# Patient Record
Sex: FEMALE | Race: WHITE | NOT HISPANIC OR LATINO
[De-identification: names, ages, dates, MRNs, and addresses within clinical notes are randomized per-mention and may not be internally consistent; named-entity substitution may affect disease eponyms.]

---

## 2015-08-31 RX ORDER — ASPIRIN/CALCIUM CARB/MAGNESIUM 324 MG
1 TABLET ORAL
Qty: 0 | Refills: 0 | DISCHARGE
Start: 2015-08-31

## 2017-01-04 ENCOUNTER — RX RENEWAL (OUTPATIENT)
Age: 67
End: 2017-01-04

## 2017-01-09 ENCOUNTER — NON-APPOINTMENT (OUTPATIENT)
Age: 67
End: 2017-01-09

## 2017-01-09 ENCOUNTER — APPOINTMENT (OUTPATIENT)
Dept: CARDIOLOGY | Facility: CLINIC | Age: 67
End: 2017-01-09

## 2017-01-09 VITALS
DIASTOLIC BLOOD PRESSURE: 62 MMHG | BODY MASS INDEX: 34.31 KG/M2 | SYSTOLIC BLOOD PRESSURE: 110 MMHG | OXYGEN SATURATION: 95 % | HEIGHT: 64 IN | HEART RATE: 82 BPM | WEIGHT: 201 LBS

## 2017-01-10 LAB — INR PPP: 1.7 RATIO

## 2017-01-23 ENCOUNTER — APPOINTMENT (OUTPATIENT)
Dept: CARDIOLOGY | Facility: CLINIC | Age: 67
End: 2017-01-23

## 2017-01-27 ENCOUNTER — APPOINTMENT (OUTPATIENT)
Dept: CARDIOLOGY | Facility: CLINIC | Age: 67
End: 2017-01-27

## 2017-01-27 LAB — INR PPP: 2.4 RATIO

## 2017-02-03 ENCOUNTER — APPOINTMENT (OUTPATIENT)
Dept: CARDIOLOGY | Facility: CLINIC | Age: 67
End: 2017-02-03

## 2017-02-06 ENCOUNTER — RX RENEWAL (OUTPATIENT)
Age: 67
End: 2017-02-06

## 2017-02-08 ENCOUNTER — APPOINTMENT (OUTPATIENT)
Dept: CARDIOLOGY | Facility: CLINIC | Age: 67
End: 2017-02-08

## 2017-02-08 LAB — INR PPP: 2.5 RATIO

## 2017-02-13 ENCOUNTER — RX RENEWAL (OUTPATIENT)
Age: 67
End: 2017-02-13

## 2017-02-23 ENCOUNTER — RX RENEWAL (OUTPATIENT)
Age: 67
End: 2017-02-23

## 2017-03-03 ENCOUNTER — APPOINTMENT (OUTPATIENT)
Dept: CARDIOLOGY | Facility: CLINIC | Age: 67
End: 2017-03-03

## 2017-03-06 LAB — INR PPP: 2.1 RATIO

## 2017-03-15 ENCOUNTER — APPOINTMENT (OUTPATIENT)
Dept: CARDIOLOGY | Facility: CLINIC | Age: 67
End: 2017-03-15

## 2017-03-15 VITALS — HEART RATE: 82 BPM | HEIGHT: 64 IN | WEIGHT: 201 LBS | BODY MASS INDEX: 34.31 KG/M2

## 2017-03-15 LAB
INR PPP: 2.5 RATIO
QUALITY CONTROL: YES

## 2017-03-20 ENCOUNTER — RX RENEWAL (OUTPATIENT)
Age: 67
End: 2017-03-20

## 2017-04-05 ENCOUNTER — APPOINTMENT (OUTPATIENT)
Dept: CARDIOLOGY | Facility: CLINIC | Age: 67
End: 2017-04-05

## 2017-04-05 VITALS — HEIGHT: 64 IN | BODY MASS INDEX: 34.31 KG/M2 | HEART RATE: 82 BPM | WEIGHT: 201 LBS

## 2017-04-05 LAB
INR PPP: 1.6 RATIO
QUALITY CONTROL: YES

## 2017-04-19 ENCOUNTER — APPOINTMENT (OUTPATIENT)
Dept: CARDIOLOGY | Facility: CLINIC | Age: 67
End: 2017-04-19

## 2017-04-19 VITALS — BODY MASS INDEX: 34.31 KG/M2 | WEIGHT: 201 LBS | HEART RATE: 82 BPM | HEIGHT: 64 IN

## 2017-04-19 LAB
INR PPP: 2 RATIO
QUALITY CONTROL: YES

## 2017-05-03 ENCOUNTER — APPOINTMENT (OUTPATIENT)
Dept: CARDIOLOGY | Facility: CLINIC | Age: 67
End: 2017-05-03

## 2017-05-03 VITALS — HEART RATE: 82 BPM | WEIGHT: 201 LBS | BODY MASS INDEX: 34.31 KG/M2 | HEIGHT: 64 IN

## 2017-05-03 LAB
INR PPP: 3 RATIO
QUALITY CONTROL: YES

## 2017-05-24 ENCOUNTER — APPOINTMENT (OUTPATIENT)
Dept: CARDIOLOGY | Facility: CLINIC | Age: 67
End: 2017-05-24

## 2017-05-25 VITALS — HEIGHT: 64 IN | BODY MASS INDEX: 34.31 KG/M2 | HEART RATE: 82 BPM | WEIGHT: 201 LBS

## 2017-05-25 LAB
INR PPP: 3.3 RATIO
QUALITY CONTROL: YES

## 2017-06-07 ENCOUNTER — APPOINTMENT (OUTPATIENT)
Dept: CARDIOLOGY | Facility: CLINIC | Age: 67
End: 2017-06-07

## 2017-06-07 VITALS — HEIGHT: 64 IN | HEART RATE: 82 BPM | WEIGHT: 201 LBS | BODY MASS INDEX: 34.31 KG/M2

## 2017-06-07 LAB
INR PPP: 2.1 RATIO
QUALITY CONTROL: YES

## 2017-06-28 ENCOUNTER — APPOINTMENT (OUTPATIENT)
Dept: CARDIOLOGY | Facility: CLINIC | Age: 67
End: 2017-06-28

## 2017-06-28 VITALS — HEIGHT: 64 IN | BODY MASS INDEX: 34.31 KG/M2 | HEART RATE: 82 BPM | WEIGHT: 201 LBS

## 2017-06-28 LAB
INR PPP: 2.1 RATIO
QUALITY CONTROL: YES

## 2017-07-05 ENCOUNTER — RX RENEWAL (OUTPATIENT)
Age: 67
End: 2017-07-05

## 2017-07-24 ENCOUNTER — RX RENEWAL (OUTPATIENT)
Age: 67
End: 2017-07-24

## 2017-07-26 ENCOUNTER — APPOINTMENT (OUTPATIENT)
Dept: CARDIOLOGY | Facility: CLINIC | Age: 67
End: 2017-07-26

## 2017-07-31 ENCOUNTER — APPOINTMENT (OUTPATIENT)
Dept: CARDIOLOGY | Facility: CLINIC | Age: 67
End: 2017-07-31
Payer: COMMERCIAL

## 2017-07-31 VITALS — HEIGHT: 64 IN | BODY MASS INDEX: 34.31 KG/M2 | WEIGHT: 201 LBS | HEART RATE: 82 BPM

## 2017-07-31 LAB
INR PPP: 2.1 RATIO
QUALITY CONTROL: YES

## 2017-07-31 PROCEDURE — 85610 PROTHROMBIN TIME: CPT | Mod: QW

## 2017-07-31 PROCEDURE — 99211 OFF/OP EST MAY X REQ PHY/QHP: CPT

## 2017-08-14 ENCOUNTER — RX RENEWAL (OUTPATIENT)
Age: 67
End: 2017-08-14

## 2017-08-16 ENCOUNTER — MEDICATION RENEWAL (OUTPATIENT)
Age: 67
End: 2017-08-16

## 2017-08-22 ENCOUNTER — RX RENEWAL (OUTPATIENT)
Age: 67
End: 2017-08-22

## 2017-09-01 ENCOUNTER — APPOINTMENT (OUTPATIENT)
Dept: CARDIOLOGY | Facility: CLINIC | Age: 67
End: 2017-09-01
Payer: COMMERCIAL

## 2017-09-01 PROCEDURE — 85610 PROTHROMBIN TIME: CPT | Mod: QW

## 2017-09-01 PROCEDURE — 99211 OFF/OP EST MAY X REQ PHY/QHP: CPT

## 2017-09-06 VITALS — HEIGHT: 64 IN | BODY MASS INDEX: 34.31 KG/M2 | WEIGHT: 201 LBS | HEART RATE: 82 BPM

## 2017-09-06 LAB
INR PPP: 2.4 RATIO
QUALITY CONTROL: YES

## 2017-09-25 ENCOUNTER — APPOINTMENT (OUTPATIENT)
Dept: CARDIOLOGY | Facility: CLINIC | Age: 67
End: 2017-09-25
Payer: COMMERCIAL

## 2017-09-25 VITALS — WEIGHT: 201 LBS | HEART RATE: 82 BPM | HEIGHT: 64 IN | BODY MASS INDEX: 34.31 KG/M2

## 2017-09-25 LAB
INR PPP: 2 RATIO
QUALITY CONTROL: YES

## 2017-09-25 PROCEDURE — 99211 OFF/OP EST MAY X REQ PHY/QHP: CPT

## 2017-09-25 PROCEDURE — 85610 PROTHROMBIN TIME: CPT | Mod: QW

## 2017-10-26 ENCOUNTER — APPOINTMENT (OUTPATIENT)
Dept: CARDIOLOGY | Facility: CLINIC | Age: 67
End: 2017-10-26
Payer: COMMERCIAL

## 2017-10-26 ENCOUNTER — NON-APPOINTMENT (OUTPATIENT)
Age: 67
End: 2017-10-26

## 2017-10-26 VITALS — BODY MASS INDEX: 32.44 KG/M2 | HEART RATE: 84 BPM | WEIGHT: 190 LBS | HEIGHT: 64 IN

## 2017-10-26 VITALS
HEART RATE: 84 BPM | DIASTOLIC BLOOD PRESSURE: 80 MMHG | OXYGEN SATURATION: 96 % | SYSTOLIC BLOOD PRESSURE: 125 MMHG | WEIGHT: 190 LBS | BODY MASS INDEX: 32.44 KG/M2 | HEIGHT: 64 IN

## 2017-10-26 LAB
INR PPP: 1.7 RATIO
QUALITY CONTROL: YES

## 2017-10-26 PROCEDURE — 93000 ELECTROCARDIOGRAM COMPLETE: CPT

## 2017-10-26 PROCEDURE — 99214 OFFICE O/P EST MOD 30 MIN: CPT

## 2017-10-26 PROCEDURE — 85610 PROTHROMBIN TIME: CPT | Mod: QW

## 2017-11-08 ENCOUNTER — APPOINTMENT (OUTPATIENT)
Dept: CARDIOLOGY | Facility: CLINIC | Age: 67
End: 2017-11-08
Payer: COMMERCIAL

## 2017-11-08 PROCEDURE — 93283 PRGRMG EVAL IMPLANTABLE DFB: CPT

## 2017-11-20 ENCOUNTER — APPOINTMENT (OUTPATIENT)
Dept: CARDIOLOGY | Facility: CLINIC | Age: 67
End: 2017-11-20
Payer: COMMERCIAL

## 2017-11-20 VITALS — BODY MASS INDEX: 32.44 KG/M2 | WEIGHT: 190 LBS | HEIGHT: 64 IN | HEART RATE: 82 BPM

## 2017-11-20 LAB
INR PPP: 1.7 RATIO
QUALITY CONTROL: YES

## 2017-11-20 PROCEDURE — 85610 PROTHROMBIN TIME: CPT | Mod: QW

## 2017-11-20 PROCEDURE — 99211 OFF/OP EST MAY X REQ PHY/QHP: CPT

## 2017-12-11 ENCOUNTER — APPOINTMENT (OUTPATIENT)
Dept: CARDIOLOGY | Facility: CLINIC | Age: 67
End: 2017-12-11
Payer: COMMERCIAL

## 2017-12-11 PROCEDURE — 85610 PROTHROMBIN TIME: CPT | Mod: QW

## 2017-12-11 PROCEDURE — 99211 OFF/OP EST MAY X REQ PHY/QHP: CPT

## 2017-12-12 VITALS — BODY MASS INDEX: 32.44 KG/M2 | HEIGHT: 64 IN | WEIGHT: 190 LBS | HEART RATE: 82 BPM

## 2017-12-12 LAB
INR PPP: 3.5 RATIO
QUALITY CONTROL: YES

## 2017-12-15 ENCOUNTER — RX RENEWAL (OUTPATIENT)
Age: 67
End: 2017-12-15

## 2017-12-28 ENCOUNTER — APPOINTMENT (OUTPATIENT)
Dept: CARDIOLOGY | Facility: CLINIC | Age: 67
End: 2017-12-28
Payer: COMMERCIAL

## 2017-12-28 PROCEDURE — 85610 PROTHROMBIN TIME: CPT | Mod: QW

## 2017-12-28 PROCEDURE — 99211 OFF/OP EST MAY X REQ PHY/QHP: CPT

## 2017-12-29 VITALS — WEIGHT: 190 LBS | HEIGHT: 64 IN | BODY MASS INDEX: 32.44 KG/M2 | HEART RATE: 82 BPM

## 2018-01-02 LAB
INR PPP: 2.7 RATIO
QUALITY CONTROL: YES

## 2018-01-03 ENCOUNTER — CHART COPY (OUTPATIENT)
Age: 68
End: 2018-01-03

## 2018-01-03 ENCOUNTER — OTHER (OUTPATIENT)
Age: 68
End: 2018-01-03

## 2018-01-23 ENCOUNTER — RX RENEWAL (OUTPATIENT)
Age: 68
End: 2018-01-23

## 2018-01-24 ENCOUNTER — APPOINTMENT (OUTPATIENT)
Dept: CARDIOLOGY | Facility: CLINIC | Age: 68
End: 2018-01-24
Payer: COMMERCIAL

## 2018-01-24 VITALS — WEIGHT: 190 LBS | HEIGHT: 64 IN | BODY MASS INDEX: 32.44 KG/M2 | HEART RATE: 82 BPM

## 2018-01-24 LAB
INR PPP: 2.3 RATIO
QUALITY CONTROL: YES

## 2018-01-24 PROCEDURE — 99211 OFF/OP EST MAY X REQ PHY/QHP: CPT

## 2018-01-24 PROCEDURE — 85610 PROTHROMBIN TIME: CPT | Mod: QW

## 2018-02-01 ENCOUNTER — RX RENEWAL (OUTPATIENT)
Age: 68
End: 2018-02-01

## 2018-02-19 ENCOUNTER — RX RENEWAL (OUTPATIENT)
Age: 68
End: 2018-02-19

## 2018-02-23 ENCOUNTER — APPOINTMENT (OUTPATIENT)
Dept: CARDIOLOGY | Facility: CLINIC | Age: 68
End: 2018-02-23
Payer: COMMERCIAL

## 2018-02-23 VITALS — HEART RATE: 82 BPM | WEIGHT: 190 LBS | BODY MASS INDEX: 32.44 KG/M2 | HEIGHT: 64 IN

## 2018-02-23 LAB
INR PPP: 3.9 RATIO
QUALITY CONTROL: YES

## 2018-02-23 PROCEDURE — 85610 PROTHROMBIN TIME: CPT | Mod: QW

## 2018-02-23 PROCEDURE — 99211 OFF/OP EST MAY X REQ PHY/QHP: CPT

## 2018-03-09 ENCOUNTER — APPOINTMENT (OUTPATIENT)
Dept: CARDIOLOGY | Facility: CLINIC | Age: 68
End: 2018-03-09
Payer: COMMERCIAL

## 2018-03-09 VITALS — HEART RATE: 82 BPM | WEIGHT: 190 LBS | HEIGHT: 64 IN | BODY MASS INDEX: 32.44 KG/M2

## 2018-03-09 PROCEDURE — 99211 OFF/OP EST MAY X REQ PHY/QHP: CPT

## 2018-03-09 PROCEDURE — 85610 PROTHROMBIN TIME: CPT | Mod: QW

## 2018-03-10 LAB
INR PPP: 2.1 RATIO
QUALITY CONTROL: YES

## 2018-03-28 ENCOUNTER — APPOINTMENT (OUTPATIENT)
Dept: CARDIOLOGY | Facility: CLINIC | Age: 68
End: 2018-03-28
Payer: COMMERCIAL

## 2018-03-28 VITALS — HEART RATE: 82 BPM | WEIGHT: 190 LBS | BODY MASS INDEX: 32.44 KG/M2 | HEIGHT: 64 IN

## 2018-03-28 LAB
INR PPP: 1.4 RATIO
QUALITY CONTROL: YES

## 2018-03-28 PROCEDURE — 99211 OFF/OP EST MAY X REQ PHY/QHP: CPT

## 2018-03-28 PROCEDURE — 85610 PROTHROMBIN TIME: CPT | Mod: QW

## 2018-04-13 ENCOUNTER — APPOINTMENT (OUTPATIENT)
Dept: CARDIOLOGY | Facility: CLINIC | Age: 68
End: 2018-04-13
Payer: COMMERCIAL

## 2018-04-13 PROCEDURE — 85610 PROTHROMBIN TIME: CPT | Mod: QW

## 2018-04-13 PROCEDURE — 99211 OFF/OP EST MAY X REQ PHY/QHP: CPT

## 2018-04-15 LAB
INR PPP: 2.9 RATIO
QUALITY CONTROL: YES

## 2018-05-03 ENCOUNTER — APPOINTMENT (OUTPATIENT)
Dept: CARDIOLOGY | Facility: CLINIC | Age: 68
End: 2018-05-03

## 2018-05-04 ENCOUNTER — APPOINTMENT (OUTPATIENT)
Dept: CARDIOLOGY | Facility: CLINIC | Age: 68
End: 2018-05-04
Payer: COMMERCIAL

## 2018-05-04 VITALS — WEIGHT: 190 LBS | HEART RATE: 82 BPM | BODY MASS INDEX: 32.44 KG/M2 | HEIGHT: 64 IN

## 2018-05-04 LAB
INR PPP: 2.4 RATIO
QUALITY CONTROL: YES

## 2018-05-04 PROCEDURE — 99211 OFF/OP EST MAY X REQ PHY/QHP: CPT

## 2018-05-04 PROCEDURE — 85610 PROTHROMBIN TIME: CPT | Mod: QW

## 2018-05-16 ENCOUNTER — EMERGENCY (EMERGENCY)
Facility: HOSPITAL | Age: 68
LOS: 1 days | Discharge: HOME | End: 2018-05-16
Attending: STUDENT IN AN ORGANIZED HEALTH CARE EDUCATION/TRAINING PROGRAM | Admitting: STUDENT IN AN ORGANIZED HEALTH CARE EDUCATION/TRAINING PROGRAM
Payer: COMMERCIAL

## 2018-05-16 VITALS
DIASTOLIC BLOOD PRESSURE: 85 MMHG | SYSTOLIC BLOOD PRESSURE: 124 MMHG | RESPIRATION RATE: 20 BRPM | HEART RATE: 78 BPM | TEMPERATURE: 98 F | OXYGEN SATURATION: 100 %

## 2018-05-16 VITALS
DIASTOLIC BLOOD PRESSURE: 55 MMHG | SYSTOLIC BLOOD PRESSURE: 96 MMHG | RESPIRATION RATE: 20 BRPM | OXYGEN SATURATION: 98 % | HEART RATE: 70 BPM | TEMPERATURE: 98 F

## 2018-05-16 LAB
ALBUMIN SERPL ELPH-MCNC: 3.4 G/DL — SIGNIFICANT CHANGE UP (ref 3.3–5)
ALP SERPL-CCNC: 69 U/L — SIGNIFICANT CHANGE UP (ref 40–120)
ALT FLD-CCNC: 25 U/L — SIGNIFICANT CHANGE UP (ref 12–78)
ANION GAP SERPL CALC-SCNC: 8 MMOL/L — SIGNIFICANT CHANGE UP (ref 5–17)
APTT BLD: 43.8 SEC — HIGH (ref 27.5–37.4)
AST SERPL-CCNC: 28 U/L — SIGNIFICANT CHANGE UP (ref 15–37)
BASOPHILS # BLD AUTO: 0.1 K/UL — SIGNIFICANT CHANGE UP (ref 0–0.2)
BASOPHILS NFR BLD AUTO: 0.7 % — SIGNIFICANT CHANGE UP (ref 0–2)
BILIRUB SERPL-MCNC: 0.9 MG/DL — SIGNIFICANT CHANGE UP (ref 0.2–1.2)
BUN SERPL-MCNC: 22 MG/DL — SIGNIFICANT CHANGE UP (ref 7–23)
CALCIUM SERPL-MCNC: 8.5 MG/DL — SIGNIFICANT CHANGE UP (ref 8.5–10.1)
CHLORIDE SERPL-SCNC: 106 MMOL/L — SIGNIFICANT CHANGE UP (ref 96–108)
CK MB BLD-MCNC: 2.7 % — SIGNIFICANT CHANGE UP (ref 0–3.5)
CK MB CFR SERPL CALC: 2.6 NG/ML — SIGNIFICANT CHANGE UP (ref 0–3.6)
CK SERPL-CCNC: 96 U/L — SIGNIFICANT CHANGE UP (ref 26–192)
CO2 SERPL-SCNC: 29 MMOL/L — SIGNIFICANT CHANGE UP (ref 22–31)
CREAT SERPL-MCNC: 1.2 MG/DL — SIGNIFICANT CHANGE UP (ref 0.5–1.3)
EOSINOPHIL # BLD AUTO: 0.1 K/UL — SIGNIFICANT CHANGE UP (ref 0–0.5)
EOSINOPHIL NFR BLD AUTO: 1.9 % — SIGNIFICANT CHANGE UP (ref 0–6)
GLUCOSE SERPL-MCNC: 144 MG/DL — HIGH (ref 70–99)
HCT VFR BLD CALC: 40.6 % — SIGNIFICANT CHANGE UP (ref 34.5–45)
HGB BLD-MCNC: 13.1 G/DL — SIGNIFICANT CHANGE UP (ref 11.5–15.5)
INR BLD: 2.86 RATIO — HIGH (ref 0.88–1.16)
LYMPHOCYTES # BLD AUTO: 1.2 K/UL — SIGNIFICANT CHANGE UP (ref 1–3.3)
LYMPHOCYTES # BLD AUTO: 15 % — SIGNIFICANT CHANGE UP (ref 13–44)
MCHC RBC-ENTMCNC: 28.9 PG — SIGNIFICANT CHANGE UP (ref 27–34)
MCHC RBC-ENTMCNC: 32.2 GM/DL — SIGNIFICANT CHANGE UP (ref 32–36)
MCV RBC AUTO: 89.6 FL — SIGNIFICANT CHANGE UP (ref 80–100)
MONOCYTES # BLD AUTO: 0.5 K/UL — SIGNIFICANT CHANGE UP (ref 0–0.9)
MONOCYTES NFR BLD AUTO: 6.9 % — SIGNIFICANT CHANGE UP (ref 1–9)
NEUTROPHILS # BLD AUTO: 5.9 K/UL — SIGNIFICANT CHANGE UP (ref 1.8–7.4)
NEUTROPHILS NFR BLD AUTO: 75.6 % — SIGNIFICANT CHANGE UP (ref 43–77)
NT-PROBNP SERPL-SCNC: 2676 PG/ML — HIGH (ref 0–125)
PLATELET # BLD AUTO: 107 K/UL — LOW (ref 150–400)
POTASSIUM SERPL-MCNC: 3.7 MMOL/L — SIGNIFICANT CHANGE UP (ref 3.5–5.3)
POTASSIUM SERPL-SCNC: 3.7 MMOL/L — SIGNIFICANT CHANGE UP (ref 3.5–5.3)
PROT SERPL-MCNC: 6.8 G/DL — SIGNIFICANT CHANGE UP (ref 6–8.3)
PROTHROM AB SERPL-ACNC: 31.8 SEC — HIGH (ref 9.8–12.7)
RBC # BLD: 4.53 M/UL — SIGNIFICANT CHANGE UP (ref 3.8–5.2)
RBC # FLD: 14.2 % — SIGNIFICANT CHANGE UP (ref 10.3–14.5)
SODIUM SERPL-SCNC: 143 MMOL/L — SIGNIFICANT CHANGE UP (ref 135–145)
TROPONIN I SERPL-MCNC: 0.03 NG/ML — SIGNIFICANT CHANGE UP (ref 0.01–0.04)
WBC # BLD: 7.8 K/UL — SIGNIFICANT CHANGE UP (ref 3.8–10.5)
WBC # FLD AUTO: 7.8 K/UL — SIGNIFICANT CHANGE UP (ref 3.8–10.5)

## 2018-05-16 PROCEDURE — 99284 EMERGENCY DEPT VISIT MOD MDM: CPT | Mod: 25

## 2018-05-16 PROCEDURE — 71045 X-RAY EXAM CHEST 1 VIEW: CPT

## 2018-05-16 PROCEDURE — 85610 PROTHROMBIN TIME: CPT

## 2018-05-16 PROCEDURE — 83880 ASSAY OF NATRIURETIC PEPTIDE: CPT

## 2018-05-16 PROCEDURE — 71045 X-RAY EXAM CHEST 1 VIEW: CPT | Mod: 26

## 2018-05-16 PROCEDURE — 93005 ELECTROCARDIOGRAM TRACING: CPT

## 2018-05-16 PROCEDURE — 84484 ASSAY OF TROPONIN QUANT: CPT

## 2018-05-16 PROCEDURE — 99285 EMERGENCY DEPT VISIT HI MDM: CPT

## 2018-05-16 PROCEDURE — 80053 COMPREHEN METABOLIC PANEL: CPT

## 2018-05-16 PROCEDURE — 82550 ASSAY OF CK (CPK): CPT

## 2018-05-16 PROCEDURE — 96374 THER/PROPH/DIAG INJ IV PUSH: CPT

## 2018-05-16 PROCEDURE — 93010 ELECTROCARDIOGRAM REPORT: CPT

## 2018-05-16 PROCEDURE — 85730 THROMBOPLASTIN TIME PARTIAL: CPT

## 2018-05-16 PROCEDURE — 85027 COMPLETE CBC AUTOMATED: CPT

## 2018-05-16 PROCEDURE — 82553 CREATINE MB FRACTION: CPT

## 2018-05-16 RX ORDER — ASPIRIN/CALCIUM CARB/MAGNESIUM 324 MG
325 TABLET ORAL ONCE
Qty: 0 | Refills: 0 | Status: COMPLETED | OUTPATIENT
Start: 2018-05-16 | End: 2018-05-16

## 2018-05-16 RX ORDER — SODIUM CHLORIDE 9 MG/ML
3 INJECTION INTRAMUSCULAR; INTRAVENOUS; SUBCUTANEOUS ONCE
Qty: 0 | Refills: 0 | Status: COMPLETED | OUTPATIENT
Start: 2018-05-16 | End: 2018-05-16

## 2018-05-16 RX ORDER — FUROSEMIDE 40 MG
40 TABLET ORAL ONCE
Qty: 0 | Refills: 0 | Status: COMPLETED | OUTPATIENT
Start: 2018-05-16 | End: 2018-05-16

## 2018-05-16 RX ADMIN — SODIUM CHLORIDE 3 MILLILITER(S): 9 INJECTION INTRAMUSCULAR; INTRAVENOUS; SUBCUTANEOUS at 01:45

## 2018-05-16 RX ADMIN — Medication 40 MILLIGRAM(S): at 01:45

## 2018-05-16 RX ADMIN — Medication 325 MILLIGRAM(S): at 02:34

## 2018-05-16 NOTE — ED PROVIDER NOTE - PROGRESS NOTE DETAILS
Patient feels better. Offered admission for CHF exacerbation.  Patient refused.  States she does not want to be admitted until seen by Dr. Curtis this morning Patient seen by monica Avalos to send home. No need for 2nd set CE. She will increase her dose of diuretic. Patient aware and agreeable with plan

## 2018-05-16 NOTE — ED PROVIDER NOTE - PMH
Arrhythmia    Benign Hypertension    CAD (Coronary Artery Disease)    Cerebellar Infarction    CHF (congestive heart failure)    CVA (Cerebral Vascular Accident)    Diabetes Mellitus, Type 2    Former smoker    HLD (hyperlipidemia)    Hyperthyroidism    MI (myocardial infarction)    Obesity    TIA (Transient Ischemic Attack)    Unsteady gait

## 2018-05-16 NOTE — ED ADULT NURSE NOTE - OBJECTIVE STATEMENT
received pt stable with c/o chest pressure and sob pt placed oncardiac monitor and ekg done and reviwed by MD xray done and blood work drawned  and sent to lab pt medicated with  lasix 40mg ivp as ordered

## 2018-05-16 NOTE — CONSULT NOTE ADULT - ASSESSMENT
Lisa presents with subacute onset of edema and dyspnea likely due in large part to dietary indiscretion from her multiple stressors. Her pacemaker/ICD is functioning well and she has known underlying atrial fibrillation. She is no evidence for an acute coronary syndrome or significant acute decompensated heart failure. She can be discharged home and her torsemide dose will be increased to 80 mg daily for several days. She will followup in the office later this week. No cardiac evaluation is necessary at this time and counseling were present a greater than 50% of her visit which was 50 minutes in duration

## 2018-05-16 NOTE — ED PROVIDER NOTE - MUSCULOSKELETAL, MLM
Spine appears normal, range of motion is not limited, no muscle or joint tenderness, b/l LE pitting edema 1+, L>R

## 2018-05-16 NOTE — CONSULT NOTE ADULT - SUBJECTIVE AND OBJECTIVE BOX
[facility] Consultation    CHIEF COMPLAINT: Patient is a 67y old  Female who presents with a chief complaint of edema and SOB    HPI:  Patient came to the emergency room after not feeling well for several days. She has had some mild increasing dyspnea but has noted edema of her legs which has been getting worse over the last 2 weeks. She reports being noncompliant with a sodium restricted diet will stressors at home including her 's hospitalization and her children living in her house. She works regularly and has been able to do her normal activity but is more fatigued at the end of the day. She reports no anginal chest pain, palpitations, lightheadedness, or syncope.    Past medical history is remarkable for coronary artery disease status post myocardial infarction, ischemic cardiomyopathy, ICD, PAF, stroke syndrome with intracranial hemorrhage several years ago, recently doing well       PAST MEDICAL & SURGICAL HISTORY:  Unsteady gait  CHF (congestive heart failure)  Arrhythmia  HLD (hyperlipidemia)  MI (myocardial infarction)  Obesity  Former smoker  Hyperthyroidism  TIA (Transient Ischemic Attack)  Cerebellar Infarction  CVA (Cerebral Vascular Accident)  Diabetes Mellitus, Type 2  CAD (Coronary Artery Disease)  Benign Hypertension  ICD  Cardiac Pacemaker  S/P Hysterectomy  S/P Ventriculoperitoneal Shunt  S/P Craniotomy  CABG (Coronary Artery Bypass Graft)  CABG (Coronary Artery Bypass Graft)      SOCIAL HISTORY: no tob/etoh    FAMILY HISTORY: no CAD, CHF  Family history of heart disease      MEDICATIONS  (STANDING): Aspirin, carvedilol, Demadex, Aldactone, Zocor, insulin, warfarin, Tapazole, digoxin    Allergies    No Known Allergies    REVIEW OF SYSTEMS:    CONSTITUTIONAL: No fevers or chills; pos fatigue  EYES: No visual changes, No diplopia  ENMT: No throat pain , No exudate  NECK: No pain or stiffness  RESPIRATORY: No cough, wheezing, hemoptysis; pos shortness of breath  CARDIOVASCULAR: No chest pain or palpitations  GASTROINTESTINAL: No abdominal pain. No nausea, vomiting, or hematemesis; No diarrhea or constipation. No melena or hematochezia.  GENITOURINARY: No dysuria, frequency or hematuria  NEUROLOGICAL: No numbness or weakness  SKIN: No itching or rash  All other review of systems is negative unless indicated above    VITAL SIGNS:   Vital Signs Last 24 Hrs  T(C): 36.4 (16 May 2018 07:20), Max: 36.8 (16 May 2018 06:09)  T(F): 97.5 (16 May 2018 07:20), Max: 98.2 (16 May 2018 06:09)  HR: 70 (16 May 2018 07:20) (70 - 78)  BP: 96/55 (16 May 2018 07:20) (96/55 - 124/85)  BP(mean): --  RR: 20 (16 May 2018 07:20) (20 - 22)  SpO2: 98% (16 May 2018 07:20) (97% - 100%)    I&O's Summary      PHYSICAL EXAM:    Constitutional: NAD, awake and alert, well-developed  Eyes:  EOMI,  Pupils round, no lesions  ENMT: no exudate or erythema  Pulmonary: Non-labored, breath sounds are clear bilaterally, No wheezing, rales or rhonchi  Cardiovascular: PMI not palpable Regular S1 and S2, no murmurs, rubs, gallops or clicks  Gastrointestinal: Bowel Sounds present, soft, nontender.   Lymph:  No cervical lymphadenopathy.  Neurological: Alert, no focal deficits  Skin: No rashes. Changes of chronic venous stasis. No cyanosis.  Psych:  Mood & affect appropriate  Ext: 1+ bilat leg edema    LABS: All Labs Reviewed:                        13.1   7.8   )-----------( 107      ( 16 May 2018 01:56 )             40.6     16 May 2018 01:56    143    |  106    |  22     ----------------------------<  144    3.7     |  29     |  1.20     Ca    8.5        16 May 2018 01:56    TPro  6.8    /  Alb  3.4    /  TBili  0.9    /  DBili  x      /  AST  28     /  ALT  25     /  AlkPhos  69     16 May 2018 01:56    PT/INR - ( 16 May 2018 01:56 )   PT: 31.8 sec;   INR: 2.86 ratio         PTT - ( 16 May 2018 01:56 )  PTT:43.8 sec  CARDIAC MARKERS ( 16 May 2018 01:56 )  .025 ng/mL / x     / 96 U/L / x     / 2.6 ng/mL        05-16 @ 01:56  Pro Bnp [id number]    ECG: AF, v paced      < from: Xray Chest 1 View AP/PA (05.16.18 @ 02:04) >    EXAM:  XR CHEST AP OR PA 1V                            PROCEDURE DATE:  05/16/2018          INTERPRETATION:  Chest pain.    AP chest. Prior 5/21/2016.    Status post median sternotomy. Left cardiac pacer in situ. Heart probably   enlarged even accounting for AP film. No consolidation or effusion.    Impression: As above                BEN ESTRELLA M.D., ATTENDING RADIOLOGIST  This document has been electronically signed. May 16 2018  9:15AM                < end of copied text >

## 2018-05-16 NOTE — ED PROVIDER NOTE - CHPI ED SYMPTOMS NEG
no chills/no diaphoresis/no vomiting/no fever/no dizziness/no cough/no back pain/no nausea/no syncope

## 2018-05-16 NOTE — ED ADULT NURSE REASSESSMENT NOTE - NS ED NURSE REASSESS COMMENT FT1
pt stable no c/o voiced at present and voided 200mls of urine vital signs stable
pt reavaluated and vital signs stable voided 600MLS of clear urine awaiting CE to be drawned at 8 am pt verbalizes no disconfort at present

## 2018-05-16 NOTE — ED PROVIDER NOTE - OBJECTIVE STATEMENT
67 year old female with extensive PMH including CHF, IDDM, CAD with 2 stents, TIA, CVA presents with chest pain and SOB. Patient states she has been increasingly fatigued for the last few days and she has noticed worsening b/l LE edema L>R despite compliance with aldactone.  Last night while at rest, she experienced SOB and mild left-sided chest pressure.  She walked up a flight of stairs which did not worsen the symptoms but they persisted.  Took ASA 81mg at 10:30pm last night.  Symptoms improved when EMS administered O2 via nasal canula.  She is not on O2 at home. PMD Leonel Mosqueda, Cardiology Dr. Curtis

## 2018-05-23 ENCOUNTER — APPOINTMENT (OUTPATIENT)
Dept: CARDIOLOGY | Facility: CLINIC | Age: 68
End: 2018-05-23
Payer: COMMERCIAL

## 2018-05-23 PROCEDURE — 93283 PRGRMG EVAL IMPLANTABLE DFB: CPT

## 2018-06-04 ENCOUNTER — APPOINTMENT (OUTPATIENT)
Dept: CARDIOLOGY | Facility: CLINIC | Age: 68
End: 2018-06-04
Payer: COMMERCIAL

## 2018-06-04 ENCOUNTER — NON-APPOINTMENT (OUTPATIENT)
Age: 68
End: 2018-06-04

## 2018-06-04 VITALS
DIASTOLIC BLOOD PRESSURE: 76 MMHG | HEIGHT: 64 IN | BODY MASS INDEX: 34.31 KG/M2 | OXYGEN SATURATION: 93 % | SYSTOLIC BLOOD PRESSURE: 120 MMHG | WEIGHT: 201 LBS | HEART RATE: 84 BPM

## 2018-06-04 VITALS — RESPIRATION RATE: 12 BRPM | HEART RATE: 65 BPM | SYSTOLIC BLOOD PRESSURE: 110 MMHG | DIASTOLIC BLOOD PRESSURE: 70 MMHG

## 2018-06-04 LAB
INR PPP: 3 RATIO
QUALITY CONTROL: YES

## 2018-06-04 PROCEDURE — 99214 OFFICE O/P EST MOD 30 MIN: CPT

## 2018-06-04 PROCEDURE — 93000 ELECTROCARDIOGRAM COMPLETE: CPT

## 2018-06-15 ENCOUNTER — OTHER (OUTPATIENT)
Age: 68
End: 2018-06-15

## 2018-06-22 ENCOUNTER — APPOINTMENT (OUTPATIENT)
Dept: CARDIOLOGY | Facility: CLINIC | Age: 68
End: 2018-06-22
Payer: COMMERCIAL

## 2018-06-22 PROCEDURE — 93306 TTE W/DOPPLER COMPLETE: CPT

## 2018-06-25 ENCOUNTER — APPOINTMENT (OUTPATIENT)
Dept: CARDIOLOGY | Facility: CLINIC | Age: 68
End: 2018-06-25

## 2018-06-26 ENCOUNTER — APPOINTMENT (OUTPATIENT)
Dept: CARDIOLOGY | Facility: CLINIC | Age: 68
End: 2018-06-26

## 2018-06-26 ENCOUNTER — EMERGENCY (EMERGENCY)
Facility: HOSPITAL | Age: 68
LOS: 1 days | Discharge: HOME | End: 2018-06-26
Attending: EMERGENCY MEDICINE
Payer: COMMERCIAL

## 2018-06-26 VITALS
TEMPERATURE: 98 F | RESPIRATION RATE: 16 BRPM | HEART RATE: 71 BPM | WEIGHT: 190.04 LBS | HEIGHT: 64 IN | SYSTOLIC BLOOD PRESSURE: 114 MMHG | OXYGEN SATURATION: 97 % | DIASTOLIC BLOOD PRESSURE: 81 MMHG

## 2018-06-26 VITALS
TEMPERATURE: 98 F | HEART RATE: 70 BPM | SYSTOLIC BLOOD PRESSURE: 107 MMHG | DIASTOLIC BLOOD PRESSURE: 71 MMHG | OXYGEN SATURATION: 97 % | RESPIRATION RATE: 16 BRPM

## 2018-06-26 LAB
ALBUMIN SERPL ELPH-MCNC: 3.5 G/DL — SIGNIFICANT CHANGE UP (ref 3.3–5)
ALP SERPL-CCNC: 77 U/L — SIGNIFICANT CHANGE UP (ref 40–120)
ALT FLD-CCNC: 23 U/L — SIGNIFICANT CHANGE UP (ref 12–78)
ANION GAP SERPL CALC-SCNC: 8 MMOL/L — SIGNIFICANT CHANGE UP (ref 5–17)
AST SERPL-CCNC: 26 U/L — SIGNIFICANT CHANGE UP (ref 15–37)
BASOPHILS # BLD AUTO: 0.06 K/UL — SIGNIFICANT CHANGE UP (ref 0–0.2)
BASOPHILS NFR BLD AUTO: 0.9 % — SIGNIFICANT CHANGE UP (ref 0–2)
BILIRUB SERPL-MCNC: 0.8 MG/DL — SIGNIFICANT CHANGE UP (ref 0.2–1.2)
BUN SERPL-MCNC: 28 MG/DL — HIGH (ref 7–23)
CALCIUM SERPL-MCNC: 9 MG/DL — SIGNIFICANT CHANGE UP (ref 8.5–10.1)
CHLORIDE SERPL-SCNC: 106 MMOL/L — SIGNIFICANT CHANGE UP (ref 96–108)
CK MB BLD-MCNC: 3.5 % — SIGNIFICANT CHANGE UP (ref 0–3.5)
CK MB CFR SERPL CALC: 3.2 NG/ML — SIGNIFICANT CHANGE UP (ref 0–3.6)
CK SERPL-CCNC: 92 U/L — SIGNIFICANT CHANGE UP (ref 26–192)
CO2 SERPL-SCNC: 29 MMOL/L — SIGNIFICANT CHANGE UP (ref 22–31)
CREAT SERPL-MCNC: 1.4 MG/DL — HIGH (ref 0.5–1.3)
EOSINOPHIL # BLD AUTO: 0.14 K/UL — SIGNIFICANT CHANGE UP (ref 0–0.5)
EOSINOPHIL NFR BLD AUTO: 2.1 % — SIGNIFICANT CHANGE UP (ref 0–6)
GLUCOSE SERPL-MCNC: 150 MG/DL — HIGH (ref 70–99)
HCT VFR BLD CALC: 42.7 % — SIGNIFICANT CHANGE UP (ref 34.5–45)
HGB BLD-MCNC: 13.5 G/DL — SIGNIFICANT CHANGE UP (ref 11.5–15.5)
IMM GRANULOCYTES NFR BLD AUTO: 0.2 % — SIGNIFICANT CHANGE UP (ref 0–1.5)
INR PPP: 2.86
LYMPHOCYTES # BLD AUTO: 0.99 K/UL — LOW (ref 1–3.3)
LYMPHOCYTES # BLD AUTO: 14.9 % — SIGNIFICANT CHANGE UP (ref 13–44)
MCHC RBC-ENTMCNC: 28.7 PG — SIGNIFICANT CHANGE UP (ref 27–34)
MCHC RBC-ENTMCNC: 31.6 GM/DL — LOW (ref 32–36)
MCV RBC AUTO: 90.7 FL — SIGNIFICANT CHANGE UP (ref 80–100)
MONOCYTES # BLD AUTO: 0.54 K/UL — SIGNIFICANT CHANGE UP (ref 0–0.9)
MONOCYTES NFR BLD AUTO: 8.1 % — SIGNIFICANT CHANGE UP (ref 2–14)
NEUTROPHILS # BLD AUTO: 4.91 K/UL — SIGNIFICANT CHANGE UP (ref 1.8–7.4)
NEUTROPHILS NFR BLD AUTO: 73.8 % — SIGNIFICANT CHANGE UP (ref 43–77)
NRBC # BLD: 0 /100 WBCS — SIGNIFICANT CHANGE UP (ref 0–0)
NT-PROBNP SERPL-SCNC: 3017 PG/ML — HIGH (ref 0–125)
PLATELET # BLD AUTO: 92 K/UL — LOW (ref 150–400)
POTASSIUM SERPL-MCNC: 3.9 MMOL/L — SIGNIFICANT CHANGE UP (ref 3.5–5.3)
POTASSIUM SERPL-SCNC: 3.9 MMOL/L — SIGNIFICANT CHANGE UP (ref 3.5–5.3)
PROT SERPL-MCNC: 7.1 G/DL — SIGNIFICANT CHANGE UP (ref 6–8.3)
RBC # BLD: 4.71 M/UL — SIGNIFICANT CHANGE UP (ref 3.8–5.2)
RBC # FLD: 15.2 % — HIGH (ref 10.3–14.5)
SODIUM SERPL-SCNC: 143 MMOL/L — SIGNIFICANT CHANGE UP (ref 135–145)
TROPONIN I SERPL-MCNC: 0.02 NG/ML — SIGNIFICANT CHANGE UP (ref 0.01–0.04)
WBC # BLD: 6.65 K/UL — SIGNIFICANT CHANGE UP (ref 3.8–10.5)
WBC # FLD AUTO: 6.65 K/UL — SIGNIFICANT CHANGE UP (ref 3.8–10.5)

## 2018-06-26 PROCEDURE — 82553 CREATINE MB FRACTION: CPT

## 2018-06-26 PROCEDURE — 85027 COMPLETE CBC AUTOMATED: CPT

## 2018-06-26 PROCEDURE — 99285 EMERGENCY DEPT VISIT HI MDM: CPT

## 2018-06-26 PROCEDURE — 83880 ASSAY OF NATRIURETIC PEPTIDE: CPT

## 2018-06-26 PROCEDURE — 93005 ELECTROCARDIOGRAM TRACING: CPT

## 2018-06-26 PROCEDURE — 99284 EMERGENCY DEPT VISIT MOD MDM: CPT | Mod: 25

## 2018-06-26 PROCEDURE — 71045 X-RAY EXAM CHEST 1 VIEW: CPT | Mod: 26

## 2018-06-26 PROCEDURE — 71045 X-RAY EXAM CHEST 1 VIEW: CPT

## 2018-06-26 PROCEDURE — 80053 COMPREHEN METABOLIC PANEL: CPT

## 2018-06-26 PROCEDURE — 96374 THER/PROPH/DIAG INJ IV PUSH: CPT

## 2018-06-26 PROCEDURE — 82550 ASSAY OF CK (CPK): CPT

## 2018-06-26 PROCEDURE — 84484 ASSAY OF TROPONIN QUANT: CPT

## 2018-06-26 PROCEDURE — 99285 EMERGENCY DEPT VISIT HI MDM: CPT | Mod: 25

## 2018-06-26 PROCEDURE — 93010 ELECTROCARDIOGRAM REPORT: CPT

## 2018-06-26 RX ORDER — FUROSEMIDE 40 MG
40 TABLET ORAL ONCE
Qty: 0 | Refills: 0 | Status: COMPLETED | OUTPATIENT
Start: 2018-06-26 | End: 2018-06-26

## 2018-06-26 RX ORDER — ASPIRIN/CALCIUM CARB/MAGNESIUM 324 MG
325 TABLET ORAL ONCE
Qty: 0 | Refills: 0 | Status: COMPLETED | OUTPATIENT
Start: 2018-06-26 | End: 2018-06-26

## 2018-06-26 RX ORDER — SODIUM CHLORIDE 9 MG/ML
3 INJECTION INTRAMUSCULAR; INTRAVENOUS; SUBCUTANEOUS ONCE
Qty: 0 | Refills: 0 | Status: COMPLETED | OUTPATIENT
Start: 2018-06-26 | End: 2018-06-26

## 2018-06-26 RX ADMIN — Medication 325 MILLIGRAM(S): at 05:25

## 2018-06-26 RX ADMIN — Medication 40 MILLIGRAM(S): at 08:12

## 2018-06-26 RX ADMIN — SODIUM CHLORIDE 3 MILLILITER(S): 9 INJECTION INTRAMUSCULAR; INTRAVENOUS; SUBCUTANEOUS at 05:26

## 2018-06-26 NOTE — ED PROVIDER NOTE - OBJECTIVE STATEMENT
67yo female bib ems with sob for several weeks. pt has been followed by cardiologist dr hernadez and sundeep, was told to increase her diuretic twice a week and had echo friday and she is still feeling sob worse with exertion, no chest pain, no cough, fever, chills, no other complaints 67yo female bib ems with sob for several weeks. pt has been followed by cardiologist dr hernadez and sundeep, was told to increase her diuretic twice a week and had echo Friday and she is still feeling sob worse with exertion, no chest pain, no cough, fever, chills, no other complaints

## 2018-06-26 NOTE — CONSULT NOTE ADULT - SUBJECTIVE AND OBJECTIVE BOX
[facility] - Ramy Solis, Julia Hernadez, José Miguel Rivera Savella  Office Number: [phone]    Initial Consult Note    CHIEF COMPLAINT: Patient is a 68y old  Female who presents with a chief complaint of shortness of breath    HPI:   69yo female bib ems with sob for several weeks. pt has been followed by cardiologist dr hernadez and julia, was told to increase her diuretic twice a week and had echo friday and she is still feeling sob worse with exertion, no chest pain, no cough, fever, chills, no other complaints.    Lisa came to the emergency room after not feeling well for several days. She has had some mild increasing dyspnea but has noted edema of her legs which has been getting worse over the last few weeks. She reports being compliant with a sodium restricted diet though has stress at home given her husbands stint in rehab. she works regularly and has been able to do her normal activity but is more fatigued at the end of the day. She reports no anginal chest pain, palpitations, lightheadedness, or syncope.  She has been told to double her torsemide dose 2x per week.   She had an echo in our office on Friday; the official result is not available but she was told that there were no significant changes, except for some pulmonary hypertension.    Past medical history is remarkable for coronary artery disease status post myocardial infarction, s/p CABG, ischemic cardiomyopathy, ICD, PAF, stroke syndrome with intracranial hemorrhage several years ago, recently doing well     PAST MEDICAL & SURGICAL HISTORY:  Unsteady gait  CHF (congestive heart failure)  Arrhythmia  HLD (hyperlipidemia)  MI (myocardial infarction)  Obesity  Former smoker  Hyperthyroidism  TIA (Transient Ischemic Attack)  Cerebellar Infarction  CVA (Cerebral Vascular Accident)  Diabetes Mellitus, Type 2  CAD (Coronary Artery Disease)  Benign Hypertension  ICD  Cardiac Pacemaker  S/P Hysterectomy  S/P Ventriculoperitoneal Shunt  S/P Craniotomy  CABG (Coronary Artery Bypass Graft)  CABG (Coronary Artery Bypass Graft)      SOCIAL HISTORY:  No tobacco, ethanol, or drug abuse.    FAMILY HISTORY:  Family history of heart disease    No family history of acute MI or sudden cardiac death.    MEDICATIONS  (STANDING):  furosemide   Injectable 40 milliGRAM(s) IV Push Once    MEDICATIONS  (PRN):      Allergies    No Known Allergies    Intolerances        REVIEW OF SYSTEMS:    CONSTITUTIONAL: + weakness, no fevers or chills  EYES/ENT: No visual changes;  No vertigo or throat pain   NECK: No pain or stiffness  RESPIRATORY: No cough, wheezing, hemoptysis; + shortness of breath  CARDIOVASCULAR: No chest pain or palpitations  GASTROINTESTINAL: No abdominal pain. No nausea, vomiting, or hematemesis; No diarrhea or constipation. No melena or hematochezia.  GENITOURINARY: No dysuria, frequency or hematuria  NEUROLOGICAL: No numbness or weakness  SKIN: No itching or rash  All other review of systems is negative unless indicated above    VITAL SIGNS:   Vital Signs Last 24 Hrs  T(C): 36.9 (26 Jun 2018 04:30), Max: 36.9 (26 Jun 2018 04:30)  T(F): 98.4 (26 Jun 2018 04:30), Max: 98.4 (26 Jun 2018 04:30)  HR: 71 (26 Jun 2018 04:30) (71 - 71)  BP: 114/81 (26 Jun 2018 04:30) (114/81 - 114/81)  BP(mean): --  RR: 16 (26 Jun 2018 04:30) (16 - 16)  SpO2: 97% (26 Jun 2018 04:30) (97% - 97%)    I&O's Summary      On Exam:    Constitutional: NAD, alert and oriented x 3, on oxygen  Lungs:  Non-labored, decrease BS at bases, No wheezing, rales or rhonchi  Cardiovascular: RRR.  S1 and S2 positive.  No murmurs, rubs, gallops or clicks  Gastrointestinal: Bowel Sounds present, soft, nontender.   Lymph: Trace peripheral edema. No cervical lymphadenopathy.  Neurological: Alert, no focal deficits  Skin: No rashes or ulcers   Psych:  Mood & affect appropriate.    LABS: All Labs Reviewed:                        13.5   6.65  )-----------( 92       ( 26 Jun 2018 05:51 )             42.7     26 Jun 2018 05:51    143    |  106    |  28     ----------------------------<  150    3.9     |  29     |  1.40     Ca    9.0        26 Jun 2018 05:51    TPro  7.1    /  Alb  3.5    /  TBili  0.8    /  DBili  x      /  AST  26     /  ALT  23     /  AlkPhos  77     26 Jun 2018 05:51      CARDIAC MARKERS ( 26 Jun 2018 05:51 )  .023 ng/mL / x     / 92 U/L / x     / 3.2 ng/mL      Blood Culture:   06-26 @ 05:51  Pro Bnp [id number]        RADIOLOGY:    EKG:

## 2018-06-26 NOTE — ED PROVIDER NOTE - PSH
CABG (Coronary Artery Bypass Graft)    CABG (Coronary Artery Bypass Graft)    Cardiac Pacemaker    ICD    S/P Craniotomy    S/P Hysterectomy    S/P Ventriculoperitoneal Shunt

## 2018-06-26 NOTE — CONSULT NOTE ADULT - ASSESSMENT
Mrs. Barrios is a 68 year old female with CAD, S/p CABG in 2007, and PCI to LCx and OM last year, ICM, and VT s/p ICD, here with shortness of breath. There is evidence of mild overload on exam.  Echo in our office performed on Friday, without significant change per patient.  Give Lasix 40 IV x 1 now.  Increase her home torsemide to 40 mg po bid through the end of the week. She will follow up with us on Friday.  Watch daily weights  Continue coumadin for goal INR 2-3. It is 2.86 now  EKG today is Ventricular paced. Cardiac enzymes are unremarkable x 2.  No cardiac contraindication to d/c home with close follow up.

## 2018-06-27 ENCOUNTER — RX RENEWAL (OUTPATIENT)
Age: 68
End: 2018-06-27

## 2018-06-29 ENCOUNTER — APPOINTMENT (OUTPATIENT)
Dept: CARDIOLOGY | Facility: CLINIC | Age: 68
End: 2018-06-29
Payer: COMMERCIAL

## 2018-06-29 ENCOUNTER — NON-APPOINTMENT (OUTPATIENT)
Age: 68
End: 2018-06-29

## 2018-06-29 VITALS
SYSTOLIC BLOOD PRESSURE: 97 MMHG | DIASTOLIC BLOOD PRESSURE: 65 MMHG | BODY MASS INDEX: 35.17 KG/M2 | HEIGHT: 64 IN | OXYGEN SATURATION: 94 % | HEART RATE: 67 BPM | WEIGHT: 206 LBS

## 2018-06-29 DIAGNOSIS — R07.9 CHEST PAIN, UNSPECIFIED: ICD-10-CM

## 2018-06-29 PROCEDURE — 99215 OFFICE O/P EST HI 40 MIN: CPT

## 2018-06-29 PROCEDURE — 93000 ELECTROCARDIOGRAM COMPLETE: CPT

## 2018-07-05 ENCOUNTER — RESULT CHARGE (OUTPATIENT)
Age: 68
End: 2018-07-05

## 2018-07-06 PROBLEM — R07.9 CHEST PAIN: Status: ACTIVE | Noted: 2018-07-06

## 2018-07-09 ENCOUNTER — APPOINTMENT (OUTPATIENT)
Dept: CARDIOLOGY | Facility: CLINIC | Age: 68
End: 2018-07-09
Payer: COMMERCIAL

## 2018-07-09 VITALS — WEIGHT: 206 LBS | BODY MASS INDEX: 35.17 KG/M2 | HEART RATE: 67 BPM | HEIGHT: 64 IN

## 2018-07-09 LAB
INR PPP: 2.7 RATIO
QUALITY CONTROL: YES

## 2018-07-09 PROCEDURE — 85610 PROTHROMBIN TIME: CPT | Mod: QW

## 2018-07-09 PROCEDURE — 99214 OFFICE O/P EST MOD 30 MIN: CPT

## 2018-07-09 RX ORDER — METOLAZONE 2.5 MG/1
2.5 TABLET ORAL DAILY
Qty: 5 | Refills: 3 | Status: DISCONTINUED | COMMUNITY
Start: 2018-06-29 | End: 2018-07-09

## 2018-07-17 VITALS — HEART RATE: 64 BPM | DIASTOLIC BLOOD PRESSURE: 60 MMHG | RESPIRATION RATE: 12 BRPM | SYSTOLIC BLOOD PRESSURE: 100 MMHG

## 2018-07-30 ENCOUNTER — RX RENEWAL (OUTPATIENT)
Age: 68
End: 2018-07-30

## 2018-08-10 ENCOUNTER — APPOINTMENT (OUTPATIENT)
Dept: CARDIOLOGY | Facility: CLINIC | Age: 68
End: 2018-08-10
Payer: COMMERCIAL

## 2018-08-10 VITALS — HEART RATE: 64 BPM | WEIGHT: 206 LBS | BODY MASS INDEX: 35.17 KG/M2 | HEIGHT: 64 IN

## 2018-08-10 LAB
INR PPP: 3.1 RATIO
QUALITY CONTROL: YES

## 2018-08-10 PROCEDURE — 93793 ANTICOAG MGMT PT WARFARIN: CPT

## 2018-08-10 PROCEDURE — 85610 PROTHROMBIN TIME: CPT | Mod: QW

## 2018-08-13 ENCOUNTER — RX RENEWAL (OUTPATIENT)
Age: 68
End: 2018-08-13

## 2018-08-17 ENCOUNTER — APPOINTMENT (OUTPATIENT)
Dept: CARDIOLOGY | Facility: CLINIC | Age: 68
End: 2018-08-17
Payer: COMMERCIAL

## 2018-08-17 VITALS — HEART RATE: 64 BPM | WEIGHT: 206 LBS | HEIGHT: 64 IN | BODY MASS INDEX: 35.17 KG/M2

## 2018-08-17 LAB
INR PPP: 2.7 RATIO
QUALITY CONTROL: YES

## 2018-08-17 PROCEDURE — 85610 PROTHROMBIN TIME: CPT | Mod: QW

## 2018-08-17 PROCEDURE — 93793 ANTICOAG MGMT PT WARFARIN: CPT

## 2018-08-22 ENCOUNTER — MEDICATION RENEWAL (OUTPATIENT)
Age: 68
End: 2018-08-22

## 2018-08-27 ENCOUNTER — APPOINTMENT (OUTPATIENT)
Dept: CARDIOLOGY | Facility: CLINIC | Age: 68
End: 2018-08-27
Payer: COMMERCIAL

## 2018-08-27 PROCEDURE — 93793 ANTICOAG MGMT PT WARFARIN: CPT

## 2018-08-27 PROCEDURE — 85610 PROTHROMBIN TIME: CPT | Mod: QW

## 2018-08-29 VITALS — BODY MASS INDEX: 35.17 KG/M2 | WEIGHT: 206 LBS | HEART RATE: 64 BPM | HEIGHT: 64 IN

## 2018-08-30 LAB
INR PPP: 1.5 RATIO
QUALITY CONTROL: YES

## 2018-08-31 ENCOUNTER — APPOINTMENT (OUTPATIENT)
Dept: CARDIOLOGY | Facility: CLINIC | Age: 68
End: 2018-08-31
Payer: COMMERCIAL

## 2018-08-31 VITALS — HEIGHT: 64 IN | BODY MASS INDEX: 35.17 KG/M2 | HEART RATE: 64 BPM | WEIGHT: 206 LBS

## 2018-08-31 LAB
INR PPP: 1.9 RATIO
QUALITY CONTROL: YES

## 2018-08-31 PROCEDURE — 93793 ANTICOAG MGMT PT WARFARIN: CPT

## 2018-08-31 PROCEDURE — 85610 PROTHROMBIN TIME: CPT | Mod: QW

## 2018-09-07 ENCOUNTER — APPOINTMENT (OUTPATIENT)
Dept: CARDIOLOGY | Facility: CLINIC | Age: 68
End: 2018-09-07
Payer: COMMERCIAL

## 2018-09-07 VITALS — BODY MASS INDEX: 35.17 KG/M2 | HEART RATE: 64 BPM | WEIGHT: 206 LBS | HEIGHT: 64 IN

## 2018-09-07 LAB
INR PPP: 2.7 RATIO
QUALITY CONTROL: YES

## 2018-09-07 PROCEDURE — 93793 ANTICOAG MGMT PT WARFARIN: CPT

## 2018-09-07 PROCEDURE — 85610 PROTHROMBIN TIME: CPT | Mod: QW

## 2018-09-14 ENCOUNTER — APPOINTMENT (OUTPATIENT)
Dept: CARDIOLOGY | Facility: CLINIC | Age: 68
End: 2018-09-14
Payer: COMMERCIAL

## 2018-09-14 VITALS — WEIGHT: 206 LBS | HEIGHT: 64 IN | BODY MASS INDEX: 35.17 KG/M2 | HEART RATE: 64 BPM

## 2018-09-14 LAB
INR PPP: 4.9 RATIO
QUALITY CONTROL: YES

## 2018-09-14 PROCEDURE — 93793 ANTICOAG MGMT PT WARFARIN: CPT

## 2018-09-14 PROCEDURE — 85610 PROTHROMBIN TIME: CPT | Mod: QW

## 2018-09-17 ENCOUNTER — APPOINTMENT (OUTPATIENT)
Dept: CARDIOLOGY | Facility: CLINIC | Age: 68
End: 2018-09-17
Payer: COMMERCIAL

## 2018-09-17 VITALS — WEIGHT: 206 LBS | HEIGHT: 64 IN | BODY MASS INDEX: 35.17 KG/M2 | HEART RATE: 64 BPM

## 2018-09-17 LAB
INR PPP: 1.9 RATIO
QUALITY CONTROL: YES

## 2018-09-17 PROCEDURE — 85610 PROTHROMBIN TIME: CPT | Mod: QW

## 2018-09-17 PROCEDURE — 93793 ANTICOAG MGMT PT WARFARIN: CPT

## 2018-09-24 ENCOUNTER — APPOINTMENT (OUTPATIENT)
Dept: CARDIOLOGY | Facility: CLINIC | Age: 68
End: 2018-09-24
Payer: COMMERCIAL

## 2018-09-24 ENCOUNTER — NON-APPOINTMENT (OUTPATIENT)
Age: 68
End: 2018-09-24

## 2018-09-24 VITALS
BODY MASS INDEX: 32.95 KG/M2 | HEIGHT: 64 IN | DIASTOLIC BLOOD PRESSURE: 60 MMHG | SYSTOLIC BLOOD PRESSURE: 98 MMHG | WEIGHT: 193 LBS | OXYGEN SATURATION: 94 % | HEART RATE: 70 BPM

## 2018-09-24 LAB
INR PPP: 2.9 RATIO
QUALITY CONTROL: YES

## 2018-09-24 PROCEDURE — 99214 OFFICE O/P EST MOD 30 MIN: CPT

## 2018-09-24 PROCEDURE — ZZZZZ: CPT

## 2018-09-24 PROCEDURE — 93000 ELECTROCARDIOGRAM COMPLETE: CPT

## 2018-10-01 ENCOUNTER — APPOINTMENT (OUTPATIENT)
Dept: CARDIOLOGY | Facility: CLINIC | Age: 68
End: 2018-10-01

## 2018-10-05 ENCOUNTER — APPOINTMENT (OUTPATIENT)
Dept: CARDIOLOGY | Facility: CLINIC | Age: 68
End: 2018-10-05
Payer: COMMERCIAL

## 2018-10-05 VITALS — HEIGHT: 64 IN | HEART RATE: 70 BPM | WEIGHT: 193 LBS | BODY MASS INDEX: 32.95 KG/M2

## 2018-10-05 PROCEDURE — 93793 ANTICOAG MGMT PT WARFARIN: CPT

## 2018-10-05 PROCEDURE — 85610 PROTHROMBIN TIME: CPT | Mod: QW

## 2018-10-09 LAB
INR PPP: 3.4 RATIO
QUALITY CONTROL: YES

## 2018-10-22 ENCOUNTER — APPOINTMENT (OUTPATIENT)
Dept: CARDIOLOGY | Facility: CLINIC | Age: 68
End: 2018-10-22
Payer: COMMERCIAL

## 2018-10-22 PROCEDURE — 85610 PROTHROMBIN TIME: CPT | Mod: QW

## 2018-10-22 PROCEDURE — 93793 ANTICOAG MGMT PT WARFARIN: CPT

## 2018-10-23 VITALS — HEART RATE: 70 BPM | HEIGHT: 64 IN | WEIGHT: 193 LBS | BODY MASS INDEX: 32.95 KG/M2

## 2018-10-24 LAB
INR PPP: 3.9 RATIO
QUALITY CONTROL: YES

## 2018-11-05 ENCOUNTER — APPOINTMENT (OUTPATIENT)
Dept: CARDIOLOGY | Facility: CLINIC | Age: 68
End: 2018-11-05

## 2018-11-09 ENCOUNTER — APPOINTMENT (OUTPATIENT)
Dept: CARDIOLOGY | Facility: CLINIC | Age: 68
End: 2018-11-09
Payer: COMMERCIAL

## 2018-11-09 VITALS — HEART RATE: 70 BPM | BODY MASS INDEX: 32.95 KG/M2 | WEIGHT: 193 LBS | HEIGHT: 64 IN

## 2018-11-09 LAB
INR PPP: 2 RATIO
QUALITY CONTROL: YES

## 2018-11-09 PROCEDURE — 85610 PROTHROMBIN TIME: CPT | Mod: QW

## 2018-11-09 PROCEDURE — 93793 ANTICOAG MGMT PT WARFARIN: CPT

## 2018-11-16 ENCOUNTER — APPOINTMENT (OUTPATIENT)
Dept: CARDIOLOGY | Facility: CLINIC | Age: 68
End: 2018-11-16

## 2018-11-16 ENCOUNTER — APPOINTMENT (OUTPATIENT)
Dept: CARDIOLOGY | Facility: CLINIC | Age: 68
End: 2018-11-16
Payer: COMMERCIAL

## 2018-11-16 LAB
INR PPP: 2.2 RATIO
QUALITY CONTROL: YES

## 2018-11-16 PROCEDURE — 93793 ANTICOAG MGMT PT WARFARIN: CPT

## 2018-11-16 PROCEDURE — 85610 PROTHROMBIN TIME: CPT | Mod: QW

## 2018-11-30 ENCOUNTER — APPOINTMENT (OUTPATIENT)
Dept: CARDIOLOGY | Facility: CLINIC | Age: 68
End: 2018-11-30
Payer: COMMERCIAL

## 2018-11-30 VITALS — HEIGHT: 64 IN | HEART RATE: 70 BPM | BODY MASS INDEX: 32.95 KG/M2 | WEIGHT: 193 LBS

## 2018-11-30 LAB
INR PPP: 3.2 RATIO
QUALITY CONTROL: YES

## 2018-11-30 PROCEDURE — 93793 ANTICOAG MGMT PT WARFARIN: CPT

## 2018-11-30 PROCEDURE — 85610 PROTHROMBIN TIME: CPT | Mod: QW

## 2018-12-07 ENCOUNTER — APPOINTMENT (OUTPATIENT)
Dept: CARDIOLOGY | Facility: CLINIC | Age: 68
End: 2018-12-07
Payer: COMMERCIAL

## 2018-12-07 PROCEDURE — 85610 PROTHROMBIN TIME: CPT | Mod: QW

## 2018-12-07 PROCEDURE — 93793 ANTICOAG MGMT PT WARFARIN: CPT

## 2018-12-12 VITALS — HEIGHT: 64 IN | HEART RATE: 70 BPM | WEIGHT: 193 LBS | BODY MASS INDEX: 32.95 KG/M2

## 2018-12-12 LAB
INR PPP: 3.1 RATIO
QUALITY CONTROL: YES

## 2018-12-21 ENCOUNTER — APPOINTMENT (OUTPATIENT)
Dept: CARDIOLOGY | Facility: CLINIC | Age: 68
End: 2018-12-21
Payer: COMMERCIAL

## 2018-12-21 VITALS — BODY MASS INDEX: 32.95 KG/M2 | HEART RATE: 70 BPM | WEIGHT: 193 LBS | HEIGHT: 64 IN

## 2018-12-21 LAB
INR PPP: 2.6 RATIO
QUALITY CONTROL: YES

## 2018-12-21 PROCEDURE — 93793 ANTICOAG MGMT PT WARFARIN: CPT

## 2018-12-21 PROCEDURE — 85610 PROTHROMBIN TIME: CPT | Mod: QW

## 2019-01-14 ENCOUNTER — NON-APPOINTMENT (OUTPATIENT)
Age: 69
End: 2019-01-14

## 2019-01-14 ENCOUNTER — APPOINTMENT (OUTPATIENT)
Dept: CARDIOLOGY | Facility: CLINIC | Age: 69
End: 2019-01-14
Payer: COMMERCIAL

## 2019-01-14 VITALS
HEART RATE: 72 BPM | DIASTOLIC BLOOD PRESSURE: 82 MMHG | SYSTOLIC BLOOD PRESSURE: 135 MMHG | HEIGHT: 64 IN | OXYGEN SATURATION: 100 % | WEIGHT: 195 LBS | BODY MASS INDEX: 33.29 KG/M2

## 2019-01-14 PROCEDURE — 85610 PROTHROMBIN TIME: CPT | Mod: QW

## 2019-01-14 PROCEDURE — 99214 OFFICE O/P EST MOD 30 MIN: CPT

## 2019-01-14 PROCEDURE — 93000 ELECTROCARDIOGRAM COMPLETE: CPT

## 2019-01-16 VITALS — HEART RATE: 72 BPM | HEIGHT: 64 IN | WEIGHT: 195 LBS | BODY MASS INDEX: 33.29 KG/M2

## 2019-01-16 LAB
INR PPP: 2.7 RATIO
QUALITY CONTROL: YES

## 2019-01-28 ENCOUNTER — RX RENEWAL (OUTPATIENT)
Age: 69
End: 2019-01-28

## 2019-02-11 ENCOUNTER — APPOINTMENT (OUTPATIENT)
Dept: CARDIOLOGY | Facility: CLINIC | Age: 69
End: 2019-02-11

## 2019-02-13 ENCOUNTER — APPOINTMENT (OUTPATIENT)
Dept: CARDIOLOGY | Facility: CLINIC | Age: 69
End: 2019-02-13

## 2019-02-15 ENCOUNTER — APPOINTMENT (OUTPATIENT)
Dept: CARDIOLOGY | Facility: CLINIC | Age: 69
End: 2019-02-15
Payer: COMMERCIAL

## 2019-02-15 VITALS — HEART RATE: 72 BPM | DIASTOLIC BLOOD PRESSURE: 82 MMHG | OXYGEN SATURATION: 100 % | SYSTOLIC BLOOD PRESSURE: 135 MMHG

## 2019-02-15 LAB
INR PPP: 2.4 RATIO
QUALITY CONTROL: YES

## 2019-02-15 PROCEDURE — 85610 PROTHROMBIN TIME: CPT | Mod: QW

## 2019-02-15 PROCEDURE — 93793 ANTICOAG MGMT PT WARFARIN: CPT

## 2019-02-18 ENCOUNTER — RX RENEWAL (OUTPATIENT)
Age: 69
End: 2019-02-18

## 2019-03-01 ENCOUNTER — APPOINTMENT (OUTPATIENT)
Dept: CARDIOLOGY | Facility: CLINIC | Age: 69
End: 2019-03-01

## 2019-03-08 ENCOUNTER — APPOINTMENT (OUTPATIENT)
Dept: CARDIOLOGY | Facility: CLINIC | Age: 69
End: 2019-03-08
Payer: COMMERCIAL

## 2019-03-08 VITALS — BODY MASS INDEX: 33.29 KG/M2 | HEIGHT: 64 IN | WEIGHT: 195 LBS | HEART RATE: 72 BPM

## 2019-03-08 LAB
INR PPP: 3.1 RATIO
QUALITY CONTROL: YES

## 2019-03-08 PROCEDURE — 85610 PROTHROMBIN TIME: CPT | Mod: QW

## 2019-03-08 PROCEDURE — 93793 ANTICOAG MGMT PT WARFARIN: CPT

## 2019-03-18 ENCOUNTER — APPOINTMENT (OUTPATIENT)
Dept: CARDIOLOGY | Facility: CLINIC | Age: 69
End: 2019-03-18

## 2019-03-22 ENCOUNTER — APPOINTMENT (OUTPATIENT)
Dept: CARDIOLOGY | Facility: CLINIC | Age: 69
End: 2019-03-22
Payer: COMMERCIAL

## 2019-03-22 VITALS — BODY MASS INDEX: 33.29 KG/M2 | HEART RATE: 72 BPM | WEIGHT: 195 LBS | HEIGHT: 64 IN

## 2019-03-22 LAB
INR PPP: 2.5 RATIO
QUALITY CONTROL: YES

## 2019-03-22 PROCEDURE — 85610 PROTHROMBIN TIME: CPT | Mod: QW

## 2019-03-22 PROCEDURE — 93793 ANTICOAG MGMT PT WARFARIN: CPT

## 2019-04-15 ENCOUNTER — NON-APPOINTMENT (OUTPATIENT)
Age: 69
End: 2019-04-15

## 2019-04-15 ENCOUNTER — APPOINTMENT (OUTPATIENT)
Dept: CARDIOLOGY | Facility: CLINIC | Age: 69
End: 2019-04-15
Payer: COMMERCIAL

## 2019-04-15 VITALS
DIASTOLIC BLOOD PRESSURE: 68 MMHG | SYSTOLIC BLOOD PRESSURE: 111 MMHG | WEIGHT: 204 LBS | HEART RATE: 81 BPM | BODY MASS INDEX: 35.02 KG/M2 | OXYGEN SATURATION: 96 %

## 2019-04-15 LAB
INR PPP: 2.1 RATIO
QUALITY CONTROL: YES

## 2019-04-15 PROCEDURE — 93000 ELECTROCARDIOGRAM COMPLETE: CPT

## 2019-04-15 PROCEDURE — 99214 OFFICE O/P EST MOD 30 MIN: CPT

## 2019-05-08 ENCOUNTER — NON-APPOINTMENT (OUTPATIENT)
Age: 69
End: 2019-05-08

## 2019-05-08 ENCOUNTER — EMERGENCY (EMERGENCY)
Facility: HOSPITAL | Age: 69
LOS: 1 days | Discharge: HOME | End: 2019-05-08
Attending: EMERGENCY MEDICINE | Admitting: EMERGENCY MEDICINE
Payer: COMMERCIAL

## 2019-05-08 ENCOUNTER — APPOINTMENT (OUTPATIENT)
Dept: CARDIOLOGY | Facility: CLINIC | Age: 69
End: 2019-05-08
Payer: COMMERCIAL

## 2019-05-08 VITALS
HEART RATE: 105 BPM | RESPIRATION RATE: 15 BRPM | TEMPERATURE: 98 F | HEIGHT: 64 IN | WEIGHT: 195.11 LBS | OXYGEN SATURATION: 96 % | SYSTOLIC BLOOD PRESSURE: 102 MMHG | DIASTOLIC BLOOD PRESSURE: 70 MMHG

## 2019-05-08 VITALS
HEART RATE: 92 BPM | TEMPERATURE: 101 F | OXYGEN SATURATION: 96 % | RESPIRATION RATE: 20 BRPM | SYSTOLIC BLOOD PRESSURE: 101 MMHG | DIASTOLIC BLOOD PRESSURE: 59 MMHG

## 2019-05-08 VITALS
SYSTOLIC BLOOD PRESSURE: 122 MMHG | WEIGHT: 202 LBS | HEART RATE: 108 BPM | DIASTOLIC BLOOD PRESSURE: 78 MMHG | HEIGHT: 64 IN | OXYGEN SATURATION: 93 % | BODY MASS INDEX: 34.49 KG/M2

## 2019-05-08 LAB
ALBUMIN SERPL ELPH-MCNC: 3.3 G/DL — SIGNIFICANT CHANGE UP (ref 3.3–5)
ALP SERPL-CCNC: 71 U/L — SIGNIFICANT CHANGE UP (ref 40–120)
ALT FLD-CCNC: 24 U/L — SIGNIFICANT CHANGE UP (ref 12–78)
ANION GAP SERPL CALC-SCNC: 9 MMOL/L — SIGNIFICANT CHANGE UP (ref 5–17)
APTT BLD: 36.2 SEC — SIGNIFICANT CHANGE UP (ref 28.5–37)
AST SERPL-CCNC: 24 U/L — SIGNIFICANT CHANGE UP (ref 15–37)
BASOPHILS # BLD AUTO: 0.02 K/UL — SIGNIFICANT CHANGE UP (ref 0–0.2)
BASOPHILS NFR BLD AUTO: 0.2 % — SIGNIFICANT CHANGE UP (ref 0–2)
BILIRUB SERPL-MCNC: 1.6 MG/DL — HIGH (ref 0.2–1.2)
BUN SERPL-MCNC: 47 MG/DL — HIGH (ref 7–23)
CALCIUM SERPL-MCNC: 8.6 MG/DL — SIGNIFICANT CHANGE UP (ref 8.5–10.1)
CHLORIDE SERPL-SCNC: 98 MMOL/L — SIGNIFICANT CHANGE UP (ref 96–108)
CO2 SERPL-SCNC: 29 MMOL/L — SIGNIFICANT CHANGE UP (ref 22–31)
CREAT SERPL-MCNC: 1.7 MG/DL — HIGH (ref 0.5–1.3)
DIGOXIN SERPL-MCNC: 0.2 NG/ML — LOW (ref 0.8–2)
EOSINOPHIL # BLD AUTO: 0 K/UL — SIGNIFICANT CHANGE UP (ref 0–0.5)
EOSINOPHIL NFR BLD AUTO: 0 % — SIGNIFICANT CHANGE UP (ref 0–6)
FLU A RESULT: SIGNIFICANT CHANGE UP
FLU A RESULT: SIGNIFICANT CHANGE UP
FLUAV AG NPH QL: SIGNIFICANT CHANGE UP
FLUBV AG NPH QL: SIGNIFICANT CHANGE UP
GLUCOSE SERPL-MCNC: 251 MG/DL — HIGH (ref 70–99)
HCT VFR BLD CALC: 36.3 % — SIGNIFICANT CHANGE UP (ref 34.5–45)
HGB BLD-MCNC: 11.6 G/DL — SIGNIFICANT CHANGE UP (ref 11.5–15.5)
IMM GRANULOCYTES NFR BLD AUTO: 0.5 % — SIGNIFICANT CHANGE UP (ref 0–1.5)
INR BLD: 2.36 RATIO — HIGH (ref 0.88–1.16)
LIDOCAIN IGE QN: 105 U/L — SIGNIFICANT CHANGE UP (ref 73–393)
LYMPHOCYTES # BLD AUTO: 0.74 K/UL — LOW (ref 1–3.3)
LYMPHOCYTES # BLD AUTO: 5.8 % — LOW (ref 13–44)
MCHC RBC-ENTMCNC: 28.6 PG — SIGNIFICANT CHANGE UP (ref 27–34)
MCHC RBC-ENTMCNC: 32 GM/DL — SIGNIFICANT CHANGE UP (ref 32–36)
MCV RBC AUTO: 89.4 FL — SIGNIFICANT CHANGE UP (ref 80–100)
MONOCYTES # BLD AUTO: 1.31 K/UL — HIGH (ref 0–0.9)
MONOCYTES NFR BLD AUTO: 10.3 % — SIGNIFICANT CHANGE UP (ref 2–14)
NEUTROPHILS # BLD AUTO: 10.59 K/UL — HIGH (ref 1.8–7.4)
NEUTROPHILS NFR BLD AUTO: 83.2 % — HIGH (ref 43–77)
NRBC # BLD: 0 /100 WBCS — SIGNIFICANT CHANGE UP (ref 0–0)
NT-PROBNP SERPL-SCNC: 3991 PG/ML — HIGH (ref 0–125)
PLATELET # BLD AUTO: 120 K/UL — LOW (ref 150–400)
POTASSIUM SERPL-MCNC: 3.6 MMOL/L — SIGNIFICANT CHANGE UP (ref 3.5–5.3)
POTASSIUM SERPL-SCNC: 3.6 MMOL/L — SIGNIFICANT CHANGE UP (ref 3.5–5.3)
PROCALCITONIN SERPL-MCNC: 0.87 NG/ML — HIGH (ref 0–0.04)
PROT SERPL-MCNC: 7.3 G/DL — SIGNIFICANT CHANGE UP (ref 6–8.3)
PROTHROM AB SERPL-ACNC: 27.6 SEC — HIGH (ref 10–12.9)
RBC # BLD: 4.06 M/UL — SIGNIFICANT CHANGE UP (ref 3.8–5.2)
RBC # FLD: 14.2 % — SIGNIFICANT CHANGE UP (ref 10.3–14.5)
RSV RESULT: SIGNIFICANT CHANGE UP
RSV RNA RESP QL NAA+PROBE: SIGNIFICANT CHANGE UP
SODIUM SERPL-SCNC: 136 MMOL/L — SIGNIFICANT CHANGE UP (ref 135–145)
TROPONIN I SERPL-MCNC: 0.03 NG/ML — SIGNIFICANT CHANGE UP (ref 0.01–0.04)
WBC # BLD: 12.72 K/UL — HIGH (ref 3.8–10.5)
WBC # FLD AUTO: 12.72 K/UL — HIGH (ref 3.8–10.5)

## 2019-05-08 PROCEDURE — 85610 PROTHROMBIN TIME: CPT

## 2019-05-08 PROCEDURE — 80162 ASSAY OF DIGOXIN TOTAL: CPT

## 2019-05-08 PROCEDURE — 93283 PRGRMG EVAL IMPLANTABLE DFB: CPT

## 2019-05-08 PROCEDURE — 85730 THROMBOPLASTIN TIME PARTIAL: CPT

## 2019-05-08 PROCEDURE — 71045 X-RAY EXAM CHEST 1 VIEW: CPT

## 2019-05-08 PROCEDURE — 71045 X-RAY EXAM CHEST 1 VIEW: CPT | Mod: 26

## 2019-05-08 PROCEDURE — 93000 ELECTROCARDIOGRAM COMPLETE: CPT | Mod: 59

## 2019-05-08 PROCEDURE — 99215 OFFICE O/P EST HI 40 MIN: CPT

## 2019-05-08 PROCEDURE — 99284 EMERGENCY DEPT VISIT MOD MDM: CPT

## 2019-05-08 PROCEDURE — 36415 COLL VENOUS BLD VENIPUNCTURE: CPT

## 2019-05-08 PROCEDURE — 80053 COMPREHEN METABOLIC PANEL: CPT

## 2019-05-08 PROCEDURE — 84145 PROCALCITONIN (PCT): CPT

## 2019-05-08 PROCEDURE — 83880 ASSAY OF NATRIURETIC PEPTIDE: CPT

## 2019-05-08 PROCEDURE — 84484 ASSAY OF TROPONIN QUANT: CPT

## 2019-05-08 PROCEDURE — 87631 RESP VIRUS 3-5 TARGETS: CPT

## 2019-05-08 PROCEDURE — 99283 EMERGENCY DEPT VISIT LOW MDM: CPT | Mod: 25

## 2019-05-08 PROCEDURE — 85027 COMPLETE CBC AUTOMATED: CPT

## 2019-05-08 PROCEDURE — 83690 ASSAY OF LIPASE: CPT

## 2019-05-08 PROCEDURE — 94640 AIRWAY INHALATION TREATMENT: CPT

## 2019-05-08 RX ORDER — IPRATROPIUM/ALBUTEROL SULFATE 18-103MCG
3 AEROSOL WITH ADAPTER (GRAM) INHALATION ONCE
Qty: 0 | Refills: 0 | Status: COMPLETED | OUTPATIENT
Start: 2019-05-08 | End: 2019-05-08

## 2019-05-08 RX ORDER — SODIUM CHLORIDE 9 MG/ML
3 INJECTION INTRAMUSCULAR; INTRAVENOUS; SUBCUTANEOUS ONCE
Qty: 0 | Refills: 0 | Status: COMPLETED | OUTPATIENT
Start: 2019-05-08 | End: 2019-05-08

## 2019-05-08 RX ORDER — ALBUTEROL 90 UG/1
2 AEROSOL, METERED ORAL
Qty: 1 | Refills: 0
Start: 2019-05-08 | End: 2019-06-06

## 2019-05-08 RX ADMIN — SODIUM CHLORIDE 3 MILLILITER(S): 9 INJECTION INTRAMUSCULAR; INTRAVENOUS; SUBCUTANEOUS at 19:19

## 2019-05-08 RX ADMIN — Medication 3 MILLILITER(S): at 19:20

## 2019-05-08 RX ADMIN — Medication 1 TABLET(S): at 21:35

## 2019-05-08 NOTE — ED PROVIDER NOTE - CLINICAL SUMMARY MEDICAL DECISION MAKING FREE TEXT BOX
Pt is a 67 yo female who presents to the ED with a cc of cough.  PMHx of Arrhythmia, BPH, h/o CAD, h/o CVA, h/o CHF, DM, HLD, hyperthyroidism, h/o obesity.  Concern for underlying pneumonia vs fluid overload.  Will obtain screening labs, EKG, chest x-ray.  Will medicate for symptoms

## 2019-05-08 NOTE — ED ADULT TRIAGE NOTE - ESI TRIAGE ACUITY LEVEL, MLM
Chief Complaint   Patient presents with     Port Draw     Right power port, labs drawn and sent, flushed with saline and heparin, vitlas completed, checked into next appointment.   Flower Boyce,RN  
3

## 2019-05-08 NOTE — ED PROVIDER NOTE - CARE PLAN
Principal Discharge DX:	Pneumonia  Secondary Diagnosis:	Cough  Secondary Diagnosis:	Shortness of breath

## 2019-05-08 NOTE — ED PROVIDER NOTE - NSFOLLOWUPINSTRUCTIONS_ED_ALL_ED_FT
Return to the ED for any new or worsening symptoms  Take your medication as prescribed  Augmentin 1 tab 2 times a day   Mucinex per label instructions take with a full glass of water  Pro Air inhaler 2 puffs every 4-6 hours as needed for shortness of breath   Advance activity as tolerated   Follow up with your PMD in 2-3 days for a recheck

## 2019-05-08 NOTE — ED ADULT NURSE REASSESSMENT NOTE - NS ED NURSE REASSESS COMMENT FT1
patient d/c to home at this time to f/u with PMD, patient well appearing and in no distress, feels much better after treatment here and feels she is ready to go home. Breathing easily in no distress, no complaints at this time. Patient states she will take tylenol at home for fever.

## 2019-05-08 NOTE — ED PROVIDER NOTE - PROGRESS NOTE DETAILS
Pt with significant improvement after nebulizer treatment.  Ambulatory to the restroom with no SOB.  Results of labs and images reviewed, copy provided, all questions answered.  Pt would like to trail outpatient management at this time and is stable.  Will discharge home

## 2019-05-08 NOTE — ED ADULT NURSE NOTE - NSIMPLEMENTINTERV_GEN_ALL_ED
Implemented All Universal Safety Interventions:  [place] to call system. Call bell, personal items and telephone within reach. Instruct patient to call for assistance. Room bathroom lighting operational. Non-slip footwear when patient is off stretcher. Physically safe environment: no spills, clutter or unnecessary equipment. Stretcher in lowest position, wheels locked, appropriate side rails in place.

## 2019-05-08 NOTE — ED PROVIDER NOTE - OBJECTIVE STATEMENT
Pt is a 69 yo female who presents to the ED with a cc of cough.  PMHx of Arrhythmia, BPH, h/o CAD, h/o CVA, h/o CHF, DM, HLD, hyperthyroidism, h/o obesity.  Pt reports that for the last 1-2 days she has been experiencing SOB worse with exertion and associated non productive cough.  Pt states that symptoms have been worsening.  She further reports that she has been experiencing increased fatigue and chest tightness.  Denies fever, chills, N/V/D/C, abd pain, ext numbness or weakness.  She was seen by the cardiologist and was sent to the ED for further work up.  Pt does report that her  has been in and out of the hospital and rehab over the last several weeks and so she has been exposed to multiple sick contacts

## 2019-05-16 ENCOUNTER — INPATIENT (INPATIENT)
Facility: HOSPITAL | Age: 69
LOS: 1 days | Discharge: HOME | DRG: 195 | End: 2019-05-18
Attending: FAMILY MEDICINE | Admitting: FAMILY MEDICINE
Payer: COMMERCIAL

## 2019-05-16 VITALS
HEART RATE: 92 BPM | DIASTOLIC BLOOD PRESSURE: 68 MMHG | RESPIRATION RATE: 21 BRPM | SYSTOLIC BLOOD PRESSURE: 130 MMHG | OXYGEN SATURATION: 98 % | WEIGHT: 195.11 LBS | TEMPERATURE: 98 F | HEIGHT: 64 IN

## 2019-05-16 DIAGNOSIS — R06.02 SHORTNESS OF BREATH: ICD-10-CM

## 2019-05-16 DIAGNOSIS — I50.9 HEART FAILURE, UNSPECIFIED: ICD-10-CM

## 2019-05-16 DIAGNOSIS — J18.1 LOBAR PNEUMONIA, UNSPECIFIED ORGANISM: ICD-10-CM

## 2019-05-16 LAB
ALBUMIN SERPL ELPH-MCNC: 3.2 G/DL — LOW (ref 3.3–5)
ALP SERPL-CCNC: 84 U/L — SIGNIFICANT CHANGE UP (ref 40–120)
ALT FLD-CCNC: 28 U/L — SIGNIFICANT CHANGE UP (ref 12–78)
ANION GAP SERPL CALC-SCNC: 9 MMOL/L — SIGNIFICANT CHANGE UP (ref 5–17)
APTT BLD: 42.2 SEC — HIGH (ref 28.5–37)
AST SERPL-CCNC: 24 U/L — SIGNIFICANT CHANGE UP (ref 15–37)
BASOPHILS # BLD AUTO: 0.07 K/UL — SIGNIFICANT CHANGE UP (ref 0–0.2)
BASOPHILS NFR BLD AUTO: 0.7 % — SIGNIFICANT CHANGE UP (ref 0–2)
BILIRUB SERPL-MCNC: 0.7 MG/DL — SIGNIFICANT CHANGE UP (ref 0.2–1.2)
BUN SERPL-MCNC: 28 MG/DL — HIGH (ref 7–23)
CALCIUM SERPL-MCNC: 8.8 MG/DL — SIGNIFICANT CHANGE UP (ref 8.5–10.1)
CHLORIDE SERPL-SCNC: 103 MMOL/L — SIGNIFICANT CHANGE UP (ref 96–108)
CK SERPL-CCNC: 116 U/L — SIGNIFICANT CHANGE UP (ref 26–192)
CO2 SERPL-SCNC: 31 MMOL/L — SIGNIFICANT CHANGE UP (ref 22–31)
CREAT SERPL-MCNC: 1.3 MG/DL — SIGNIFICANT CHANGE UP (ref 0.5–1.3)
EOSINOPHIL # BLD AUTO: 0.12 K/UL — SIGNIFICANT CHANGE UP (ref 0–0.5)
EOSINOPHIL NFR BLD AUTO: 1.2 % — SIGNIFICANT CHANGE UP (ref 0–6)
FLU A RESULT: SIGNIFICANT CHANGE UP
FLU A RESULT: SIGNIFICANT CHANGE UP
FLUAV AG NPH QL: SIGNIFICANT CHANGE UP
FLUBV AG NPH QL: SIGNIFICANT CHANGE UP
GLUCOSE SERPL-MCNC: 208 MG/DL — HIGH (ref 70–99)
HCT VFR BLD CALC: 38.7 % — SIGNIFICANT CHANGE UP (ref 34.5–45)
HGB BLD-MCNC: 12.2 G/DL — SIGNIFICANT CHANGE UP (ref 11.5–15.5)
IMM GRANULOCYTES NFR BLD AUTO: 0.8 % — SIGNIFICANT CHANGE UP (ref 0–1.5)
INR BLD: 2.37 RATIO — HIGH (ref 0.88–1.16)
LACTATE SERPL-SCNC: 1.5 MMOL/L — SIGNIFICANT CHANGE UP (ref 0.7–2)
LYMPHOCYTES # BLD AUTO: 1.26 K/UL — SIGNIFICANT CHANGE UP (ref 1–3.3)
LYMPHOCYTES # BLD AUTO: 13 % — SIGNIFICANT CHANGE UP (ref 13–44)
MCHC RBC-ENTMCNC: 28.6 PG — SIGNIFICANT CHANGE UP (ref 27–34)
MCHC RBC-ENTMCNC: 31.5 GM/DL — LOW (ref 32–36)
MCV RBC AUTO: 90.6 FL — SIGNIFICANT CHANGE UP (ref 80–100)
MONOCYTES # BLD AUTO: 0.45 K/UL — SIGNIFICANT CHANGE UP (ref 0–0.9)
MONOCYTES NFR BLD AUTO: 4.6 % — SIGNIFICANT CHANGE UP (ref 2–14)
NEUTROPHILS # BLD AUTO: 7.71 K/UL — HIGH (ref 1.8–7.4)
NEUTROPHILS NFR BLD AUTO: 79.7 % — HIGH (ref 43–77)
NRBC # BLD: 0 /100 WBCS — SIGNIFICANT CHANGE UP (ref 0–0)
NT-PROBNP SERPL-SCNC: 2740 PG/ML — HIGH (ref 0–125)
PLATELET # BLD AUTO: 191 K/UL — SIGNIFICANT CHANGE UP (ref 150–400)
POTASSIUM SERPL-MCNC: 4 MMOL/L — SIGNIFICANT CHANGE UP (ref 3.5–5.3)
POTASSIUM SERPL-SCNC: 4 MMOL/L — SIGNIFICANT CHANGE UP (ref 3.5–5.3)
PROT SERPL-MCNC: 7.4 G/DL — SIGNIFICANT CHANGE UP (ref 6–8.3)
PROTHROM AB SERPL-ACNC: 27.8 SEC — HIGH (ref 10–12.9)
RAPID RVP RESULT: DETECTED
RBC # BLD: 4.27 M/UL — SIGNIFICANT CHANGE UP (ref 3.8–5.2)
RBC # FLD: 14.6 % — HIGH (ref 10.3–14.5)
RSV RESULT: SIGNIFICANT CHANGE UP
RSV RNA RESP QL NAA+PROBE: SIGNIFICANT CHANGE UP
RV+EV RNA SPEC QL NAA+PROBE: DETECTED
SODIUM SERPL-SCNC: 143 MMOL/L — SIGNIFICANT CHANGE UP (ref 135–145)
TROPONIN I SERPL-MCNC: 0.03 NG/ML — SIGNIFICANT CHANGE UP (ref 0.01–0.04)
WBC # BLD: 9.69 K/UL — SIGNIFICANT CHANGE UP (ref 3.8–10.5)
WBC # FLD AUTO: 9.69 K/UL — SIGNIFICANT CHANGE UP (ref 3.8–10.5)

## 2019-05-16 PROCEDURE — 99255 IP/OBS CONSLTJ NEW/EST HI 80: CPT

## 2019-05-16 PROCEDURE — 93010 ELECTROCARDIOGRAM REPORT: CPT

## 2019-05-16 PROCEDURE — 99285 EMERGENCY DEPT VISIT HI MDM: CPT

## 2019-05-16 PROCEDURE — 71046 X-RAY EXAM CHEST 2 VIEWS: CPT | Mod: 26

## 2019-05-16 RX ORDER — SODIUM CHLORIDE 9 MG/ML
1000 INJECTION, SOLUTION INTRAVENOUS
Refills: 0 | Status: DISCONTINUED | OUTPATIENT
Start: 2019-05-16 | End: 2019-05-18

## 2019-05-16 RX ORDER — AZITHROMYCIN 500 MG/1
500 TABLET, FILM COATED ORAL ONCE
Refills: 0 | Status: COMPLETED | OUTPATIENT
Start: 2019-05-16 | End: 2019-05-16

## 2019-05-16 RX ORDER — CEFTRIAXONE 500 MG/1
INJECTION, POWDER, FOR SOLUTION INTRAMUSCULAR; INTRAVENOUS
Refills: 0 | Status: COMPLETED | OUTPATIENT
Start: 2019-05-16 | End: 2019-05-18

## 2019-05-16 RX ORDER — DEXTROSE 50 % IN WATER 50 %
15 SYRINGE (ML) INTRAVENOUS ONCE
Refills: 0 | Status: DISCONTINUED | OUTPATIENT
Start: 2019-05-16 | End: 2019-05-18

## 2019-05-16 RX ORDER — ASPIRIN/CALCIUM CARB/MAGNESIUM 324 MG
81 TABLET ORAL DAILY
Refills: 0 | Status: DISCONTINUED | OUTPATIENT
Start: 2019-05-16 | End: 2019-05-18

## 2019-05-16 RX ORDER — ALBUTEROL 90 UG/1
2 AEROSOL, METERED ORAL EVERY 6 HOURS
Refills: 0 | Status: DISCONTINUED | OUTPATIENT
Start: 2019-05-16 | End: 2019-05-18

## 2019-05-16 RX ORDER — DIGOXIN 250 MCG
0.12 TABLET ORAL DAILY
Refills: 0 | Status: DISCONTINUED | OUTPATIENT
Start: 2019-05-16 | End: 2019-05-18

## 2019-05-16 RX ORDER — CEFTRIAXONE 500 MG/1
1 INJECTION, POWDER, FOR SOLUTION INTRAMUSCULAR; INTRAVENOUS EVERY 24 HOURS
Refills: 0 | Status: COMPLETED | OUTPATIENT
Start: 2019-05-17 | End: 2019-05-18

## 2019-05-16 RX ORDER — SIMVASTATIN 20 MG/1
40 TABLET, FILM COATED ORAL AT BEDTIME
Refills: 0 | Status: DISCONTINUED | OUTPATIENT
Start: 2019-05-16 | End: 2019-05-18

## 2019-05-16 RX ORDER — INSULIN LISPRO 100/ML
VIAL (ML) SUBCUTANEOUS
Refills: 0 | Status: DISCONTINUED | OUTPATIENT
Start: 2019-05-16 | End: 2019-05-17

## 2019-05-16 RX ORDER — DEXTROSE 50 % IN WATER 50 %
25 SYRINGE (ML) INTRAVENOUS ONCE
Refills: 0 | Status: DISCONTINUED | OUTPATIENT
Start: 2019-05-16 | End: 2019-05-18

## 2019-05-16 RX ORDER — DEXTROSE 50 % IN WATER 50 %
12.5 SYRINGE (ML) INTRAVENOUS ONCE
Refills: 0 | Status: DISCONTINUED | OUTPATIENT
Start: 2019-05-16 | End: 2019-05-18

## 2019-05-16 RX ORDER — SODIUM CHLORIDE 9 MG/ML
3 INJECTION INTRAMUSCULAR; INTRAVENOUS; SUBCUTANEOUS ONCE
Refills: 0 | Status: COMPLETED | OUTPATIENT
Start: 2019-05-16 | End: 2019-05-16

## 2019-05-16 RX ORDER — LACTOBACILLUS ACIDOPHILUS 100MM CELL
1 CAPSULE ORAL
Refills: 0 | Status: DISCONTINUED | OUTPATIENT
Start: 2019-05-16 | End: 2019-05-18

## 2019-05-16 RX ORDER — INSULIN LISPRO 100/ML
VIAL (ML) SUBCUTANEOUS AT BEDTIME
Refills: 0 | Status: DISCONTINUED | OUTPATIENT
Start: 2019-05-16 | End: 2019-05-17

## 2019-05-16 RX ORDER — CEFTRIAXONE 500 MG/1
1 INJECTION, POWDER, FOR SOLUTION INTRAMUSCULAR; INTRAVENOUS ONCE
Refills: 0 | Status: COMPLETED | OUTPATIENT
Start: 2019-05-16 | End: 2019-05-16

## 2019-05-16 RX ORDER — GLUCAGON INJECTION, SOLUTION 0.5 MG/.1ML
1 INJECTION, SOLUTION SUBCUTANEOUS ONCE
Refills: 0 | Status: DISCONTINUED | OUTPATIENT
Start: 2019-05-16 | End: 2019-05-18

## 2019-05-16 RX ORDER — SPIRONOLACTONE 25 MG/1
25 TABLET, FILM COATED ORAL DAILY
Refills: 0 | Status: DISCONTINUED | OUTPATIENT
Start: 2019-05-16 | End: 2019-05-18

## 2019-05-16 RX ORDER — CARVEDILOL PHOSPHATE 80 MG/1
12.5 CAPSULE, EXTENDED RELEASE ORAL EVERY 12 HOURS
Refills: 0 | Status: DISCONTINUED | OUTPATIENT
Start: 2019-05-16 | End: 2019-05-18

## 2019-05-16 RX ORDER — INSULIN GLARGINE 100 [IU]/ML
70 INJECTION, SOLUTION SUBCUTANEOUS AT BEDTIME
Refills: 0 | Status: DISCONTINUED | OUTPATIENT
Start: 2019-05-16 | End: 2019-05-18

## 2019-05-16 RX ORDER — PIPERACILLIN AND TAZOBACTAM 4; .5 G/20ML; G/20ML
3.38 INJECTION, POWDER, LYOPHILIZED, FOR SOLUTION INTRAVENOUS EVERY 8 HOURS
Refills: 0 | Status: DISCONTINUED | OUTPATIENT
Start: 2019-05-16 | End: 2019-05-16

## 2019-05-16 RX ORDER — WARFARIN SODIUM 2.5 MG/1
5 TABLET ORAL DAILY
Refills: 0 | Status: DISCONTINUED | OUTPATIENT
Start: 2019-05-16 | End: 2019-05-18

## 2019-05-16 RX ADMIN — Medication 100 MILLIGRAM(S): at 18:45

## 2019-05-16 RX ADMIN — WARFARIN SODIUM 5 MILLIGRAM(S): 2.5 TABLET ORAL at 21:45

## 2019-05-16 RX ADMIN — AZITHROMYCIN 500 MILLIGRAM(S): 500 TABLET, FILM COATED ORAL at 13:05

## 2019-05-16 RX ADMIN — SODIUM CHLORIDE 3 MILLILITER(S): 9 INJECTION INTRAMUSCULAR; INTRAVENOUS; SUBCUTANEOUS at 09:07

## 2019-05-16 RX ADMIN — Medication 100 MILLIGRAM(S): at 13:06

## 2019-05-16 RX ADMIN — Medication 1 TABLET(S): at 18:44

## 2019-05-16 RX ADMIN — Medication 1 TABLET(S): at 13:05

## 2019-05-16 RX ADMIN — Medication 100 MILLIGRAM(S): at 21:45

## 2019-05-16 RX ADMIN — Medication 20 MILLIGRAM(S): at 18:45

## 2019-05-16 RX ADMIN — CARVEDILOL PHOSPHATE 12.5 MILLIGRAM(S): 80 CAPSULE, EXTENDED RELEASE ORAL at 18:44

## 2019-05-16 RX ADMIN — CEFTRIAXONE 100 GRAM(S): 500 INJECTION, POWDER, FOR SOLUTION INTRAMUSCULAR; INTRAVENOUS at 13:06

## 2019-05-16 RX ADMIN — Medication 81 MILLIGRAM(S): at 13:05

## 2019-05-16 RX ADMIN — Medication 2: at 21:47

## 2019-05-16 RX ADMIN — Medication 100 MILLIGRAM(S): at 10:22

## 2019-05-16 RX ADMIN — Medication 2: at 17:16

## 2019-05-16 RX ADMIN — INSULIN GLARGINE 70 UNIT(S): 100 INJECTION, SOLUTION SUBCUTANEOUS at 21:52

## 2019-05-16 RX ADMIN — SIMVASTATIN 40 MILLIGRAM(S): 20 TABLET, FILM COATED ORAL at 21:51

## 2019-05-16 NOTE — ED PROVIDER NOTE - OBJECTIVE STATEMENT
67 yo F p/w cough, mild dyspnea over past few days. Pt seen in ed 1 week ago for cough x few days, found to have PNA. Pt seen by PMD 3 days ago, now co inc dyspnea, weakness, persistent cough. Pos chest heaviness. Pt with LE edema, no worse than usual. no abd pain. no n/v/d. no neck / back pain. no recent trauma. no other recent illness. no other agg/allev factors. No other inj or co.

## 2019-05-16 NOTE — ED PROVIDER NOTE - PROGRESS NOTE DETAILS
Pt doing well, still with some persistent dyspnea - dw Dr Langston ([facility]) , will see pt to admit

## 2019-05-16 NOTE — H&P ADULT - NSHPREVIEWOFSYSTEMS_GEN_ALL_CORE
· Respiratory [+]: COUGH, SHORTNESS OF BREATH  · ROS STATEMENT: all other ROS negative except as per HPI

## 2019-05-16 NOTE — ED PROVIDER NOTE - CARE PLAN
Principal Discharge DX:	SOB (shortness of breath)  Secondary Diagnosis:	Pneumonia of left lower lobe due to infectious organism

## 2019-05-16 NOTE — ED PROVIDER NOTE - PHYSICAL EXAMINATION
· CONSTITUTIONAL: Well appearing, well nourished, awake, alert, oriented to person, place, time/situation and in no apparent distress. non-toxic, well appearing.  · ENMT: Airway patent, Nasal mucosa clear. Mouth with normal mucosa. Throat has no vesicles, no oropharyngeal exudates and uvula is midline. MM moist. neck supple. no meningeal signs.  · EYES: Clear bilaterally, pupils equal, round and reactive to light.  · CARDIAC: Normal rate, regular rhythm.  Heart sounds S1, S2.  No murmurs, rubs or gallops.  · RESPIRATORY: Breath sounds equal bilaterally. nl resp effort.  ? dec bs bl bases. No wheeze.  · GASTROINTESTINAL: Abdomen soft, non-tender, no guarding. non-distended. no hsm. no CVAT  · GENITOURINARY:  Bladder: non-tender  · MUSCULOSKELETAL: Spine appears normal, range of motion is not limited, no muscle or joint tenderness, 1+ bl LE edema. No erythema.  · NEUROLOGICAL: Alert and oriented, no focal deficits, no motor or sensory deficits.  · SKIN: Skin normal color for race, warm, dry and intact. No evidence of rash.  · HEME LYMPH: No adenopathy or splenomegaly.

## 2019-05-16 NOTE — ED ADULT NURSE NOTE - OBJECTIVE STATEMENT
67 y/o female presents to the ed today, biba, c/o cough, weakness, and sob. pt was here 7 days ago and was found to have pneumonia and dc'd on abx and inhalers. went to her pcp and was told getting better. over the last two days pt became increased sob, more coughing, and very weak. she denies nausea vomiting fever chills chest pain headache abdominal pain and urinary problems.

## 2019-05-16 NOTE — CONSULT NOTE ADULT - ASSESSMENT
69 yo F w/ pmhx CAD s/p CABG and PCI of the LCA and the first obtuse marginal artery with ILANA, MI and ischemic cardiomyopathy, s/p cardiomems placed but not able to get ongoing readings due to inability to lie flat from prior stroke and brain surgery, paroxysmal VT s/p biventricular/ICD ([employer]), A fib (on warfarin), DM2, HLD, hyperthyroidism, proliferative diabetic retinopathy, stroke and hemorrhage (2010) s/p craniotomy for intracranial decompression and removal of thrombus, present to ED due to weakness and cough. Patient reports she came to hospital last week for SOB and cough and was found to have PNA. Patient was discharged on antibiotics and an inhaler. Patient reports persistent symptoms and worsening weakness. She denies sternal chest pain, palpitations, lightheadedness or LOC. 67 yo F w/ pmhx CAD s/p CABG and PCI of the LCA and the first obtuse marginal artery with ILANA, MI and ischemic cardiomyopathy, s/p cardiomems placed but not able to get ongoing readings due to inability to lie flat from prior stroke and brain surgery, paroxysmal VT s/p biventricular/ICD ([employer]), A fib (on warfarin), DM2, HLD, hyperthyroidism, proliferative diabetic retinopathy, stroke and hemorrhage (2010) s/p craniotomy for intracranial decompression and removal of thrombus, present to ED due to weakness and cough. Patient reports she came to hospital last week for SOB and cough and was found to have PNA. Patient was discharged on antibiotics and an inhaler. Patient reports persistent symptoms and worsening weakness. She denies sternal chest pain, palpitations, lightheadedness or LOC.     - No clear evidence of acute ischemia, trops negative x 1.  - No acute changes on EKG compared to previous.  - Chronic BL LE edema, not changed from baseline.   - Chest X ray shows a questionable evolving airspace infiltrate left base with a small hazy left pleural effusion. This may be the culprit of her symptoms considering patient recent PNA. Continue abx.   - Previous TTE shows aortic sclerosis, MAC, LAE, severe LV dysfunction, moderate pulmonary hypertension, enlarged LA size, mild mitral regurgitation LVEF 30%.  .  - BP well controlled, monitor routine hemodynamics.  - Continue spirinolactone, ASA, carvedilol and simvastatin for CAD.    - Continue torsemide for HTN  - Continue warfarin for a fib.  - Monitor and replete lytes, keep K>4, Mg>2.  - Strict I/Os, daily weights.  - Other cardiovascular workup will depend on clinical course.  - All other workup per primary team.  - Will continue to follow. 69 yo F w/ pmhx CAD s/p CABG and PCI of the LCA and the first obtuse marginal artery with ILANA, MI and ischemic cardiomyopathy, s/p cardiomems placed but not able to get ongoing readings due to inability to lie flat from prior stroke and brain surgery, paroxysmal VT s/p biventricular/ICD ([employer]), A fib (on warfarin), DM2, HLD, hyperthyroidism, proliferative diabetic retinopathy, stroke and hemorrhage (2010) s/p craniotomy for intracranial decompression and removal of thrombus, present to ED due to weakness and cough. Patient reports she came to hospital last week for SOB and cough and was found to have PNA. Patient was discharged on antibiotics and an inhaler. Patient reports persistent symptoms and worsening weakness. She denies sternal chest pain, palpitations, lightheadedness or LOC.     - No clear evidence of acute ischemia, trops negative x 1.  - No acute changes on EKG compared to previous.  - EKG shows ventricular paced rhythm with occasional premature ventricular complexes   - Chronic BL LE edema, not changed from baseline.   - Chest X ray shows a questionable evolving airspace infiltrate left base with a small hazy left pleural effusion. This may be the culprit of her symptoms considering patient recent PNA. Continue abx.   - Previous TTE shows aortic sclerosis, MAC, LAE, severe LV dysfunction, moderate pulmonary hypertension, enlarged LA size, mild mitral regurgitation LVEF 30%.  .  - BP well controlled, monitor routine hemodynamics.  - Continue spirinolactone, ASA, carvedilol and simvastatin for CAD.    - Continue torsemide for HTN  - Continue warfarin for a fib.  - Monitor and replete lytes, keep K>4, Mg>2.  - Strict I/Os, daily weights.  - Other cardiovascular workup will depend on clinical course.  - All other workup per primary team.  - Will continue to follow.

## 2019-05-16 NOTE — SWALLOW BEDSIDE ASSESSMENT ADULT - COMMENTS
Consult received and chart reviewed. This clinician arrived at patient's room this afternoon to perform a clinical bedside swallow evaluation, at which time the patient refused to participate in a formal evaluation of swallow function, stating, "I never gave consent to have this done and I don't want it." Discussed patient's refusal with RN on unit and awaiting a call back from Dr. Langston to further discuss. Re-consult this department if patient is willing to participate in an evaluation.

## 2019-05-16 NOTE — H&P ADULT - NSICDXPASTSURGICALHX_GEN_ALL_CORE_FT
PAST SURGICAL HISTORY:  CABG (Coronary Artery Bypass Graft)     CABG (Coronary Artery Bypass Graft)     Cardiac Pacemaker     ICD     S/P Craniotomy     S/P Hysterectomy     S/P Ventriculoperitoneal Shunt

## 2019-05-16 NOTE — H&P ADULT - HISTORY OF PRESENT ILLNESS
67 yo F p/w cough, mild dyspnea over past few days. Pt seen in ed 1 week ago for cough x few days, found to have PNA. Pt seen by PMD 3 days ago, now co inc dyspnea, weakness, persistent cough. Pos chest heaviness. Pt with LE edema, no worse than usual. no abd pain. no n/v/d. no neck / back pain. no recent trauma. no other recent illness. no other agg/allev factors.   In ER patient was found to have PNA.  patient is being admitted for further work up and treatment.

## 2019-05-16 NOTE — CONSULT NOTE ADULT - SUBJECTIVE AND OBJECTIVE BOX
[facility]         Ramy Solis, Ever, Julia, Nicole, José Miguel, Caron        [phone] (office)    CHIEF COMPLAINT: Patient is a 68y old  Female who presents with a chief complaint of     HPI:  This is a 68 year old woman with a history of CAD s/p CABG and percutaneous intervention of the left circumflex artery and the first obtuse marginal artery with drug eluting stents, a prior MI, an ischemic cardiomyopathy, s/p Cardiomems placed but has not been able to get ongoing readings given her inability to lie flat from her prior stroke and brain surgery, paroxysmal VT s/p placement of a biventricular/ICD (Guidant), history of ischemic cardiomyopathy, history of diabetes, history of hyperlipidemia,history of hyperthyroidism, history of proliferative diabetic retinopathy, and history of stroke with hemorrhage in 2010 at which time she had a long complicated course requiring craniotomy for intracranial decompression and removal of thrombus, ICD discharges felt to be due to rapid atrial fibrillation     PAST MEDICAL & SURGICAL HISTORY:  Unsteady gait  CHF (congestive heart failure)  Arrhythmia  HLD (hyperlipidemia)  MI (myocardial infarction)  Obesity  Former smoker  Hyperthyroidism  TIA (Transient Ischemic Attack)  Cerebellar Infarction  CVA (Cerebral Vascular Accident)  Diabetes Mellitus, Type 2  CAD (Coronary Artery Disease)  Benign Hypertension  ICD  Cardiac Pacemaker  S/P Hysterectomy  S/P Ventriculoperitoneal Shunt  S/P Craniotomy  CABG (Coronary Artery Bypass Graft)  CABG (Coronary Artery Bypass Graft)      SOCIAL HISTORY: No active tobacco, alcohol or illicit drug use.    FAMILY HISTORY:      Home Medications:  Aldactone 25 mg oral tablet: 1 tab(s) orally once a day (16 May 2019 08:41)  aspirin 81 mg oral delayed release tablet: 1 tab(s) orally once a day (16 May 2019 08:41)  carvedilol 12.5 mg oral tablet: 1 tab(s) orally every 12 hours (16 May 2019 08:41)  Demadex 20 mg oral tablet:  orally 2 times a day (16 May 2019 08:41)  digoxin 125 mcg (0.125 mg) oral tablet: 1 tab(s) orally every 48 hours (16 May 2019 08:41)  HumaLOG KwikPen 100 units/mL subcutaneous solution: 1  subcutaneous 3 times a day with meals  BASED ON SLIDING SCALE - APPROXIMATELY  30 units BFAST  30 units LUNCH  60 units DINNER    (16 May 2019 08:41)  Lantus 100 units/mL subcutaneous solution: 70 unit(s) subcutaneous once a day (at bedtime) (16 May 2018 01:02)  Tapazole 5 mg oral tablet: 0.5 tab(s) orally every 48 hours (16 May 2018 01:02)  warfarin 6 mg oral tablet: 1 tab(s) orally once a day (16 May 2018 01:02)  Zocor 40 mg oral tablet: 1 tab(s) orally once a day (at bedtime) (16 May 2018 01:02)      MEDICATIONS  (STANDING):  levoFLOXacin IVPB 500 milliGRAM(s) IV Intermittent once    MEDICATIONS  (PRN):      Allergies    No Known Allergies    Intolerances        REVIEW OF SYSTEMS: Is negative for eye, ENT, GI, , allergic, dermatologic, musculoskeletal and neurologic, except as described above.    VITAL SIGNS:   Vital Signs Last 24 Hrs  T(C): 36.8 (16 May 2019 08:32), Max: 36.8 (16 May 2019 08:32)  T(F): 98.3 (16 May 2019 08:32), Max: 98.3 (16 May 2019 08:32)  HR: 92 (16 May 2019 08:32) (92 - 92)  BP: 130/68 (16 May 2019 08:32) (130/68 - 130/68)  BP(mean): --  RR: 21 (16 May 2019 08:32) (21 - 21)  SpO2: 98% (16 May 2019 08:32) (98% - 98%)    I&O's Summary      PHYSICAL EXAM:  Constitutional: NAD, awake and alert, well-developed  Eyes: EOMI, no oral cyanosis, conjunctivae clear, anicteric  Pulmonary: Non-labored, breath sounds are clear bilaterally, no wheezing, rales or rhonchi  Cardiovascular: Irregular S1 and S2, in af, tachycardic normal rate. No murmur  Gastrointestinal: Bowel sounds present, soft, nontender  Lymph: No peripheral edema   Neurological: Alert, strength and sensitivity are grossly intact  Skin: Warm, well-perfused  Psych: Mood and affect appropriate    LABS: All Labs Reviewed:                        12.2   9.69  )-----------( 191      ( 16 May 2019 09:09 )             38.7     16 May 2019 09:09    143    |  103    |  28     ----------------------------<  208    4.0     |  31     |  1.30     Ca    8.8        16 May 2019 09:09    TPro  7.4    /  Alb  3.2    /  TBili  0.7    /  DBili  x      /  AST  24     /  ALT  28     /  AlkPhos  84     16 May 2019 09:09    PT/INR - ( 16 May 2019 09:09 )   PT: 27.8 sec;   INR: 2.37 ratio         PTT - ( 16 May 2019 09:09 )  PTT:42.2 sec  CARDIAC MARKERS ( 16 May 2019 09:09 )  .030 ng/mL / x     / 116 U/L / x     / x          Blood Culture:   05-16 @ 09:09  Pro Bnp [id number]        RADIOLOGY:    EKG: [facility]         Ramy Solis, Ever, Julia, Nicole, José Miguel, Caron        [phone] (office)    CHIEF COMPLAINT: Patient is a 68y old  Female who presents with a chief complaint of     HPI:  69 yo F w/ pmhx CAD s/p CABG and PCI of the LCA and the first obtuse marginal artery with ILANA, MI and ischemic cardiomyopathy, s/p cardiomems placed but not able to get ongoing readings due to inability to lie flat from prior stroke and brain surgery, paroxysmal VT s/p biventricular/ICD (Guidant), A fib (on warfarin), DM2, HLD, hyperthyroidism, proliferative diabetic retinopathy, stroke and hemorrhage (2010) s/p craniotomy for intracranial decompression and removal of thrombus, present to ED due to weakness and cough. Patient reports she came to hospital last week for SOB and cough and was found to have PNA. Patient was discharged on antibiotics and an inhaler. Patient reports persistent symptoms and worsening weakness. She denies sternal chest pain, palpitations, lightheadedness or LOC.     PAST MEDICAL & SURGICAL HISTORY:  Unsteady gait  CHF (congestive heart failure)  Arrhythmia  HLD (hyperlipidemia)  MI (myocardial infarction)  Obesity  Former smoker  Hyperthyroidism  TIA (Transient Ischemic Attack)  Cerebellar Infarction  CVA (Cerebral Vascular Accident)  Diabetes Mellitus, Type 2  CAD (Coronary Artery Disease)  Benign Hypertension  ICD  Cardiac Pacemaker  S/P Hysterectomy  S/P Ventriculoperitoneal Shunt  S/P Craniotomy  CABG (Coronary Artery Bypass Graft)  CABG (Coronary Artery Bypass Graft)      SOCIAL HISTORY: No active tobacco, alcohol or illicit drug use.    FAMILY HISTORY:      Home Medications:  Aldactone 25 mg oral tablet: 1 tab(s) orally once a day (16 May 2019 08:41)  aspirin 81 mg oral delayed release tablet: 1 tab(s) orally once a day (16 May 2019 08:41)  carvedilol 12.5 mg oral tablet: 1 tab(s) orally every 12 hours (16 May 2019 08:41)  Demadex 20 mg oral tablet:  orally 2 times a day (16 May 2019 08:41)  digoxin 125 mcg (0.125 mg) oral tablet: 1 tab(s) orally every 48 hours (16 May 2019 08:41)  HumaLOG KwikPen 100 units/mL subcutaneous solution: 1  subcutaneous 3 times a day with meals  BASED ON SLIDING SCALE - APPROXIMATELY  30 units BFAST  30 units LUNCH  60 units DINNER    (16 May 2019 08:41)  Lantus 100 units/mL subcutaneous solution: 70 unit(s) subcutaneous once a day (at bedtime) (16 May 2018 01:02)  Tapazole 5 mg oral tablet: 0.5 tab(s) orally every 48 hours (16 May 2018 01:02)  warfarin 6 mg oral tablet: 1 tab(s) orally once a day (16 May 2018 01:02)  Zocor 40 mg oral tablet: 1 tab(s) orally once a day (at bedtime) (16 May 2018 01:02)      MEDICATIONS  (STANDING):  levoFLOXacin IVPB 500 milliGRAM(s) IV Intermittent once    MEDICATIONS  (PRN):      Allergies    No Known Allergies    Intolerances        REVIEW OF SYSTEMS: Is negative for eye, ENT, GI, , allergic, dermatologic, musculoskeletal and neurologic, except as described above.    VITAL SIGNS:   Vital Signs Last 24 Hrs  T(C): 36.8 (16 May 2019 08:32), Max: 36.8 (16 May 2019 08:32)  T(F): 98.3 (16 May 2019 08:32), Max: 98.3 (16 May 2019 08:32)  HR: 92 (16 May 2019 08:32) (92 - 92)  BP: 130/68 (16 May 2019 08:32) (130/68 - 130/68)  BP(mean): --  RR: 21 (16 May 2019 08:32) (21 - 21)  SpO2: 98% (16 May 2019 08:32) (98% - 98%)    I&O's Summary      PHYSICAL EXAM:  Constitutional: in acute distress, awake and alert  Eyes: EOMI, no oral cyanosis, conjunctivae clear, anicteric  Pulmonary: Labored,left sided rales in middle lobe   Cardiovascular: Irregular S1 and S2, in af, normal rate. No murmur  Gastrointestinal: Bowel sounds present, soft, nontender  Lymph: +2 BL peripheral edema   Neurological: Alert, strength and sensitivity are grossly intact  Skin: Warm, well-perfused  Psych: Mood and affect appropriate    LABS: All Labs Reviewed:                        12.2   9.69  )-----------( 191      ( 16 May 2019 09:09 )             38.7     16 May 2019 09:09    143    |  103    |  28     ----------------------------<  208    4.0     |  31     |  1.30     Ca    8.8        16 May 2019 09:09    TPro  7.4    /  Alb  3.2    /  TBili  0.7    /  DBili  x      /  AST  24     /  ALT  28     /  AlkPhos  84     16 May 2019 09:09    PT/INR - ( 16 May 2019 09:09 )   PT: 27.8 sec;   INR: 2.37 ratio         PTT - ( 16 May 2019 09:09 )  PTT:42.2 sec  CARDIAC MARKERS ( 16 May 2019 09:09 )  .030 ng/mL / x     / 116 U/L / x     / x          Blood Culture:   05-16 @ 09:09  Pro Bnp [id number]        RADIOLOGY:    EKG: [facility]         Ramy Solis, Ever, Julia, Nicole, José Miguel, Caron        [phone] (office)    CHIEF COMPLAINT: Patient is a 68y old  Female who presents with a chief complaint of     HPI:  69 yo F w/ pmhx CAD s/p CABG and PCI of the LCA and the first obtuse marginal artery with ILANA, MI and ischemic cardiomyopathy, s/p cardiomems placed but not able to get ongoing readings due to inability to lie flat from prior stroke and brain surgery, paroxysmal VT s/p biventricular/ICD (Guidant), A fib (on warfarin), DM2, HLD, hyperthyroidism, proliferative diabetic retinopathy, stroke and hemorrhage () s/p craniotomy for intracranial decompression and removal of thrombus, present to ED due to weakness and cough. Patient reports she came to hospital last week for SOB and cough and was found to have PNA. Patient was discharged on antibiotics and an inhaler. Patient reports persistent symptoms and worsening weakness. She denies sternal chest pain, palpitations, lightheadedness or LOC.     Cardiology Summary    EK2019, V paced, AF   Stress Test: 14, Left ventricular ejection fraction 42%, apical, inferior, mid septal infarction with minimal suzette-infarct ischemia.   Echo: , Aortic sclerosis, MAC, LAE, severe LV dysfunction, moderate pulmonary hypertension, enlarged LA size, mild mitral regurgitation LVEF 30%.   Cardiac Cath: 8/15, Left ventricular ejection fraction of 50%, normal left main, total occlusion of the proximal LAD, 80% stenosis the proximal circumflex artery, 99% stenosis of the first acute marginal artery, 99% diffuse stenosis of the right coronary artery, minor luminal irregularities of the left internal mammary artery to the LAD, total occlusion of the saphenous vein graft to the first obtuse marginal artery, mild atherosclerosis of the saphenous vein graft to the right posterior descending artery.   Stent: 8/15, Percutaneous intervention with drug eluding stent of the first obtuse marginal artery and percutaneous intervention with drug eluding stent to the proximal circumflex artery   CAB   ICD/Defibrillator: , Guidant BiV-ICD     PAST MEDICAL & SURGICAL HISTORY:  Unsteady gait  CHF (congestive heart failure)  Arrhythmia  HLD (hyperlipidemia)  MI (myocardial infarction)  Obesity  Former smoker  Hyperthyroidism  TIA (Transient Ischemic Attack)  Cerebellar Infarction  CVA (Cerebral Vascular Accident)  Diabetes Mellitus, Type 2  CAD (Coronary Artery Disease)  Benign Hypertension  ICD  Cardiac Pacemaker  S/P Hysterectomy  S/P Ventriculoperitoneal Shunt  S/P Craniotomy  CABG (Coronary Artery Bypass Graft)  CABG (Coronary Artery Bypass Graft)      SOCIAL HISTORY: No active tobacco, alcohol or illicit drug use.    FAMILY HISTORY:      Home Medications:  Aldactone 25 mg oral tablet: 1 tab(s) orally once a day (16 May 2019 08:41)  aspirin 81 mg oral delayed release tablet: 1 tab(s) orally once a day (16 May 2019 08:41)  carvedilol 12.5 mg oral tablet: 1 tab(s) orally every 12 hours (16 May 2019 08:41)  Demadex 20 mg oral tablet:  orally 2 times a day (16 May 2019 08:41)  digoxin 125 mcg (0.125 mg) oral tablet: 1 tab(s) orally every 48 hours (16 May 2019 08:41)  HumaLOG KwikPen 100 units/mL subcutaneous solution: 1  subcutaneous 3 times a day with meals  BASED ON SLIDING SCALE - APPROXIMATELY  30 units BFAST  30 units LUNCH  60 units DINNER    (16 May 2019 08:41)  Lantus 100 units/mL subcutaneous solution: 70 unit(s) subcutaneous once a day (at bedtime) (16 May 2018 01:02)  Tapazole 5 mg oral tablet: 0.5 tab(s) orally every 48 hours (16 May 2018 01:02)  warfarin 6 mg oral tablet: 1 tab(s) orally once a day (16 May 2018 01:02)  Zocor 40 mg oral tablet: 1 tab(s) orally once a day (at bedtime) (16 May 2018 01:02)      MEDICATIONS  (STANDING):  levoFLOXacin IVPB 500 milliGRAM(s) IV Intermittent once    MEDICATIONS  (PRN):      Allergies    No Known Allergies    Intolerances        REVIEW OF SYSTEMS: Is negative for eye, ENT, GI, , allergic, dermatologic, musculoskeletal and neurologic, except as described above.    VITAL SIGNS:   Vital Signs Last 24 Hrs  T(C): 36.8 (16 May 2019 08:32), Max: 36.8 (16 May 2019 08:32)  T(F): 98.3 (16 May 2019 08:32), Max: 98.3 (16 May 2019 08:32)  HR: 92 (16 May 2019 08:32) (92 - 92)  BP: 130/68 (16 May 2019 08:32) (130/68 - 130/68)  BP(mean): --  RR: 21 (16 May 2019 08:32) (21 - 21)  SpO2: 98% (16 May 2019 08:32) (98% - 98%)    I&O's Summary      PHYSICAL EXAM:  Constitutional: in acute distress, awake and alert  Eyes: EOMI, no oral cyanosis, conjunctivae clear, anicteric  Pulmonary: Labored,left sided rales in middle lobe   Cardiovascular: Irregular S1 and S2, in af, normal rate. No murmur  Gastrointestinal: Bowel sounds present, soft, nontender  Lymph: +2 BL peripheral edema   Neurological: Alert, strength and sensitivity are grossly intact  Skin: Warm, well-perfused  Psych: Mood and affect appropriate    LABS: All Labs Reviewed:                        12.2   9.69  )-----------( 191      ( 16 May 2019 09:09 )             38.7     16 May 2019 09:09    143    |  103    |  28     ----------------------------<  208    4.0     |  31     |  1.30     Ca    8.8        16 May 2019 09:09    TPro  7.4    /  Alb  3.2    /  TBili  0.7    /  DBili  x      /  AST  24     /  ALT  28     /  AlkPhos  84     16 May 2019 09:09    PT/INR - ( 16 May 2019 09:09 )   PT: 27.8 sec;   INR: 2.37 ratio         PTT - ( 16 May 2019 09:09 )  PTT:42.2 sec  CARDIAC MARKERS ( 16 May 2019 09:09 )  .030 ng/mL / x     / 116 U/L / x     / x          Blood Culture:    @ 09:09  Pro Bnp [id number]        RADIOLOGY:    EKG: [facility]         Ramy Solis, Ever, Julia, Nicole, José Miguel, Caron        [phone] (office)    CHIEF COMPLAINT: Patient is a 68y old  Female who presents with a chief complaint of     HPI:  67 yo F w/ pmhx CAD s/p CABG and PCI of the LCA and the first obtuse marginal artery with ILANA, MI and ischemic cardiomyopathy, s/p cardiomems placed but not able to get ongoing readings due to inability to lie flat from prior stroke and brain surgery, paroxysmal VT s/p biventricular/ICD (Guidant), A fib (on warfarin), DM2, HLD, hyperthyroidism, proliferative diabetic retinopathy, stroke and hemorrhage () s/p craniotomy for intracranial decompression and removal of thrombus, present to ED due to weakness and cough. Patient reports she came to hospital last week for SOB and cough and was found to have PNA. Patient was discharged on antibiotics and an inhaler. Patient reports persistent symptoms and worsening weakness. She denies sternal chest pain, palpitations, lightheadedness or LOC.     Cardiology Summary    EK2019, V paced, AF   Stress Test: 14, Left ventricular ejection fraction 42%, apical, inferior, mid septal infarction with minimal suzette-infarct ischemia.   Echo: , Aortic sclerosis, MAC, LAE, severe LV dysfunction, moderate pulmonary hypertension, enlarged LA size, mild mitral regurgitation LVEF 30%.   Cardiac Cath: 8/15, Left ventricular ejection fraction of 50%, normal left main, total occlusion of the proximal LAD, 80% stenosis the proximal circumflex artery, 99% stenosis of the first acute marginal artery, 99% diffuse stenosis of the right coronary artery, minor luminal irregularities of the left internal mammary artery to the LAD, total occlusion of the saphenous vein graft to the first obtuse marginal artery, mild atherosclerosis of the saphenous vein graft to the right posterior descending artery.   Stent: 8/15, Percutaneous intervention with drug eluding stent of the first obtuse marginal artery and percutaneous intervention with drug eluding stent to the proximal circumflex artery   CAB   ICD/Defibrillator: , Guidant BiV-ICD     PAST MEDICAL & SURGICAL HISTORY:  Unsteady gait  CHF (congestive heart failure)  Arrhythmia  HLD (hyperlipidemia)  MI (myocardial infarction)  Obesity  Former smoker  Hyperthyroidism  TIA (Transient Ischemic Attack)  Cerebellar Infarction  CVA (Cerebral Vascular Accident)  Diabetes Mellitus, Type 2  CAD (Coronary Artery Disease)  Benign Hypertension  ICD  Cardiac Pacemaker  S/P Hysterectomy  S/P Ventriculoperitoneal Shunt  S/P Craniotomy  CABG (Coronary Artery Bypass Graft)  CABG (Coronary Artery Bypass Graft)      SOCIAL HISTORY: No active tobacco, alcohol or illicit drug use.    FAMILY HISTORY:      Home Medications:  Aldactone 25 mg oral tablet: 1 tab(s) orally once a day (16 May 2019 08:41)  aspirin 81 mg oral delayed release tablet: 1 tab(s) orally once a day (16 May 2019 08:41)  carvedilol 12.5 mg oral tablet: 1 tab(s) orally every 12 hours (16 May 2019 08:41)  Demadex 20 mg oral tablet:  orally 2 times a day (16 May 2019 08:41)  digoxin 125 mcg (0.125 mg) oral tablet: 1 tab(s) orally every 48 hours (16 May 2019 08:41)  HumaLOG KwikPen 100 units/mL subcutaneous solution: 1  subcutaneous 3 times a day with meals  BASED ON SLIDING SCALE - APPROXIMATELY  30 units BFAST  30 units LUNCH  60 units DINNER    (16 May 2019 08:41)  Lantus 100 units/mL subcutaneous solution: 70 unit(s) subcutaneous once a day (at bedtime) (16 May 2018 01:02)  Tapazole 5 mg oral tablet: 0.5 tab(s) orally every 48 hours (16 May 2018 01:02)  warfarin 6 mg oral tablet: 1 tab(s) orally once a day (16 May 2018 01:02)  Zocor 40 mg oral tablet: 1 tab(s) orally once a day (at bedtime) (16 May 2018 01:02)      MEDICATIONS  (STANDING):  levoFLOXacin IVPB 500 milliGRAM(s) IV Intermittent once    MEDICATIONS  (PRN):      Allergies    No Known Allergies    Intolerances        REVIEW OF SYSTEMS: Is negative for eye, ENT, GI, , allergic, dermatologic, musculoskeletal and neurologic, except as described above.    VITAL SIGNS:   Vital Signs Last 24 Hrs  T(C): 36.8 (16 May 2019 08:32), Max: 36.8 (16 May 2019 08:32)  T(F): 98.3 (16 May 2019 08:32), Max: 98.3 (16 May 2019 08:32)  HR: 92 (16 May 2019 08:32) (92 - 92)  BP: 130/68 (16 May 2019 08:32) (130/68 - 130/68)  BP(mean): --  RR: 21 (16 May 2019 08:32) (21 - 21)  SpO2: 98% (16 May 2019 08:32) (98% - 98%)    I&O's Summary      PHYSICAL EXAM:  Constitutional: in acute distress, awake and alert  Eyes: EOMI, no oral cyanosis, conjunctivae clear, anicteric  Pulmonary: Labored,left sided rales in middle lobe   Cardiovascular: Irregular S1 and S2, in af, normal rate. No murmur  Gastrointestinal: Bowel sounds present, soft, nontender  Lymph: +2 BL peripheral edema   Neurological: Alert, strength and sensitivity are grossly intact  Skin: Warm, well-perfused  Psych: Mood and affect appropriate    LABS: All Labs Reviewed:                        12.2   9.69  )-----------( 191      ( 16 May 2019 09:09 )             38.7     16 May 2019 09:09    143    |  103    |  28     ----------------------------<  208    4.0     |  31     |  1.30     Ca    8.8        16 May 2019 09:09    TPro  7.4    /  Alb  3.2    /  TBili  0.7    /  DBili  x      /  AST  24     /  ALT  28     /  AlkPhos  84     16 May 2019 09:09    PT/INR - ( 16 May 2019 09:09 )   PT: 27.8 sec;   INR: 2.37 ratio         PTT - ( 16 May 2019 09:09 )  PTT:42.2 sec  CARDIAC MARKERS ( 16 May 2019 09:09 )  .030 ng/mL / x     / 116 U/L / x     / x          Blood Culture:   - @ 09:09  Pro Bnp [id number]        RADIOLOGY:    EKG:   Ventricular-paced rhythm with occasional premature ventricular complexes   Vent rate 95 pr interval *  QRS duration 144  QT/QTc 430/540   prt * 216 21 [facility]         Ramy Solis, Ever, Julia, Nicole, Caron Valdez        [phone] (office)    CHIEF COMPLAINT: Patient is a 68y old  Female who presents with a chief complaint of shortness of breath    HPI:  69 yo F w/ pmhx CAD s/p CABG and PCI of the LCA and the first obtuse marginal artery with ILANA, MI and ischemic cardiomyopathy, s/p cardiomems placed but not able to get ongoing readings due to inability to lie flat from prior stroke and brain surgery, paroxysmal VT s/p biventricular/ICD (Guidant), A fib (on warfarin), DM2, HLD, hyperthyroidism, proliferative diabetic retinopathy, stroke and hemorrhage () s/p craniotomy for intracranial decompression and removal of thrombus, present to ED due to weakness and cough. Patient reports she came to hospital last week for SOB and cough and was found to have PNA. Patient was discharged on antibiotics and an inhaler. Patient reports persistent symptoms and worsening weakness. She denies sternal chest pain, palpitations, lightheadedness or LOC.     Cardiology Summary    EK2019, V paced, AF   Stress Test: 14, Left ventricular ejection fraction 42%, apical, inferior, mid septal infarction with minimal suzette-infarct ischemia.   Echo: , Aortic sclerosis, MAC, LAE, severe LV dysfunction, moderate pulmonary hypertension, enlarged LA size, mild mitral regurgitation LVEF 30%.   Cardiac Cath: 8/15, Left ventricular ejection fraction of 50%, normal left main, total occlusion of the proximal LAD, 80% stenosis the proximal circumflex artery, 99% stenosis of the first acute marginal artery, 99% diffuse stenosis of the right coronary artery, minor luminal irregularities of the left internal mammary artery to the LAD, total occlusion of the saphenous vein graft to the first obtuse marginal artery, mild atherosclerosis of the saphenous vein graft to the right posterior descending artery.   Stent: 8/15, Percutaneous intervention with drug eluding stent of the first obtuse marginal artery and percutaneous intervention with drug eluding stent to the proximal circumflex artery   CAB   ICD/Defibrillator: , Guidant BiV-ICD     PAST MEDICAL & SURGICAL HISTORY:  Unsteady gait  CHF (congestive heart failure)  Arrhythmia  HLD (hyperlipidemia)  MI (myocardial infarction)  Obesity  Former smoker  Hyperthyroidism  TIA (Transient Ischemic Attack)  Cerebellar Infarction  CVA (Cerebral Vascular Accident)  Diabetes Mellitus, Type 2  CAD (Coronary Artery Disease)  Benign Hypertension  ICD  Cardiac Pacemaker  S/P Hysterectomy  S/P Ventriculoperitoneal Shunt  S/P Craniotomy  CABG (Coronary Artery Bypass Graft)  CABG (Coronary Artery Bypass Graft)      SOCIAL HISTORY: No active tobacco, alcohol or illicit drug use.    FAMILY HISTORY:      Home Medications:  Aldactone 25 mg oral tablet: 1 tab(s) orally once a day (16 May 2019 08:41)  aspirin 81 mg oral delayed release tablet: 1 tab(s) orally once a day (16 May 2019 08:41)  carvedilol 12.5 mg oral tablet: 1 tab(s) orally every 12 hours (16 May 2019 08:41)  Demadex 20 mg oral tablet:  orally 2 times a day (16 May 2019 08:41)  digoxin 125 mcg (0.125 mg) oral tablet: 1 tab(s) orally every 48 hours (16 May 2019 08:41)  HumaLOG KwikPen 100 units/mL subcutaneous solution: 1  subcutaneous 3 times a day with meals  BASED ON SLIDING SCALE - APPROXIMATELY  30 units BFAST  30 units LUNCH  60 units DINNER    (16 May 2019 08:41)  Lantus 100 units/mL subcutaneous solution: 70 unit(s) subcutaneous once a day (at bedtime) (16 May 2018 01:02)  Tapazole 5 mg oral tablet: 0.5 tab(s) orally every 48 hours (16 May 2018 01:02)  warfarin 6 mg oral tablet: 1 tab(s) orally once a day (16 May 2018 01:02)  Zocor 40 mg oral tablet: 1 tab(s) orally once a day (at bedtime) (16 May 2018 01:02)      MEDICATIONS  (STANDING):  levoFLOXacin IVPB 500 milliGRAM(s) IV Intermittent once    MEDICATIONS  (PRN):      Allergies    No Known Allergies    Intolerances        REVIEW OF SYSTEMS: Is negative for eye, ENT, GI, , allergic, dermatologic, musculoskeletal and neurologic, except as described above.    VITAL SIGNS:   Vital Signs Last 24 Hrs  T(C): 36.8 (16 May 2019 08:32), Max: 36.8 (16 May 2019 08:32)  T(F): 98.3 (16 May 2019 08:32), Max: 98.3 (16 May 2019 08:32)  HR: 92 (16 May 2019 08:32) (92 - 92)  BP: 130/68 (16 May 2019 08:32) (130/68 - 130/68)  BP(mean): --  RR: 21 (16 May 2019 08:32) (21 - 21)  SpO2: 98% (16 May 2019 08:32) (98% - 98%)    I&O's Summary      PHYSICAL EXAM:  Constitutional: in acute distress, awake and alert  Eyes: EOMI, no oral cyanosis, conjunctivae clear, anicteric  Pulmonary: Labored,left sided rales in middle lobe   Cardiovascular: Irregular S1 and S2, in af, normal rate. No murmur  Gastrointestinal: Bowel sounds present, soft, nontender  Lymph: +2 BL peripheral edema   Neurological: Alert, strength and sensitivity are grossly intact  Skin: Warm, well-perfused  Psych: Mood and affect appropriate    LABS: All Labs Reviewed:                        12.2   9.69  )-----------( 191      ( 16 May 2019 09:09 )             38.7     16 May 2019 09:09    143    |  103    |  28     ----------------------------<  208    4.0     |  31     |  1.30     Ca    8.8        16 May 2019 09:09    TPro  7.4    /  Alb  3.2    /  TBili  0.7    /  DBili  x      /  AST  24     /  ALT  28     /  AlkPhos  84     16 May 2019 09:09    PT/INR - ( 16 May 2019 09:09 )   PT: 27.8 sec;   INR: 2.37 ratio         PTT - ( 16 May 2019 09:09 )  PTT:42.2 sec  CARDIAC MARKERS ( 16 May 2019 09:09 )  .030 ng/mL / x     / 116 U/L / x     / x          Blood Culture:    @ 09:09  Pro Bnp [id number]        RADIOLOGY:    EKG:   Ventricular-paced rhythm with occasional premature ventricular complexes   Vent rate 95 pr interval *  QRS duration 144  QT/QTc 430/540   prt * 216 21

## 2019-05-16 NOTE — H&P ADULT - NSHPLABSRESULTS_GEN_ALL_CORE
05-16    143  |  103  |  28<H>  ----------------------------<  208<H>  4.0   |  31  |  1.30    Ca    8.8      16 May 2019 09:09    TPro  7.4  /  Alb  3.2<L>  /  TBili  0.7  /  DBili  x   /  AST  24  /  ALT  28  /  AlkPhos  84  05-16                            12.2   9.69  )-----------( 191      ( 16 May 2019 09:09 )             38.7       CARDIAC MARKERS ( 16 May 2019 09:09 )  .030 ng/mL / x     / 116 U/L / x     / x            LIVER FUNCTIONS - ( 16 May 2019 09:09 )  Alb: 3.2 g/dL / Pro: 7.4 g/dL / ALK PHOS: 84 U/L / ALT: 28 U/L / AST: 24 U/L / GGT: x             PT/INR - ( 16 May 2019 09:09 )   PT: 27.8 sec;   INR: 2.37 ratio         PTT - ( 16 May 2019 09:09 )  PTT:42.2 sec

## 2019-05-16 NOTE — ED ADULT NURSE NOTE - NSIMPLEMENTINTERV_GEN_ALL_ED
Implemented All Universal Safety Interventions:  [place] to call system. Call bell, personal items and telephone within reach. Instruct patient to call for assistance. Room bathroom lighting operational. Non-slip footwear when patient is off stretcher. Physically safe environment: no spills, clutter or unnecessary equipment. Stretcher in lowest position, wheels locked, appropriate side rails in place.

## 2019-05-16 NOTE — ED ADULT NURSE REASSESSMENT NOTE - NS ED NURSE REASSESS COMMENT FT1
report given to rn virginia at bedside, a/o x 4, 20 g rac, resp even and unlabored, vitals are as charted

## 2019-05-16 NOTE — H&P ADULT - NSHPPHYSICALEXAM_GEN_ALL_CORE
· Physical Examination: · CONSTITUTIONAL: Well appearing, well nourished, awake, alert, oriented to person, place, time/situation and in no apparent distress. non-toxic, well appearing.  	· ENMT: Airway patent, Nasal mucosa clear. Mouth with normal mucosa. Throat has no vesicles, no oropharyngeal exudates and uvula is midline. MM moist. neck supple. no meningeal signs.  	· EYES: Clear bilaterally, pupils equal, round and reactive to light.  	· CARDIAC: Normal rate, regular rhythm.  Heart sounds S1, S2.  No murmurs, rubs or gallops.  	· RESPIRATORY: Breath sounds equal bilaterally. nl resp effort.  ? dec bs bl bases. No wheeze.  	· GASTROINTESTINAL: Abdomen soft, non-tender, no guarding. non-distended. no hsm. no CVAT  	· GENITOURINARY:  Bladder: non-tender  	· MUSCULOSKELETAL: Spine appears normal, range of motion is not limited, no muscle or joint tenderness, 1+ bl LE edema. No erythema.  	· NEUROLOGICAL: Alert and oriented, no focal deficits, no motor or sensory deficits.  	· SKIN: Skin normal color for race, warm, dry and intact. No evidence of rash.  · HEME LYMPH: No adenopathy or splenomegaly.

## 2019-05-16 NOTE — CONSULT NOTE ADULT - SUBJECTIVE AND OBJECTIVE BOX
HPI: 67 yo F p/w cough, mild dyspnea not resolving for over a week. Pt seen in ed 1 week ago for cough x few days, found to have PNA. Pt seen by PMD 3 days ago, now co inc dyspnea, weakness, persistent cough. Pos chest heaviness. Pt with LE edema, no worse than usual. no abd pain. no n/v/d. no neck / back pain. no recent trauma. no other recent illness. no other agg/allev factors. Prior treatment with nebs and augmentin      PAST MEDICAL & SURGICAL HISTORY:  Unsteady gait  CHF (congestive heart failure)  Arrhythmia  HLD (hyperlipidemia)  MI (myocardial infarction)  Obesity  Former smoker  Hyperthyroidism  TIA (Transient Ischemic Attack)  Cerebellar Infarction  CVA (Cerebral Vascular Accident)  Diabetes Mellitus, Type 2  CAD (Coronary Artery Disease)  Benign Hypertension  ICD  Cardiac Pacemaker  S/P Hysterectomy  S/P Ventriculoperitoneal Shunt  S/P Craniotomy  CABG (Coronary Artery Bypass Graft)  CABG (Coronary Artery Bypass Graft)      Antimicrobials  piperacillin/tazobactam IVPB. 3.375 Gram(s) IV Intermittent every 8 hours      Immunological      Other  ALBUTerol    90 MICROgram(s) HFA Inhaler 2 Puff(s) Inhalation every 6 hours PRN  aspirin enteric coated 81 milliGRAM(s) Oral daily  carvedilol 12.5 milliGRAM(s) Oral every 12 hours  digoxin     Tablet 0.125 milliGRAM(s) Oral daily  guaiFENesin    Syrup 100 milliGRAM(s) Oral every 4 hours  lactobacillus acidophilus 1 Tablet(s) Oral three times a day with meals  methimazole 2.5 milliGRAM(s) Oral daily  simvastatin 40 milliGRAM(s) Oral at bedtime  spironolactone 25 milliGRAM(s) Oral daily  torsemide 20 milliGRAM(s) Oral two times a day  warfarin 5 milliGRAM(s) Oral daily      Allergies    No Known Allergies    Intolerances      SOCIAL HISTORY: no toxic habits reported    FAMILY HISTORY: noncontrib      ROS:    EYES:  Negative  blurry vision or double vision  GASTROINTESTINAL:  Negative for nausea, vomiting, diarrhea  -otherwise negative except for subjective    Vital Signs Last 24 Hrs  T(C): 36.8 (16 May 2019 08:32), Max: 36.8 (16 May 2019 08:32)  T(F): 98.3 (16 May 2019 08:32), Max: 98.3 (16 May 2019 08:32)  HR: 92 (16 May 2019 08:32) (92 - 92)  BP: 130/68 (16 May 2019 08:32) (130/68 - 130/68)  BP(mean): --  RR: 21 (16 May 2019 08:32) (21 - 21)  SpO2: 98% (16 May 2019 08:32) (98% - 98%)    PE:  WDWN in no distress  HEENT:  NC, PERRL, sclerae anicteric, conjunctivae clear, EOMI.  Sinuses nontender, no nasal exudate.  No buccal or pharyngeal lesions, erythema or exudate  Neck:  Supple, no adenopathy  Lungs:  No accessory muscle use, bronchial BS and crackles in LLL  Cor:  RRR, S1, S2, no murmur appreciated  Abd:  Symmetric, normoactive BS.  Soft, nontender, no masses, guarding or rebound.  Liver and spleen not enlarged  Extrem:  No cyanosis, noted LE edema  Skin:  No rashes.  Neuro: grossly intact  Musc: moving all limbs freely, no focal deficits    LABS:                        12.2   9.69  )-----------( 191      ( 16 May 2019 09:09 )             38.7       WBC Count: 9.69 K/uL (05-16-19 @ 09:09)      05-16    143  |  103  |  28<H>  ----------------------------<  208<H>  4.0   |  31  |  1.30    Ca    8.8      16 May 2019 09:09    TPro  7.4  /  Alb  3.2<L>  /  TBili  0.7  /  DBili  x   /  AST  24  /  ALT  28  /  AlkPhos  84  05-16      Creatinine, Serum: 1.30 mg/dL (05-16-19 @ 09:09)        MICROBIOLOGY:        RADIOLOGY & ADDITIONAL STUDIES:    --< from: Xray Chest 2 Views PA/Lat (05.16.19 @ 08:53) >  EXAM:  XR CHEST PA LAT 2V                            PROCEDURE DATE:  05/16/2019          INTERPRETATION:  Short of breath, cough.    PA lateral. Prior 5/8/2019.    Status post median sternotomy. Left cardiac pacer obscures a portion of   the left base. Questionable evolving airspace infiltrate left base with a   small hazy left pleural effusion. Recommend close follow-up.

## 2019-05-17 ENCOUNTER — APPOINTMENT (OUTPATIENT)
Dept: CARDIOLOGY | Facility: CLINIC | Age: 69
End: 2019-05-17

## 2019-05-17 ENCOUNTER — TRANSCRIPTION ENCOUNTER (OUTPATIENT)
Age: 69
End: 2019-05-17

## 2019-05-17 DIAGNOSIS — I49.9 CARDIAC ARRHYTHMIA, UNSPECIFIED: ICD-10-CM

## 2019-05-17 DIAGNOSIS — E05.90 THYROTOXICOSIS, UNSPECIFIED WITHOUT THYROTOXIC CRISIS OR STORM: ICD-10-CM

## 2019-05-17 DIAGNOSIS — I25.10 ATHEROSCLEROTIC HEART DISEASE OF NATIVE CORONARY ARTERY WITHOUT ANGINA PECTORIS: ICD-10-CM

## 2019-05-17 DIAGNOSIS — R06.02 SHORTNESS OF BREATH: ICD-10-CM

## 2019-05-17 DIAGNOSIS — J20.6 ACUTE BRONCHITIS DUE TO RHINOVIRUS: ICD-10-CM

## 2019-05-17 DIAGNOSIS — E11.9 TYPE 2 DIABETES MELLITUS WITHOUT COMPLICATIONS: ICD-10-CM

## 2019-05-17 DIAGNOSIS — I63.543 CEREBRAL INFARCTION DUE TO UNSPECIFIED OCCLUSION OR STENOSIS OF BILATERAL CEREBELLAR ARTERIES: ICD-10-CM

## 2019-05-17 LAB
APTT BLD: 40.4 SEC — HIGH (ref 28.5–37)
HCV AB S/CO SERPL IA: 0.13 S/CO — SIGNIFICANT CHANGE UP (ref 0–0.99)
HCV AB SERPL-IMP: SIGNIFICANT CHANGE UP
INR BLD: 2.33 RATIO — HIGH (ref 0.88–1.16)
PROCALCITONIN SERPL-MCNC: <0.05 — SIGNIFICANT CHANGE UP (ref 0–0.04)
PROTHROM AB SERPL-ACNC: 27.3 SEC — HIGH (ref 10–12.9)

## 2019-05-17 PROCEDURE — 99232 SBSQ HOSP IP/OBS MODERATE 35: CPT

## 2019-05-17 RX ORDER — LACTOBACILLUS ACIDOPHILUS 100MM CELL
2 CAPSULE ORAL
Qty: 0 | Refills: 0 | DISCHARGE
Start: 2019-05-17

## 2019-05-17 RX ORDER — INSULIN LISPRO 100/ML
VIAL (ML) SUBCUTANEOUS
Refills: 0 | Status: DISCONTINUED | OUTPATIENT
Start: 2019-05-17 | End: 2019-05-18

## 2019-05-17 RX ORDER — INSULIN LISPRO 100/ML
15 VIAL (ML) SUBCUTANEOUS ONCE
Refills: 0 | Status: COMPLETED | OUTPATIENT
Start: 2019-05-17 | End: 2019-05-17

## 2019-05-17 RX ORDER — AZITHROMYCIN 500 MG/1
500 TABLET, FILM COATED ORAL ONCE
Refills: 0 | Status: COMPLETED | OUTPATIENT
Start: 2019-05-18 | End: 2019-05-18

## 2019-05-17 RX ORDER — INSULIN LISPRO 100/ML
VIAL (ML) SUBCUTANEOUS AT BEDTIME
Refills: 0 | Status: DISCONTINUED | OUTPATIENT
Start: 2019-05-17 | End: 2019-05-18

## 2019-05-17 RX ORDER — INSULIN LISPRO 100/ML
25 VIAL (ML) SUBCUTANEOUS
Refills: 0 | Status: DISCONTINUED | OUTPATIENT
Start: 2019-05-17 | End: 2019-05-18

## 2019-05-17 RX ORDER — AZITHROMYCIN 500 MG/1
500 TABLET, FILM COATED ORAL DAILY
Refills: 0 | Status: DISCONTINUED | OUTPATIENT
Start: 2019-05-17 | End: 2019-05-17

## 2019-05-17 RX ORDER — CEFUROXIME AXETIL 250 MG
1 TABLET ORAL
Qty: 10 | Refills: 0
Start: 2019-05-17 | End: 2019-05-21

## 2019-05-17 RX ORDER — AZITHROMYCIN 500 MG/1
500 TABLET, FILM COATED ORAL ONCE
Refills: 0 | Status: COMPLETED | OUTPATIENT
Start: 2019-05-17 | End: 2019-05-17

## 2019-05-17 RX ORDER — AZITHROMYCIN 500 MG/1
1 TABLET, FILM COATED ORAL
Qty: 3 | Refills: 0
Start: 2019-05-17 | End: 2019-05-19

## 2019-05-17 RX ORDER — INSULIN GLARGINE 100 [IU]/ML
40 INJECTION, SOLUTION SUBCUTANEOUS AT BEDTIME
Refills: 0 | Status: COMPLETED | OUTPATIENT
Start: 2019-05-17 | End: 2019-05-17

## 2019-05-17 RX ORDER — CEFUROXIME AXETIL 250 MG
500 TABLET ORAL EVERY 12 HOURS
Refills: 0 | Status: DISCONTINUED | OUTPATIENT
Start: 2019-05-19 | End: 2019-05-18

## 2019-05-17 RX ADMIN — CEFTRIAXONE 100 GRAM(S): 500 INJECTION, POWDER, FOR SOLUTION INTRAMUSCULAR; INTRAVENOUS at 11:38

## 2019-05-17 RX ADMIN — CARVEDILOL PHOSPHATE 12.5 MILLIGRAM(S): 80 CAPSULE, EXTENDED RELEASE ORAL at 05:40

## 2019-05-17 RX ADMIN — Medication 1 TABLET(S): at 11:37

## 2019-05-17 RX ADMIN — Medication 6: at 11:38

## 2019-05-17 RX ADMIN — SIMVASTATIN 40 MILLIGRAM(S): 20 TABLET, FILM COATED ORAL at 21:25

## 2019-05-17 RX ADMIN — Medication 0.12 MILLIGRAM(S): at 05:40

## 2019-05-17 RX ADMIN — Medication 60 MILLIGRAM(S): at 05:40

## 2019-05-17 RX ADMIN — Medication 100 MILLIGRAM(S): at 05:40

## 2019-05-17 RX ADMIN — Medication 25 UNIT(S): at 11:38

## 2019-05-17 RX ADMIN — AZITHROMYCIN 500 MILLIGRAM(S): 500 TABLET, FILM COATED ORAL at 13:20

## 2019-05-17 RX ADMIN — INSULIN GLARGINE 40 UNIT(S): 100 INJECTION, SOLUTION SUBCUTANEOUS at 21:53

## 2019-05-17 RX ADMIN — Medication 1 TABLET(S): at 17:44

## 2019-05-17 RX ADMIN — Medication 15 UNIT(S): at 17:19

## 2019-05-17 RX ADMIN — Medication 81 MILLIGRAM(S): at 11:37

## 2019-05-17 RX ADMIN — Medication 100 MILLIGRAM(S): at 13:20

## 2019-05-17 RX ADMIN — CARVEDILOL PHOSPHATE 12.5 MILLIGRAM(S): 80 CAPSULE, EXTENDED RELEASE ORAL at 17:44

## 2019-05-17 RX ADMIN — Medication 100 MILLIGRAM(S): at 21:25

## 2019-05-17 RX ADMIN — SPIRONOLACTONE 25 MILLIGRAM(S): 25 TABLET, FILM COATED ORAL at 08:36

## 2019-05-17 RX ADMIN — WARFARIN SODIUM 5 MILLIGRAM(S): 2.5 TABLET ORAL at 21:25

## 2019-05-17 RX ADMIN — Medication 100 MILLIGRAM(S): at 17:44

## 2019-05-17 RX ADMIN — Medication 1 TABLET(S): at 08:36

## 2019-05-17 NOTE — CONSULT NOTE ADULT - PROBLEM SELECTOR RECOMMENDATION 9
add humalog 25 units 3x/day before meals  cont lantus 70 units qhs  cont mod dose humalog scale coverage  cont cons cho diet  goal bg 100-180 in hosp setting
Based on this clinical presentation story is consistent with PNA but no obv risks for pseudomonas or MRSA so will change therapy to standard CAP coverage pending further data.
procalcitonin  ID note apprec  FU cxr

## 2019-05-17 NOTE — CONSULT NOTE ADULT - SUBJECTIVE AND OBJECTIVE BOX
PULMONARY/CRITICAL CARE        Patient is a 68y old  Female who presents with a chief complaint of sob (17 May 2019 07:17)    BRIEF HOSPITAL COURSE: *** 69 yo F p/w cough, mild dyspnea not resolving for over a week. Pt seen in ed 1 week ago for cough x few days, found to have PNA. Pt seen by PMD 3 days ago, now co inc dyspnea, weakness, persistent cough. Pos chest heaviness. Pt with LE edema, no worse than usual. no abd pain. no n/v/d. no neck / back pain. no recent trauma. no other recent illness. no other agg/allev factors. Prior treatment with nebs and augmentin    Pt feels better today. Nonprod cough.  Rhinovirus positive.  No prior lung disease  Events last 24 hours: ***    PAST MEDICAL & SURGICAL HISTORY:  Unsteady gait  CHF (congestive heart failure)  Arrhythmia  HLD (hyperlipidemia)  MI (myocardial infarction)  Obesity  Former smoker  Hyperthyroidism  TIA (Transient Ischemic Attack)  Cerebellar Infarction  CVA (Cerebral Vascular Accident)  Diabetes Mellitus, Type 2  CAD (Coronary Artery Disease)  Benign Hypertension  ICD  Cardiac Pacemaker  S/P Hysterectomy  S/P Ventriculoperitoneal Shunt  S/P Craniotomy  CABG (Coronary Artery Bypass Graft)  CABG (Coronary Artery Bypass Graft)    Allergies    No Known Allergies    Intolerances      FAMILY HISTORY and SOCIAL: distant smoker. No etoh. Works with disabled      Review of Systems:  CONSTITUTIONAL: No fever, chills, has  fatigue  EYES: No eye pain, visual disturbances, or discharge  ENMT:  No difficulty hearing, tinnitus, vertigo; No sinus or throat pain  NECK: No pain or stiffness  RESPIRATORY: has  cough, wheezing, no chills or hemoptysis; mild shortness of breath  CARDIOVASCULAR: No chest pain, palpitations, dizziness, has chronic  leg swelling  GASTROINTESTINAL: No abdominal or epigastric pain. No nausea, vomiting, or hematemesis; No diarrhea or constipation. No melena or hematochezia.  GENITOURINARY: No dysuria, frequency, hematuria, or incontinence  NEUROLOGICAL: No headaches, memory loss, loss of strength, numbness, or tremors  SKIN: No itching, burning, rashes, or lesions   MUSCULOSKELETAL: No joint pain or swelling; No muscle, back, or extremity pain  PSYCHIATRIC: No depression, anxiety, mood swings, or difficulty sleeping      Medications:  cefTRIAXone   IVPB      cefTRIAXone   IVPB 1 Gram(s) IV Intermittent every 24 hours    carvedilol 12.5 milliGRAM(s) Oral every 12 hours  digoxin     Tablet 0.125 milliGRAM(s) Oral daily  spironolactone 25 milliGRAM(s) Oral daily  torsemide 60 milliGRAM(s) Oral daily    ALBUTerol    90 MICROgram(s) HFA Inhaler 2 Puff(s) Inhalation every 6 hours PRN  guaiFENesin    Syrup 100 milliGRAM(s) Oral every 4 hours        aspirin enteric coated 81 milliGRAM(s) Oral daily  warfarin 5 milliGRAM(s) Oral daily        dextrose 40% Gel 15 Gram(s) Oral once PRN  dextrose 50% Injectable 12.5 Gram(s) IV Push once  dextrose 50% Injectable 25 Gram(s) IV Push once  dextrose 50% Injectable 25 Gram(s) IV Push once  glucagon  Injectable 1 milliGRAM(s) IntraMuscular once PRN  insulin glargine Injectable (LANTUS) 70 Unit(s) SubCutaneous at bedtime  insulin lispro (HumaLOG) corrective regimen sliding scale   SubCutaneous three times a day before meals  insulin lispro (HumaLOG) corrective regimen sliding scale   SubCutaneous at bedtime  insulin lispro Injectable (HumaLOG) 25 Unit(s) SubCutaneous three times a day before meals  methimazole 2.5 milliGRAM(s) Oral daily  simvastatin 40 milliGRAM(s) Oral at bedtime    dextrose 5%. 1000 milliLiter(s) IV Continuous <Continuous>        lactobacillus acidophilus 1 Tablet(s) Oral three times a day with meals          ICU Vital Signs Last 24 Hrs  T(C): 36.4 (17 May 2019 05:15), Max: 36.8 (16 May 2019 08:32)  T(F): 97.6 (17 May 2019 05:15), Max: 98.3 (16 May 2019 08:32)  HR: 84 (17 May 2019 05:15) (79 - 92)  BP: 109/75 (17 May 2019 05:15) (100/64 - 130/68)  BP(mean): --  ABP: --  ABP(mean): --  RR: 18 (17 May 2019 05:15) (18 - 21)  SpO2: 94% (17 May 2019 05:15) (93% - 98%)    Vital Signs Last 24 Hrs  T(C): 36.4 (17 May 2019 05:15), Max: 36.8 (16 May 2019 08:32)  T(F): 97.6 (17 May 2019 05:15), Max: 98.3 (16 May 2019 08:32)  HR: 84 (17 May 2019 05:15) (79 - 92)  BP: 109/75 (17 May 2019 05:15) (100/64 - 130/68)  BP(mean): --  RR: 18 (17 May 2019 05:15) (18 - 21)  SpO2: 94% (17 May 2019 05:15) (93% - 98%)        I&O's Detail    16 May 2019 07:01  -  17 May 2019 07:00  --------------------------------------------------------  IN:    Oral Fluid: 240 mL  Total IN: 240 mL    OUT:  Total OUT: 0 mL    Total NET: 240 mL            LABS:                        12.2   9.69  )-----------( 191      ( 16 May 2019 09:09 )             38.7     05-16    143  |  103  |  28<H>  ----------------------------<  208<H>  4.0   |  31  |  1.30    Ca    8.8      16 May 2019 09:09    TPro  7.4  /  Alb  3.2<L>  /  TBili  0.7  /  DBili  x   /  AST  24  /  ALT  28  /  AlkPhos  84  05-16      CARDIAC MARKERS ( 16 May 2019 09:09 )  .030 ng/mL / x     / 116 U/L / x     / x          CAPILLARY BLOOD GLUCOSE      POCT Blood Glucose.: 322 mg/dL (16 May 2019 21:40)    PT/INR - ( 16 May 2019 09:09 )   PT: 27.8 sec;   INR: 2.37 ratio         PTT - ( 16 May 2019 09:09 )  PTT:42.2 sec    CULTURES:      Physical Examination:    General: No acute distress.  obese female    HEENT: Pupils equal, reactive to light.  Symmetric.    PULM: few bas crackles, few wheezes good excursion    CVS: Regular rate and rhythm, no murmurs, rubs, or gallops    ABD: Soft, nondistended, nontender, normoactive bowel sounds, no masses    EXT: 2 plus pedal edema, nontender    SKIN: Warm and well perfused, no rashes noted.    NEURO: Alert, oriented, interactive, nonfocal    RADIOLOGY: ***< from: Xray Chest 2 Views PA/Lat (05.16.19 @ 08:53) >  EXAM:  XR CHEST PA LAT 2V                            PROCEDURE DATE:  05/16/2019          INTERPRETATION:  Short of breath, cough.    PA lateral. Prior 5/8/2019.    Status post median sternotomy. Left cardiac pacer obscures a portion of   the left base. Questionable evolving airspace infiltrate left base with a   small hazy left pleural effusion. Recommend close follow-up.    Impression: As above                BEN ESTRELLA M.D., ATTENDING RADIOLOGIST  This document has been electronically signed. May 16 2019  9:45AM        < end of copied text >      CRITICAL CARE TIME SPENT: *** Yes

## 2019-05-17 NOTE — CHART NOTE - NSCHARTNOTEFT_GEN_A_CORE
Do you have Advance Directives (HCP / LV / Organ donation / Documentation of oral advance Directive):   (    )  yes    (  x    )    NO                                                                            Do you have LV - Living will :                                                                                                                                             (    )  yes    (   x   )   No    Do you have HCP - Health Care Proxy:                                                                                                                            (    )  yes   (    x   ) N0    Do you have DNR- Do Not Resuscitate :                                                                                                                           (      )  yes  (    x    )  No    Do you have DNI- Do Not intubate  :                                                                                                                               (      )  yes   (   x    ) No    Do you have MOLST - Medical orders for Life sustaining treatment  :                                                                    (      ) yes    (  x     ) No    Decision Maker :  (  x   ) Patient     (      )  HCA   (     ) Public Health Law Surrogate     (      ) Surrogate  (       ) Guardian    Goals of Care :  (   x   )   Complete Care     (       ) No Limitations                              (       )   Comfort Care       (       )  Hospice                               (      )   Limited medical Intervention / s    Medical Interventions :   (   x     )   CPR       (        )  DNR                                               (      x  )  Intubation with MV - Mechanical Ventilation  (  x    ) BIPAP/CPAP    (         )   DNI                                               (     x    )  Artificial Nutrition -  IVF, TPN / PPN, Tube Feeds             (         )   No Feeding Tube                                                (    x    ) Use Antibiotics                         (          ) No Antibiotics                                                (     x    ) Blood and Blood Products     (         )   No Blood or Blood products                                                (     x     )  Dialysis                                    (         )  No Dialysis                                                (          )  Medical Management only  (         )  No Invasive Interventions or Surgery  Time spent :                        (    x   ) up to 30 minutes                       (           )   more than 30 minutes  Goals of care reviewed and discussed

## 2019-05-17 NOTE — CONSULT NOTE ADULT - PROBLEM SELECTOR PROBLEM 1
Diabetes Mellitus, Type 2
Pneumonia of left lower lobe due to infectious organism
Pneumonia of left lower lobe due to infectious organism

## 2019-05-17 NOTE — DISCHARGE NOTE PROVIDER - NSDCCPCAREPLAN_GEN_ALL_CORE_FT
PRINCIPAL DISCHARGE DIAGNOSIS  Diagnosis: SOB (shortness of breath)  Assessment and Plan of Treatment: follow up with Lung Dr. VARGAS      SECONDARY DISCHARGE DIAGNOSES  Diagnosis: Pneumonia of left lower lobe due to infectious organism  Assessment and Plan of Treatment:

## 2019-05-17 NOTE — DISCHARGE NOTE PROVIDER - PROVIDER TOKENS
PROVIDER:[TOKEN:[[id number]]],PROVIDER:[TOKEN:[[id number]]],PROVIDER:[TOKEN:[[id number]]],PROVIDER:[TOKEN:[[id number]]]

## 2019-05-17 NOTE — CONSULT NOTE ADULT - PROBLEM SELECTOR RECOMMENDATION 2
chaddn
cont methimazole 2.5mg daily
per medicine/cardiology.    Thank you for consulting us and involving us in the management of this most interesting and challenging case.     We will follow along in the care of this patient.

## 2019-05-17 NOTE — DIETITIAN INITIAL EVALUATION ADULT. - OTHER INFO
Pt states wants mostly Kosher foods, she will call kitchen to choose Kosher or Kosher style. States knows DM meal guidelines, aware of coumadin food/drug interactions,  declines further education States no hypoglycemia/symptoms PTA..

## 2019-05-17 NOTE — CONSULT NOTE ADULT - SUBJECTIVE AND OBJECTIVE BOX
Patient is a 68y old  Female who presents with a chief complaint of sob (16 May 2019 12:11)      Reason For Consult: dm2 uncontrolled    HPI:      PAST MEDICAL & SURGICAL HISTORY:  Unsteady gait  CHF (congestive heart failure)  Arrhythmia  HLD (hyperlipidemia)  MI (myocardial infarction)  Obesity  Former smoker  Hyperthyroidism  TIA (Transient Ischemic Attack)  Cerebellar Infarction  CVA (Cerebral Vascular Accident)  Diabetes Mellitus, Type 2  CAD (Coronary Artery Disease)  Benign Hypertension  ICD  Cardiac Pacemaker  S/P Hysterectomy  S/P Ventriculoperitoneal Shunt  S/P Craniotomy  CABG (Coronary Artery Bypass Graft)  CABG (Coronary Artery Bypass Graft)      FAMILY HISTORY:        Social History:    MEDICATIONS  (STANDING):  aspirin enteric coated 81 milliGRAM(s) Oral daily  carvedilol 12.5 milliGRAM(s) Oral every 12 hours  cefTRIAXone   IVPB      cefTRIAXone   IVPB 1 Gram(s) IV Intermittent every 24 hours  dextrose 5%. 1000 milliLiter(s) (50 mL/Hr) IV Continuous <Continuous>  dextrose 50% Injectable 12.5 Gram(s) IV Push once  dextrose 50% Injectable 25 Gram(s) IV Push once  dextrose 50% Injectable 25 Gram(s) IV Push once  digoxin     Tablet 0.125 milliGRAM(s) Oral daily  guaiFENesin    Syrup 100 milliGRAM(s) Oral every 4 hours  insulin glargine Injectable (LANTUS) 70 Unit(s) SubCutaneous at bedtime  insulin lispro (HumaLOG) corrective regimen sliding scale   SubCutaneous three times a day before meals  insulin lispro (HumaLOG) corrective regimen sliding scale   SubCutaneous at bedtime  lactobacillus acidophilus 1 Tablet(s) Oral three times a day with meals  methimazole 2.5 milliGRAM(s) Oral daily  simvastatin 40 milliGRAM(s) Oral at bedtime  spironolactone 25 milliGRAM(s) Oral daily  torsemide 60 milliGRAM(s) Oral daily  warfarin 5 milliGRAM(s) Oral daily    MEDICATIONS  (PRN):  ALBUTerol    90 MICROgram(s) HFA Inhaler 2 Puff(s) Inhalation every 6 hours PRN Shortness of Breath and/or Wheezing  dextrose 40% Gel 15 Gram(s) Oral once PRN Blood Glucose LESS THAN 70 milliGRAM(s)/deciLiter  glucagon  Injectable 1 milliGRAM(s) IntraMuscular once PRN Glucose <70 milliGRAM(s)/deciLiter        T(C): 36.4 (05-17-19 @ 05:15), Max: 36.8 (05-16-19 @ 08:32)  HR: 84 (05-17-19 @ 05:15) (79 - 92)  BP: 109/75 (05-17-19 @ 05:15) (100/64 - 130/68)  RR: 18 (05-17-19 @ 05:15) (18 - 21)  SpO2: 94% (05-17-19 @ 05:15) (93% - 98%)  Wt(kg): --    PHYSICAL EXAM:  GENERAL: NAD, well-groomed, well-developed  HEAD:  Atraumatic, Normocephalic  NECK: Supple, No JVD, Normal thyroid  CHEST/LUNG: Clear to percussion bilaterally; No rales, rhonchi, wheezing, or rubs  HEART: Regular rate and rhythm; No murmurs, rubs, or gallops  ABDOMEN: Soft, Nontender, Nondistended; Bowel sounds present  EXTREMITIES:  2+ Peripheral Pulses, No clubbing, cyanosis, or edema  SKIN: No rashes or lesions    CAPILLARY BLOOD GLUCOSE      POCT Blood Glucose.: 322 mg/dL (16 May 2019 21:40)  POCT Blood Glucose.: 315 mg/dL (16 May 2019 21:37)  POCT Blood Glucose.: 234 mg/dL (16 May 2019 16:52)                            12.2   9.69  )-----------( 191      ( 16 May 2019 09:09 )             38.7       CMP:  05-16 @ 09:09  SGPT 28  Albumin 3.2   Alk Phos 84   Anion Gap 9   SGOT 24   Total Bili 0.7   BUN 28   Calcium Total 8.8   CO2 31   Chloride 103   Creatinine 1.30   eGFR if AA 49   eGFR if non AA 42   Glucose 208   Potassium 4.0   Protein 7.4   Sodium 143      Thyroid Function Tests:      Diabetes Tests: 05-16 @ 15:49 HbA1c 8.8 C-Peptide --         Radiology:

## 2019-05-17 NOTE — DISCHARGE NOTE PROVIDER - CARE PROVIDERS DIRECT ADDRESSES
[email or web address]

## 2019-05-17 NOTE — SWALLOW VFSS/MBS ASSESSMENT ADULT - COMMENTS
MD order for MBS received. Chart reviewed. Pt is familiar to this dept from an attempt at a clinical assessment of swallow function completed on 5/16/2019 at which time pt refused ST intervention (please see note for full details). SLP arrived at pt bedside this AM to discuss MBS completion given recent refusal. Pt was educated on the rationale of MBS and how pneumonia can be related to dysphagia. Pt continues to adamantly refuse ST intervention at this time.     MBS not completed 2/2 to pt refusal. Discussed today's encounter with pt's RN, Yulia with call out to Dr. Langston.

## 2019-05-17 NOTE — DISCHARGE NOTE PROVIDER - CARE PROVIDER_API CALL
Catrachito Rojas (DO)  Family Medicine  [address], [address]  [place] [address]  Phone: [phone]  Fax: [phone]  Follow Up Time:     Serafin Easley)  Critical Care Medicine; Internal Medicine; Pulmonary Disease  [address], [address]  [place] [address]  Phone: [phone]  Fax: [phone]  Follow Up Time:     Elliott Loco; PhD)  Infectious Disease; Internal Medicine  [address]  [place] [address]  Phone: [phone]  Fax: [phone]  Follow Up Time:     Pito Curtis)  Cardiovascular Disease  [address] [place] [address]  Phone: [phone]  Fax: [phone]  Follow Up Time:

## 2019-05-17 NOTE — DISCHARGE NOTE PROVIDER - HOSPITAL COURSE
admitted for SOB / RACHEL    found to have PNA on CXR    ABX per ID    DC after ID and PULM clearance

## 2019-05-18 ENCOUNTER — TRANSCRIPTION ENCOUNTER (OUTPATIENT)
Age: 69
End: 2019-05-18

## 2019-05-18 VITALS
WEIGHT: 214.95 LBS | DIASTOLIC BLOOD PRESSURE: 66 MMHG | RESPIRATION RATE: 18 BRPM | TEMPERATURE: 98 F | HEART RATE: 76 BPM | OXYGEN SATURATION: 92 % | SYSTOLIC BLOOD PRESSURE: 101 MMHG

## 2019-05-18 LAB
APTT BLD: 36.4 SEC — HIGH (ref 27.5–36.3)
HCT VFR BLD CALC: 36 % — SIGNIFICANT CHANGE UP (ref 34.5–45)
HGB BLD-MCNC: 11.3 G/DL — LOW (ref 11.5–15.5)
INR BLD: 2.31 RATIO — HIGH (ref 0.88–1.16)
MCHC RBC-ENTMCNC: 28.5 PG — SIGNIFICANT CHANGE UP (ref 27–34)
MCHC RBC-ENTMCNC: 31.4 GM/DL — LOW (ref 32–36)
MCV RBC AUTO: 90.9 FL — SIGNIFICANT CHANGE UP (ref 80–100)
MRSA PCR RESULT.: DETECTED
NRBC # BLD: 0 /100 WBCS — SIGNIFICANT CHANGE UP (ref 0–0)
PLATELET # BLD AUTO: 155 K/UL — SIGNIFICANT CHANGE UP (ref 150–400)
PROTHROM AB SERPL-ACNC: 26.8 SEC — HIGH (ref 10–12.9)
RBC # BLD: 3.96 M/UL — SIGNIFICANT CHANGE UP (ref 3.8–5.2)
RBC # FLD: 14.6 % — HIGH (ref 10.3–14.5)
S AUREUS DNA NOSE QL NAA+PROBE: DETECTED
WBC # BLD: 6.96 K/UL — SIGNIFICANT CHANGE UP (ref 3.8–10.5)
WBC # FLD AUTO: 6.96 K/UL — SIGNIFICANT CHANGE UP (ref 3.8–10.5)

## 2019-05-18 PROCEDURE — 87633 RESP VIRUS 12-25 TARGETS: CPT

## 2019-05-18 PROCEDURE — 99285 EMERGENCY DEPT VISIT HI MDM: CPT | Mod: 25

## 2019-05-18 PROCEDURE — 71045 X-RAY EXAM CHEST 1 VIEW: CPT | Mod: 26

## 2019-05-18 PROCEDURE — 84145 PROCALCITONIN (PCT): CPT

## 2019-05-18 PROCEDURE — 84484 ASSAY OF TROPONIN QUANT: CPT

## 2019-05-18 PROCEDURE — 83880 ASSAY OF NATRIURETIC PEPTIDE: CPT

## 2019-05-18 PROCEDURE — 36415 COLL VENOUS BLD VENIPUNCTURE: CPT

## 2019-05-18 PROCEDURE — 83036 HEMOGLOBIN GLYCOSYLATED A1C: CPT

## 2019-05-18 PROCEDURE — 93005 ELECTROCARDIOGRAM TRACING: CPT

## 2019-05-18 PROCEDURE — 87486 CHLMYD PNEUM DNA AMP PROBE: CPT

## 2019-05-18 PROCEDURE — 83605 ASSAY OF LACTIC ACID: CPT

## 2019-05-18 PROCEDURE — 87641 MR-STAPH DNA AMP PROBE: CPT

## 2019-05-18 PROCEDURE — 99232 SBSQ HOSP IP/OBS MODERATE 35: CPT

## 2019-05-18 PROCEDURE — 85730 THROMBOPLASTIN TIME PARTIAL: CPT

## 2019-05-18 PROCEDURE — 86803 HEPATITIS C AB TEST: CPT

## 2019-05-18 PROCEDURE — 87798 DETECT AGENT NOS DNA AMP: CPT

## 2019-05-18 PROCEDURE — 82962 GLUCOSE BLOOD TEST: CPT

## 2019-05-18 PROCEDURE — 87631 RESP VIRUS 3-5 TARGETS: CPT

## 2019-05-18 PROCEDURE — 71045 X-RAY EXAM CHEST 1 VIEW: CPT

## 2019-05-18 PROCEDURE — 87040 BLOOD CULTURE FOR BACTERIA: CPT

## 2019-05-18 PROCEDURE — 87581 M.PNEUMON DNA AMP PROBE: CPT

## 2019-05-18 PROCEDURE — 82550 ASSAY OF CK (CPK): CPT

## 2019-05-18 PROCEDURE — 87640 STAPH A DNA AMP PROBE: CPT

## 2019-05-18 PROCEDURE — 80053 COMPREHEN METABOLIC PANEL: CPT

## 2019-05-18 PROCEDURE — 85027 COMPLETE CBC AUTOMATED: CPT

## 2019-05-18 PROCEDURE — 96374 THER/PROPH/DIAG INJ IV PUSH: CPT

## 2019-05-18 PROCEDURE — 71046 X-RAY EXAM CHEST 2 VIEWS: CPT

## 2019-05-18 PROCEDURE — 85610 PROTHROMBIN TIME: CPT

## 2019-05-18 RX ADMIN — CEFTRIAXONE 100 GRAM(S): 500 INJECTION, POWDER, FOR SOLUTION INTRAMUSCULAR; INTRAVENOUS at 09:24

## 2019-05-18 RX ADMIN — Medication 25 UNIT(S): at 08:22

## 2019-05-18 RX ADMIN — Medication 100 MILLIGRAM(S): at 02:39

## 2019-05-18 RX ADMIN — Medication 25 UNIT(S): at 11:55

## 2019-05-18 RX ADMIN — Medication 60 MILLIGRAM(S): at 06:59

## 2019-05-18 RX ADMIN — Medication 81 MILLIGRAM(S): at 11:54

## 2019-05-18 RX ADMIN — Medication 1 TABLET(S): at 11:55

## 2019-05-18 RX ADMIN — Medication 100 MILLIGRAM(S): at 09:25

## 2019-05-18 RX ADMIN — Medication 0.12 MILLIGRAM(S): at 06:39

## 2019-05-18 RX ADMIN — SPIRONOLACTONE 25 MILLIGRAM(S): 25 TABLET, FILM COATED ORAL at 11:53

## 2019-05-18 RX ADMIN — Medication 100 MILLIGRAM(S): at 06:39

## 2019-05-18 RX ADMIN — CARVEDILOL PHOSPHATE 12.5 MILLIGRAM(S): 80 CAPSULE, EXTENDED RELEASE ORAL at 07:00

## 2019-05-18 RX ADMIN — AZITHROMYCIN 500 MILLIGRAM(S): 500 TABLET, FILM COATED ORAL at 09:24

## 2019-05-18 RX ADMIN — Medication 1 TABLET(S): at 08:25

## 2019-05-18 NOTE — PROGRESS NOTE ADULT - ATTENDING COMMENTS
The patient was personally seen and examined, in addition to being examined and evaluated by NP.  All elements of the note were edited where appropriate.
d/c home

## 2019-05-18 NOTE — PROGRESS NOTE ADULT - SUBJECTIVE AND OBJECTIVE BOX
[facility] -- Ramy Solis, Ever, Nicole Dumont Patel, Savella  Office # [phone]      Follow Up:    weakness  Subjective/Observations:   No events overnight resting comfortably in bed.  No complaints of chest pain, dyspnea, or palpitations reported. No signs of orthopnea or PND.     REVIEW OF SYSTEMS: All other review of systems is negative unless indicated above    PAST MEDICAL & SURGICAL HISTORY:  Unsteady gait  CHF (congestive heart failure)  Arrhythmia  HLD (hyperlipidemia)  MI (myocardial infarction)  Obesity  Former smoker  Hyperthyroidism  TIA (Transient Ischemic Attack)  Cerebellar Infarction  CVA (Cerebral Vascular Accident)  Diabetes Mellitus, Type 2  CAD (Coronary Artery Disease)  Benign Hypertension  ICD  Cardiac Pacemaker  S/P Hysterectomy  S/P Ventriculoperitoneal Shunt  S/P Craniotomy  CABG (Coronary Artery Bypass Graft)  CABG (Coronary Artery Bypass Graft)      MEDICATIONS  (STANDING):  aspirin enteric coated 81 milliGRAM(s) Oral daily  carvedilol 12.5 milliGRAM(s) Oral every 12 hours  cefTRIAXone   IVPB      cefTRIAXone   IVPB 1 Gram(s) IV Intermittent every 24 hours  dextrose 5%. 1000 milliLiter(s) (50 mL/Hr) IV Continuous <Continuous>  dextrose 50% Injectable 12.5 Gram(s) IV Push once  dextrose 50% Injectable 25 Gram(s) IV Push once  dextrose 50% Injectable 25 Gram(s) IV Push once  digoxin     Tablet 0.125 milliGRAM(s) Oral daily  guaiFENesin    Syrup 100 milliGRAM(s) Oral every 4 hours  insulin glargine Injectable (LANTUS) 70 Unit(s) SubCutaneous at bedtime  insulin lispro (HumaLOG) corrective regimen sliding scale   SubCutaneous three times a day before meals  insulin lispro (HumaLOG) corrective regimen sliding scale   SubCutaneous at bedtime  insulin lispro Injectable (HumaLOG) 25 Unit(s) SubCutaneous three times a day before meals  lactobacillus acidophilus 1 Tablet(s) Oral three times a day with meals  methimazole 2.5 milliGRAM(s) Oral daily  simvastatin 40 milliGRAM(s) Oral at bedtime  spironolactone 25 milliGRAM(s) Oral daily  torsemide 60 milliGRAM(s) Oral daily  warfarin 5 milliGRAM(s) Oral daily    MEDICATIONS  (PRN):  ALBUTerol    90 MICROgram(s) HFA Inhaler 2 Puff(s) Inhalation every 6 hours PRN Shortness of Breath and/or Wheezing  dextrose 40% Gel 15 Gram(s) Oral once PRN Blood Glucose LESS THAN 70 milliGRAM(s)/deciLiter  glucagon  Injectable 1 milliGRAM(s) IntraMuscular once PRN Glucose <70 milliGRAM(s)/deciLiter      Allergies    No Known Allergies    Intolerances        Vital Signs Last 24 Hrs  T(C): 36.9 (17 May 2019 13:06), Max: 36.9 (17 May 2019 13:06)  T(F): 98.4 (17 May 2019 13:06), Max: 98.4 (17 May 2019 13:06)  HR: 78 (17 May 2019 13:06) (78 - 90)  BP: 103/69 (17 May 2019 13:06) (100/64 - 124/81)  BP(mean): --  RR: 19 (17 May 2019 13:06) (18 - 19)  SpO2: 92% (17 May 2019 13:06) (92% - 97%)    I&O's Summary    16 May 2019 07:01  -  17 May 2019 07:00  --------------------------------------------------------  IN: 240 mL / OUT: 0 mL / NET: 240 mL          PHYSICAL EXAM:  TELE: vpaced  Constitutional: NAD, awake and alert, well-developed  HEENT: Moist Mucous Membranes, Anicteric  Pulmonary: Non-labored, breath sounds are clear bilaterally, No wheezing, crackles or rhonchi  Cardiovascular: Regular, S1 and S2 nl, No murmurs, rubs, gallops or clicks  Gastrointestinal: Bowel Sounds present, soft, nontender.   Lymph: No lymphadenopathy. No peripheral edema.  Skin: No visible rashes or ulcers.  Psych:  Mood & affect appropriate    LABS: All Labs Reviewed:                        12.2   9.69  )-----------( 191      ( 16 May 2019 09:09 )             38.7     16 May 2019 09:09    143    |  103    |  28     ----------------------------<  208    4.0     |  31     |  1.30     Ca    8.8        16 May 2019 09:09    TPro  7.4    /  Alb  3.2    /  TBili  0.7    /  DBili  x      /  AST  24     /  ALT  28     /  AlkPhos  84     16 May 2019 09:09    PT/INR - ( 17 May 2019 08:32 )   PT: 27.3 sec;   INR: 2.33 ratio         PTT - ( 17 May 2019 08:32 )  PTT:40.4 sec  CARDIAC MARKERS ( 16 May 2019 09:09 )  .030 ng/mL / x     / 116 U/L / x     / x             ECG:  < from: 12 Lead ECG (05.16.19 @ 08:53) >  Ventricular Rate 95 BPM    Atrial Rate 98 BPM    QRS Duration 144 ms    Q-T Interval 430 ms    QTC Calculation(Bezet) 540 ms    R Axis 216 degrees    T Axis 21 degrees    Diagnosis Line Ventricular-paced rhythm with occasional premature ventricularcomplexes  Underlying Atrial Fibrillation with appropriate VVI Pacing  Abnormal ECG  Confirmed by Pito Curtis MD ([id number]) on 5/16/2019 12:11:05 PM    < end of copied text >    Echo:    Radiology:  < from: Xray Chest 2 Views PA/Lat (05.16.19 @ 08:53) >  EXAM:  XR CHEST PA LAT 2V                            PROCEDURE DATE:  05/16/2019          INTERPRETATION:  Short of breath, cough.    PA lateral. Prior 5/8/2019.    Status post median sternotomy. Left cardiac pacer obscures a portion of   the left base. Questionable evolving airspace infiltrate left base with a   small hazy left pleural effusion. Recommend close follow-up.    Impression: As above                BEN ESTRELLA M.D., ATTENDING RADIOLOGIST  This document has been electronically signed. May 16 2019  9:45AM        < end of copied text >           [facility]   Cardiology
PULMONARY/CRITICAL CARE      INTERVAL HPI/OVERNIGHT EVENTS:    68y FemaleHPI:  Pt denies sob, less cough. No fever.  67 yo F p/w cough, mild dyspnea over past few days. Pt seen in ed 1 week ago for cough x few days, found to have PNA. Pt seen by PMD 3 days ago, now co inc dyspnea, weakness, persistent cough. Pos chest heaviness. Pt with LE edema, no worse than usual. no abd pain. no n/v/d. no neck / back pain. no recent trauma. no other recent illness. no other agg/allev factors.   In ER patient was found to have PNA.  patient is being admitted for further work up and treatment. (16 May 2019 11:09)        PAST MEDICAL & SURGICAL HISTORY:  Unsteady gait  CHF (congestive heart failure)  Arrhythmia  HLD (hyperlipidemia)  MI (myocardial infarction)  Obesity  Former smoker  Hyperthyroidism  TIA (Transient Ischemic Attack)  Cerebellar Infarction  CVA (Cerebral Vascular Accident)  Diabetes Mellitus, Type 2  CAD (Coronary Artery Disease)  Benign Hypertension  ICD  Cardiac Pacemaker  S/P Hysterectomy  S/P Ventriculoperitoneal Shunt  S/P Craniotomy  CABG (Coronary Artery Bypass Graft)  CABG (Coronary Artery Bypass Graft)        ICU Vital Signs Last 24 Hrs  T(C): 36.6 (18 May 2019 05:22), Max: 36.9 (17 May 2019 13:06)  T(F): 97.9 (18 May 2019 05:22), Max: 98.4 (17 May 2019 13:06)  HR: 76 (18 May 2019 05:22) (76 - 80)  BP: 101/66 (18 May 2019 05:22) (94/55 - 113/73)  BP(mean): --  ABP: --  ABP(mean): --  RR: 18 (18 May 2019 05:22) (18 - 19)  SpO2: 92% (18 May 2019 05:22) (91% - 92%)    Qtts:     I&O's Summary            PHYSICAL EXAM:    GENERAL: NAD, well-groomed, well-developed, NAD  HEAD:  Atraumatic, Normocephalic  EYES: EOMI, PERRLA, conjunctiva and sclera clear  ENMT: No tonsillar erythema, exudates, or enlargement; Moist mucous membranes, Good dentition, No lesions  NECK: Supple, No JVD, Normal thyroid  NERVOUS SYSTEM:  Alert & Oriented X3, Good concentration; Motor Strength 5/5 B/L upper and lower extremities  CHEST/LUNG: decreased bs, few rhonchi, no wheezing, or rubs  HEART: Regular rate and rhythm; No murmurs, rubs, or gallops  ABDOMEN: Soft, Nontender, Nondistended; Bowel sounds present  EXTREMITIES:  2+ Peripheral Pulses, No clubbing, cyanosis, or edema  LYMPH: No lymphadenopathy noted  SKIN: No rashes or lesions        LABS:                        11.3   6.96  )-----------( 155      ( 18 May 2019 06:40 )             36.0     05-16    143  |  103  |  28<H>  ----------------------------<  208<H>  4.0   |  31  |  1.30    Ca    8.8      16 May 2019 09:09    TPro  7.4  /  Alb  3.2<L>  /  TBili  0.7  /  DBili  x   /  AST  24  /  ALT  28  /  AlkPhos  84  05-16    PT/INR - ( 18 May 2019 06:40 )   PT: 26.8 sec;   INR: 2.31 ratio         PTT - ( 18 May 2019 06:40 )  PTT:36.4 sec      vanco through     RADIOLOGY & ADDITIONAL STUDIES:  < from: Xray Chest 2 Views PA/Lat (05.16.19 @ 08:53) >    EXAM:  XR CHEST PA LAT 2V                            PROCEDURE DATE:  05/16/2019          INTERPRETATION:  Short of breath, cough.    PA lateral. Prior 5/8/2019.    Status post median sternotomy. Left cardiac pacer obscures a portion of   the left base. Questionable evolving airspace infiltrate left base with a   small hazy left pleural effusion. Recommend close follow-up.    Impression: As above                BEN ESTRELLA M.D., ATTENDING RADIOLOGIST  This document has been electronically signed. May 16 2019  9:45AM        < end of copied text >      CRITICAL CARE TIME SPENT:
Patient is a 68y old  Female who presents with a chief complaint of sob (17 May 2019 11:40)      INTERVAL /OVERNIGHT EVENTS: feels better    MEDICATIONS  (STANDING):  aspirin enteric coated 81 milliGRAM(s) Oral daily  carvedilol 12.5 milliGRAM(s) Oral every 12 hours  cefTRIAXone   IVPB      cefTRIAXone   IVPB 1 Gram(s) IV Intermittent every 24 hours  dextrose 5%. 1000 milliLiter(s) (50 mL/Hr) IV Continuous <Continuous>  dextrose 50% Injectable 12.5 Gram(s) IV Push once  dextrose 50% Injectable 25 Gram(s) IV Push once  dextrose 50% Injectable 25 Gram(s) IV Push once  digoxin     Tablet 0.125 milliGRAM(s) Oral daily  guaiFENesin    Syrup 100 milliGRAM(s) Oral every 4 hours  insulin glargine Injectable (LANTUS) 70 Unit(s) SubCutaneous at bedtime  insulin lispro (HumaLOG) corrective regimen sliding scale   SubCutaneous three times a day before meals  insulin lispro (HumaLOG) corrective regimen sliding scale   SubCutaneous at bedtime  insulin lispro Injectable (HumaLOG) 25 Unit(s) SubCutaneous three times a day before meals  lactobacillus acidophilus 1 Tablet(s) Oral three times a day with meals  methimazole 2.5 milliGRAM(s) Oral daily  simvastatin 40 milliGRAM(s) Oral at bedtime  spironolactone 25 milliGRAM(s) Oral daily  torsemide 60 milliGRAM(s) Oral daily  warfarin 5 milliGRAM(s) Oral daily    MEDICATIONS  (PRN):  ALBUTerol    90 MICROgram(s) HFA Inhaler 2 Puff(s) Inhalation every 6 hours PRN Shortness of Breath and/or Wheezing  dextrose 40% Gel 15 Gram(s) Oral once PRN Blood Glucose LESS THAN 70 milliGRAM(s)/deciLiter  glucagon  Injectable 1 milliGRAM(s) IntraMuscular once PRN Glucose <70 milliGRAM(s)/deciLiter      Allergies    No Known Allergies    Intolerances        REVIEW OF SYSTEMS:  CONSTITUTIONAL: No fever, weight loss, or fatigue  EYES: No eye pain, visual disturbances, or discharge  ENMT:  No difficulty hearing, tinnitus, vertigo; No sinus or throat pain  NECK: No pain or stiffness  RESPIRATORY: No cough, wheezing, chills or hemoptysis; No shortness of breath  CARDIOVASCULAR: No chest pain, palpitations, dizziness, or leg swelling  GASTROINTESTINAL: No abdominal or epigastric pain. No nausea, vomiting, or hematemesis; No diarrhea or constipation. No melena or hematochezia.  GENITOURINARY: No dysuria, frequency, hematuria, or incontinence  NEUROLOGICAL: No headaches, memory loss, loss of strength, numbness, or tremors  SKIN: No itching, burning, rashes, or lesions   LYMPH NODES: No enlarged glands  ENDOCRINE: No heat or cold intolerance; No hair loss; No polydipsia or polyuria  MUSCULOSKELETAL: No joint pain or swelling; No muscle, back, or extremity pain  PSYCHIATRIC: No depression, anxiety, mood swings, or difficulty sleeping  HEME/LYMPH: No easy bruising, or bleeding gums  ALLERGY AND IMMUNOLOGIC: No hives or eczema    Vital Signs Last 24 Hrs  T(C): 36.9 (17 May 2019 13:06), Max: 36.9 (17 May 2019 13:06)  T(F): 98.4 (17 May 2019 13:06), Max: 98.4 (17 May 2019 13:06)  HR: 78 (17 May 2019 13:06) (78 - 90)  BP: 103/69 (17 May 2019 13:06) (100/64 - 124/81)  BP(mean): --  RR: 19 (17 May 2019 13:06) (18 - 20)  SpO2: 92% (17 May 2019 13:06) (92% - 98%)    PHYSICAL EXAM:  GENERAL: NAD, well-groomed, well-developed  HEAD:  Atraumatic, Normocephalic  EYES: EOMI, PERRLA, conjunctiva and sclera clear  ENMT: No tonsillar erythema, exudates, or enlargement; Moist mucous membranes, Good dentition, No lesions  NECK: Supple, No JVD, Normal thyroid  NERVOUS SYSTEM:  Alert & Oriented X3, Good concentration; Motor Strength 5/5 B/L upper and lower extremities; DTRs 2+ intact and symmetric  CHEST/LUNG: Clear to auscultation bilaterally; No rales, rhonchi, wheezing, or rubs  HEART: Regular rate and rhythm; No murmurs, rubs, or gallops  ABDOMEN: Soft, Nontender, Nondistended; Bowel sounds present  EXTREMITIES:  2+ Peripheral Pulses, No clubbing, cyanosis, + edema  LYMPH: No lymphadenopathy noted  SKIN: No rashes or lesions    LABS:      Ca    8.8        16 May 2019 09:09      PT/INR - ( 17 May 2019 08:32 )   PT: 27.3 sec;   INR: 2.33 ratio         PTT - ( 17 May 2019 08:32 )  PTT:40.4 sec    CAPILLARY BLOOD GLUCOSE      POCT Blood Glucose.: 252 mg/dL (17 May 2019 11:28)  POCT Blood Glucose.: 206 mg/dL (17 May 2019 08:04)  POCT Blood Glucose.: 322 mg/dL (16 May 2019 21:40)  POCT Blood Glucose.: 315 mg/dL (16 May 2019 21:37)  POCT Blood Glucose.: 234 mg/dL (16 May 2019 16:52)      RADIOLOGY & ADDITIONAL TESTS:    Notes Reviewed:  [x ] YES  [ ] NO    Care Discussed with Consultants/Other Providers [x ] YES  [ ] NO
Patient is a 68y old  Female who presents with a chief complaint of sob (18 May 2019 10:13)      INTERVAL HPI/OVERNIGHT EVENTS: Patient seen and examined. NAD. No complaints.    Vital Signs Last 24 Hrs  T(C): 36.6 (18 May 2019 05:22), Max: 36.9 (17 May 2019 13:06)  T(F): 97.9 (18 May 2019 05:22), Max: 98.4 (17 May 2019 13:06)  HR: 76 (18 May 2019 05:22) (76 - 80)  BP: 101/66 (18 May 2019 05:22) (94/55 - 113/73)  BP(mean): --  RR: 18 (18 May 2019 05:22) (18 - 19)  SpO2: 92% (18 May 2019 05:22) (91% - 92%)                                11.3   6.96  )-----------( 155      ( 18 May 2019 06:40 )             36.0     PT/INR - ( 18 May 2019 06:40 )   PT: 26.8 sec;   INR: 2.31 ratio         PTT - ( 18 May 2019 06:40 )  PTT:36.4 sec  CAPILLARY BLOOD GLUCOSE      POCT Blood Glucose.: 136 mg/dL (18 May 2019 07:37)  POCT Blood Glucose.: 147 mg/dL (18 May 2019 02:26)  POCT Blood Glucose.: 132 mg/dL (17 May 2019 20:56)  POCT Blood Glucose.: 86 mg/dL (17 May 2019 16:43)  POCT Blood Glucose.: 77 mg/dL (17 May 2019 16:38)  POCT Blood Glucose.: 252 mg/dL (17 May 2019 11:28)              ALBUTerol    90 MICROgram(s) HFA Inhaler 2 Puff(s) Inhalation every 6 hours PRN  aspirin enteric coated 81 milliGRAM(s) Oral daily  carvedilol 12.5 milliGRAM(s) Oral every 12 hours  dextrose 40% Gel 15 Gram(s) Oral once PRN  dextrose 5%. 1000 milliLiter(s) IV Continuous <Continuous>  dextrose 50% Injectable 12.5 Gram(s) IV Push once  dextrose 50% Injectable 25 Gram(s) IV Push once  dextrose 50% Injectable 25 Gram(s) IV Push once  digoxin     Tablet 0.125 milliGRAM(s) Oral daily  glucagon  Injectable 1 milliGRAM(s) IntraMuscular once PRN  guaiFENesin    Syrup 100 milliGRAM(s) Oral every 4 hours  insulin glargine Injectable (LANTUS) 70 Unit(s) SubCutaneous at bedtime  insulin lispro (HumaLOG) corrective regimen sliding scale   SubCutaneous three times a day before meals  insulin lispro (HumaLOG) corrective regimen sliding scale   SubCutaneous at bedtime  insulin lispro Injectable (HumaLOG) 25 Unit(s) SubCutaneous three times a day before meals  lactobacillus acidophilus 1 Tablet(s) Oral three times a day with meals  methimazole 2.5 milliGRAM(s) Oral daily  simvastatin 40 milliGRAM(s) Oral at bedtime  spironolactone 25 milliGRAM(s) Oral daily  torsemide 60 milliGRAM(s) Oral daily  warfarin 5 milliGRAM(s) Oral daily              REVIEW OF SYSTEMS:  CONSTITUTIONAL: No fever, no weight loss, or no fatigue  NECK: No pain, no stiffness  RESPIRATORY: No cough, no wheezing, no chills, no hemoptysis, No shortness of breath  CARDIOVASCULAR: No chest pain, no palpitations, no dizziness, no leg swelling  GASTROINTESTINAL: No abdominal pain. No nausea, no vomiting, no hematemesis; No diarrhea, no constipation. No melena, no hematochezia.  GENITOURINARY: No dysuria, no frequency, no hematuria, no incontinence  NEUROLOGICAL: No headaches, no loss of strength, no numbness, no tremors  SKIN: No itching, no burning  MUSCULOSKELETAL: No joint pain, no swelling; No muscle, no back, no extremity pain  PSYCHIATRIC: No depression, no mood swings,   HEME/LYMPH: No easy bruising, no bleeding gums  ALLERY AND IMMUNOLOGIC: No hives       Consultant(s) Notes Reviewed:  [X] YES  [ ] NO    PHYSICAL EXAM:  GENERAL: NAD  HEAD:  Atraumatic, Normocephalic  EYES: EOMI, PERRLA, conjunctiva and sclera clear  ENMT: No tonsillar erythema, exudates, or enlargement; Moist mucous membranes  NECK: Supple, No JVD  NERVOUS SYSTEM:  Awake & alert  CHEST/LUNG: Clear to auscultation bilaterally; No rales, rhonchi, wheezing,  HEART: Regular rate and rhythm  ABDOMEN: Soft, Nontender, Nondistended; Bowel sounds present  EXTREMITIES:  No clubbing, cyanosis, or edema  LYMPH: No lymphadenopathy noted  SKIN: No rashes      Advanced care planning discussed with patient/family [X] YES   [ ] NO    Advanced care planning discussed with patient/family. Advanced care planning forms reviewed/discussed/completed. 20 minutes spent.
[facility]    Ramy Solis, Ever, Julia, Nicole, José Miguel, Caron      [phone]    CHIEF COMPLAINT: Patient is a 68y old  Female who presents with a chief complaint of sob (18 May 2019 08:01)      Follow Up: icm, af, pna    Interim history: The patient reports no new symptoms.  Denies chest discomfort and shortness of breath.  No abdominal pain.  No new neurologic symptoms. Persistent productive cough      MEDICATIONS  (STANDING):  aspirin enteric coated 81 milliGRAM(s) Oral daily  carvedilol 12.5 milliGRAM(s) Oral every 12 hours  dextrose 5%. 1000 milliLiter(s) (50 mL/Hr) IV Continuous <Continuous>  dextrose 50% Injectable 12.5 Gram(s) IV Push once  dextrose 50% Injectable 25 Gram(s) IV Push once  dextrose 50% Injectable 25 Gram(s) IV Push once  digoxin     Tablet 0.125 milliGRAM(s) Oral daily  guaiFENesin    Syrup 100 milliGRAM(s) Oral every 4 hours  insulin glargine Injectable (LANTUS) 70 Unit(s) SubCutaneous at bedtime  insulin lispro (HumaLOG) corrective regimen sliding scale   SubCutaneous three times a day before meals  insulin lispro (HumaLOG) corrective regimen sliding scale   SubCutaneous at bedtime  insulin lispro Injectable (HumaLOG) 25 Unit(s) SubCutaneous three times a day before meals  lactobacillus acidophilus 1 Tablet(s) Oral three times a day with meals  methimazole 2.5 milliGRAM(s) Oral daily  simvastatin 40 milliGRAM(s) Oral at bedtime  spironolactone 25 milliGRAM(s) Oral daily  torsemide 60 milliGRAM(s) Oral daily  warfarin 5 milliGRAM(s) Oral daily    MEDICATIONS  (PRN):  ALBUTerol    90 MICROgram(s) HFA Inhaler 2 Puff(s) Inhalation every 6 hours PRN Shortness of Breath and/or Wheezing  dextrose 40% Gel 15 Gram(s) Oral once PRN Blood Glucose LESS THAN 70 milliGRAM(s)/deciLiter  glucagon  Injectable 1 milliGRAM(s) IntraMuscular once PRN Glucose <70 milliGRAM(s)/deciLiter      REVIEW OF SYSTEMS:  eye, ent, GI, , allergic, dermatologic, musculoskeletal and neurologic are negative except as described above    Vital Signs Last 24 Hrs  T(C): 36.6 (18 May 2019 05:22), Max: 36.9 (17 May 2019 13:06)  T(F): 97.9 (18 May 2019 05:22), Max: 98.4 (17 May 2019 13:06)  HR: 76 (18 May 2019 05:22) (76 - 80)  BP: 101/66 (18 May 2019 05:22) (94/55 - 113/73)  BP(mean): --  RR: 18 (18 May 2019 05:22) (18 - 19)  SpO2: 92% (18 May 2019 05:22) (91% - 92%)    I&O's Summary      Telemetry past 24h:    PHYSICAL EXAM:    Constitutional: well-nourished, well-developed, NAD   HEENT:  MMM, sclerae anicteric, conjunctivae clear, no oral cyanosis.  Pulmonary: Non-labored, breath sounds are dec at left base, No wheezing, rales or rhonchi  Cardiovascular: Regular, S1 and S2.  No murmur.  No rubs, gallops or clicks  Gastrointestinal: Bowel Sounds present, soft, nontender.   Lymph: mild peripheral edema.   Neurological: Alert, no focal deficits  Skin: No rashes.  Psych:  Mood & affect appropriate    LABS: All Labs Reviewed:                        11.3   6.96  )-----------( 155      ( 18 May 2019 06:40 )             36.0                         12.2   9.69  )-----------( 191      ( 16 May 2019 09:09 )             38.7     16 May 2019 09:09    143    |  103    |  28     ----------------------------<  208    4.0     |  31     |  1.30     Ca    8.8        16 May 2019 09:09    TPro  7.4    /  Alb  3.2    /  TBili  0.7    /  DBili  x      /  AST  24     /  ALT  28     /  AlkPhos  84     16 May 2019 09:09    PT/INR - ( 18 May 2019 06:40 )   PT: 26.8 sec;   INR: 2.31 ratio         PTT - ( 18 May 2019 06:40 )  PTT:36.4 sec      Blood Culture: Organism --  Gram Stain Blood -- Gram Stain --  Specimen Source .Blood Blood-Peripheral  Culture-Blood --      05-16 @ 09:09  Pro Bnp [id number]        RADIOLOGY:    EKG:    Echo:
infectious diseases progress note:    JULY CLINE is a 68y y. o. Female patient    Patient reports: feeling much better and anxious to go home    ROS:    EYES:  Negative  blurry vision or double vision  GASTROINTESTINAL:  Negative for nausea, vomiting, diarrhea  -otherwise negative except for subjective    Allergies    No Known Allergies    Intolerances        ANTIBIOTICS/RELEVANT:  antimicrobials  azithromycin   Tablet 500 milliGRAM(s) Oral daily  cefTRIAXone   IVPB      cefTRIAXone   IVPB 1 Gram(s) IV Intermittent every 24 hours    immunologic:    OTHER:  ALBUTerol    90 MICROgram(s) HFA Inhaler 2 Puff(s) Inhalation every 6 hours PRN  aspirin enteric coated 81 milliGRAM(s) Oral daily  carvedilol 12.5 milliGRAM(s) Oral every 12 hours  dextrose 40% Gel 15 Gram(s) Oral once PRN  dextrose 5%. 1000 milliLiter(s) IV Continuous <Continuous>  dextrose 50% Injectable 12.5 Gram(s) IV Push once  dextrose 50% Injectable 25 Gram(s) IV Push once  dextrose 50% Injectable 25 Gram(s) IV Push once  digoxin     Tablet 0.125 milliGRAM(s) Oral daily  glucagon  Injectable 1 milliGRAM(s) IntraMuscular once PRN  guaiFENesin    Syrup 100 milliGRAM(s) Oral every 4 hours  insulin glargine Injectable (LANTUS) 70 Unit(s) SubCutaneous at bedtime  insulin lispro (HumaLOG) corrective regimen sliding scale   SubCutaneous three times a day before meals  insulin lispro (HumaLOG) corrective regimen sliding scale   SubCutaneous at bedtime  insulin lispro Injectable (HumaLOG) 25 Unit(s) SubCutaneous three times a day before meals  lactobacillus acidophilus 1 Tablet(s) Oral three times a day with meals  methimazole 2.5 milliGRAM(s) Oral daily  simvastatin 40 milliGRAM(s) Oral at bedtime  spironolactone 25 milliGRAM(s) Oral daily  torsemide 60 milliGRAM(s) Oral daily  warfarin 5 milliGRAM(s) Oral daily      Objective:  Last 24-Vital Signs Last 24 Hrs  T(C): 36.4 (17 May 2019 05:15), Max: 36.7 (16 May 2019 14:55)  T(F): 97.6 (17 May 2019 05:15), Max: 98 (16 May 2019 14:55)  HR: 84 (17 May 2019 05:15) (79 - 90)  BP: 109/75 (17 May 2019 05:15) (100/64 - 124/81)  BP(mean): --  RR: 18 (17 May 2019 05:15) (18 - 20)  SpO2: 94% (17 May 2019 05:15) (93% - 98%)    T(C): 36.4 (05-17-19 @ 05:15), Max: 36.8 (05-16-19 @ 08:32)  T(F): 97.6 (05-17-19 @ 05:15), Max: 98.3 (05-16-19 @ 08:32)  T(C): 36.4 (05-17-19 @ 05:15), Max: 36.8 (05-16-19 @ 08:32)  T(F): 97.6 (05-17-19 @ 05:15), Max: 98.3 (05-16-19 @ 08:32)  T(C): 36.4 (05-17-19 @ 05:15), Max: 36.8 (05-16-19 @ 08:32)  T(F): 97.6 (05-17-19 @ 05:15), Max: 98.3 (05-16-19 @ 08:32)    PHYSICAL EXAM:  Constitutional: Well-developed, well nourished  Eyes: PERRLA, EOMI  Ear/Nose/Throat: oropharynx normal	  Neck: no JVD, no lymphadenopathy, supple  Respiratory: no accessory muscle use, decreased BS in LLL, few crackles and ronchi noted  Cardiovascular: RRR, normal S1, S2 no m/r/g  Gastrointestinal: soft, NT, no HSM, BS-normal  Extremities: no clubbing, no cyanosis, edema absent  Neuro: patient alert, oriented and appropriate  Skin: no sig lesions      LABS:                        12.2   9.69  )-----------( 191      ( 16 May 2019 09:09 )             38.7       WBC 9.69  05-16 @ 09:09      05-16    143  |  103  |  28<H>  ----------------------------<  208<H>  4.0   |  31  |  1.30    Ca    8.8      16 May 2019 09:09    TPro  7.4  /  Alb  3.2<L>  /  TBili  0.7  /  DBili  x   /  AST  24  /  ALT  28  /  AlkPhos  84  05-16      Creatinine, Serum: 1.30 mg/dL (05-16-19 @ 09:09)      PT/INR - ( 17 May 2019 08:32 )   PT: 27.3 sec;   INR: 2.33 ratio         PTT - ( 17 May 2019 08:32 )  PTT:40.4 sec          MICROBIOLOGY:    Rapid Respiratory Viral Panel (05.16.19 @ 15:46)    Rapid RVP Result: Detected: This Respiratory Panel uses polymerase chain reaction (PCR) to detect for  adenovirus; coronavirus (HKU1, NL63, 229E, OC43); human metapneumovirus  (hMPV); human enterovirus/rhinovirus (Entero/RV); influenza A; influenza  A/H1; influenza A/H3; influenza A/H1-2009; influenza B; parainfluenza  viruses 1, 2, 3, 4; respiratory syncytial virus; Mycoplasma pneumoniae;  and Chlamydophila pneumoniae.    Entero/Rhinovirus (RapRVP): Detected (05.16.19 @ 15:46)        RADIOLOGY & ADDITIONAL STUDIES:

## 2019-05-18 NOTE — DISCHARGE NOTE NURSING/CASE MANAGEMENT/SOCIAL WORK - NSDCDPATPORTLINK_GEN_ALL_CORE
You can access the [facility] Patient Portal, offered by [facility], by registering with the following website: [email or web address]

## 2019-05-18 NOTE — PROGRESS NOTE ADULT - PROBLEM SELECTOR PROBLEM 2
Pneumonia of left lower lobe due to infectious organism
Pneumonia of left lower lobe due to infectious organism
Acute bronchitis due to Rhinovirus
CHF (congestive heart failure)

## 2019-05-18 NOTE — PROGRESS NOTE ADULT - PROBLEM SELECTOR PLAN 1
multifactorial  pulm eval with Dr. DIAZ
multifactorial  pulm eval with Dr. DIAZ
FU cxr my office next week
Patient rapidly improving after change in therapy. Recommend:  Ceftriaxone and Azithromycin last dose am 5/18 then fine for dc on  Cefuroxime 500mg PO BID starting 5/19 with last day 5/22

## 2019-05-18 NOTE — PROGRESS NOTE ADULT - ASSESSMENT
67 yo F w/ pmhx CAD s/p CABG and PCI of the LCA and the first obtuse marginal artery with ILANA, MI and ischemic cardiomyopathy, s/p cardiomems placed but not able to get ongoing readings due to inability to lie flat from prior stroke and brain surgery, paroxysmal VT s/p biventricular/ICD (Guidant), A fib (on warfarin), DM2, HLD, hyperthyroidism, proliferative diabetic retinopathy, stroke and hemorrhage (2010) s/p craniotomy for intracranial decompression and removal of thrombus, present to ED due to weakness and cough. Patient reports she came to hospital last week for SOB and cough and was found to have PNA. Patient was discharged on antibiotics and an inhaler. Patient reports persistent symptoms and worsening weakness. She denies sternal chest pain, palpitations, lightheadedness or LOC.         CAD  -Stable  - No clear evidence of acute ischemia, trops negative x 2.  - No acute changes on EKG compared to previous.  - EKG shows ventricular paced rhythm with occasional premature ventricular complexes   -CW ASA, carvedilol and simvastatin    ICM  - Chronic BL LE edema, not changed from baseline.   - Chest X ray shows a questionable evolving airspace infiltrate left base with a small hazy left pleural effusion. This may be the culprit of her symptoms considering patient recent PNA. Continue abx.   - Previous TTE shows aortic sclerosis, MAC, LAE, severe LV dysfunction, moderate pulmonary hypertension, enlarged LA size, mild mitral regurgitation LVEF 30%.  .  - BP well controlled, monitor routine hemodynamics.  - Continue torsemide/spirolactone/dig    AFib  -Rate controlled  - Dose coumadin to maintain INR of 2-3   - Monitor and replete lytes, keep K>4, Mg>2.  - Strict I/Os, daily weights.  - Other cardiovascular workup will depend on clinical course.  - All other workup per primary team.  - Will continue to follow.  Pito Phoenix [facility]  Cardiology
67 yo F admitted with concern for PNA that did not resolve with nebs and PO Augmentin
69 yo F w/ pmhx CAD s/p CABG and PCI of the LCA and the first obtuse marginal artery with ILANA, MI and ischemic cardiomyopathy, s/p cardiomems placed but not able to get ongoing readings due to inability to lie flat from prior stroke and brain surgery, paroxysmal VT s/p biventricular/ICD (Guidant), A fib (on warfarin), DM2, HLD, hyperthyroidism, proliferative diabetic retinopathy, stroke and hemorrhage (2010) s/p craniotomy for intracranial decompression and removal of thrombus, present to ED due to weakness and cough. Patient reports she came to hospital last week for SOB and cough and was found to have PNA. Patient was discharged on antibiotics and an inhaler. Patient reports persistent symptoms and worsening weakness. She denies sternal chest pain, palpitations, lightheadedness or LOC.         CAD  -Stable  - No clear evidence of acute ischemia, trops negative x 2.  - No acute changes on EKG compared to previous.  - EKG shows ventricular paced rhythm with occasional premature ventricular complexes   -CW ASA, carvedilol and simvastatin    ICM  - Chronic BL LE edema, not changed from baseline.    - Previous TTE shows aortic sclerosis, MAC, LAE, severe LV dysfunction, moderate pulmonary hypertension, enlarged LA size, mild mitral regurgitation LVEF 30%.  .  - BP well controlled, monitor routine hemodynamics.  - Continue torsemide/spirolactone/dig    AFib  -Rate controlled  - Dose coumadin to maintain INR of 2-3   - Monitor and replete lytes, keep K>4, Mg>2.  - Strict I/Os, daily weights.  - Other cardiovascular workup will depend on clinical course.  - All other workup and dc planning per primary team.
Pt with persistent Pneumonia/ Bronchitis. Has Rhinovirus, questionable bacterial infection.  Should be able to go home today.  Advised see me in office next week.

## 2019-05-18 NOTE — PROGRESS NOTE ADULT - PROBLEM SELECTOR PLAN 3
diuretics for diuresis  cardio eval with Dr. RAYA
diuretics for diuresis  cardio eval with Dr. RAYA
continue meds

## 2019-05-18 NOTE — PROGRESS NOTE ADULT - PROBLEM SELECTOR PLAN 2
IV ABX   ID eval with Dr. SILVERMAN
change ot po abx  ID eval with Dr. SILVERMAN
continue meds
per medicine.    Thank you for consulting us and involving us in the management of this most interesting and challenging case.     Please Call with any further questions

## 2019-05-18 NOTE — DISCHARGE NOTE NURSING/CASE MANAGEMENT/SOCIAL WORK - NSDCPEPTSTRK_GEN_ALL_CORE
Prescribed medications/Risk factors for stroke/Stroke warning signs and symptoms/Signs and symptoms of stroke/Call 911 for stroke/Stroke support groups for patients, families, and friends/Need for follow up after discharge/Stroke education booklet

## 2019-05-18 NOTE — PROGRESS NOTE ADULT - PROBLEM SELECTOR PROBLEM 1
SOB (shortness of breath)
SOB (shortness of breath)
Pneumonia of left lower lobe due to infectious organism
Pneumonia of left lower lobe due to infectious organism

## 2019-05-21 LAB
CULTURE RESULTS: SIGNIFICANT CHANGE UP
CULTURE RESULTS: SIGNIFICANT CHANGE UP
SPECIMEN SOURCE: SIGNIFICANT CHANGE UP
SPECIMEN SOURCE: SIGNIFICANT CHANGE UP

## 2019-06-14 ENCOUNTER — APPOINTMENT (OUTPATIENT)
Dept: CARDIOLOGY | Facility: CLINIC | Age: 69
End: 2019-06-14
Payer: COMMERCIAL

## 2019-06-14 VITALS — DIASTOLIC BLOOD PRESSURE: 78 MMHG | HEART RATE: 108 BPM | SYSTOLIC BLOOD PRESSURE: 122 MMHG | OXYGEN SATURATION: 93 %

## 2019-06-14 LAB
INR PPP: 2 RATIO
QUALITY CONTROL: YES

## 2019-06-14 PROCEDURE — 93793 ANTICOAG MGMT PT WARFARIN: CPT

## 2019-06-14 PROCEDURE — 85610 PROTHROMBIN TIME: CPT | Mod: QW

## 2019-06-24 ENCOUNTER — RX RENEWAL (OUTPATIENT)
Age: 69
End: 2019-06-24

## 2019-06-24 ENCOUNTER — EMERGENCY (EMERGENCY)
Facility: HOSPITAL | Age: 69
LOS: 1 days | End: 2019-06-24
Attending: EMERGENCY MEDICINE
Payer: COMMERCIAL

## 2019-06-24 VITALS
TEMPERATURE: 98 F | OXYGEN SATURATION: 98 % | DIASTOLIC BLOOD PRESSURE: 68 MMHG | SYSTOLIC BLOOD PRESSURE: 132 MMHG | RESPIRATION RATE: 17 BRPM | HEART RATE: 76 BPM

## 2019-06-24 VITALS
TEMPERATURE: 98 F | RESPIRATION RATE: 16 BRPM | WEIGHT: 195.11 LBS | DIASTOLIC BLOOD PRESSURE: 87 MMHG | HEART RATE: 79 BPM | OXYGEN SATURATION: 99 % | SYSTOLIC BLOOD PRESSURE: 125 MMHG

## 2019-06-24 LAB
ALBUMIN SERPL ELPH-MCNC: 3.5 G/DL — SIGNIFICANT CHANGE UP (ref 3.3–5)
ALP SERPL-CCNC: 73 U/L — SIGNIFICANT CHANGE UP (ref 40–120)
ALT FLD-CCNC: 21 U/L — SIGNIFICANT CHANGE UP (ref 12–78)
ANION GAP SERPL CALC-SCNC: 5 MMOL/L — SIGNIFICANT CHANGE UP (ref 5–17)
APPEARANCE UR: ABNORMAL
APTT BLD: 42.9 SEC — HIGH (ref 28.5–37)
AST SERPL-CCNC: 25 U/L — SIGNIFICANT CHANGE UP (ref 15–37)
BACTERIA # UR AUTO: ABNORMAL
BASOPHILS # BLD AUTO: 0.07 K/UL — SIGNIFICANT CHANGE UP (ref 0–0.2)
BASOPHILS NFR BLD AUTO: 0.9 % — SIGNIFICANT CHANGE UP (ref 0–2)
BILIRUB SERPL-MCNC: 1 MG/DL — SIGNIFICANT CHANGE UP (ref 0.2–1.2)
BILIRUB UR-MCNC: NEGATIVE — SIGNIFICANT CHANGE UP
BUN SERPL-MCNC: 25 MG/DL — HIGH (ref 7–23)
CALCIUM SERPL-MCNC: 8.9 MG/DL — SIGNIFICANT CHANGE UP (ref 8.5–10.1)
CHLORIDE SERPL-SCNC: 104 MMOL/L — SIGNIFICANT CHANGE UP (ref 96–108)
CK MB CFR SERPL CALC: 1.9 NG/ML — SIGNIFICANT CHANGE UP (ref 0–3.6)
CO2 SERPL-SCNC: 32 MMOL/L — HIGH (ref 22–31)
COLOR SPEC: YELLOW — SIGNIFICANT CHANGE UP
COMMENT - URINE: SIGNIFICANT CHANGE UP
CREAT SERPL-MCNC: 1.3 MG/DL — SIGNIFICANT CHANGE UP (ref 0.5–1.3)
DIFF PNL FLD: NEGATIVE — SIGNIFICANT CHANGE UP
EOSINOPHIL # BLD AUTO: 0.11 K/UL — SIGNIFICANT CHANGE UP (ref 0–0.5)
EOSINOPHIL NFR BLD AUTO: 1.4 % — SIGNIFICANT CHANGE UP (ref 0–6)
EPI CELLS # UR: SIGNIFICANT CHANGE UP
GLUCOSE SERPL-MCNC: 222 MG/DL — HIGH (ref 70–99)
GLUCOSE UR QL: NEGATIVE — SIGNIFICANT CHANGE UP
HCT VFR BLD CALC: 40.6 % — SIGNIFICANT CHANGE UP (ref 34.5–45)
HGB BLD-MCNC: 12.8 G/DL — SIGNIFICANT CHANGE UP (ref 11.5–15.5)
IMM GRANULOCYTES NFR BLD AUTO: 0.3 % — SIGNIFICANT CHANGE UP (ref 0–1.5)
INR BLD: 2.6 RATIO — HIGH (ref 0.88–1.16)
KETONES UR-MCNC: NEGATIVE — SIGNIFICANT CHANGE UP
LACTATE SERPL-SCNC: 0.9 MMOL/L — SIGNIFICANT CHANGE UP (ref 0.7–2)
LEUKOCYTE ESTERASE UR-ACNC: ABNORMAL
LYMPHOCYTES # BLD AUTO: 0.87 K/UL — LOW (ref 1–3.3)
LYMPHOCYTES # BLD AUTO: 11.3 % — LOW (ref 13–44)
MAGNESIUM SERPL-MCNC: 2.2 MG/DL — SIGNIFICANT CHANGE UP (ref 1.6–2.6)
MCHC RBC-ENTMCNC: 28.4 PG — SIGNIFICANT CHANGE UP (ref 27–34)
MCHC RBC-ENTMCNC: 31.5 GM/DL — LOW (ref 32–36)
MCV RBC AUTO: 90.2 FL — SIGNIFICANT CHANGE UP (ref 80–100)
MONOCYTES # BLD AUTO: 0.39 K/UL — SIGNIFICANT CHANGE UP (ref 0–0.9)
MONOCYTES NFR BLD AUTO: 5.1 % — SIGNIFICANT CHANGE UP (ref 2–14)
NEUTROPHILS # BLD AUTO: 6.22 K/UL — SIGNIFICANT CHANGE UP (ref 1.8–7.4)
NEUTROPHILS NFR BLD AUTO: 81 % — HIGH (ref 43–77)
NITRITE UR-MCNC: NEGATIVE — SIGNIFICANT CHANGE UP
NRBC # BLD: 0 /100 WBCS — SIGNIFICANT CHANGE UP (ref 0–0)
NT-PROBNP SERPL-SCNC: 2686 PG/ML — HIGH (ref 0–125)
PH UR: 5 — SIGNIFICANT CHANGE UP (ref 5–8)
PLATELET # BLD AUTO: 124 K/UL — LOW (ref 150–400)
POTASSIUM SERPL-MCNC: 3.8 MMOL/L — SIGNIFICANT CHANGE UP (ref 3.5–5.3)
POTASSIUM SERPL-SCNC: 3.8 MMOL/L — SIGNIFICANT CHANGE UP (ref 3.5–5.3)
PROCALCITONIN SERPL-MCNC: <0.05 — SIGNIFICANT CHANGE UP (ref 0–0.04)
PROT SERPL-MCNC: 6.7 G/DL — SIGNIFICANT CHANGE UP (ref 6–8.3)
PROT UR-MCNC: 150 MG/DL
PROTHROM AB SERPL-ACNC: 30.5 SEC — HIGH (ref 10–12.9)
RBC # BLD: 4.5 M/UL — SIGNIFICANT CHANGE UP (ref 3.8–5.2)
RBC # FLD: 15.3 % — HIGH (ref 10.3–14.5)
RBC CASTS # UR COMP ASSIST: SIGNIFICANT CHANGE UP /HPF (ref 0–4)
SODIUM SERPL-SCNC: 141 MMOL/L — SIGNIFICANT CHANGE UP (ref 135–145)
SP GR SPEC: 1.02 — SIGNIFICANT CHANGE UP (ref 1.01–1.02)
TROPONIN I SERPL-MCNC: 0.03 NG/ML — SIGNIFICANT CHANGE UP (ref 0.01–0.04)
UROBILINOGEN FLD QL: NEGATIVE — SIGNIFICANT CHANGE UP
WBC # BLD: 7.68 K/UL — SIGNIFICANT CHANGE UP (ref 3.8–10.5)
WBC # FLD AUTO: 7.68 K/UL — SIGNIFICANT CHANGE UP (ref 3.8–10.5)
WBC UR QL: ABNORMAL

## 2019-06-24 PROCEDURE — 93010 ELECTROCARDIOGRAM REPORT: CPT

## 2019-06-24 PROCEDURE — 85610 PROTHROMBIN TIME: CPT

## 2019-06-24 PROCEDURE — 81001 URINALYSIS AUTO W/SCOPE: CPT

## 2019-06-24 PROCEDURE — 94640 AIRWAY INHALATION TREATMENT: CPT

## 2019-06-24 PROCEDURE — 83735 ASSAY OF MAGNESIUM: CPT

## 2019-06-24 PROCEDURE — 93005 ELECTROCARDIOGRAM TRACING: CPT

## 2019-06-24 PROCEDURE — 83880 ASSAY OF NATRIURETIC PEPTIDE: CPT

## 2019-06-24 PROCEDURE — 85730 THROMBOPLASTIN TIME PARTIAL: CPT

## 2019-06-24 PROCEDURE — 84145 PROCALCITONIN (PCT): CPT

## 2019-06-24 PROCEDURE — 80053 COMPREHEN METABOLIC PANEL: CPT

## 2019-06-24 PROCEDURE — 71045 X-RAY EXAM CHEST 1 VIEW: CPT | Mod: 26

## 2019-06-24 PROCEDURE — 99285 EMERGENCY DEPT VISIT HI MDM: CPT

## 2019-06-24 PROCEDURE — 87040 BLOOD CULTURE FOR BACTERIA: CPT

## 2019-06-24 PROCEDURE — 36415 COLL VENOUS BLD VENIPUNCTURE: CPT

## 2019-06-24 PROCEDURE — 96374 THER/PROPH/DIAG INJ IV PUSH: CPT

## 2019-06-24 PROCEDURE — 71045 X-RAY EXAM CHEST 1 VIEW: CPT

## 2019-06-24 PROCEDURE — 82553 CREATINE MB FRACTION: CPT

## 2019-06-24 PROCEDURE — 85027 COMPLETE CBC AUTOMATED: CPT

## 2019-06-24 PROCEDURE — 84484 ASSAY OF TROPONIN QUANT: CPT

## 2019-06-24 PROCEDURE — 99284 EMERGENCY DEPT VISIT MOD MDM: CPT | Mod: 25

## 2019-06-24 PROCEDURE — 83605 ASSAY OF LACTIC ACID: CPT

## 2019-06-24 RX ORDER — FUROSEMIDE 40 MG
40 TABLET ORAL ONCE
Refills: 0 | Status: COMPLETED | OUTPATIENT
Start: 2019-06-24 | End: 2019-06-24

## 2019-06-24 RX ORDER — IPRATROPIUM/ALBUTEROL SULFATE 18-103MCG
3 AEROSOL WITH ADAPTER (GRAM) INHALATION ONCE
Refills: 0 | Status: COMPLETED | OUTPATIENT
Start: 2019-06-24 | End: 2019-06-24

## 2019-06-24 RX ADMIN — Medication 40 MILLIGRAM(S): at 09:17

## 2019-06-24 RX ADMIN — Medication 3 MILLILITER(S): at 09:17

## 2019-06-24 NOTE — CONSULT NOTE ADULT - ASSESSMENT
70 yo F w/ pmhx CAD s/p CABG and PCI of the LCA and the first obtuse marginal artery with ILANA, MI and ischemic cardiomyopathy, s/p cardiomems placed but not able to get ongoing readings due to inability to lie flat from prior stroke and brain surgery, paroxysmal VT s/p biventricular/ICD (Guidant), A fib (on warfarin), DM2, HLD, hyperthyroidism, proliferative diabetic retinopathy, stroke and hemorrhage (2010) s/p craniotomy for intracranial decompression and removal of thrombus, recently discharged 05/19 after being treated for PNA presents c/o SOB    - Chronic BL LE edema, small Left sided pleural effusion  - mildly volume overloaded at this time  - Previous TTE shows aortic sclerosis, MAC, LAE, severe LV dysfunction, moderate pulmonary hypertension, enlarged LA size, mild mitral regurgitation LVEF 30%.      - A fib rate controlled, continue coumadin  - Trops pending will f/u  - EKG shows ventricular paced rhythm  - No acute changes on EKG compared to previous    - BP well controlled, continue home BP meds, monitor routine hemodynamics  - monitor and replete lytes, keep K>4, Mg>2      - Will follow up pending labs 70 yo F w/ pmhx CAD s/p CABG and PCI of the LCA and the first obtuse marginal artery with ILANA, MI and ischemic cardiomyopathy, s/p cardiomems placed but not able to get ongoing readings due to inability to lie flat from prior stroke and brain surgery, paroxysmal VT s/p biventricular/ICD (Guidant), A fib (on warfarin), DM2, HLD, hyperthyroidism, proliferative diabetic retinopathy, stroke and hemorrhage (2010) s/p craniotomy for intracranial decompression and removal of thrombus, recently discharged 05/19 after being treated for PNA presents c/o SOB    - Chronic BL LE edema, small Left sided pleural effusion  - mildly volume overloaded at this time  - would continue torsemide 60mg daily, add metolazone 5mg daily for 3 days with outpatient follow up at the end of the week with our office  - Previous TTE shows aortic sclerosis, MAC, LAE, severe LV dysfunction, moderate pulmonary hypertension, enlarged LA size, mild mitral regurgitation LVEF 30%.      - A fib rate controlled, continue coumadin  - Trops negative, no concern for ischemia at this time  - EKG shows ventricular paced rhythm  - No acute changes on EKG compared to previous    - BP well controlled, continue home BP meds, monitor routine hemodynamics  - monitor and replete lytes, keep K>4, Mg>2      - Will follow up pending labs 70 yo F w/ pmhx CAD s/p CABG and PCI of the LCA and the first obtuse marginal artery with ILANA, MI and ischemic cardiomyopathy, s/p cardiomems placed but not able to get ongoing readings due to inability to lie flat from prior stroke and brain surgery, paroxysmal VT s/p biventricular/ICD (Guidant), A fib (on warfarin), DM2, HLD, hyperthyroidism, proliferative diabetic retinopathy, stroke and hemorrhage (2010) s/p craniotomy for intracranial decompression and removal of thrombus, recently discharged 05/19 after being treated for PNA presents c/o SOB    - She appears to be more vol ol  - Her cxr shows more congestion when compared to her discharge cxr.   - Her lower ext edema has worsened and she has worsening orthopnea  - She has improved with lasix 40mg IV x1   - EKG is paced.   - Trops negative, no concern for ischemia at this time  - BP well controlled, continue home BP meds, monitor routine hemodynamics    - F/U labs.   - She is more comfortable at this time. If above workup neg I would continue torsemide 60mg daily, add metolazone 5mg daily for 3 days with outpatient follow up at the end of the week with our office

## 2019-06-24 NOTE — CONSULT NOTE ADULT - ATTENDING COMMENTS
I saw and examined the patient personally. Spoke with above provider regarding this case. I reviewed the above findings completely.  I agree with the above history, physical, and plan which I have edited where appropriate.  likely now mildly vol ol. reduce salt intake. start metolazone 5mg Qday x 3 days. F/U with Dr. Curtis in 1 week.

## 2019-06-24 NOTE — ED ADULT NURSE NOTE - NSIMPLEMENTINTERV_GEN_ALL_ED
Implemented All Universal Safety Interventions:  [place] to call system. Call bell, personal items and telephone within reach. Instruct patient to call for assistance. Room bathroom lighting operational. Non-slip footwear when patient is off stretcher. Physically safe environment: no spills, clutter or unnecessary equipment. Stretcher in lowest position, wheels locked, appropriate side rails in place.

## 2019-06-24 NOTE — ED PROVIDER NOTE - PROGRESS NOTE DETAILS
patient seen and examined by cardiology Dr. Valdez, to start metolazone for 3 days, dc home f/u in office this week

## 2019-06-24 NOTE — ED PROVIDER NOTE - CARE PROVIDER_API CALL
Estuardo Valdez)  Internal Medicine  [address] [place] [address]  Phone: [phone]  Fax: [phone]  Follow Up Time:

## 2019-06-24 NOTE — CONSULT NOTE ADULT - SUBJECTIVE AND OBJECTIVE BOX
CHIEF COMPLAINT: Patient is a 69y old  Female who presents with a chief complaint of SOB    HPI:   70 yo F w/ pmhx CAD s/p CABG and PCI of the LCA and the first obtuse marginal artery with ILANA, MI and ischemic cardiomyopathy, s/p cardiomems placed but not able to get ongoing readings due to inability to lie flat from prior stroke and brain surgery, paroxysmal VT s/p biventricular/ICD (Guidant), A fib (on warfarin), DM2, HLD, hyperthyroidism, proliferative diabetic retinopathy, stroke and hemorrhage () s/p craniotomy for intracranial decompression and removal of thrombus, recently discharged  after being treated for PNA presents c/o SOB. Pt states she was recently treated for PNA and since then has not been feeling better. Reports persistent cough and SOB. SOB both at rest and on exertion. Also reports leg swelling has worsened. Admits to feeling tired. Denies CP, palpitations, dizziness, lightheadedness.     EKG: V paced HR 78    Cardiology Summary    EK2019, V paced, AF   Stress Test: 14, Left ventricular ejection fraction 42%, apical, inferior, mid septal infarction with minimal suzette-infarct ischemia.   Echo: , Aortic sclerosis, MAC, LAE, severe LV dysfunction, moderate pulmonary hypertension, enlarged LA size, mild mitral regurgitation LVEF 30%.   Cardiac Cath: 8/15, Left ventricular ejection fraction of 50%, normal left main, total occlusion of the proximal LAD, 80% stenosis the proximal circumflex artery, 99% stenosis of the first acute marginal artery, 99% diffuse stenosis of the right coronary artery, minor luminal irregularities of the left internal mammary artery to the LAD, total occlusion of the saphenous vein graft to the first obtuse marginal artery, mild atherosclerosis of the saphenous vein graft to the right posterior descending artery.   Stent: 8/15, Percutaneous intervention with drug eluding stent of the first obtuse marginal artery and percutaneous intervention with drug eluding stent to the proximal circumflex artery   CAB   ICD/Defibrillator: Jos BiV-ICD   REVIEW OF SYSTEMS:   All other review of systems are negative unless indicated above    PAST MEDICAL & SURGICAL HISTORY:  Unsteady gait  CHF (congestive heart failure)  Arrhythmia  HLD (hyperlipidemia)  MI (myocardial infarction)  Obesity  Former smoker  Hyperthyroidism  TIA (Transient Ischemic Attack)  Cerebellar Infarction  CVA (Cerebral Vascular Accident)  Diabetes Mellitus, Type 2  CAD (Coronary Artery Disease)  Benign Hypertension  ICD  Cardiac Pacemaker  S/P Hysterectomy  S/P Ventriculoperitoneal Shunt  S/P Craniotomy  CABG (Coronary Artery Bypass Graft)  CABG (Coronary Artery Bypass Graft)      SOCIAL HISTORY:  No tobacco, ethanol, or drug abuse.    FAMILY HISTORY:    No family history of acute MI or sudden cardiac death.    MEDICATIONS  (STANDING):  ALBUTerol/ipratropium for Nebulization. 3 milliLiter(s) Nebulizer once  furosemide   Injectable 40 milliGRAM(s) IV Push Once    Aspirin 81 MG TABS; TAKE 1 TABLET DAILY  Carvedilol 12.5 MG Oral Tablet; Take 1 tablet twice a day  Digox 125 MCG Oral Tablet; take 1 tablet every other day  Digoxin 125 MCG Oral Tablet; take 1 tablet every other day  Enoxaparin Sodium 100 MG/ML Subcutaneous Solution; INJECT 1 UNIT Twice daily  HumaLOG SOLN; 60 units am, 65 units pm  Klor-Con M20 20 MEQ Oral Tablet Extended Release; TAKE 2 TABLETS DAILY X FIRST 3  DAYS ONLY THEN 1 TAB DAILY  Losartan Potassium 50 MG Oral Tablet; TAKE 1 TABLET DAILY  Simvastatin 40 MG Oral Tablet; Take 1 tablet daily  Spironolactone 25 MG Oral Tablet; Take 1 tablet daily  Tapazole TABS; 1.25 mg every other day  Torsemide 20 MG Oral Tablet; 60 mg/day  Warfarin Sodium 4 MG Oral Tablet; Take 2 tablets daily      MEDICATIONS  (PRN):      Allergies    No Known Allergies    Intolerances            VITAL SIGNS:   Vital Signs Last 24 Hrs  T(C): 36.6 (2019 08:02), Max: 36.6 (2019 08:02)  T(F): 97.9 (2019 08:02), Max: 97.9 (2019 08:02)  HR: 79 (2019 08:02) (79 - 79)  BP: 125/87 (2019 08:02) (125/87 - 125/87)  BP(mean): --  RR: 16 (2019 08:02) (16 - 16)  SpO2: 99% (2019 08:02) (99% - 99%)    I&O's Summary      On Exam:  TELE:   Constitutional: NAD, awake and alert, well-developed  HEENT: Moist Mucous Membranes, Anicteric  Pulmonary: Non-labored, breath sounds decreased b/l bases with mild rales L>R, No wheezing or rhonchi  Cardiovascular: Regular, S1 and S2, No murmurs, rubs, gallops or clicks  Gastrointestinal: Bowel Sounds present, soft, nontender.   Ext: LLE 2+ pitting edema, RLE 1+ pitting edema  Skin: No visible rashes or ulcers.  Psych:  Mood & affect appropriate, anxious    LABS: All Labs Reviewed:                Blood Culture:         RADIOLOGY:  < from: Xray Chest 1 View- PORTABLE-Urgent (19 @ 08:49) >    EXAM:  XR CHEST PORTABLE URGENT 1V                            PROCEDURE DATE:  2019          INTERPRETATION:  Clinical information: Shortness of breath. Congestive   heart failure. Pneumonia.    Technique: Frontal view of the chest.    Comparison: Prior chest x-ray examination from 2019.    Findings: There is an AICD device overlying the left chest wall. The   patient is status post median sternotomy.    There is a small left sided pleural effusion with adjacent atelectasis   and/or pneumonia. There is mild pulmonary vascular congestion. The heart   size is enlarged. Multilevel degenerative changes are noted within the   imaged potions of the spine.     IMPRESSION: Small left-sided pleural effusion with adjacent atelectasis   and/or pneumonia.                SHANNAN ALCALA M.D., ATTENDING RADIOLOGIST  This document has been electronically signed. 2019  9:03AM                < end of copied text > CHIEF COMPLAINT: Patient is a 69y old  Female who presents with a chief complaint of SOB    HPI:   68 yo F w/ pmhx CAD s/p CABG and PCI of the LCx and the first obtuse marginal artery with ILANA,  severe ischemic cardiomyopathy, s/p cardiomems placed but not able to get ongoing readings due to inability to lie flat from prior stroke and brain surgery, paroxysmal VT s/p biventricular/ICD (Guidant), A fib (on warfarin), DM2, HLD, hyperthyroidism, proliferative diabetic retinopathy, stroke and hemorrhage () s/p craniotomy for intracranial decompression and removal of thrombus, recently discharged  after being treated for PNA presents c/o SOB. Pt states she was recently treated for PNA and since then has not been feeling better. Reports persistent cough and SOB. SOB both at rest and on exertion. Also reports leg swelling has worsened over the last few days. Recently saw pulmonary and it was felt no new active pulmonary issues.  Admits to feeling tired. Denies CP, palpitations, dizziness, lightheadedness.     EKG: V paced HR 78    Cardiology Summary    EK2019, V paced, AF   Stress Test: 14, Left ventricular ejection fraction 42%, apical, inferior, mid septal infarction with minimal suzette-infarct ischemia.   Echo: , Aortic sclerosis, MAC, LAE, severe LV dysfunction, moderate pulmonary hypertension, enlarged LA size, mild mitral regurgitation LVEF 30%.   Cardiac Cath: 8/15, Left ventricular ejection fraction of 50%, normal left main, total occlusion of the proximal LAD, 80% stenosis the proximal circumflex artery, 99% stenosis of the first acute marginal artery, 99% diffuse stenosis of the right coronary artery, minor luminal irregularities of the left internal mammary artery to the LAD, total occlusion of the saphenous vein graft to the first obtuse marginal artery, mild atherosclerosis of the saphenous vein graft to the right posterior descending artery.   Stent: 8/15, Percutaneous intervention with drug eluding stent of the first obtuse marginal artery and percutaneous intervention with drug eluding stent to the proximal circumflex artery   CAB   ICD/Defibrillator: , Guidant BiV-ICD   REVIEW OF SYSTEMS:   All other review of systems are negative unless indicated above    PAST MEDICAL & SURGICAL HISTORY:  Unsteady gait  CHF (congestive heart failure)  Arrhythmia  HLD (hyperlipidemia)  MI (myocardial infarction)  Obesity  Former smoker  Hyperthyroidism  TIA (Transient Ischemic Attack)  Cerebellar Infarction  CVA (Cerebral Vascular Accident)  Diabetes Mellitus, Type 2  CAD (Coronary Artery Disease)  Benign Hypertension  ICD  Cardiac Pacemaker  S/P Hysterectomy  S/P Ventriculoperitoneal Shunt  S/P Craniotomy  CABG (Coronary Artery Bypass Graft)  CABG (Coronary Artery Bypass Graft)      SOCIAL HISTORY:  No tobacco, ethanol, or drug abuse.    FAMILY HISTORY:    No family history of acute MI or sudden cardiac death.    MEDICATIONS  (STANDING):  ALBUTerol/ipratropium for Nebulization. 3 milliLiter(s) Nebulizer once  furosemide   Injectable 40 milliGRAM(s) IV Push Once    Aspirin 81 MG TABS; TAKE 1 TABLET DAILY  Carvedilol 12.5 MG Oral Tablet; Take 1 tablet twice a day  Digox 125 MCG Oral Tablet; take 1 tablet every other day  Digoxin 125 MCG Oral Tablet; take 1 tablet every other day  Enoxaparin Sodium 100 MG/ML Subcutaneous Solution; INJECT 1 UNIT Twice daily  HumaLOG SOLN; 60 units am, 65 units pm  Klor-Con M20 20 MEQ Oral Tablet Extended Release; TAKE 2 TABLETS DAILY X FIRST 3  DAYS ONLY THEN 1 TAB DAILY  Losartan Potassium 50 MG Oral Tablet; TAKE 1 TABLET DAILY  Simvastatin 40 MG Oral Tablet; Take 1 tablet daily  Spironolactone 25 MG Oral Tablet; Take 1 tablet daily  Tapazole TABS; 1.25 mg every other day  Torsemide 20 MG Oral Tablet; 60 mg/day  Warfarin Sodium 4 MG Oral Tablet; Take 2 tablets daily      MEDICATIONS  (PRN):      Allergies    No Known Allergies    Intolerances            VITAL SIGNS:   Vital Signs Last 24 Hrs  T(C): 36.6 (2019 08:02), Max: 36.6 (2019 08:02)  T(F): 97.9 (2019 08:02), Max: 97.9 (2019 08:02)  HR: 79 (2019 08:02) (79 - 79)  BP: 125/87 (2019 08:02) (125/87 - 125/87)  BP(mean): --  RR: 16 (2019 08:02) (16 - 16)  SpO2: 99% (2019 08:02) (99% - 99%)    I&O's Summary      On Exam:     Constitutional: NAD, awake and alert, well-developed  HEENT: Moist Mucous Membranes, Anicteric  Pulmonary: Non-labored, breath sounds decreased b/l bases with mild rales L>R, No wheezing or rhonchi  Cardiovascular: Regular, S1 and S2, No murmurs, rubs, gallops or clicks  Gastrointestinal: Bowel Sounds present, soft, nontender.   Lymph:  2+ b/l pitting edema  Skin: No visible rashes or ulcers.  Psych:  Mood & affect appropriate, anxious    LABS: All Labs Reviewed:                Blood Culture:         RADIOLOGY:  < from: Xray Chest 1 View- PORTABLE-Urgent (19 @ 08:49) >    EXAM:  XR CHEST PORTABLE URGENT 1V                            PROCEDURE DATE:  2019          INTERPRETATION:  Clinical information: Shortness of breath. Congestive   heart failure. Pneumonia.    Technique: Frontal view of the chest.    Comparison: Prior chest x-ray examination from 2019.    Findings: There is an AICD device overlying the left chest wall. The   patient is status post median sternotomy.    There is a small left sided pleural effusion with adjacent atelectasis   and/or pneumonia. There is mild pulmonary vascular congestion. The heart   size is enlarged. Multilevel degenerative changes are noted within the   imaged potions of the spine.     IMPRESSION: Small left-sided pleural effusion with adjacent atelectasis   and/or pneumonia.                SHANNAN ALCALA M.D., ATTENDING RADIOLOGIST  This document has been electronically signed. 2019  9:03AM                < end of copied text >         EKG

## 2019-06-24 NOTE — ED PROVIDER NOTE - OBJECTIVE STATEMENT
69 female presents to ER by ambulance with report of shortness of breath, worse with lying down and exertion, for the past month, getting worse today, having productive cough, no fever, was recently treated for pneumonia on last admission to [facility], not currently on antibiotics.

## 2019-07-01 ENCOUNTER — NON-APPOINTMENT (OUTPATIENT)
Age: 69
End: 2019-07-01

## 2019-07-01 ENCOUNTER — APPOINTMENT (OUTPATIENT)
Dept: CARDIOLOGY | Facility: CLINIC | Age: 69
End: 2019-07-01
Payer: COMMERCIAL

## 2019-07-01 VITALS
BODY MASS INDEX: 31.41 KG/M2 | HEART RATE: 70 BPM | DIASTOLIC BLOOD PRESSURE: 73 MMHG | SYSTOLIC BLOOD PRESSURE: 108 MMHG | WEIGHT: 184 LBS | OXYGEN SATURATION: 96 % | HEIGHT: 64 IN

## 2019-07-01 PROCEDURE — 93000 ELECTROCARDIOGRAM COMPLETE: CPT

## 2019-07-01 PROCEDURE — 99214 OFFICE O/P EST MOD 30 MIN: CPT

## 2019-07-01 RX ORDER — LANCETS 28 GAUGE
EACH MISCELLANEOUS
Qty: 400 | Refills: 0 | Status: ACTIVE | COMMUNITY
Start: 2019-01-28

## 2019-07-01 RX ORDER — BLOOD SUGAR DIAGNOSTIC
STRIP MISCELLANEOUS
Qty: 450 | Refills: 0 | Status: ACTIVE | COMMUNITY
Start: 2018-07-30

## 2019-07-01 RX ORDER — PEN NEEDLE, DIABETIC 29 G X1/2"
32G X 4 MM NEEDLE, DISPOSABLE MISCELLANEOUS
Qty: 360 | Refills: 0 | Status: ACTIVE | COMMUNITY
Start: 2019-01-28

## 2019-07-01 RX ORDER — INSULIN DETEMIR 100 [IU]/ML
100 INJECTION, SOLUTION SUBCUTANEOUS
Qty: 75 | Refills: 0 | Status: ACTIVE | COMMUNITY
Start: 2019-01-28

## 2019-07-01 RX ORDER — INSULIN ASPART 100 [IU]/ML
100 INJECTION, SOLUTION INTRAVENOUS; SUBCUTANEOUS
Qty: 120 | Refills: 0 | Status: ACTIVE | COMMUNITY
Start: 2019-01-28

## 2019-07-01 NOTE — REVIEW OF SYSTEMS
[Negative] : Psychiatric [Feeling Fatigued] : feeling fatigued [Shortness Of Breath] : no shortness of breath [Dyspnea on exertion] : not dyspnea during exertion [Chest  Pressure] : no chest pressure [Chest Pain] : no chest pain [Lower Ext Edema] : lower extremity edema [Palpitations] : no palpitations [Cough] : cough [Wheezing] : no wheezing [Abdominal Pain] : no abdominal pain [Heartburn] : no heartburn [Dysphagia] : no dysphagia [Dysuria] : no dysuria [Incontinence] : no incontinence

## 2019-07-01 NOTE — CARDIOLOGY SUMMARY
[LVEF ___%] : LVEF [unfilled]% [Severe] : severe LV dysfunction [Moderate] : moderate pulmonary hypertension [Enlarged] : enlarged LA size [Mild] : mild mitral regurgitation [___] : [unfilled]

## 2019-07-01 NOTE — DISCUSSION/SUMMARY
[FreeTextEntry1] : 69 year old woman with a history of CAD s/p CABG and percutaneous intervention of the left circumflex artery and the first obtuse marginal artery with drug eluting stents, a prior MI, an ischemic cardiomyopathy, paroxysmal VT s/p placement of a biventricular/ICD ([employer]), who presents for follow up.\par \par She is feeling a little better today and her volume status has greatly improved. She will continue her current dose of torsemide and will monitor her weights at home. I have given her a script for metolazone 2.5 mg, which seems to have worked well. If she gains 2-3 lbs, she will call the office, and we will proceed with a metolazone. \par I have stressed the importance of staying active. She will follow up with her new pulmonologist.\par She is to follow up with Dr. Curtis as recommended.

## 2019-07-01 NOTE — PHYSICAL EXAM
[Normal Conjunctiva] : the conjunctiva exhibited no abnormalities [Eyelids - No Xanthelasma] : the eyelids demonstrated no xanthelasmas [Normal Oral Mucosa] : normal oral mucosa [No Oral Pallor] : no oral pallor [No Oral Cyanosis] : no oral cyanosis [Normal Jugular Venous A Waves Present] : normal jugular venous A waves present [Normal Jugular Venous V Waves Present] : normal jugular venous V waves present [No Jugular Venous Merino A Waves] : no jugular venous merino A waves [Abdomen Soft] : soft [Abdomen Tenderness] : non-tender [Abdomen Mass (___ Cm)] : no abdominal mass palpated [Nail Clubbing] : no clubbing of the fingernails [Cyanosis, Localized] : no localized cyanosis [Petechial Hemorrhages (___cm)] : no petechial hemorrhages [Skin Color & Pigmentation] : normal skin color and pigmentation [No Venous Stasis] : no venous stasis [Skin Lesions] : no skin lesions [No Skin Ulcers] : no skin ulcer [No Xanthoma] : no  xanthoma was observed [Oriented To Time, Place, And Person] : oriented to person, place, and time [Affect] : the affect was normal [Mood] : the mood was normal [No Anxiety] : not feeling anxious [Not Palpable] : not palpable [No Precordial Heave] : no precordial heave was noted [Apical Thrill] : a thrill was present at the apex [Tachycardia] : tachycardic [Heart Rate ___] : [unfilled] bpm [Irregularly Irregular] : irregularly irregular [Normal S1] : normal S1 [Normal S2] : normal S2 [No Gallop] : no gallop heard [No Murmur] : no murmurs heard [2+] : left 2+ [Rt] : varicose veins of the right leg noted [Lt] : varicose veins of the left leg noted [FreeTextEntry1] : Mild respiratory distress [] : no respiratory distress [Respiration, Rhythm And Depth] : normal respiratory rhythm and effort [Exaggerated Use Of Accessory Muscles For Inspiration] : no accessory muscle use [Auscultation Breath Sounds / Voice Sounds] : lungs were clear to auscultation bilaterally [Click] : no click [Pericardial Rub] : no pericardial rub [Right Carotid Bruit] : no bruit heard over the right carotid [Left Carotid Bruit] : no bruit heard over the left carotid [Right Femoral Bruit] : no bruit heard over the right femoral artery [Left Femoral Bruit] : no bruit heard over the left femoral artery [Bruit] : no bruit heard [___+] : [unfilled]U+ pretibial pitting edema on the left

## 2019-07-01 NOTE — REASON FOR VISIT
[Follow-Up - Clinic] : a clinic follow-up of [Cardiomyopathy] : cardiomyopathy [Coronary Artery Disease] : coronary artery disease [Hyperlipidemia] : hyperlipidemia

## 2019-07-01 NOTE — HISTORY OF PRESENT ILLNESS
[FreeTextEntry1] : 69 year old woman with a history of CAD s/p CABG and percutaneous intervention of the left circumflex artery and the first obtuse marginal artery with drug eluting stents, a prior MI, an ischemic cardiomyopathy, s/p Cardiomems placed but has not been able to get ongoing readings given her inability to lie flat from her prior stroke and brain surgery, paroxysmal VT s/p placement of a biventricular/ICD (Guidant), history of ischemic cardiomyopathy, history of diabetes, history of hyperlipidemia,history of hyperthyroidism, history of proliferative diabetic retinopathy, and history of stroke with hemorrhage in 2010 at which time she had a long complicated course requiring craniotomy for intracranial decompression and removal of thrombus, ICD discharges felt to be due to rapid atrial fibrillation who presents to the office for a follow up.\par \par On 6/24/2019, she went to the ER with shortness of breath and LE edema. She was given a script for metolazone, which she took 5 mg po daily for 3 days, and is now back on torsemide alone.\par She is feeling ok today and reports congestion. Her breathing and edema has significantly improved. She is still coughing, but not wheezing.

## 2019-07-10 NOTE — DISCHARGE NOTE PROVIDER - NSDCHOSPICE_GEN_A_CORE
Speech Therapy Video Fluoroscopy  Video Swallow Study                                        Video Fluoroscopy Date: 7/10/2019  Referred by: ZORA Wood  Next Referring Physician Visit: 7/17/19  Medical Diagnosis (from order):  Dysphagia, unspecified type [R13.10]   Treatment Diagnosis: Dysphagia, Unspecified  Insurance: 1. [employer] 2. N/A  Insurance Information: [place] Medicaid HMO    Date of Onset/Injury: Referred on 6/4/19  Precautions: None  Chart reviewed: Relevant co-morbidities, allergies, tests and medications: HTN, COPD, CKD, GERD    SUBJECTIVE   Patient arrived on time and was pleasant and cooperative. He reported increased difficulty swallowing solids, endorsing a feeling of foods \"getting stuck\" at pharyngeal level.   Pain: patient reports pain is not an issue/concern    Function:   Limitations (patient reported): swallowing  Prior Level(patient reported): diet: Easy Chew/Dysphagia 3, Thin Liquids.    Prior Treatment: no therapies in the past year for current condition. Hospitalization, home health services or skilled nursing facility in the last 30 days: No, per patient.    Social Support/Home Environment: Patient lives alone.  Patient has assistance as needed from family/friends.       Safety:  Do you feel safe at home, work and/or school? yes, per patient  Fall History: (fall/near fall in past 12 months): No    Patient Goals/Concerns:  Swallowing    OBJECTIVE   Current Status:  Diet: Easy Chew/Dysphagia 3, Thin Liquids  Dentition:  Edentulous (patient has upper and lower dentures but does not wear them)  Cognition: Intact  Auditory Comprehension: Intact   Verbal Expression: Intact  Feeds Self: Yes  Oral Motor Screen: Assessment of facial, labial, lingual, velum and jaw function tested within functional limits    Videofluoroscopy Results:   Tested In: Lateral View  Consistency Trialed: Thin Liquid; Delivery Method: Teaspoon, Cup, Straw  Oral Phase Premature Spillage   Pharyngeal Phase  Spillage to the level of the pyriforms, Delayed swallow response, Residuals noted   Amount of Residuals trace   Location of Residuals base of tongue, pyriform sinuses   Amount of Penetration/Aspiration none   Timing of Penetration/Aspiration not applicable   Penetration Aspiration Scale 1 - Material does not enter the airway       Consistency Trialed: Pontoosuc Thick Liquid; Delivery Method: Teaspoon, Cup  Oral Phase Premature Spillage   Pharyngeal Phase Spillage to the level of the pyriforms, Delayed swallow response, Residuals noted, Penetration   Amount of Residuals trace   Location of Residuals base of tongue, vallecula, pyriform sinuses   Amount of Penetration/Aspiration trace, shallow, transient   Timing of Penetration/Aspiration during the swallow   Penetration Aspiration Scale 2 - Material enters the airway, remains above the vocal folds and is ejected from the airway     Consistency Trialed: Honey Thick Liquid; Delivery Method: Teaspoon, Cup  Oral Phase Slow Oral Transit, Premature Spillage   Pharyngeal Phase Spillage to the level of the pyriforms, Delayed swallow response, Residuals noted   Amount of Residuals mild   Location of Residuals base of tongue   Amount of Penetration/Aspiration none   Timing of Penetration/Aspiration not applicable   Penetration Aspiration Scale 1 - Material does not enter the airway     Consistency Trialed: Puree; Delivery Method: Teaspoon  Oral Phase Slow Oral Transit   Pharyngeal Phase Spillage to the level of the vallecula, Delayed swallow response, Residuals noted   Amount of Residuals mild   Location of Residuals base of tongue   Amount of Penetration/Aspiration none   Timing of Penetration/Aspiration not applicable   Penetration Aspiration Scale 1 - Material does not enter the airway       Consistency Trialed: Solid; Bite Size: average  Oral Phase Slow Mastication, Slow Oral Transit, Premature Spillage   Pharyngeal Phase Spillage to the level of the pyriforms, Delayed swallow  response, Residuals noted   Amount of Residuals mild   Location of Residuals base of tongue   Amount of Penetration/Aspiration none   Timing of Penetration/Aspiration not applicable   Penetration Aspiration Scale 1 - Material does not enter the airway       Consistency Trialed: Pill; Delivery Method: Teaspoon  Oral Phase Intact   Pharyngeal Phase Spillage to the level of the vallecula   Amount of Residuals none   Location of Residuals not applicable   Amount of Penetration/Aspiration none   Timing of Penetration/Aspiration not applicable   Penetration Aspiration Scale 1 - Material does not enter the airway         Esophageal Phase:  Not Observed    ASSESSMENT   60 year old male presents to speech therapy with decline in swallowing function. Assessment completed in lateral view with thin, nectar thick, and honey thick liquids, pureed and general solids, and a 13 mm tablet, all contrasted with barium. Oral phase was largely intact, with the exception of premature spillage with all liquid consistencies and general solids, slow but functional mastication for solids, and slow anterior-posterior transit particularly with solids. Delayed swallow initiation to the level of the pyriforms for all consistencies except pureed solids, which triggered at the level of the vallecula. Patient with increased swallow delay as viscosity increased, up to 3 seconds for general solids. Pharyngeal phase with adequate hyolaryngeal elevation/excursion and epiglottic inversion. Trace to mild pharyngeal residuals, particularly on base of tongue. Trace, transient penetration with nectar thick liquids via cup, and no other penetration/aspiration observed.     Recommend continue current diet (likely mechanical soft to easy chew solids) due to lack of dentition, and thin liquids. Patient would likely benefit from slow rate during meals in order to reduce impact of fatigue, small bites/sips, and alternating solids/liquids. No further speech therapy  indicated at this time.          Recommendations/Plan:  P.O. Diet Consistency: Easy Chew/Dysphagia 3, Thin Liquids  Strategies/Guidelines: Alternate liquids/solids, Small bites/sips, Eat Slowly, Sit Upright, Remain sitting for 30 minutes following PO intake  Level of Supervision: None Required  Swallow Therapy: No  Repeat Videofluoroscopy: No  Consults: None    Goals:  to be obtained in one visit:   1. Patient to complete video fluoroscopy evaluation. Status: MET  2. To communicate results to patient.  Status: MET    PLAN   Frequency/Duration: no additional therapy indicated    The plan of care and goals were established with the patient who concurs.  Patient has been given attendance policy at time of initial evaluation.    Patient Education:  Who will be receiving education: patient  Are they ready to learn: yes  Preferred learning style: written, verbal, demonstration  Barriers to learning: no barriers apparent at this time   Result of initial outlined education: Verbalizes understanding    Therapy Daily Billing   Primary Insurance: [facility] [employer] [facility]  Secondary Insurance: N/A    Evaluation Procedures:  Motion Fluoroscopy / Swallow Eval    Treatment Procedures:  No treatment codes were used on this date of service    Total Treatment Time: 60 minutes      The referring provider's electronic or written signature on the evaluation authorizes the therapy plan of care and certifies the need for these services, furnished under this plan of care while under their care.  Mailed/faxed for referring provider signature: ______________________________ Date: _________ Time: _______   No

## 2019-07-12 ENCOUNTER — APPOINTMENT (OUTPATIENT)
Dept: CARDIOLOGY | Facility: CLINIC | Age: 69
End: 2019-07-12
Payer: COMMERCIAL

## 2019-07-12 VITALS — WEIGHT: 193 LBS | HEART RATE: 95 BPM | BODY MASS INDEX: 33.13 KG/M2 | OXYGEN SATURATION: 97 %

## 2019-07-12 LAB
INR PPP: 1.9 RATIO
QUALITY CONTROL: YES

## 2019-07-12 PROCEDURE — 85610 PROTHROMBIN TIME: CPT | Mod: QW

## 2019-07-12 PROCEDURE — 93793 ANTICOAG MGMT PT WARFARIN: CPT

## 2019-07-29 ENCOUNTER — RX RENEWAL (OUTPATIENT)
Age: 69
End: 2019-07-29

## 2019-07-29 ENCOUNTER — OTHER (OUTPATIENT)
Age: 69
End: 2019-07-29

## 2019-08-09 ENCOUNTER — APPOINTMENT (OUTPATIENT)
Dept: CARDIOLOGY | Facility: CLINIC | Age: 69
End: 2019-08-09
Payer: COMMERCIAL

## 2019-08-09 VITALS — HEIGHT: 64 IN | BODY MASS INDEX: 32.95 KG/M2 | WEIGHT: 193 LBS | HEART RATE: 95 BPM

## 2019-08-09 LAB
INR PPP: 1.8 RATIO
QUALITY CONTROL: YES

## 2019-08-09 PROCEDURE — 85610 PROTHROMBIN TIME: CPT | Mod: QW

## 2019-08-09 PROCEDURE — 93793 ANTICOAG MGMT PT WARFARIN: CPT

## 2019-08-12 ENCOUNTER — OTHER (OUTPATIENT)
Age: 69
End: 2019-08-12

## 2019-08-12 ENCOUNTER — RX RENEWAL (OUTPATIENT)
Age: 69
End: 2019-08-12

## 2019-08-23 ENCOUNTER — APPOINTMENT (OUTPATIENT)
Dept: CARDIOLOGY | Facility: CLINIC | Age: 69
End: 2019-08-23
Payer: COMMERCIAL

## 2019-08-23 VITALS — BODY MASS INDEX: 32.95 KG/M2 | HEART RATE: 95 BPM | WEIGHT: 193 LBS | HEIGHT: 64 IN

## 2019-08-23 LAB
INR PPP: 2 RATIO
QUALITY CONTROL: YES

## 2019-08-23 PROCEDURE — 85610 PROTHROMBIN TIME: CPT | Mod: QW

## 2019-08-23 PROCEDURE — 93793 ANTICOAG MGMT PT WARFARIN: CPT

## 2019-09-06 ENCOUNTER — APPOINTMENT (OUTPATIENT)
Dept: CARDIOLOGY | Facility: CLINIC | Age: 69
End: 2019-09-06
Payer: MEDICARE

## 2019-09-06 ENCOUNTER — NON-APPOINTMENT (OUTPATIENT)
Age: 69
End: 2019-09-06

## 2019-09-06 VITALS
WEIGHT: 187 LBS | SYSTOLIC BLOOD PRESSURE: 107 MMHG | HEIGHT: 64 IN | BODY MASS INDEX: 31.92 KG/M2 | DIASTOLIC BLOOD PRESSURE: 74 MMHG | HEART RATE: 88 BPM | OXYGEN SATURATION: 93 %

## 2019-09-06 LAB
INR PPP: 2 RATIO
QUALITY CONTROL: YES

## 2019-09-06 PROCEDURE — 93000 ELECTROCARDIOGRAM COMPLETE: CPT

## 2019-09-06 PROCEDURE — 99214 OFFICE O/P EST MOD 30 MIN: CPT

## 2019-09-06 RX ORDER — AMOXICILLIN AND CLAVULANATE POTASSIUM 875; 125 MG/1; MG/1
875-125 TABLET, COATED ORAL
Qty: 20 | Refills: 0 | Status: DISCONTINUED | COMMUNITY
Start: 2019-05-08 | End: 2019-09-06

## 2019-09-09 NOTE — CONSULT NOTE ADULT - PROVIDER SPECIALTY LIST ADULT
Cardiology Returned from [facility] 9/1/19.  Pt c/o LLQ abd pain, abd distention, chills, nausea, SOB, and CP  PMH ovarian cysts  US Thursday was normal

## 2019-09-18 ENCOUNTER — APPOINTMENT (OUTPATIENT)
Dept: CARDIOLOGY | Facility: CLINIC | Age: 69
End: 2019-09-18
Payer: MEDICARE

## 2019-09-18 PROCEDURE — 93283 PRGRMG EVAL IMPLANTABLE DFB: CPT

## 2019-09-27 ENCOUNTER — APPOINTMENT (OUTPATIENT)
Dept: CARDIOLOGY | Facility: CLINIC | Age: 69
End: 2019-09-27
Payer: MEDICARE

## 2019-09-27 VITALS — OXYGEN SATURATION: 93 % | DIASTOLIC BLOOD PRESSURE: 74 MMHG | SYSTOLIC BLOOD PRESSURE: 107 MMHG | HEART RATE: 88 BPM

## 2019-09-27 LAB
INR PPP: 2.4 RATIO
QUALITY CONTROL: YES

## 2019-09-27 PROCEDURE — 85610 PROTHROMBIN TIME: CPT | Mod: QW

## 2019-09-27 PROCEDURE — 93793 ANTICOAG MGMT PT WARFARIN: CPT

## 2019-10-03 ENCOUNTER — OTHER (OUTPATIENT)
Age: 69
End: 2019-10-03

## 2019-10-18 ENCOUNTER — APPOINTMENT (OUTPATIENT)
Dept: CARDIOLOGY | Facility: CLINIC | Age: 69
End: 2019-10-18
Payer: MEDICARE

## 2019-10-18 VITALS — OXYGEN SATURATION: 93 % | DIASTOLIC BLOOD PRESSURE: 74 MMHG | SYSTOLIC BLOOD PRESSURE: 107 MMHG | HEART RATE: 88 BPM

## 2019-10-18 LAB
INR PPP: 3.3 RATIO
QUALITY CONTROL: YES

## 2019-10-18 PROCEDURE — 85610 PROTHROMBIN TIME: CPT | Mod: QW

## 2019-10-18 PROCEDURE — 93793 ANTICOAG MGMT PT WARFARIN: CPT

## 2019-11-01 ENCOUNTER — APPOINTMENT (OUTPATIENT)
Dept: CARDIOLOGY | Facility: CLINIC | Age: 69
End: 2019-11-01
Payer: MEDICARE

## 2019-11-01 VITALS — WEIGHT: 187 LBS | HEIGHT: 64 IN | BODY MASS INDEX: 31.92 KG/M2 | HEART RATE: 88 BPM

## 2019-11-01 LAB
INR PPP: 2.9 RATIO
QUALITY CONTROL: YES

## 2019-11-01 PROCEDURE — 93793 ANTICOAG MGMT PT WARFARIN: CPT

## 2019-11-01 PROCEDURE — 85610 PROTHROMBIN TIME: CPT | Mod: QW

## 2019-11-15 ENCOUNTER — APPOINTMENT (OUTPATIENT)
Dept: CARDIOLOGY | Facility: CLINIC | Age: 69
End: 2019-11-15
Payer: MEDICARE

## 2019-11-15 VITALS — WEIGHT: 187 LBS | BODY MASS INDEX: 31.92 KG/M2 | HEART RATE: 88 BPM | HEIGHT: 64 IN

## 2019-11-15 LAB
INR PPP: 2.6 RATIO
QUALITY CONTROL: YES

## 2019-11-15 PROCEDURE — 85610 PROTHROMBIN TIME: CPT | Mod: QW

## 2019-11-15 PROCEDURE — 93793 ANTICOAG MGMT PT WARFARIN: CPT

## 2019-12-06 ENCOUNTER — APPOINTMENT (OUTPATIENT)
Dept: CARDIOLOGY | Facility: CLINIC | Age: 69
End: 2019-12-06
Payer: MEDICARE

## 2019-12-06 VITALS — HEART RATE: 88 BPM | HEIGHT: 64 IN | WEIGHT: 187 LBS | BODY MASS INDEX: 31.92 KG/M2

## 2019-12-06 LAB
INR PPP: 2.5 RATIO
QUALITY CONTROL: YES

## 2019-12-06 PROCEDURE — 93793 ANTICOAG MGMT PT WARFARIN: CPT

## 2019-12-06 PROCEDURE — 85610 PROTHROMBIN TIME: CPT | Mod: QW

## 2019-12-20 ENCOUNTER — NON-APPOINTMENT (OUTPATIENT)
Age: 69
End: 2019-12-20

## 2019-12-20 ENCOUNTER — APPOINTMENT (OUTPATIENT)
Dept: CARDIOLOGY | Facility: CLINIC | Age: 69
End: 2019-12-20
Payer: MEDICARE

## 2019-12-20 VITALS
DIASTOLIC BLOOD PRESSURE: 65 MMHG | HEART RATE: 72 BPM | OXYGEN SATURATION: 97 % | BODY MASS INDEX: 33.46 KG/M2 | WEIGHT: 196 LBS | SYSTOLIC BLOOD PRESSURE: 98 MMHG | HEIGHT: 64 IN

## 2019-12-20 LAB
INR PPP: 3.8 RATIO
QUALITY CONTROL: YES

## 2019-12-20 PROCEDURE — 99214 OFFICE O/P EST MOD 30 MIN: CPT

## 2019-12-20 PROCEDURE — 85610 PROTHROMBIN TIME: CPT | Mod: QW

## 2019-12-20 PROCEDURE — 93000 ELECTROCARDIOGRAM COMPLETE: CPT

## 2020-01-03 ENCOUNTER — EMERGENCY (EMERGENCY)
Facility: HOSPITAL | Age: 70
LOS: 1 days | Discharge: HOME | End: 2020-01-03
Attending: EMERGENCY MEDICINE | Admitting: EMERGENCY MEDICINE
Payer: MEDICARE

## 2020-01-03 ENCOUNTER — APPOINTMENT (OUTPATIENT)
Dept: CARDIOLOGY | Facility: CLINIC | Age: 70
End: 2020-01-03
Payer: MEDICARE

## 2020-01-03 VITALS
OXYGEN SATURATION: 100 % | SYSTOLIC BLOOD PRESSURE: 118 MMHG | DIASTOLIC BLOOD PRESSURE: 82 MMHG | RESPIRATION RATE: 15 BRPM | TEMPERATURE: 98 F | HEART RATE: 87 BPM

## 2020-01-03 VITALS — BODY MASS INDEX: 33.46 KG/M2 | HEIGHT: 64 IN | WEIGHT: 196 LBS | HEART RATE: 72 BPM

## 2020-01-03 VITALS
SYSTOLIC BLOOD PRESSURE: 126 MMHG | HEART RATE: 80 BPM | TEMPERATURE: 97 F | DIASTOLIC BLOOD PRESSURE: 84 MMHG | RESPIRATION RATE: 14 BRPM | OXYGEN SATURATION: 100 % | WEIGHT: 195.11 LBS | HEIGHT: 65 IN

## 2020-01-03 LAB
ALBUMIN SERPL ELPH-MCNC: 3.3 G/DL — SIGNIFICANT CHANGE UP (ref 3.3–5)
ALP SERPL-CCNC: 73 U/L — SIGNIFICANT CHANGE UP (ref 40–120)
ALT FLD-CCNC: 18 U/L — SIGNIFICANT CHANGE UP (ref 12–78)
ANION GAP SERPL CALC-SCNC: 7 MMOL/L — SIGNIFICANT CHANGE UP (ref 5–17)
APTT BLD: 59.4 SEC — HIGH (ref 28.5–37)
AST SERPL-CCNC: 29 U/L — SIGNIFICANT CHANGE UP (ref 15–37)
BASOPHILS # BLD AUTO: 0.07 K/UL — SIGNIFICANT CHANGE UP (ref 0–0.2)
BASOPHILS NFR BLD AUTO: 0.9 % — SIGNIFICANT CHANGE UP (ref 0–2)
BILIRUB SERPL-MCNC: 1.3 MG/DL — HIGH (ref 0.2–1.2)
BUN SERPL-MCNC: 35 MG/DL — HIGH (ref 7–23)
CALCIUM SERPL-MCNC: 9 MG/DL — SIGNIFICANT CHANGE UP (ref 8.5–10.1)
CHLORIDE SERPL-SCNC: 104 MMOL/L — SIGNIFICANT CHANGE UP (ref 96–108)
CO2 SERPL-SCNC: 29 MMOL/L — SIGNIFICANT CHANGE UP (ref 22–31)
CREAT SERPL-MCNC: 1.6 MG/DL — HIGH (ref 0.5–1.3)
DIGOXIN SERPL-MCNC: 0.7 NG/ML — LOW (ref 0.8–2)
EOSINOPHIL # BLD AUTO: 0.08 K/UL — SIGNIFICANT CHANGE UP (ref 0–0.5)
EOSINOPHIL NFR BLD AUTO: 1.1 % — SIGNIFICANT CHANGE UP (ref 0–6)
GLUCOSE SERPL-MCNC: 130 MG/DL — HIGH (ref 70–99)
HCT VFR BLD CALC: 39.3 % — SIGNIFICANT CHANGE UP (ref 34.5–45)
HGB BLD-MCNC: 12.2 G/DL — SIGNIFICANT CHANGE UP (ref 11.5–15.5)
IMM GRANULOCYTES NFR BLD AUTO: 0.4 % — SIGNIFICANT CHANGE UP (ref 0–1.5)
INR BLD: 6.64 RATIO — CRITICAL HIGH (ref 0.88–1.16)
INR PPP: 6.9 RATIO
LYMPHOCYTES # BLD AUTO: 0.6 K/UL — LOW (ref 1–3.3)
LYMPHOCYTES # BLD AUTO: 7.9 % — LOW (ref 13–44)
MCHC RBC-ENTMCNC: 27.7 PG — SIGNIFICANT CHANGE UP (ref 27–34)
MCHC RBC-ENTMCNC: 31 GM/DL — LOW (ref 32–36)
MCV RBC AUTO: 89.1 FL — SIGNIFICANT CHANGE UP (ref 80–100)
MONOCYTES # BLD AUTO: 0.59 K/UL — SIGNIFICANT CHANGE UP (ref 0–0.9)
MONOCYTES NFR BLD AUTO: 7.8 % — SIGNIFICANT CHANGE UP (ref 2–14)
NEUTROPHILS # BLD AUTO: 6.22 K/UL — SIGNIFICANT CHANGE UP (ref 1.8–7.4)
NEUTROPHILS NFR BLD AUTO: 81.9 % — HIGH (ref 43–77)
NRBC # BLD: 0 /100 WBCS — SIGNIFICANT CHANGE UP (ref 0–0)
NT-PROBNP SERPL-SCNC: 2654 PG/ML — HIGH (ref 0–125)
PLATELET # BLD AUTO: 94 K/UL — LOW (ref 150–400)
POTASSIUM SERPL-MCNC: 4 MMOL/L — SIGNIFICANT CHANGE UP (ref 3.5–5.3)
POTASSIUM SERPL-SCNC: 4 MMOL/L — SIGNIFICANT CHANGE UP (ref 3.5–5.3)
PROT SERPL-MCNC: 7.3 G/DL — SIGNIFICANT CHANGE UP (ref 6–8.3)
PROTHROM AB SERPL-ACNC: 79.6 SEC — HIGH (ref 10–12.9)
QUALITY CONTROL: YES
RBC # BLD: 4.41 M/UL — SIGNIFICANT CHANGE UP (ref 3.8–5.2)
RBC # FLD: 16.3 % — HIGH (ref 10.3–14.5)
SODIUM SERPL-SCNC: 140 MMOL/L — SIGNIFICANT CHANGE UP (ref 135–145)
WBC # BLD: 7.59 K/UL — SIGNIFICANT CHANGE UP (ref 3.8–10.5)
WBC # FLD AUTO: 7.59 K/UL — SIGNIFICANT CHANGE UP (ref 3.8–10.5)

## 2020-01-03 PROCEDURE — 80053 COMPREHEN METABOLIC PANEL: CPT

## 2020-01-03 PROCEDURE — 84100 ASSAY OF PHOSPHORUS: CPT

## 2020-01-03 PROCEDURE — 93005 ELECTROCARDIOGRAM TRACING: CPT

## 2020-01-03 PROCEDURE — 80162 ASSAY OF DIGOXIN TOTAL: CPT

## 2020-01-03 PROCEDURE — 83735 ASSAY OF MAGNESIUM: CPT

## 2020-01-03 PROCEDURE — 85027 COMPLETE CBC AUTOMATED: CPT

## 2020-01-03 PROCEDURE — 96374 THER/PROPH/DIAG INJ IV PUSH: CPT

## 2020-01-03 PROCEDURE — 85730 THROMBOPLASTIN TIME PARTIAL: CPT

## 2020-01-03 PROCEDURE — 71045 X-RAY EXAM CHEST 1 VIEW: CPT

## 2020-01-03 PROCEDURE — 99283 EMERGENCY DEPT VISIT LOW MDM: CPT

## 2020-01-03 PROCEDURE — 99284 EMERGENCY DEPT VISIT MOD MDM: CPT | Mod: 25

## 2020-01-03 PROCEDURE — 83880 ASSAY OF NATRIURETIC PEPTIDE: CPT

## 2020-01-03 PROCEDURE — 85610 PROTHROMBIN TIME: CPT

## 2020-01-03 PROCEDURE — 99285 EMERGENCY DEPT VISIT HI MDM: CPT

## 2020-01-03 PROCEDURE — 85610 PROTHROMBIN TIME: CPT | Mod: QW

## 2020-01-03 PROCEDURE — 36415 COLL VENOUS BLD VENIPUNCTURE: CPT

## 2020-01-03 PROCEDURE — 93010 ELECTROCARDIOGRAM REPORT: CPT

## 2020-01-03 PROCEDURE — 71045 X-RAY EXAM CHEST 1 VIEW: CPT | Mod: 26

## 2020-01-03 RX ORDER — FUROSEMIDE 40 MG
80 TABLET ORAL ONCE
Refills: 0 | Status: COMPLETED | OUTPATIENT
Start: 2020-01-03 | End: 2020-01-03

## 2020-01-03 RX ADMIN — Medication 80 MILLIGRAM(S): at 17:00

## 2020-01-03 NOTE — ED ADULT NURSE NOTE - NSIMPLEMENTINTERV_GEN_ALL_ED
Implemented All Universal Safety Interventions:  [place] to call system. Call bell, personal items and telephone within reach. Instruct patient to call for assistance. Room bathroom lighting operational. Non-slip footwear when patient is off stretcher. Physically safe environment: no spills, clutter or unnecessary equipment. Stretcher in lowest position, wheels locked, appropriate side rails in place.

## 2020-01-03 NOTE — CONSULT NOTE ADULT - ATTENDING COMMENTS
I saw and examined the patient personally. Spoke with above provider regarding this case. I reviewed the above findings completely.  I agree with the above history, physical, and plan which I have edited where appropriate.     Pt has ADHF>  lasix 80mg IV x1.   check cxr labs.   may need to be admitted for further diuresis given crackles on exam.

## 2020-01-03 NOTE — ED PROVIDER NOTE - CLINICAL SUMMARY MEDICAL DECISION MAKING FREE TEXT BOX
pt with acute on chronic chf, took metolazone at home, feeling improved, check bnp, cxr, cards consult,   lasix 80mg and improved, ok for d/c per dr wadsworth on daily metolazone prior to diuretic.  fu monday

## 2020-01-03 NOTE — ED PROVIDER NOTE - PROGRESS NOTE DETAILS
pt urinating well in ed.  pt feeling better. d/c dr wadsworth. d/c on metolazone daily followed by diuretic this weekend instead of as needed and fu as scheduled on monday. pt instructed to limited salts and follow diet.

## 2020-01-03 NOTE — ED PROVIDER NOTE - NSFOLLOWUPINSTRUCTIONS_ED_ALL_ED_FT
take metolazone daily 1/2 hour prior to torsemide.  low sodium, fluid restrict diet. return for worsening symptoms.  follow up with dr hernadez in office on monday as prior scheduled.  hold coumadin as instructed by cardiology

## 2020-01-03 NOTE — CONSULT NOTE ADULT - SUBJECTIVE AND OBJECTIVE BOX
[facility] - Ramy Solis, Ever, Julia, Nicole, José Miguel, Caron Conner  Office Number: [phone]    Initial Consult Note    CHIEF COMPLAINT: Patient is a 69y old  Female who presents with a chief complaint of difficulty breathing    HPI:  Patient is a 69F with PMH of CAD s/p CABG and PCI of the LCx and the first obtuse marginal artery with ILANA,  severe ischemic cardiomyopathy, s/p cardiomems placed but not able to get ongoing readings due to inability to lie flat from prior stroke and brain surgery, paroxysmal VT s/p biventricular/ICD (Guidant), A fib (on warfarin), DM2, HLD, hyperthyroidism, proliferative diabetic retinopathy, stroke and hemorrhage (2010) s/p craniotomy for intracranial decompression and removal of thrombus, presents to hospital today for PMD office with difficulty breathing. Patient reports her doctor sent her to the hospital to be evaluated for fluid around her heart. In the ER, patient hemodynamically stable with BP of 126/84, HR of 80. Cardiology called to evaluate for SOB. Patient endorses she is unable to lie flat.  PAST MEDICAL & SURGICAL HISTORY:  Unsteady gait  CHF (congestive heart failure)  Arrhythmia  HLD (hyperlipidemia)  MI (myocardial infarction)  Obesity  Former smoker  Hyperthyroidism  TIA (Transient Ischemic Attack)  Cerebellar Infarction  CVA (Cerebral Vascular Accident)  Diabetes Mellitus, Type 2  CAD (Coronary Artery Disease)  Benign Hypertension  ICD  Cardiac Pacemaker  S/P Hysterectomy  S/P Ventriculoperitoneal Shunt  S/P Craniotomy  CABG (Coronary Artery Bypass Graft)  CABG (Coronary Artery Bypass Graft)    SOCIAL HISTORY:  No tobacco, ethanol, or drug abuse.  FAMILY HISTORY:    No family history of acute MI or sudden cardiac death.  MEDICATIONS  (STANDING):    MEDICATIONS  (PRN):    Allergies    No Known Allergies    Intolerances      REVIEW OF SYSTEMS:  CONSTITUTIONAL: No weakness, fevers or chills  EYES/ENT: No visual changes;  No vertigo or throat pain   NECK: No pain or stiffness  RESPIRATORY: No cough, wheezing, hemoptysis; No shortness of breath  CARDIOVASCULAR: No chest pain or palpitations  GASTROINTESTINAL: No abdominal pain. No nausea, vomiting, or hematemesis; No diarrhea or constipation. No melena or hematochezia.  GENITOURINARY: No dysuria, frequency or hematuria  NEUROLOGICAL: No numbness or weakness  SKIN: No itching or rash  All other review of systems is negative unless indicated above  VITAL SIGNS:   Vital Signs Last 24 Hrs  T(C): 36.3 (03 Jan 2020 14:12), Max: 36.3 (03 Jan 2020 14:12)  T(F): 97.3 (03 Jan 2020 14:12), Max: 97.3 (03 Jan 2020 14:12)  HR: 80 (03 Jan 2020 14:12) (80 - 80)  BP: 126/84 (03 Jan 2020 14:12) (126/84 - 126/84)  BP(mean): --  RR: 14 (03 Jan 2020 14:12) (14 - 14)  SpO2: 100% (03 Jan 2020 14:12) (100% - 100%)  I&O's Summary    On Exam:  Constitutional: NAD, alert and oriented x 3  Lungs:  Non-labored, breath sounds are clear bilaterally, No wheezing, rales or rhonchi  Cardiovascular: RRR.  S1 and S2 positive.  No murmurs, rubs, gallops or clicks  Gastrointestinal: Bowel Sounds present, soft, nontender.   Lymph: No peripheral edema. No cervical lymphadenopathy.  Neurological: Alert, no focal deficits  Skin: No rashes or ulcers   Psych:  Mood & affect appropriate.    LABS: All Labs Reviewed:    < from: Cardiac Cath Lab - Adult (08.31.15 @ 09:03) >  VENTRICLES: EF estimated was 50 %.  CORONARY VESSELS: The coronary circulation is right dominant.  LM:   --  LM: Normal.  LAD:   --  Proximal LAD: There was a 100 % stenosis.  CX:   --  Proximal circumflex: There was a tubular 80 % stenosis. There was  TYESHA grade 3 flow through the vessel (brisk flow). This is a likely  culprit for the patient's clinical presentation. An intervention was  performed.  --  OM1: There was a diffuse 99 % stenosis. There was TYESHA grade 3 flow  through the vessel (brisk flow). This is a likely culprit for the  patient's clinical presentation. An intervention was performed.  RCA:   --  Proximal RCA: There was a diffuse 99 % stenosis. There was TYESHA  grade 2 flow through the vessel (partial perfusion).  GRAFTS:   --  Graft to the mid LAD: The graft was a LIMA. Graft angiography  showed minor luminal irregularities. Distal vessel angiography showed mild  diffuse disease.  --  Graft to the 1st obtuse marginal: The graft was a saphenous vein graft  from the aorta. There was a diffuse 100 % stenosis at the proximal  anastomosis.  --  Graft to the RPDA: The graft was a saphenous vein graft from the aorta.  Graft angiography showed mild atherosclerosis. Distal vessel angiography  showed mild diffuse disease.  COMPLICATIONS: There were no complications.  DIAGNOSTIC RECOMMENDATIONS:  1. Successful PCI to the pLCx (3.0mm Resolute Integrity ILANA) and OM1 (2.5mm  and 2.25mm Resolute Integrity ILANA) in thr setting of unstable angina.  2. Aspirin and clopidogrel.  INTERVENTIONAL RECOMMENDATIONS:  1. Successful PCI to the pLCx (3.0mm Resolute Integrity ILANA) and OM1 (2.5mm  and 2.25mm Resolute Integrity ILANA) in thr setting of unstable angina.  2. Aspirin and clopidogrel.  Prepared and signed by  Ai Lake M.D.  Signed 09/01/2015 11:42:25  HE    < end of copied text > [facility] - Ramy Solis, Ever, Julia, Nicole, José Miguel, Caron Conner  Office Number: [phone]    Initial Consult Note    CHIEF COMPLAINT: Patient is a 69y old  Female who presents with a chief complaint of difficulty breathing and increased bilateral lower extremity swelling    HPI:  Patient is a 69F with PMH of CAD s/p CABG and s/p LHC in  w/PCI of the LCx and the first obtuse marginal artery with ILANA,  severe ischemic cardiomyopathy, s/p cardiomems placed but not able to get ongoing readings due to inability to lie flat from prior stroke and brain surgery, paroxysmal VT s/p biventricular/ICD (Guidant), A fib (on warfarin), DM2, HLD, hyperthyroidism, proliferative diabetic retinopathy, stroke and hemorrhage () s/p craniotomy for intracranial decompression and removal of thrombus, presents to hospital today with difficulty breathing and bilateral lower extremity swelling, left leg greater than right leg. Patient reports she was at Dr. Curtis's office and was told her INR level was 6.9 and she had fluid accumulation. Dr. Curtis sent her to [facility]. Patient reports she was told she needs IV diuretics. Patient endorses she had a cold and URI 2 weeks ago. Since then, she has been accumulating fluid. In ER, EKG with V-pacing, no evidence of active ischemia.    PAST MEDICAL & SURGICAL HISTORY:  Unsteady gait  CHF (congestive heart failure)  Arrhythmia  HLD (hyperlipidemia)  MI (myocardial infarction)  Obesity  Former smoker  Hyperthyroidism  TIA (Transient Ischemic Attack)  Cerebellar Infarction  CVA (Cerebral Vascular Accident)  Diabetes Mellitus, Type 2  CAD (Coronary Artery Disease)  Benign Hypertension  ICD  Cardiac Pacemaker  S/P Hysterectomy  S/P Ventriculoperitoneal Shunt  S/P Craniotomy  CABG (Coronary Artery Bypass Graft)  CABG (Coronary Artery Bypass Graft)    SOCIAL HISTORY:  No tobacco, ethanol, or drug abuse.  FAMILY HISTORY:    No family history of acute MI or sudden cardiac death.    Current Meds  1. Aspirin 81 MG TABS; TAKE 1 TABLET DAILY  2. BD Pen Needle Katarina U/F 32G X 4 MM  3. Benzonatate 200 MG Oral Capsule  4. Carvedilol 12.5 MG Oral Tablet; Take 1 tablet twice a day  5. Digox 125 MCG Oral Tablet; take 1 tablet every other day  6. Digoxin 125 MCG Oral Tablet; take 1 tablet every other day  7. Enoxaparin Sodium 100 MG/ML Subcutaneous Solution; INJECT 1 UNIT  Twice daily  8. FreeStyle Lancets  9. FreeStyle Lite Test In Vitro Strip  10. HumaLOG SOLN; 60 units am, 65 units pm  11. Klor-Con M20 20 MEQ Oral Tablet Extended Release; TAKE 2 TABLETS  DAILY X FIRST 3 DAYS ONLY THEN 1 TAB DAILY  12. Levemir FlexTouch 100 UNIT/ML Subcutaneous Solution Pen-injector  13. Losartan Potassium 50 MG Oral Tablet; TAKE 1 TABLET DAILY  14. metOLazone 2.5 MG Oral Tablet; TAKE 1 TABLET Daily PRN 2 to 3 lb weight  gain take 30 min before torsemide  15. NovoLOG FlexPen 100 UNIT/ML Subcutaneous Solution Pen-injector  16. Simvastatin 40 MG Oral Tablet; Take 1 tablet daily  17. Spironolactone 25 MG Oral Tablet; Take 1 tablet daily  18. Tapazole TABS; 1.25 mg every other day  19. Torsemide 20 MG Oral Tablet; 60mg/day  20. Ventolin  (90 Base) MCG/ACT Inhalation Aerosol Solution  21. Warfarin Sodium 4 MG Oral Tablet; Take 2 tablets daily    MEDICATIONS  (PRN):    Allergies    No Known Allergies    Intolerances      REVIEW OF SYSTEMS:  CONSTITUTIONAL: weakness, unable to lie flat  EYES/ENT: No visual changes;  No vertigo or throat pain   NECK: No pain or stiffness  RESPIRATORY: endorses cough and shortness of breath, endorses PND, orthopnea  CARDIOVASCULAR: No chest pain or palpitations  GASTROINTESTINAL: No abdominal pain. No nausea, vomiting, or hematemesis; No diarrhea or constipation. No melena or hematochezia.  GENITOURINARY: No dysuria, frequency or hematuria  NEUROLOGICAL: No numbness or weakness  SKIN: No itching or rash  EXTREMITIES: endorses swelling of the lower legs  All other review of systems is negative unless indicated above  VITAL SIGNS:   Vital Signs Last 24 Hrs  T(C): 36.3 (2020 14:12), Max: 36.3 (2020 14:12)  T(F): 97.3 (2020 14:12), Max: 97.3 (2020 14:12)  HR: 80 (2020 14:12) (80 - 80)  BP: 126/84 (2020 14:12) (126/84 - 126/84)  BP(mean): --  RR: 14 (2020 14:12) (14 - 14)  SpO2: 100% (2020 14:12) (100% - 100%)  I&O's Summary    On Exam:  Constitutional:distress due to difficulty breathing  Lungs:  lungs with crackles on exam  Cardiovascular: +JVD,  RRR.  S1 and S2 positive.  No murmurs, rubs, gallops or clicks  Gastrointestinal: obese abdomen, Bowel Sounds present, soft, nontender.   EXTREMITIES: pitting edema left left and right leg  Lymph: Neurological: Alert, no focal deficits  Skin: No rashes or ulcers   Psych:  Mood & affect appropriate.    LABS: All Labs Reviewed:    < from: Cardiac Cath Lab - Adult (08.31.15 @ 09:03) >  VENTRICLES: EF estimated was 50 %.  CORONARY VESSELS: The coronary circulation is right dominant.  LM:   --  LM: Normal.  LAD:   --  Proximal LAD: There was a 100 % stenosis.  CX:   --  Proximal circumflex: There was a tubular 80 % stenosis. There was  TYESHA grade 3 flow through the vessel (brisk flow). This is a likely  culprit for the patient's clinical presentation. An intervention was  performed.  --  OM1: There was a diffuse 99 % stenosis. There was TYESHA grade 3 flow  through the vessel (brisk flow). This is a likely culprit for the  patient's clinical presentation. An intervention was performed.  RCA:   --  Proximal RCA: There was a diffuse 99 % stenosis. There was TYESHA  grade 2 flow through the vessel (partial perfusion).  GRAFTS:   --  Graft to the mid LAD: The graft was a LIMA. Graft angiography  showed minor luminal irregularities. Distal vessel angiography showed mild  diffuse disease.  --  Graft to the 1st obtuse marginal: The graft was a saphenous vein graft  from the aorta. There was a diffuse 100 % stenosis at the proximal  anastomosis.  --  Graft to the RPDA: The graft was a saphenous vein graft from the aorta.  Graft angiography showed mild atherosclerosis. Distal vessel angiography  showed mild diffuse disease.  COMPLICATIONS: There were no complications.  DIAGNOSTIC RECOMMENDATIONS:  1. Successful PCI to the pLCx (3.0mm Resolute Integrity ILANA) and OM1 (2.5mm  and 2.25mm Resolute Integrity ILANA) in thr setting of unstable angina.  2. Aspirin and clopidogrel.  INTERVENTIONAL RECOMMENDATIONS:  1. Successful PCI to the pLCx (3.0mm Resolute Integrity ILANA) and OM1 (2.5mm  and 2.25mm Resolute Integrity ILANA) in thr setting of unstable angina.  2. Aspirin and clopidogrel.  Prepared and signed by  Ai Lake M.D.  Signed 2015 11:42:25  HE    < end of copied text >      EK19, V paced, AF   Stress Test: 14, Left ventricular ejection fraction 42%, apical, inferior, mid septal infarction with minimal suzette-infarct ischemia.   Echo: , Aortic sclerosis, MAC, LAE, severe LV dysfunction, moderate pulmonary hypertension, enlarged LA size, mild mitral regurgitation LVEF 30%.   Cardiac Cath: 8/15, Left ventricular ejection fraction of 50%, normal left main, total occlusion of the proximal LAD, 80% stenosis the proximal circumflex artery, 99% stenosis of the first acute marginal artery, 99% diffuse stenosis of the right coronary artery, minor luminal irregularities of the left internal mammary artery to the LAD, total occlusion of the saphenous vein graft to the first obtuse marginal artery, mild atherosclerosis of the saphenous vein graft to the right posterior descending artery.   Stent: 8/15, Percutaneous intervention with drug eluding stent of the first obtuse marginal artery and percutaneous intervention with drug eluding stent to the proximal circumflex artery   CAB   ICD/Defibrillator: , Guidant BiV-ICD

## 2020-01-03 NOTE — CONSULT NOTE ADULT - ASSESSMENT
69 obese F with PMH of CAD s/p CABG and s/p LHC in 2015 w/PCI of the LCx and the first obtuse marginal artery with ILANA,  severe ischemic cardiomyopathy, s/p cardiomems placed but not able to get ongoing readings due to inability to lie flat from prior stroke and brain surgery, paroxysmal VT s/p biventricular/ICD (Guidant), A fib (on warfarin), DM2, HLD, hyperthyroidism, proliferative diabetic retinopathy, stroke and hemorrhage (2010) s/p craniotomy for intracranial decompression and removal of thrombus, presents to hospital today in acute on chronic systolic  decompensated heart failure  PLAN:  ACUTE on chronic DECOMPENSATED SYSTOLIC HF, VOLUME STATUS:  -Patient grossly volume overloaded on clinical exam  -Would administer metolazone 2.5mg po x 1 followed by  lasix 80mg IVP x1   -obtain serum pro BNP, CXR, CMP with mag and phos, CBC and coags,digoxin level  -monitor strict intake and output  -restrict sodium  -evaluate response to lasix  -patient is not requiring oxygen and blood pressure is stable  -monitor electrolytes and keep K>4 and mag greater than 2  -will continue to follow 69 obese F with PMH of CAD s/p CABG and s/p LHC in 2015 w/PCI of the LCx and the first obtuse marginal artery with ILANA,  severe ischemic cardiomyopathy, s/p cardiomems placed but not able to get ongoing readings due to inability to lie flat from prior stroke and brain surgery, paroxysmal VT s/p biventricular/ICD (Guidant), A fib (on warfarin), DM2, HLD, hyperthyroidism, proliferative diabetic retinopathy, stroke and hemorrhage (2010) s/p craniotomy for intracranial decompression and removal of thrombus, presents to hospital today in acute on chronic systolic  decompensated heart failure  PLAN:  ACUTE on chronic DECOMPENSATED SYSTOLIC HF, VOLUME STATUS:  -Patient grossly volume overloaded on clinical exam and is requiring IV diuresis  -patient reports she took home dose of torsemide (60mg) with metolazone 2.5 today  -Would administer metolazone 2.5mg po x 1 followed by  lasix 80mg IVP x1   -obtain serum pro BNP, CXR, CMP with mag and phos, CBC and coags,digoxin level  -monitor strict intake and output  -restrict sodium  -evaluate response to lasix  -patient is not requiring oxygen and blood pressure is stable  -monitor electrolytes and keep K>4 and mag greater than 2  -will continue to follow  -cardiomems unable to be interrogated, patient unable to lie flat  -BP is stable on current meds  -obtain CMP and dig level prior to initiating patient home meds   -home meds include  coreg, losartan, raissa, dig as stated above    AFIB ON COUMADIN  -elevated CHADSVASC2 to 6  -would obtain an INR stat  -patient verbally reported cardiologist told her the level was high and she should hold warfarin for the weekend  -HOLD warfarin for now until INR is reported  CAD:  -patient with no reports of chest pain, EKG no sign of active ischemia  -continue aspirin 81 69 obese F with PMH of CAD s/p CABG and s/p LHC in 2015 w/PCI of the LCx and the first obtuse marginal artery with ILANA,  severe ischemic cardiomyopathy, s/p cardiomems placed but not able to get ongoing readings due to inability to lie flat from prior stroke and brain surgery, paroxysmal VT s/p biventricular/ICD (Guidant), A fib (on warfarin), DM2, HLD, hyperthyroidism, proliferative diabetic retinopathy, stroke and hemorrhage (2010) s/p craniotomy for intracranial decompression and removal of thrombus, presents to hospital today in acute on chronic systolic  decompensated heart failure     ACUTE on chronic DECOMPENSATED SYSTOLIC HF, VOLUME STATUS:  -Patient grossly volume overloaded on clinical exam and is requiring IV diuresis  -patient reports she took home dose of torsemide (60mg) with metolazone 2.5 today  -Would administer metolazone 2.5mg po x 1 followed by  lasix 80mg IVP x1   -obtain serum pro BNP, CXR, CMP with mag and phos, CBC and coags,digoxin level  -monitor strict intake and output  -restrict sodium  -evaluate response to lasix  -patient is not requiring oxygen and blood pressure is stable  -monitor electrolytes and keep K>4 and mag greater than 2  -will continue to follow  -cardiomems unable to be interrogated, patient unable to lie flat  -BP is stable on current meds  -obtain CMP and dig level prior to initiating patient home meds   -home meds include  coreg, losartan, raissa, dig as stated above    AFIB ON COUMADIN  -elevated CHADSVASC2 to 6  -would obtain an INR stat  -patient verbally reported cardiologist told her the level was high and she should hold warfarin for the weekend  -HOLD warfarin for now until INR is reported  CAD:  -patient with no reports of chest pain, EKG no sign of active ischemia  -continue aspirin 81

## 2020-01-03 NOTE — ED PROVIDER NOTE - PATIENT PORTAL LINK FT
You can access the FollowMyHealth Patient Portal offered by [facility] by registering at the following website: [email or web address]. By joining [facility]’s FollowMyHealth portal, you will also be able to view your health information using other applications (apps) compatible with our system.

## 2020-01-06 ENCOUNTER — APPOINTMENT (OUTPATIENT)
Dept: CARDIOLOGY | Facility: CLINIC | Age: 70
End: 2020-01-06
Payer: MEDICARE

## 2020-01-06 VITALS — WEIGHT: 196 LBS | BODY MASS INDEX: 33.46 KG/M2 | HEIGHT: 64 IN | HEART RATE: 72 BPM

## 2020-01-06 LAB
INR PPP: 2 RATIO
QUALITY CONTROL: YES

## 2020-01-06 PROCEDURE — 85610 PROTHROMBIN TIME: CPT | Mod: QW

## 2020-01-06 PROCEDURE — 93793 ANTICOAG MGMT PT WARFARIN: CPT

## 2020-01-14 ENCOUNTER — APPOINTMENT (OUTPATIENT)
Dept: CARDIOLOGY | Facility: CLINIC | Age: 70
End: 2020-01-14
Payer: MEDICARE

## 2020-01-14 VITALS — HEIGHT: 64 IN | BODY MASS INDEX: 33.46 KG/M2 | HEART RATE: 70 BPM | WEIGHT: 196 LBS

## 2020-01-14 LAB
INR PPP: 3.9 RATIO
QUALITY CONTROL: YES

## 2020-01-14 PROCEDURE — 93793 ANTICOAG MGMT PT WARFARIN: CPT

## 2020-01-14 PROCEDURE — 85610 PROTHROMBIN TIME: CPT | Mod: QW

## 2020-01-28 ENCOUNTER — APPOINTMENT (OUTPATIENT)
Dept: CARDIOLOGY | Facility: CLINIC | Age: 70
End: 2020-01-28
Payer: MEDICARE

## 2020-01-28 VITALS — HEIGHT: 64 IN | BODY MASS INDEX: 33.46 KG/M2 | WEIGHT: 196 LBS | HEART RATE: 70 BPM

## 2020-01-28 PROCEDURE — 93793 ANTICOAG MGMT PT WARFARIN: CPT

## 2020-01-28 PROCEDURE — 85610 PROTHROMBIN TIME: CPT | Mod: QW

## 2020-01-29 LAB
INR PPP: 3 RATIO
QUALITY CONTROL: YES

## 2020-02-12 ENCOUNTER — APPOINTMENT (OUTPATIENT)
Dept: CARDIOLOGY | Facility: CLINIC | Age: 70
End: 2020-02-12
Payer: MEDICARE

## 2020-02-12 VITALS — HEIGHT: 64 IN | WEIGHT: 196 LBS | HEART RATE: 70 BPM | BODY MASS INDEX: 33.46 KG/M2

## 2020-02-12 LAB
INR PPP: 3.2 RATIO
QUALITY CONTROL: YES

## 2020-02-12 PROCEDURE — 93793 ANTICOAG MGMT PT WARFARIN: CPT

## 2020-02-12 PROCEDURE — 85610 PROTHROMBIN TIME: CPT | Mod: QW

## 2020-02-13 ENCOUNTER — RX RENEWAL (OUTPATIENT)
Age: 70
End: 2020-02-13

## 2020-02-26 ENCOUNTER — APPOINTMENT (OUTPATIENT)
Dept: CARDIOLOGY | Facility: CLINIC | Age: 70
End: 2020-02-26
Payer: MEDICARE

## 2020-02-26 LAB
INR PPP: 2.9 RATIO
QUALITY CONTROL: YES

## 2020-02-26 PROCEDURE — 85610 PROTHROMBIN TIME: CPT | Mod: QW

## 2020-02-26 PROCEDURE — 93793 ANTICOAG MGMT PT WARFARIN: CPT

## 2020-03-11 ENCOUNTER — APPOINTMENT (OUTPATIENT)
Dept: CARDIOLOGY | Facility: CLINIC | Age: 70
End: 2020-03-11
Payer: MEDICARE

## 2020-03-11 LAB
INR PPP: 2.2 RATIO
QUALITY CONTROL: YES

## 2020-03-11 PROCEDURE — 93793 ANTICOAG MGMT PT WARFARIN: CPT

## 2020-03-11 PROCEDURE — 85610 PROTHROMBIN TIME: CPT | Mod: QW

## 2020-03-25 ENCOUNTER — APPOINTMENT (OUTPATIENT)
Dept: CARDIOLOGY | Facility: CLINIC | Age: 70
End: 2020-03-25
Payer: MEDICARE

## 2020-03-25 LAB
INR PPP: 3 RATIO
QUALITY CONTROL: YES

## 2020-03-25 PROCEDURE — 93793 ANTICOAG MGMT PT WARFARIN: CPT

## 2020-03-25 PROCEDURE — 85610 PROTHROMBIN TIME: CPT | Mod: QW

## 2020-04-08 ENCOUNTER — APPOINTMENT (OUTPATIENT)
Dept: CARDIOLOGY | Facility: CLINIC | Age: 70
End: 2020-04-08
Payer: MEDICARE

## 2020-04-08 VITALS — HEIGHT: 64 IN | BODY MASS INDEX: 33.46 KG/M2 | HEART RATE: 70 BPM | WEIGHT: 196 LBS

## 2020-04-08 LAB
INR PPP: 3.5 RATIO
QUALITY CONTROL: YES

## 2020-04-08 PROCEDURE — 85610 PROTHROMBIN TIME: CPT | Mod: QW

## 2020-04-08 PROCEDURE — 93793 ANTICOAG MGMT PT WARFARIN: CPT

## 2020-04-15 ENCOUNTER — APPOINTMENT (OUTPATIENT)
Dept: CARDIOLOGY | Facility: CLINIC | Age: 70
End: 2020-04-15
Payer: MEDICARE

## 2020-04-15 PROCEDURE — 93793 ANTICOAG MGMT PT WARFARIN: CPT

## 2020-04-15 PROCEDURE — 85610 PROTHROMBIN TIME: CPT | Mod: QW

## 2020-04-16 LAB
INR PPP: 3.2 RATIO
QUALITY CONTROL: NO

## 2020-04-20 ENCOUNTER — APPOINTMENT (OUTPATIENT)
Dept: CARDIOLOGY | Facility: CLINIC | Age: 70
End: 2020-04-20

## 2020-04-22 ENCOUNTER — APPOINTMENT (OUTPATIENT)
Dept: CARDIOLOGY | Facility: CLINIC | Age: 70
End: 2020-04-22
Payer: MEDICARE

## 2020-04-22 PROCEDURE — 85610 PROTHROMBIN TIME: CPT | Mod: QW

## 2020-04-22 PROCEDURE — 93793 ANTICOAG MGMT PT WARFARIN: CPT

## 2020-04-27 LAB
INR PPP: 3.1 RATIO
QUALITY CONTROL: YES

## 2020-04-29 ENCOUNTER — APPOINTMENT (OUTPATIENT)
Dept: CARDIOLOGY | Facility: CLINIC | Age: 70
End: 2020-04-29

## 2020-04-29 ENCOUNTER — APPOINTMENT (OUTPATIENT)
Dept: CARDIOLOGY | Facility: CLINIC | Age: 70
End: 2020-04-29
Payer: MEDICARE

## 2020-04-29 PROCEDURE — 93283 PRGRMG EVAL IMPLANTABLE DFB: CPT

## 2020-05-07 ENCOUNTER — APPOINTMENT (OUTPATIENT)
Dept: CARDIOLOGY | Facility: CLINIC | Age: 70
End: 2020-05-07
Payer: MEDICARE

## 2020-05-07 VITALS — DIASTOLIC BLOOD PRESSURE: 70 MMHG | SYSTOLIC BLOOD PRESSURE: 102 MMHG

## 2020-05-07 LAB
INR PPP: 4.8 RATIO
QUALITY CONTROL: YES

## 2020-05-07 PROCEDURE — 93793 ANTICOAG MGMT PT WARFARIN: CPT

## 2020-05-07 PROCEDURE — 99215 OFFICE O/P EST HI 40 MIN: CPT

## 2020-05-07 PROCEDURE — 85610 PROTHROMBIN TIME: CPT | Mod: QW

## 2020-05-07 RX ORDER — LOSARTAN POTASSIUM 50 MG/1
50 TABLET, FILM COATED ORAL DAILY
Qty: 90 | Refills: 3 | Status: DISCONTINUED | COMMUNITY
Start: 2018-01-03 | End: 2020-05-07

## 2020-05-07 RX ORDER — INSULIN LISPRO 100 [IU]/ML
100 INJECTION, SOLUTION INTRAVENOUS; SUBCUTANEOUS
Qty: 45 | Refills: 0 | Status: ACTIVE | COMMUNITY
Start: 2020-04-23

## 2020-05-11 ENCOUNTER — APPOINTMENT (OUTPATIENT)
Dept: CARDIOLOGY | Facility: CLINIC | Age: 70
End: 2020-05-11
Payer: MEDICARE

## 2020-05-11 VITALS — SYSTOLIC BLOOD PRESSURE: 98 MMHG | HEART RATE: 72 BPM | DIASTOLIC BLOOD PRESSURE: 60 MMHG

## 2020-05-11 LAB
INR PPP: 2.2 RATIO
QUALITY CONTROL: YES

## 2020-05-11 PROCEDURE — 85610 PROTHROMBIN TIME: CPT | Mod: QW

## 2020-05-11 PROCEDURE — 93793 ANTICOAG MGMT PT WARFARIN: CPT

## 2020-05-18 ENCOUNTER — APPOINTMENT (OUTPATIENT)
Dept: CARDIOLOGY | Facility: CLINIC | Age: 70
End: 2020-05-18
Payer: MEDICARE

## 2020-05-18 PROCEDURE — 93793 ANTICOAG MGMT PT WARFARIN: CPT

## 2020-05-18 PROCEDURE — 85610 PROTHROMBIN TIME: CPT | Mod: QW

## 2020-05-20 LAB
ALBUMIN SERPL ELPH-MCNC: 3.7 G/DL
ALP BLD-CCNC: 87 U/L
ALT SERPL-CCNC: 19 U/L
ANION GAP SERPL CALC-SCNC: 12 MMOL/L
AST SERPL-CCNC: 23 U/L
BILIRUB SERPL-MCNC: 1.1 MG/DL
BUN SERPL-MCNC: 42 MG/DL
CALCIUM SERPL-MCNC: 9.5 MG/DL
CHLORIDE SERPL-SCNC: 94 MMOL/L
CO2 SERPL-SCNC: 31 MMOL/L
CREAT SERPL-MCNC: 1.59 MG/DL
GLUCOSE SERPL-MCNC: 166 MG/DL
INR PPP: 2 RATIO
POTASSIUM SERPL-SCNC: 5.1 MMOL/L
PROT SERPL-MCNC: 7.4 G/DL
QUALITY CONTROL: YES
SODIUM SERPL-SCNC: 137 MMOL/L

## 2020-05-21 ENCOUNTER — APPOINTMENT (OUTPATIENT)
Dept: CARDIOLOGY | Facility: CLINIC | Age: 70
End: 2020-05-21
Payer: MEDICARE

## 2020-05-21 PROCEDURE — 93306 TTE W/DOPPLER COMPLETE: CPT

## 2020-05-27 ENCOUNTER — APPOINTMENT (OUTPATIENT)
Dept: CARDIOLOGY | Facility: CLINIC | Age: 70
End: 2020-05-27
Payer: MEDICARE

## 2020-05-27 VITALS — DIASTOLIC BLOOD PRESSURE: 58 MMHG | HEART RATE: 70 BPM | SYSTOLIC BLOOD PRESSURE: 98 MMHG | OXYGEN SATURATION: 97 %

## 2020-05-27 LAB
INR PPP: 2 RATIO
QUALITY CONTROL: YES

## 2020-05-27 PROCEDURE — 85610 PROTHROMBIN TIME: CPT | Mod: QW

## 2020-05-27 PROCEDURE — 93793 ANTICOAG MGMT PT WARFARIN: CPT

## 2020-06-02 ENCOUNTER — NON-APPOINTMENT (OUTPATIENT)
Age: 70
End: 2020-06-02

## 2020-06-02 ENCOUNTER — APPOINTMENT (OUTPATIENT)
Dept: CARDIOLOGY | Facility: CLINIC | Age: 70
End: 2020-06-02
Payer: MEDICARE

## 2020-06-02 VITALS
WEIGHT: 173 LBS | HEART RATE: 68 BPM | HEIGHT: 64 IN | OXYGEN SATURATION: 97 % | BODY MASS INDEX: 29.53 KG/M2 | SYSTOLIC BLOOD PRESSURE: 112 MMHG | DIASTOLIC BLOOD PRESSURE: 72 MMHG

## 2020-06-02 LAB
INR PPP: 1.8 RATIO
QUALITY CONTROL: YES

## 2020-06-02 PROCEDURE — 85610 PROTHROMBIN TIME: CPT | Mod: QW

## 2020-06-02 PROCEDURE — 93000 ELECTROCARDIOGRAM COMPLETE: CPT

## 2020-06-02 PROCEDURE — 99214 OFFICE O/P EST MOD 30 MIN: CPT

## 2020-06-09 ENCOUNTER — APPOINTMENT (OUTPATIENT)
Dept: CARDIOLOGY | Facility: CLINIC | Age: 70
End: 2020-06-09

## 2020-06-10 ENCOUNTER — APPOINTMENT (OUTPATIENT)
Dept: CARDIOLOGY | Facility: CLINIC | Age: 70
End: 2020-06-10
Payer: MEDICARE

## 2020-06-10 ENCOUNTER — APPOINTMENT (OUTPATIENT)
Dept: CARDIOLOGY | Facility: CLINIC | Age: 70
End: 2020-06-10

## 2020-06-10 VITALS — OXYGEN SATURATION: 96 % | SYSTOLIC BLOOD PRESSURE: 102 MMHG | DIASTOLIC BLOOD PRESSURE: 60 MMHG | HEART RATE: 64 BPM

## 2020-06-10 PROCEDURE — 93793 ANTICOAG MGMT PT WARFARIN: CPT

## 2020-06-10 PROCEDURE — 85610 PROTHROMBIN TIME: CPT | Mod: QW

## 2020-06-11 VITALS
HEIGHT: 64 IN | SYSTOLIC BLOOD PRESSURE: 102 MMHG | HEART RATE: 64 BPM | DIASTOLIC BLOOD PRESSURE: 60 MMHG | OXYGEN SATURATION: 96 %

## 2020-06-12 LAB
ANION GAP SERPL CALC-SCNC: 13 MMOL/L
BUN SERPL-MCNC: 30 MG/DL
CALCIUM SERPL-MCNC: 9.4 MG/DL
CHLORIDE SERPL-SCNC: 99 MMOL/L
CO2 SERPL-SCNC: 27 MMOL/L
CREAT SERPL-MCNC: 1.4 MG/DL
GLUCOSE SERPL-MCNC: 91 MG/DL
INR PPP: 1.8 RATIO
POTASSIUM SERPL-SCNC: 4.2 MMOL/L
QUALITY CONTROL: YES
SODIUM SERPL-SCNC: 139 MMOL/L

## 2020-06-17 ENCOUNTER — APPOINTMENT (OUTPATIENT)
Dept: CARDIOLOGY | Facility: CLINIC | Age: 70
End: 2020-06-17
Payer: MEDICARE

## 2020-06-17 VITALS
RESPIRATION RATE: 14 BRPM | OXYGEN SATURATION: 99 % | HEART RATE: 70 BPM | SYSTOLIC BLOOD PRESSURE: 102 MMHG | DIASTOLIC BLOOD PRESSURE: 62 MMHG

## 2020-06-17 PROCEDURE — 85610 PROTHROMBIN TIME: CPT | Mod: QW

## 2020-06-17 PROCEDURE — 93793 ANTICOAG MGMT PT WARFARIN: CPT

## 2020-06-19 LAB
INR PPP: 2.1 RATIO
QUALITY CONTROL: YES

## 2020-06-24 ENCOUNTER — APPOINTMENT (OUTPATIENT)
Dept: CARDIOLOGY | Facility: CLINIC | Age: 70
End: 2020-06-24
Payer: MEDICARE

## 2020-06-24 ENCOUNTER — APPOINTMENT (OUTPATIENT)
Dept: CARDIOLOGY | Facility: CLINIC | Age: 70
End: 2020-06-24

## 2020-06-24 VITALS
HEIGHT: 64 IN | SYSTOLIC BLOOD PRESSURE: 102 MMHG | BODY MASS INDEX: 29.53 KG/M2 | RESPIRATION RATE: 14 BRPM | HEART RATE: 70 BPM | WEIGHT: 173 LBS | DIASTOLIC BLOOD PRESSURE: 60 MMHG | OXYGEN SATURATION: 97 %

## 2020-06-24 DIAGNOSIS — Z79.01 LONG TERM (CURRENT) USE OF ANTICOAGULANTS: ICD-10-CM

## 2020-06-24 DIAGNOSIS — Z51.81 ENCOUNTER FOR THERAPEUTIC DRUG LVL MONITORING: ICD-10-CM

## 2020-06-24 DIAGNOSIS — Z79.01 ENCOUNTER FOR THERAPEUTIC DRUG LVL MONITORING: ICD-10-CM

## 2020-06-24 LAB
INR PPP: 1.7 RATIO
QUALITY CONTROL: YES

## 2020-06-24 PROCEDURE — 85610 PROTHROMBIN TIME: CPT | Mod: QW

## 2020-06-24 PROCEDURE — 93793 ANTICOAG MGMT PT WARFARIN: CPT

## 2020-06-29 ENCOUNTER — APPOINTMENT (OUTPATIENT)
Dept: CARDIOLOGY | Facility: CLINIC | Age: 70
End: 2020-06-29
Payer: MEDICARE

## 2020-06-29 ENCOUNTER — NON-APPOINTMENT (OUTPATIENT)
Age: 70
End: 2020-06-29

## 2020-06-29 VITALS
HEIGHT: 64 IN | WEIGHT: 171 LBS | BODY MASS INDEX: 29.19 KG/M2 | SYSTOLIC BLOOD PRESSURE: 98 MMHG | OXYGEN SATURATION: 97 % | DIASTOLIC BLOOD PRESSURE: 63 MMHG | HEART RATE: 71 BPM

## 2020-06-29 PROCEDURE — 93000 ELECTROCARDIOGRAM COMPLETE: CPT

## 2020-06-29 PROCEDURE — 99214 OFFICE O/P EST MOD 30 MIN: CPT

## 2020-07-01 LAB
ANION GAP SERPL CALC-SCNC: 11 MMOL/L
BUN SERPL-MCNC: 31 MG/DL
CALCIUM SERPL-MCNC: 9.4 MG/DL
CHLORIDE SERPL-SCNC: 101 MMOL/L
CO2 SERPL-SCNC: 26 MMOL/L
CREAT SERPL-MCNC: 1.44 MG/DL
GLUCOSE SERPL-MCNC: 111 MG/DL
NT-PROBNP SERPL-MCNC: 879 PG/ML
POTASSIUM SERPL-SCNC: 4.6 MMOL/L
SODIUM SERPL-SCNC: 139 MMOL/L

## 2020-07-07 ENCOUNTER — APPOINTMENT (OUTPATIENT)
Dept: CARDIOLOGY | Facility: CLINIC | Age: 70
End: 2020-07-07
Payer: MEDICARE

## 2020-07-07 VITALS — DIASTOLIC BLOOD PRESSURE: 58 MMHG | HEIGHT: 64 IN | HEART RATE: 70 BPM | SYSTOLIC BLOOD PRESSURE: 102 MMHG

## 2020-07-07 VITALS — HEIGHT: 64 IN | DIASTOLIC BLOOD PRESSURE: 58 MMHG | SYSTOLIC BLOOD PRESSURE: 102 MMHG | HEART RATE: 70 BPM

## 2020-07-07 LAB
INR PPP: 2.4 RATIO
QUALITY CONTROL: YES

## 2020-07-07 PROCEDURE — 85610 PROTHROMBIN TIME: CPT | Mod: QW

## 2020-07-07 PROCEDURE — 93793 ANTICOAG MGMT PT WARFARIN: CPT

## 2020-07-21 ENCOUNTER — APPOINTMENT (OUTPATIENT)
Dept: CARDIOLOGY | Facility: CLINIC | Age: 70
End: 2020-07-21
Payer: MEDICARE

## 2020-07-21 VITALS
DIASTOLIC BLOOD PRESSURE: 56 MMHG | HEART RATE: 70 BPM | OXYGEN SATURATION: 97 % | BODY MASS INDEX: 29.19 KG/M2 | WEIGHT: 171 LBS | SYSTOLIC BLOOD PRESSURE: 92 MMHG | HEIGHT: 64 IN

## 2020-07-21 LAB
INR PPP: 2.6 RATIO
QUALITY CONTROL: YES

## 2020-07-21 PROCEDURE — 85610 PROTHROMBIN TIME: CPT | Mod: QW

## 2020-07-21 PROCEDURE — 93793 ANTICOAG MGMT PT WARFARIN: CPT

## 2020-08-06 ENCOUNTER — APPOINTMENT (OUTPATIENT)
Dept: CARDIOLOGY | Facility: CLINIC | Age: 70
End: 2020-08-06
Payer: MEDICARE

## 2020-08-06 VITALS — HEART RATE: 65 BPM | HEIGHT: 64 IN | SYSTOLIC BLOOD PRESSURE: 88 MMHG | DIASTOLIC BLOOD PRESSURE: 60 MMHG

## 2020-08-06 PROCEDURE — 85610 PROTHROMBIN TIME: CPT | Mod: QW

## 2020-08-06 PROCEDURE — 93793 ANTICOAG MGMT PT WARFARIN: CPT

## 2020-08-08 LAB
INR PPP: 2 RATIO
QUALITY CONTROL: YES

## 2020-08-12 ENCOUNTER — APPOINTMENT (OUTPATIENT)
Dept: CARDIOLOGY | Facility: CLINIC | Age: 70
End: 2020-08-12
Payer: MEDICARE

## 2020-08-12 PROCEDURE — 93296 REM INTERROG EVL PM/IDS: CPT

## 2020-08-12 PROCEDURE — 93295 DEV INTERROG REMOTE 1/2/MLT: CPT

## 2020-08-13 ENCOUNTER — APPOINTMENT (OUTPATIENT)
Dept: CARDIOLOGY | Facility: CLINIC | Age: 70
End: 2020-08-13
Payer: MEDICARE

## 2020-08-13 VITALS
HEIGHT: 64 IN | SYSTOLIC BLOOD PRESSURE: 94 MMHG | RESPIRATION RATE: 14 BRPM | DIASTOLIC BLOOD PRESSURE: 52 MMHG | HEART RATE: 68 BPM

## 2020-08-13 PROCEDURE — 85610 PROTHROMBIN TIME: CPT | Mod: QW

## 2020-08-13 PROCEDURE — 93793 ANTICOAG MGMT PT WARFARIN: CPT

## 2020-08-19 LAB
INR PPP: 2.2 RATIO
QUALITY CONTROL: YES

## 2020-08-20 ENCOUNTER — APPOINTMENT (OUTPATIENT)
Dept: CARDIOLOGY | Facility: CLINIC | Age: 70
End: 2020-08-20
Payer: MEDICARE

## 2020-08-20 VITALS — HEIGHT: 64 IN | SYSTOLIC BLOOD PRESSURE: 92 MMHG | DIASTOLIC BLOOD PRESSURE: 56 MMHG | RESPIRATION RATE: 14 BRPM

## 2020-08-20 LAB
INR PPP: 2.3 RATIO
QUALITY CONTROL: YES

## 2020-08-20 PROCEDURE — 85610 PROTHROMBIN TIME: CPT | Mod: QW

## 2020-08-20 PROCEDURE — 93793 ANTICOAG MGMT PT WARFARIN: CPT

## 2020-08-31 ENCOUNTER — RX RENEWAL (OUTPATIENT)
Age: 70
End: 2020-08-31

## 2020-09-08 ENCOUNTER — APPOINTMENT (OUTPATIENT)
Dept: CARDIOLOGY | Facility: CLINIC | Age: 70
End: 2020-09-08
Payer: MEDICARE

## 2020-09-08 VITALS
HEART RATE: 70 BPM | SYSTOLIC BLOOD PRESSURE: 100 MMHG | OXYGEN SATURATION: 100 % | BODY MASS INDEX: 29.19 KG/M2 | WEIGHT: 171 LBS | HEIGHT: 64 IN | DIASTOLIC BLOOD PRESSURE: 60 MMHG

## 2020-09-08 LAB
INR PPP: 2.4 RATIO
QUALITY CONTROL: YES

## 2020-09-08 PROCEDURE — 93793 ANTICOAG MGMT PT WARFARIN: CPT

## 2020-09-08 PROCEDURE — 85610 PROTHROMBIN TIME: CPT | Mod: QW

## 2020-09-14 ENCOUNTER — APPOINTMENT (OUTPATIENT)
Dept: CARDIOLOGY | Facility: CLINIC | Age: 70
End: 2020-09-14
Payer: MEDICARE

## 2020-09-14 ENCOUNTER — NON-APPOINTMENT (OUTPATIENT)
Age: 70
End: 2020-09-14

## 2020-09-14 VITALS
HEART RATE: 72 BPM | BODY MASS INDEX: 31.24 KG/M2 | OXYGEN SATURATION: 98 % | HEIGHT: 64 IN | SYSTOLIC BLOOD PRESSURE: 107 MMHG | DIASTOLIC BLOOD PRESSURE: 68 MMHG | WEIGHT: 183 LBS

## 2020-09-14 PROCEDURE — 99214 OFFICE O/P EST MOD 30 MIN: CPT

## 2020-09-14 PROCEDURE — 93000 ELECTROCARDIOGRAM COMPLETE: CPT

## 2020-09-22 ENCOUNTER — APPOINTMENT (OUTPATIENT)
Dept: CARDIOLOGY | Facility: CLINIC | Age: 70
End: 2020-09-22
Payer: MEDICARE

## 2020-09-22 VITALS
HEART RATE: 72 BPM | DIASTOLIC BLOOD PRESSURE: 60 MMHG | RESPIRATION RATE: 15 BRPM | OXYGEN SATURATION: 100 % | SYSTOLIC BLOOD PRESSURE: 98 MMHG | HEIGHT: 64 IN

## 2020-09-22 PROCEDURE — 85610 PROTHROMBIN TIME: CPT | Mod: QW

## 2020-09-22 PROCEDURE — 93793 ANTICOAG MGMT PT WARFARIN: CPT

## 2020-09-30 LAB
INR PPP: 2.4 RATIO
QUALITY CONTROL: YES

## 2020-10-06 ENCOUNTER — APPOINTMENT (OUTPATIENT)
Dept: CARDIOLOGY | Facility: CLINIC | Age: 70
End: 2020-10-06
Payer: MEDICARE

## 2020-10-06 VITALS
SYSTOLIC BLOOD PRESSURE: 94 MMHG | BODY MASS INDEX: 31.24 KG/M2 | WEIGHT: 183 LBS | DIASTOLIC BLOOD PRESSURE: 60 MMHG | HEART RATE: 70 BPM | HEIGHT: 64 IN

## 2020-10-06 VITALS — BODY MASS INDEX: 31.41 KG/M2 | HEIGHT: 64 IN | RESPIRATION RATE: 14 BRPM | HEART RATE: 70 BPM

## 2020-10-06 PROCEDURE — 93793 ANTICOAG MGMT PT WARFARIN: CPT

## 2020-10-06 PROCEDURE — 85610 PROTHROMBIN TIME: CPT | Mod: QW

## 2020-10-09 LAB
INR PPP: 2.1 RATIO
QUALITY CONTROL: YES

## 2020-10-20 ENCOUNTER — APPOINTMENT (OUTPATIENT)
Dept: CARDIOLOGY | Facility: CLINIC | Age: 70
End: 2020-10-20
Payer: MEDICARE

## 2020-10-20 VITALS
RESPIRATION RATE: 14 BRPM | SYSTOLIC BLOOD PRESSURE: 98 MMHG | HEART RATE: 70 BPM | HEIGHT: 64 IN | OXYGEN SATURATION: 99 % | DIASTOLIC BLOOD PRESSURE: 60 MMHG

## 2020-10-20 PROCEDURE — 93793 ANTICOAG MGMT PT WARFARIN: CPT

## 2020-10-20 PROCEDURE — 85610 PROTHROMBIN TIME: CPT | Mod: QW

## 2020-10-21 LAB
INR PPP: 1.9 RATIO
QUALITY CONTROL: YES

## 2020-11-03 ENCOUNTER — APPOINTMENT (OUTPATIENT)
Dept: CARDIOLOGY | Facility: CLINIC | Age: 70
End: 2020-11-03
Payer: MEDICARE

## 2020-11-03 VITALS
SYSTOLIC BLOOD PRESSURE: 98 MMHG | HEIGHT: 64 IN | HEART RATE: 70 BPM | DIASTOLIC BLOOD PRESSURE: 62 MMHG | RESPIRATION RATE: 14 BRPM

## 2020-11-03 PROCEDURE — 93793 ANTICOAG MGMT PT WARFARIN: CPT

## 2020-11-03 PROCEDURE — 85610 PROTHROMBIN TIME: CPT | Mod: QW

## 2020-11-04 NOTE — ED ADULT NURSE NOTE - OBJECTIVE STATEMENT
68 year old female presents to the E$D with c/o sob x 10 days, worsening today. A+O x 4. States she has CHF and DM II. REports sob has gradually worsened over the past 10 days. Reports increased difficulty walking. BLE + 3 edema. skin warm dry and intact.
[id number]

## 2020-11-05 LAB
INR PPP: 1.9 RATIO
QUALITY CONTROL: YES

## 2020-11-11 ENCOUNTER — APPOINTMENT (OUTPATIENT)
Dept: CARDIOLOGY | Facility: CLINIC | Age: 70
End: 2020-11-11
Payer: MEDICARE

## 2020-11-11 PROCEDURE — 93295 DEV INTERROG REMOTE 1/2/MLT: CPT

## 2020-11-11 PROCEDURE — 93296 REM INTERROG EVL PM/IDS: CPT

## 2020-11-17 ENCOUNTER — APPOINTMENT (OUTPATIENT)
Dept: CARDIOLOGY | Facility: CLINIC | Age: 70
End: 2020-11-17
Payer: MEDICARE

## 2020-11-17 VITALS — HEIGHT: 64 IN | HEART RATE: 65 BPM | WEIGHT: 183 LBS | BODY MASS INDEX: 31.24 KG/M2

## 2020-11-17 LAB
INR PPP: 2.4 RATIO
QUALITY CONTROL: YES

## 2020-11-17 PROCEDURE — 85610 PROTHROMBIN TIME: CPT | Mod: QW

## 2020-11-17 PROCEDURE — 93793 ANTICOAG MGMT PT WARFARIN: CPT

## 2020-11-30 ENCOUNTER — APPOINTMENT (OUTPATIENT)
Dept: ELECTROPHYSIOLOGY | Facility: CLINIC | Age: 70
End: 2020-11-30
Payer: MEDICARE

## 2020-11-30 ENCOUNTER — NON-APPOINTMENT (OUTPATIENT)
Age: 70
End: 2020-11-30

## 2020-11-30 VITALS
OXYGEN SATURATION: 99 % | WEIGHT: 183 LBS | HEART RATE: 74 BPM | HEIGHT: 64 IN | BODY MASS INDEX: 31.24 KG/M2 | DIASTOLIC BLOOD PRESSURE: 60 MMHG | SYSTOLIC BLOOD PRESSURE: 102 MMHG

## 2020-11-30 PROCEDURE — 93000 ELECTROCARDIOGRAM COMPLETE: CPT | Mod: 59

## 2020-11-30 PROCEDURE — 93284 PRGRMG EVAL IMPLANTABLE DFB: CPT

## 2020-11-30 PROCEDURE — 99214 OFFICE O/P EST MOD 30 MIN: CPT

## 2020-12-01 ENCOUNTER — APPOINTMENT (OUTPATIENT)
Dept: CARDIOLOGY | Facility: CLINIC | Age: 70
End: 2020-12-01
Payer: MEDICARE

## 2020-12-01 LAB
INR PPP: 2.1 RATIO
QUALITY CONTROL: YES

## 2020-12-01 PROCEDURE — 85610 PROTHROMBIN TIME: CPT | Mod: QW

## 2020-12-01 PROCEDURE — 93793 ANTICOAG MGMT PT WARFARIN: CPT

## 2020-12-02 NOTE — DISCUSSION/SUMMARY
[FreeTextEntry1] : In summary, the patient is a 70 year old woman with a past history of myocardial infarction, coronary artery disease status post coronary artery bypass surgery in 2007, ischemic cardiomyopathy, stroke with hemorrhage in 2010, and history of inducible sustained ventricular tachycardia leading to placement of a biventricular ICD.  During today's interrogation, her device was noted to have reached the explant indicator and she is due for a generator change.  She has a Medtronic 6949 Sprint [place] lead from March 29, 2007, which is on recall to due to an increased risk of conductor failure, as well as a capped Guidant 4047 lead, from 2002.  We discussed the risk for life threatening arrhythmias in the setting of her cardiomyopathy and the role of ICD therapy for sudden death prevention. Given her decreased LV function, CHF and LBBB, she is clearly a candidate for resynchronization. We discussed the potential treatment options at length, which included proceeding with a generator change versus extraction of the abandoned lead and advisory Dinesh ICD lead and re-implantation.  If we were to proceed with only a generator change, she would be at risk for failure of the [place] lead in the future, potentially leading to inappropriate ICD discharges.  The risks of extraction were discussed. This included, but were not limited to bleeding, infection, AV fistula, vasculature/cardiac tear, valvular damage, need for emergent surgery, and death. After all questions were answered, the patient, in a shared decision making manner, would like to proceed with extraction with the implantation of a new BIV ICD. \par

## 2020-12-02 NOTE — REVIEW OF SYSTEMS
[Eyeglasses] : currently wearing eyeglasses [Under Stress] : under stress [Fever] : no fever [Headache] : no headache [Chills] : no chills [Feeling Fatigued] : not feeling fatigued [Earache] : no earache [Shortness Of Breath] : no shortness of breath [Dyspnea on exertion] : not dyspnea during exertion [Chest  Pressure] : no chest pressure [Chest Pain] : no chest pain [Lower Ext Edema] : no extremity edema [Leg Claudication] : no intermittent leg claudication [Palpitations] : no palpitations [Cough] : no cough [Abdominal Pain] : no abdominal pain [Nausea] : no nausea [Vomiting] : no vomiting [Heartburn] : no heartburn [Change in Appetite] : no change in appetite [Dysphagia] : no dysphagia [Dysuria] : no dysuria [Incontinence] : no incontinence [Joint Pain] : no joint pain [Joint Swelling] : no joint swelling [Muscle Cramps] : no muscle cramps [Skin: A Rash] : no rash: [Dizziness] : no dizziness [Tremor] : no tremor was seen [Numbness (Hypesthesia)] : no numbness [Convulsions] : no convulsions [Tingling (Paresthesia)] : no tingling [Confusion] : no confusion was observed [Anxiety] : no anxiety [Excessive Thirst] : no polydipsia [Easy Bleeding] : no tendency for easy bleeding

## 2020-12-02 NOTE — PHYSICAL EXAM
[General Appearance - Well Developed] : well developed [Normal Appearance] : normal appearance [Well Groomed] : well groomed [General Appearance - Well Nourished] : well nourished [No Deformities] : no deformities [General Appearance - In No Acute Distress] : no acute distress [Normal Conjunctiva] : the conjunctiva exhibited no abnormalities [Normal Oral Mucosa] : normal oral mucosa [Normal Jugular Venous A Waves Present] : normal jugular venous A waves present [Normal Jugular Venous V Waves Present] : normal jugular venous V waves present [] : no respiratory distress [Respiration, Rhythm And Depth] : normal respiratory rhythm and effort [Exaggerated Use Of Accessory Muscles For Inspiration] : no accessory muscle use [Auscultation Breath Sounds / Voice Sounds] : lungs were clear to auscultation bilaterally [Chest Palpation] : palpation of the chest revealed no abnormalities [Lungs Percussion] : the lungs were normal to percussion [Abdomen Soft] : soft [Abdomen Tenderness] : non-tender [Abnormal Walk] : normal gait [Cyanosis, Localized] : no localized cyanosis [Skin Color & Pigmentation] : normal skin color and pigmentation [Oriented To Time, Place, And Person] : oriented to person, place, and time [FreeTextEntry1] : Atrial Fibrillation

## 2020-12-02 NOTE — HISTORY OF PRESENT ILLNESS
[FreeTextEntry1] : I had the pleasure of seeing your patient Lisa Barrios in the [facility] of [facility] today.  As you well know, she is a delightful 70 year old woman with a past history of myocardial infarction, coronary artery disease status post coronary artery bypass surgery in 2007, a history of inducible sustained ventricular tachycardia leading to placement of a biventricular ICD, ischemic cardiomyopathy, diabetes mellitus, hyperthyroidism, hyperlipidemia, proliferative diabetic retinopathy The patient had a stroke with hemorrhage in 2010, at which time she had a long, complicated hospital course requiring a craniotomy for intracranial decompression and removal of thrombus.  In August of 2010, she had several ICD discharges felt to be due to rapid atrial fibrillation.  She has had several episodes of atypical chest pain and underwent a cardiac catheterization in 2018, leading to percutaneous intervention of the left circumflex artery and the first obtuse marginal artery with drug eluting stents.  She was also seen in the emergency room at [facility] last year with decompensated heart failure.  She had a Cardiomems placed, but has not been able to get ongoing readings, given her inability to lie flat from her prior stroke and brain surgery.  An echocardiogram from May 21, 2020 revealed an ejection fraction of 30%, mild to moderate mitral regurgitation, minimal aortic regurgitation, a severely dilated left atrium with a LA volume index of 63 cc/m2, moderate left ventricular enlargement, moderate to severe global left ventricular systolic dysfunction, mild right atrial enlargement, and mild tricuspid regurgitation.  A recent interrogation on September 14, 2020 noted that she had approximately 3 months left prior to the need for a generator replacement of her biventricular device. \par \par The patients device history is complicated. She underwent initial PCM implant in 2002. When she was upgraded to an ICD the RV pacing lead was abandoned. Her ICD lead is an advisory [employer] [place] lead. \par \par Today, in clinic, she overall feels well and denies any chest pain, palpitations, dyspnea, lightheadedness/dizziness, presyncope, or syncope.  Her blood pressure was 102/60 and her pulse was 74.  Her presenting EKG demonstrated Atrial Fibrillation with ventricular pacing at a rate of 85 beats per minute.  Interrogation of her device revealed that it has reached the explant indicator as of November 6, 2020.  The P-wave was 1.0mV, and the atrial lead impedance was 453 ohms.  The atrial threshold was not checked due to the patient being in Atrial Fibrillation.  The RV lead intrinsic amplitude was 11.5mV @ 49 bpm, the RV lead impedance was 417 ohms, and the threshold was 1.6V @ 0.5ms.  The LV intrinsic amplitude was 10.5mV, the lead impedance was 327 ohms, and the threshold was 1.1V @ 0.5ms.  The stored data revealed the patient to be in persistent Atrial Fibrillation, with 2 episodes of NSVT, lasting 3 beats and 6 beats.  She is biventricularly paced 96%.  The device is a [employer] Incepta CRT-D implanted February 21, 2013, with a RA Guidant 4470 implanted December 2002, a RV Guidant 4459 implanted December 2002, a Medtronic 6949 [employer] [place] lead implanted March 2007, and a Guidant [id number] LV lead, implanted in March 2007.

## 2020-12-11 ENCOUNTER — NON-APPOINTMENT (OUTPATIENT)
Age: 70
End: 2020-12-11

## 2020-12-14 ENCOUNTER — APPOINTMENT (OUTPATIENT)
Dept: CARDIOLOGY | Facility: CLINIC | Age: 70
End: 2020-12-14
Payer: MEDICARE

## 2020-12-14 ENCOUNTER — NON-APPOINTMENT (OUTPATIENT)
Age: 70
End: 2020-12-14

## 2020-12-14 VITALS
HEART RATE: 70 BPM | HEIGHT: 64 IN | BODY MASS INDEX: 32.1 KG/M2 | WEIGHT: 188 LBS | DIASTOLIC BLOOD PRESSURE: 64 MMHG | SYSTOLIC BLOOD PRESSURE: 114 MMHG | OXYGEN SATURATION: 100 %

## 2020-12-14 PROCEDURE — 93000 ELECTROCARDIOGRAM COMPLETE: CPT

## 2020-12-14 PROCEDURE — 99214 OFFICE O/P EST MOD 30 MIN: CPT

## 2020-12-14 PROCEDURE — 85610 PROTHROMBIN TIME: CPT | Mod: QW

## 2020-12-18 LAB
INR PPP: 2.3 RATIO
QUALITY CONTROL: YES

## 2020-12-21 ENCOUNTER — RESULT REVIEW (OUTPATIENT)
Age: 70
End: 2020-12-21

## 2020-12-21 ENCOUNTER — OUTPATIENT (OUTPATIENT)
Dept: OUTPATIENT SERVICES | Facility: HOSPITAL | Age: 70
LOS: 1 days | End: 2020-12-21

## 2020-12-21 ENCOUNTER — TRANSCRIPTION ENCOUNTER (OUTPATIENT)
Age: 70
End: 2020-12-21

## 2020-12-21 ENCOUNTER — OUTPATIENT (OUTPATIENT)
Dept: OUTPATIENT SERVICES | Facility: HOSPITAL | Age: 70
LOS: 1 days | End: 2020-12-21
Payer: MEDICARE

## 2020-12-21 VITALS
OXYGEN SATURATION: 100 % | SYSTOLIC BLOOD PRESSURE: 99 MMHG | WEIGHT: 184.97 LBS | DIASTOLIC BLOOD PRESSURE: 65 MMHG | TEMPERATURE: 97 F | HEART RATE: 71 BPM | HEIGHT: 64 IN | RESPIRATION RATE: 16 BRPM

## 2020-12-21 DIAGNOSIS — E11.9 TYPE 2 DIABETES MELLITUS WITHOUT COMPLICATIONS: ICD-10-CM

## 2020-12-21 DIAGNOSIS — Z95.5 PRESENCE OF CORONARY ANGIOPLASTY IMPLANT AND GRAFT: Chronic | ICD-10-CM

## 2020-12-21 DIAGNOSIS — Z20.828 CONTACT WITH AND (SUSPECTED) EXPOSURE TO OTHER VIRAL COMMUNICABLE DISEASES: ICD-10-CM

## 2020-12-21 DIAGNOSIS — Z95.810 PRESENCE OF AUTOMATIC (IMPLANTABLE) CARDIAC DEFIBRILLATOR: Chronic | ICD-10-CM

## 2020-12-21 DIAGNOSIS — I25.5 ISCHEMIC CARDIOMYOPATHY: ICD-10-CM

## 2020-12-21 DIAGNOSIS — T82.110A BREAKDOWN (MECHANICAL) OF CARDIAC ELECTRODE, INITIAL ENCOUNTER: ICD-10-CM

## 2020-12-21 DIAGNOSIS — I25.10 ATHEROSCLEROTIC HEART DISEASE OF NATIVE CORONARY ARTERY WITHOUT ANGINA PECTORIS: ICD-10-CM

## 2020-12-21 DIAGNOSIS — I45.9 CONDUCTION DISORDER, UNSPECIFIED: ICD-10-CM

## 2020-12-21 DIAGNOSIS — Z01.818 ENCOUNTER FOR OTHER PREPROCEDURAL EXAMINATION: ICD-10-CM

## 2020-12-21 DIAGNOSIS — I48.91 UNSPECIFIED ATRIAL FIBRILLATION: ICD-10-CM

## 2020-12-21 DIAGNOSIS — T82.118A BREAKDOWN (MECHANICAL) OF OTHER CARDIAC ELECTRONIC DEVICE, INITIAL ENCOUNTER: ICD-10-CM

## 2020-12-21 DIAGNOSIS — Z29.9 ENCOUNTER FOR PROPHYLACTIC MEASURES, UNSPECIFIED: ICD-10-CM

## 2020-12-21 LAB
A1C WITH ESTIMATED AVERAGE GLUCOSE RESULT: 7.5 % — HIGH (ref 4–5.6)
ANION GAP SERPL CALC-SCNC: 11 MMOL/L — SIGNIFICANT CHANGE UP (ref 5–17)
BLD GP AB SCN SERPL QL: NEGATIVE — SIGNIFICANT CHANGE UP
BUN SERPL-MCNC: 46 MG/DL — HIGH (ref 7–23)
CALCIUM SERPL-MCNC: 9.8 MG/DL — SIGNIFICANT CHANGE UP (ref 8.4–10.5)
CHLORIDE SERPL-SCNC: 102 MMOL/L — SIGNIFICANT CHANGE UP (ref 96–108)
CO2 SERPL-SCNC: 24 MMOL/L — SIGNIFICANT CHANGE UP (ref 22–31)
CREAT SERPL-MCNC: 1.47 MG/DL — HIGH (ref 0.5–1.3)
ESTIMATED AVERAGE GLUCOSE: 169 MG/DL — HIGH (ref 68–114)
GLUCOSE SERPL-MCNC: 145 MG/DL — HIGH (ref 70–99)
HCT VFR BLD CALC: 38.2 % — SIGNIFICANT CHANGE UP (ref 34.5–45)
HGB BLD-MCNC: 12.3 G/DL — SIGNIFICANT CHANGE UP (ref 11.5–15.5)
MCHC RBC-ENTMCNC: 30.4 PG — SIGNIFICANT CHANGE UP (ref 27–34)
MCHC RBC-ENTMCNC: 32.2 GM/DL — SIGNIFICANT CHANGE UP (ref 32–36)
MCV RBC AUTO: 94.3 FL — SIGNIFICANT CHANGE UP (ref 80–100)
NRBC # BLD: 0 /100 WBCS — SIGNIFICANT CHANGE UP (ref 0–0)
PLATELET # BLD AUTO: 129 K/UL — LOW (ref 150–400)
POTASSIUM SERPL-MCNC: 5.4 MMOL/L — HIGH (ref 3.5–5.3)
POTASSIUM SERPL-SCNC: 5.4 MMOL/L — HIGH (ref 3.5–5.3)
RBC # BLD: 4.05 M/UL — SIGNIFICANT CHANGE UP (ref 3.8–5.2)
RBC # FLD: 12.4 % — SIGNIFICANT CHANGE UP (ref 10.3–14.5)
RH IG SCN BLD-IMP: POSITIVE — SIGNIFICANT CHANGE UP
SARS-COV-2 RNA SPEC QL NAA+PROBE: SIGNIFICANT CHANGE UP
SODIUM SERPL-SCNC: 137 MMOL/L — SIGNIFICANT CHANGE UP (ref 135–145)
WBC # BLD: 6.27 K/UL — SIGNIFICANT CHANGE UP (ref 3.8–10.5)
WBC # FLD AUTO: 6.27 K/UL — SIGNIFICANT CHANGE UP (ref 3.8–10.5)

## 2020-12-21 PROCEDURE — 71046 X-RAY EXAM CHEST 2 VIEWS: CPT | Mod: 26

## 2020-12-21 RX ORDER — INSULIN DETEMIR 100/ML (3)
0 INSULIN PEN (ML) SUBCUTANEOUS
Qty: 0 | Refills: 0 | DISCHARGE

## 2020-12-21 RX ORDER — CEFUROXIME AXETIL 250 MG
1500 TABLET ORAL ONCE
Refills: 0 | Status: DISCONTINUED | OUTPATIENT
Start: 2020-12-22 | End: 2020-12-22

## 2020-12-21 NOTE — H&P PST ADULT - ASSESSMENT
ALFONZOI VTE 2.0 SCORE [CLOT updated 2019]    AGE RELATED RISK FACTORS                                                       MOBILITY RELATED FACTORS  [ ] Age 41-60 years                                            (1 Point)                    [ ] Bed rest                                                        (1 Point)  [ x] Age: 61-74 years                                           (2 Points)                  [ ] Plaster cast                                                   (2 Points)  [ ] Age= 75 years                                              (3 Points)                    [ ] Bed bound for more than 72 hours                 (2 Points)    DISEASE RELATED RISK FACTORS                                               GENDER SPECIFIC FACTORS  [x ] Edema in the lower extremities                       (1 Point)              [ ] Pregnancy                                                     (1 Point)  [ ] Varicose veins                                               (1 Point)                     [ ] Post-partum < 6 weeks                                   (1 Point)             [ x] BMI > 25 Kg/m2                                            (1 Point)                     [ ] Hormonal therapy  or oral contraception          (1 Point)                 [ ] Sepsis (in the previous month)                        (1 Point)               [ ] History of pregnancy complications                 (1 point)  [ ] Pneumonia or serious lung disease                                               [ ] Unexplained or recurrent                     (1 Point)           (in the previous month)                               (1 Point)  [ ] Abnormal pulmonary function test                     (1 Point)                 SURGERY RELATED RISK FACTORS  [ ] Acute myocardial infarction                              (1 Point)               [ ]  Section                                             (1 Point)  [ ] Congestive heart failure (in the previous month)  (1 Point)      [ ] Minor surgery                                                  (1 Point)   [ ] Inflammatory bowel disease                             (1 Point)               [ ] Arthroscopic surgery                                        (2 Points)  [ ] Central venous access                                      (2 Points)                [ x] General surgery lasting more than 45 minutes (2 points)  [ ] Malignancy- Present or previous                   (2 Points)                [ ] Elective arthroplasty                                         (5 points)    [ ] Stroke (in the previous month)                          (5 Points)                                                                                                                                                           HEMATOLOGY RELATED FACTORS                                                 TRAUMA RELATED RISK FACTORS  [ ] Prior episodes of VTE                                     (3 Points)                [ ] Fracture of the hip, pelvis, or leg                       (5 Points)  [ ] Positive family history for VTE                         (3 Points)             [ ] Acute spinal cord injury (in the previous month)  (5 Points)  [ ] Prothrombin 77100 A                                     (3 Points)               [ ] Paralysis  (less than 1 month)                             (5 Points)  [ ] Factor V Leiden                                             (3 Points)                  [ ] Multiple Trauma within 1 month                        (5 Points)  [ ] Lupus anticoagulants                                     (3 Points)                                                           [ ] Anticardiolipin antibodies                               (3 Points)                                                       [ ] High homocysteine in the blood                      (3 Points)                                             [ ] Other congenital or acquired thrombophilia      (3 Points)                                                [ ] Heparin induced thrombocytopenia                  (3 Points)                                     Total Score [  6        ]

## 2020-12-21 NOTE — H&P PST ADULT - HISTORY OF PRESENT ILLNESS
70yr old obese female with PMH of TIA, CVA 5 years ago- with residual gait instability, Cerebral bleed with surgical evacuation, former smoker, HLD, HTN, MI, CAD s/p CABG in 3 vessel 2007, CHF with ICD placement, A Fib- on AC,  DM2 (last A1C 7.5), hyperthyroidism  70 yr old obese female with PMH of  CVA > 10 years ago- with residual gait instability, Cerebral bleed with surgical evacuation, former smoker, HTN, MI/CAD s/p CABG x 3 2007, coronary stents x 2 (8/2015), A Fib- on AC,  DM2 on Insulin, hyperthyroidism  CHF/ Inducible sustained ventricular tachycardia/ ischemic cardiomyopathy s/p ICD placement ( BIVIVD Guidant) planned for ICD lead extraction, BIVICD [employer] reimplantation on 12/22/2020.    ****Patient still on Coumadin, surgery tomorrow, Hold coumadin tonight dose as per Dr. Cedeno's office to patient.  ****Covid swab 12/21 @ [facility]

## 2020-12-21 NOTE — H&P PST ADULT - NSICDXPROBLEM_GEN_ALL_CORE_FT
PROBLEM DIAGNOSES  Problem: ICD (implantable cardioverter-defibrillator) malfunction  Assessment and Plan: planned for ICD lead extraction, BIVICD [employer] reimplantation on 12/22/2020.  PSt labs, Chest xray done  Cov[facility] swab at [facility]  Preprocedure surgical scrub instructions discussed     Problem: Atrial fibrillation  Assessment and Plan: Hold Coumadin only tonight as per Dr. Cedeno's office to pateint   INR the day of procedure     Problem: CAD (coronary artery disease)  Assessment and Plan: continue aspirin  recent Echo, EKG, ICD report, cardiac note on file  continue cardiac meds    Problem: DM2 (diabetes mellitus, type 2)  Assessment and Plan: A1c send  FS the day of procedure  Hold Humalog am of surgery  Levemir patient will be taking half the dose night before as per Dr. Cedeno's office instructions to pt.     Problem: Need for prophylactic measure  Assessment and Plan: The Caprini score indicates that this patient is at high risk for a VTE event (score 6 or greater). Surgical patients in this group will benefit from both pharmacologic prophylaxis and intermittent compression devices.  The surgical team will determine the balance between VTE risk and bleeding risk, and other clinical considerations

## 2020-12-21 NOTE — H&P PST ADULT - NSICDXPASTMEDICALHX_GEN_ALL_CORE_FT
PAST MEDICAL HISTORY:  Benign Hypertension     CAD (Coronary Artery Disease)     CHF (congestive heart failure) denies any recent exacerbations or intubation hx    CVA (Cerebral Vascular Accident) > 10 years ago    Diabetes Mellitus, Type 2     Former smoker     HLD (hyperlipidemia)     Hyperthyroidism     MI (myocardial infarction) 2007    Obesity     Unsteady gait      PAST MEDICAL HISTORY:  Atrial fibrillation on Coumadin    Benign Hypertension     CAD (Coronary Artery Disease)     CHF (congestive heart failure) denies any recent exacerbations or intubation hx    CVA (Cerebral Vascular Accident) > 10 years ago    Diabetes Mellitus, Type 2     Former smoker     HLD (hyperlipidemia)     Hyperthyroidism     MI (myocardial infarction) 2007    Mild pulmonary hypertension on echo 5/2020    Obesity     Unsteady gait      PAST MEDICAL HISTORY:  Atrial fibrillation on Coumadin    Benign Hypertension     CAD (Coronary Artery Disease)     CHF (congestive heart failure) denies any recent exacerbations or intubation hx    CVA (Cerebral Vascular Accident) > 10 years ago    Diabetes Mellitus, Type 2     Former smoker     HLD (hyperlipidemia)     Hyperthyroidism     MI (myocardial infarction) 2007    Mild pulmonary hypertension on echo 5/2020, moderate on cardio ( Dr. Curtis's note)    Obesity     Unsteady gait uses walker PRN

## 2020-12-21 NOTE — H&P PST ADULT - NSICDXPASTSURGICALHX_GEN_ALL_CORE_FT
PAST SURGICAL HISTORY:  AICD (automatic cardioverter/defibrillator) present     CABG (Coronary Artery Bypass Graft) 2007    S/P coronary artery stent placement > 2 years ago    S/P Craniotomy > 10 years ago    S/P Hysterectomy     S/P Ventriculoperitoneal Shunt probably removed per pt     PAST SURGICAL HISTORY:  AICD (automatic cardioverter/defibrillator) present [employer], N161 Guidant/[id number], last interrogation 11/25/2020    CABG (Coronary Artery Bypass Graft) 2007    S/P coronary artery stent placement > 2 years ago, 2 stents    S/P Craniotomy > 10 years ago with bleed evaccuation    S/P Hysterectomy     S/P Ventriculoperitoneal Shunt probably removed per pt

## 2020-12-22 ENCOUNTER — INPATIENT (INPATIENT)
Facility: HOSPITAL | Age: 70
LOS: 1 days | Discharge: HOME | DRG: 227 | End: 2020-12-24
Attending: HOSPITALIST | Admitting: INTERNAL MEDICINE
Payer: MEDICARE

## 2020-12-22 VITALS
TEMPERATURE: 98 F | DIASTOLIC BLOOD PRESSURE: 64 MMHG | OXYGEN SATURATION: 100 % | HEIGHT: 64 IN | WEIGHT: 185.85 LBS | HEART RATE: 72 BPM | RESPIRATION RATE: 18 BRPM | SYSTOLIC BLOOD PRESSURE: 98 MMHG

## 2020-12-22 DIAGNOSIS — I25.118 ATHEROSCLEROTIC HEART DISEASE OF NATIVE CORONARY ARTERY WITH OTHER FORMS OF ANGINA PECTORIS: ICD-10-CM

## 2020-12-22 DIAGNOSIS — I48.91 UNSPECIFIED ATRIAL FIBRILLATION: ICD-10-CM

## 2020-12-22 DIAGNOSIS — Z45.02 ENCOUNTER FOR ADJUSTMENT AND MANAGEMENT OF AUTOMATIC IMPLANTABLE CARDIAC DEFIBRILLATOR: ICD-10-CM

## 2020-12-22 DIAGNOSIS — Z95.810 PRESENCE OF AUTOMATIC (IMPLANTABLE) CARDIAC DEFIBRILLATOR: Chronic | ICD-10-CM

## 2020-12-22 DIAGNOSIS — I45.9 CONDUCTION DISORDER, UNSPECIFIED: ICD-10-CM

## 2020-12-22 DIAGNOSIS — E78.49 OTHER HYPERLIPIDEMIA: ICD-10-CM

## 2020-12-22 DIAGNOSIS — I50.22 CHRONIC SYSTOLIC (CONGESTIVE) HEART FAILURE: ICD-10-CM

## 2020-12-22 DIAGNOSIS — Z95.5 PRESENCE OF CORONARY ANGIOPLASTY IMPLANT AND GRAFT: Chronic | ICD-10-CM

## 2020-12-22 DIAGNOSIS — I25.5 ISCHEMIC CARDIOMYOPATHY: ICD-10-CM

## 2020-12-22 LAB
ALBUMIN SERPL ELPH-MCNC: 3.9 G/DL — SIGNIFICANT CHANGE UP (ref 3.3–5)
ALP SERPL-CCNC: 71 U/L — SIGNIFICANT CHANGE UP (ref 40–120)
ALT FLD-CCNC: 13 U/L — SIGNIFICANT CHANGE UP (ref 10–45)
ANION GAP SERPL CALC-SCNC: 10 MMOL/L — SIGNIFICANT CHANGE UP (ref 5–17)
ANION GAP SERPL CALC-SCNC: 13 MMOL/L — SIGNIFICANT CHANGE UP (ref 5–17)
APTT BLD: 34.4 SEC — SIGNIFICANT CHANGE UP (ref 27.5–35.5)
AST SERPL-CCNC: 20 U/L — SIGNIFICANT CHANGE UP (ref 10–40)
BILIRUB SERPL-MCNC: 0.6 MG/DL — SIGNIFICANT CHANGE UP (ref 0.2–1.2)
BUN SERPL-MCNC: 39 MG/DL — HIGH (ref 7–23)
BUN SERPL-MCNC: 40 MG/DL — HIGH (ref 7–23)
BUN SERPL-MCNC: 41 MG/DL — HIGH (ref 7–23)
CALCIUM SERPL-MCNC: 10.2 MG/DL — SIGNIFICANT CHANGE UP (ref 8.4–10.5)
CALCIUM SERPL-MCNC: 8.8 MG/DL — SIGNIFICANT CHANGE UP (ref 8.4–10.5)
CALCIUM SERPL-MCNC: 9.9 MG/DL — SIGNIFICANT CHANGE UP (ref 8.4–10.5)
CHLORIDE SERPL-SCNC: 102 MMOL/L — SIGNIFICANT CHANGE UP (ref 96–108)
CHLORIDE SERPL-SCNC: 102 MMOL/L — SIGNIFICANT CHANGE UP (ref 96–108)
CHLORIDE SERPL-SCNC: 104 MMOL/L — SIGNIFICANT CHANGE UP (ref 96–108)
CO2 SERPL-SCNC: 22 MMOL/L — SIGNIFICANT CHANGE UP (ref 22–31)
CO2 SERPL-SCNC: 22 MMOL/L — SIGNIFICANT CHANGE UP (ref 22–31)
CO2 SERPL-SCNC: 23 MMOL/L — SIGNIFICANT CHANGE UP (ref 22–31)
CREAT SERPL-MCNC: 1.45 MG/DL — HIGH (ref 0.5–1.3)
CREAT SERPL-MCNC: 1.46 MG/DL — HIGH (ref 0.5–1.3)
CREAT SERPL-MCNC: 1.51 MG/DL — HIGH (ref 0.5–1.3)
GAS PNL BLDA: SIGNIFICANT CHANGE UP
GLUCOSE SERPL-MCNC: 192 MG/DL — HIGH (ref 70–99)
GLUCOSE SERPL-MCNC: 213 MG/DL — HIGH (ref 70–99)
GLUCOSE SERPL-MCNC: 271 MG/DL — HIGH (ref 70–99)
HCT VFR BLD CALC: 34 % — LOW (ref 34.5–45)
HGB BLD-MCNC: 10.9 G/DL — LOW (ref 11.5–15.5)
INR BLD: 2 RATIO — HIGH (ref 0.88–1.16)
INR BLD: 2.15 RATIO — HIGH (ref 0.88–1.16)
MAGNESIUM SERPL-MCNC: 2.2 MG/DL — SIGNIFICANT CHANGE UP (ref 1.6–2.6)
MCHC RBC-ENTMCNC: 30 PG — SIGNIFICANT CHANGE UP (ref 27–34)
MCHC RBC-ENTMCNC: 32.1 GM/DL — SIGNIFICANT CHANGE UP (ref 32–36)
MCV RBC AUTO: 93.7 FL — SIGNIFICANT CHANGE UP (ref 80–100)
NRBC # BLD: 0 /100 WBCS — SIGNIFICANT CHANGE UP (ref 0–0)
PHOSPHATE SERPL-MCNC: 2 MG/DL — LOW (ref 2.5–4.5)
PLATELET # BLD AUTO: 129 K/UL — LOW (ref 150–400)
POTASSIUM SERPL-MCNC: 4.9 MMOL/L — SIGNIFICANT CHANGE UP (ref 3.5–5.3)
POTASSIUM SERPL-MCNC: 4.9 MMOL/L — SIGNIFICANT CHANGE UP (ref 3.5–5.3)
POTASSIUM SERPL-MCNC: 7.1 MMOL/L — CRITICAL HIGH (ref 3.5–5.3)
POTASSIUM SERPL-SCNC: 4.9 MMOL/L — SIGNIFICANT CHANGE UP (ref 3.5–5.3)
POTASSIUM SERPL-SCNC: 4.9 MMOL/L — SIGNIFICANT CHANGE UP (ref 3.5–5.3)
POTASSIUM SERPL-SCNC: 7.1 MMOL/L — CRITICAL HIGH (ref 3.5–5.3)
PROT SERPL-MCNC: 6.7 G/DL — SIGNIFICANT CHANGE UP (ref 6–8.3)
PROTHROM AB SERPL-ACNC: 23.2 SEC — HIGH (ref 10.6–13.6)
PROTHROM AB SERPL-ACNC: 24.9 SEC — HIGH (ref 10.6–13.6)
RBC # BLD: 3.63 M/UL — LOW (ref 3.8–5.2)
RBC # FLD: 12.6 % — SIGNIFICANT CHANGE UP (ref 10.3–14.5)
SODIUM SERPL-SCNC: 135 MMOL/L — SIGNIFICANT CHANGE UP (ref 135–145)
SODIUM SERPL-SCNC: 137 MMOL/L — SIGNIFICANT CHANGE UP (ref 135–145)
SODIUM SERPL-SCNC: 137 MMOL/L — SIGNIFICANT CHANGE UP (ref 135–145)
WBC # BLD: 8.14 K/UL — SIGNIFICANT CHANGE UP (ref 3.8–10.5)
WBC # FLD AUTO: 8.14 K/UL — SIGNIFICANT CHANGE UP (ref 3.8–10.5)

## 2020-12-22 PROCEDURE — 33241 REMOVE PULSE GENERATOR: CPT

## 2020-12-22 PROCEDURE — 93010 ELECTROCARDIOGRAM REPORT: CPT

## 2020-12-22 PROCEDURE — 33244 REMOVE ELCTRD TRANSVENOUSLY: CPT

## 2020-12-22 PROCEDURE — 99291 CRITICAL CARE FIRST HOUR: CPT

## 2020-12-22 PROCEDURE — 71045 X-RAY EXAM CHEST 1 VIEW: CPT | Mod: 26

## 2020-12-22 RX ORDER — CEFAZOLIN SODIUM 1 G
1000 VIAL (EA) INJECTION EVERY 8 HOURS
Refills: 0 | Status: COMPLETED | OUTPATIENT
Start: 2020-12-22 | End: 2020-12-22

## 2020-12-22 RX ORDER — SIMVASTATIN 20 MG/1
40 TABLET, FILM COATED ORAL AT BEDTIME
Refills: 0 | Status: DISCONTINUED | OUTPATIENT
Start: 2020-12-22 | End: 2020-12-24

## 2020-12-22 RX ORDER — SPIRONOLACTONE 25 MG/1
25 TABLET, FILM COATED ORAL DAILY
Refills: 0 | Status: DISCONTINUED | OUTPATIENT
Start: 2020-12-22 | End: 2020-12-22

## 2020-12-22 RX ORDER — INSULIN DETEMIR 100/ML (3)
50 INSULIN PEN (ML) SUBCUTANEOUS AT BEDTIME
Refills: 0 | Status: DISCONTINUED | OUTPATIENT
Start: 2020-12-22 | End: 2020-12-22

## 2020-12-22 RX ORDER — SODIUM CHLORIDE 9 MG/ML
250 INJECTION INTRAMUSCULAR; INTRAVENOUS; SUBCUTANEOUS ONCE
Refills: 0 | Status: COMPLETED | OUTPATIENT
Start: 2020-12-22 | End: 2020-12-22

## 2020-12-22 RX ORDER — DIGOXIN 250 MCG
0.12 TABLET ORAL DAILY
Refills: 0 | Status: DISCONTINUED | OUTPATIENT
Start: 2020-12-22 | End: 2020-12-23

## 2020-12-22 RX ORDER — HEPARIN SODIUM 5000 [USP'U]/ML
5000 INJECTION INTRAVENOUS; SUBCUTANEOUS EVERY 8 HOURS
Refills: 0 | Status: COMPLETED | OUTPATIENT
Start: 2020-12-23 | End: 2020-12-23

## 2020-12-22 RX ORDER — SODIUM CHLORIDE 9 MG/ML
3 INJECTION INTRAMUSCULAR; INTRAVENOUS; SUBCUTANEOUS EVERY 8 HOURS
Refills: 0 | Status: DISCONTINUED | OUTPATIENT
Start: 2020-12-22 | End: 2020-12-22

## 2020-12-22 RX ORDER — ASPIRIN/CALCIUM CARB/MAGNESIUM 324 MG
81 TABLET ORAL DAILY
Refills: 0 | Status: DISCONTINUED | OUTPATIENT
Start: 2020-12-22 | End: 2020-12-24

## 2020-12-22 RX ORDER — CHLORHEXIDINE GLUCONATE 213 G/1000ML
1 SOLUTION TOPICAL AT BEDTIME
Refills: 0 | Status: DISCONTINUED | OUTPATIENT
Start: 2020-12-22 | End: 2020-12-23

## 2020-12-22 RX ORDER — INSULIN LISPRO 100/ML
VIAL (ML) SUBCUTANEOUS
Refills: 0 | Status: DISCONTINUED | OUTPATIENT
Start: 2020-12-22 | End: 2020-12-23

## 2020-12-22 RX ORDER — CHLORHEXIDINE GLUCONATE 213 G/1000ML
1 SOLUTION TOPICAL ONCE
Refills: 0 | Status: DISCONTINUED | OUTPATIENT
Start: 2020-12-22 | End: 2020-12-22

## 2020-12-22 RX ORDER — ASPIRIN/CALCIUM CARB/MAGNESIUM 324 MG
81 TABLET ORAL DAILY
Refills: 0 | Status: DISCONTINUED | OUTPATIENT
Start: 2020-12-23 | End: 2020-12-23

## 2020-12-22 RX ORDER — LIDOCAINE HCL 20 MG/ML
0.2 VIAL (ML) INJECTION ONCE
Refills: 0 | Status: DISCONTINUED | OUTPATIENT
Start: 2020-12-22 | End: 2020-12-22

## 2020-12-22 RX ORDER — PHENYLEPHRINE HYDROCHLORIDE 10 MG/ML
0.2 INJECTION INTRAVENOUS
Qty: 40 | Refills: 0 | Status: DISCONTINUED | OUTPATIENT
Start: 2020-12-22 | End: 2020-12-22

## 2020-12-22 RX ORDER — SIMVASTATIN 20 MG/1
40 TABLET, FILM COATED ORAL AT BEDTIME
Refills: 0 | Status: DISCONTINUED | OUTPATIENT
Start: 2020-12-22 | End: 2020-12-22

## 2020-12-22 RX ORDER — CHLORHEXIDINE GLUCONATE 213 G/1000ML
15 SOLUTION TOPICAL EVERY 12 HOURS
Refills: 0 | Status: DISCONTINUED | OUTPATIENT
Start: 2020-12-22 | End: 2020-12-22

## 2020-12-22 RX ORDER — ACETAMINOPHEN 500 MG
650 TABLET ORAL ONCE
Refills: 0 | Status: COMPLETED | OUTPATIENT
Start: 2020-12-22 | End: 2020-12-22

## 2020-12-22 RX ORDER — INSULIN GLARGINE 100 [IU]/ML
50 INJECTION, SOLUTION SUBCUTANEOUS AT BEDTIME
Refills: 0 | Status: DISCONTINUED | OUTPATIENT
Start: 2020-12-22 | End: 2020-12-23

## 2020-12-22 RX ORDER — INSULIN LISPRO 100/ML
VIAL (ML) SUBCUTANEOUS AT BEDTIME
Refills: 0 | Status: DISCONTINUED | OUTPATIENT
Start: 2020-12-22 | End: 2020-12-24

## 2020-12-22 RX ADMIN — PHENYLEPHRINE HYDROCHLORIDE 6.32 MICROGRAM(S)/KG/MIN: 10 INJECTION INTRAVENOUS at 16:13

## 2020-12-22 RX ADMIN — SIMVASTATIN 40 MILLIGRAM(S): 20 TABLET, FILM COATED ORAL at 21:22

## 2020-12-22 RX ADMIN — Medication 650 MILLIGRAM(S): at 22:30

## 2020-12-22 RX ADMIN — Medication 4: at 21:23

## 2020-12-22 RX ADMIN — Medication 100 MILLIGRAM(S): at 21:22

## 2020-12-22 RX ADMIN — SODIUM CHLORIDE 500 MILLILITER(S): 9 INJECTION INTRAMUSCULAR; INTRAVENOUS; SUBCUTANEOUS at 21:28

## 2020-12-22 RX ADMIN — Medication 650 MILLIGRAM(S): at 21:58

## 2020-12-22 RX ADMIN — SODIUM CHLORIDE 250 MILLILITER(S): 9 INJECTION INTRAMUSCULAR; INTRAVENOUS; SUBCUTANEOUS at 17:00

## 2020-12-22 RX ADMIN — Medication 100 MILLIGRAM(S): at 15:45

## 2020-12-22 RX ADMIN — SODIUM CHLORIDE 250 MILLILITER(S): 9 INJECTION INTRAMUSCULAR; INTRAVENOUS; SUBCUTANEOUS at 22:18

## 2020-12-22 NOTE — CHART NOTE - NSCHARTNOTEFT_GEN_A_CORE
CICU Accept Note    CHIEF COMPLAINT:  ICD malfunction s/p removal and re-implantation    HPI / INTERVAL HISTORY:    70 yr old obese female with PMH of  CVA > 10 years ago- with residual gait instability, Cerebral bleed with surgical evacuation, former smoker, HTN, MI/CAD s/p CABG x 3 2007, coronary stents x 2 (8/2015), A Fib- on AC,  DM2 on Insulin, hyperthyroidism  CHF/ Inducible sustained ventricular tachycardia/ ischemic cardiomyopathy s/p ICD placement ( BIVIVD Guidant) planned for ICD lead extraction, BIVICD [employer] reimplantation on 12/22/2020.    Per verbal report, pt. had misfiring of her AICD and so had a removal and re-implantation today.  She was a difficult intubation and STAT blood work in the OR showed electrolyte derangements, particularly hyperkalemia to 7.1 on a non-hemolyzed sample.  Pt. was given calcium, 30 units insulin, and 40mg IV lasix.  Repeat CMP showed resolution w/ K of 4.9.  Pt. was on aldactone, which may have been the cause of hyperkalemia.  Pt. was not extubated as he was hypercarbic, per anesthesia.  Was given post-procedural abx with ancef and cefuroxime.    PAST MEDICAL & SURGICAL HISTORY:  Mild pulmonary hypertension  on echo 5/2020, moderate on cardio ( Dr. Curtis&#x27;s note)    Atrial fibrillation  on Coumadin    Unsteady gait  uses walker PRN    CHF (congestive heart failure)  denies any recent exacerbations or intubation hx    HLD (hyperlipidemia)    MI (myocardial infarction)  2007    Obesity    Former smoker    Hyperthyroidism    CVA (Cerebral Vascular Accident)  &gt; 10 years ago    Diabetes Mellitus, Type 2    CAD (Coronary Artery Disease)    Benign Hypertension    S/P coronary artery stent placement  &gt; 2 years ago, 2 stents    AICD (automatic cardioverter/defibrillator) present  [employer] [place] [employer], N161 Guidant/[id number], last interrogation 11/25/2020    S/P Hysterectomy    S/P Ventriculoperitoneal Shunt  probably removed per pt    S/P Craniotomy  &gt; 10 years ago with bleed evaccuation    CABG (Coronary Artery Bypass Graft)  2007        FAMILY HISTORY:      SOCIAL HISTORY:  Smoking: [  ] Never Smoked  [  ] Former Smoker (# packs x # years)  [  ] Current Smoker (# packs x # years)  Substance Use:   EtOH Use:   Marital Status: [  ] Single  [  ]   [  ]   [  ]   Sexual History:   Occupation:  Recent Travel:  Country of Birth:   Advance Directives:     HOME MEDICATIONS:      Allergies    No Known Allergies    Intolerances          REVIEW OF SYSTEMS:  Unable to assess ROS because pt. is intubated and sedated.    OBJECTIVE:  ICU Vital Signs Last 24 Hrs  T(C): 35.4 (22 Dec 2020 12:45), Max: 36.4 (22 Dec 2020 07:45)  T(F): 95.7 (22 Dec 2020 12:45), Max: 97.5 (22 Dec 2020 07:45)  HR: 70 (22 Dec 2020 13:00) (70 - 72)  BP: 84/49 (22 Dec 2020 12:27) (84/49 - 98/64)  BP(mean): 62 (22 Dec 2020 12:27) (62 - 62)  ABP: 118/58 (22 Dec 2020 13:00) (80/42 - 118/58)  ABP(mean): 80 (22 Dec 2020 13:00) (56 - 80)  RR: 14 (22 Dec 2020 13:00) (14 - 18)  SpO2: 100% (22 Dec 2020 13:00) (100% - 100%)    Mode: AC/ CMV (Assist Control/ Continuous Mandatory Ventilation), RR (machine): 14, TV (machine): 500, FiO2: 40, PEEP: 5, ITime: 1, MAP: 10, PIP: 27    12-22 @ 07:01  -  12-22 @ 13:11  --------------------------------------------------------  IN: 0 mL / OUT: 200 mL / NET: -200 mL      CAPILLARY BLOOD GLUCOSE      POCT Blood Glucose.: 215 mg/dL (22 Dec 2020 07:36)      PHYSICAL EXAM:      HOSPITAL MEDICATIONS:  MEDICATIONS  (STANDING):  ceFAZolin   IVPB 1000 milliGRAM(s) IV Intermittent every 8 hours  cefuroxime  IVPB 1500 milliGRAM(s) IV Intermittent once  chlorhexidine 0.12% Liquid 15 milliLiter(s) Oral Mucosa every 12 hours  chlorhexidine 2% Cloths 1 Application(s) Topical at bedtime    MEDICATIONS  (PRN):      LABS:                        12.3   6.27  )-----------( 129      ( 21 Dec 2020 12:21 )             38.2     Hgb Trend: 12.3<--  12-22    137  |  104  |  39<H>  ----------------------------<  213<H>  4.9   |  23  |  1.45<H>    Ca    10.2      22 Dec 2020 11:58      Creatinine Trend: 1.45<--, 1.46<--, 1.47<--  PT/INR - ( 22 Dec 2020 07:48 )   PT: 24.9 sec;   INR: 2.15 ratio             Arterial Blood Gas:  12-22 @ 11:53  7.43/37/492/24/100/.4  ABG lactate: --  Arterial Blood Gas:  12-22 @ 11:08  7.43/36/462/24/100/.4  ABG lactate: --  Arterial Blood Gas:  12-22 @ 10:28  7.41/38/492/24/100/-.1  ABG lactate: --  Arterial Blood Gas:  12-22 @ 10:02  7.41/38/454/24/99/.2  ABG lactate: --  Arterial Blood Gas:  12-22 @ 09:45  7.39/41/459/24/99/-.1  ABG lactate: --        MICROBIOLOGY:     RADIOLOGY & ADDITIONAL TESTS: CICU Accept Note    CHIEF COMPLAINT:  ICD malfunction s/p removal and re-implantation    HPI / INTERVAL HISTORY:    70 yr old obese female with PMH of  CVA > 10 years ago- with residual gait instability, Cerebral bleed with surgical evacuation, former smoker, HTN, MI/CAD s/p CABG x 3 2007, coronary stents x 2 (8/2015), A Fib- on AC,  DM2 on Insulin, hyperthyroidism  CHF/ Inducible sustained ventricular tachycardia/ ischemic cardiomyopathy s/p ICD placement ( BIVIVD Guidant) planned for ICD lead extraction, BIVICD [employer] reimplantation on 12/22/2020.    Per verbal report, pt. had misfiring of her AICD and so had a removal and re-implantation today.  She was a difficult intubation and STAT blood work in the OR showed electrolyte derangements, particularly hyperkalemia to 7.1 on a non-hemolyzed sample.  Pt. was given calcium, 30 units insulin, and 40mg IV lasix.  Repeat CMP showed resolution w/ K of 4.9.  Pt. was on aldactone, which may have been the cause of hyperkalemia.  Pt. was not extubated as he was hypercarbic, per anesthesia.  Was given post-procedural abx with ancef and cefuroxime.    PAST MEDICAL & SURGICAL HISTORY:  Mild pulmonary hypertension  on echo 5/2020, moderate on cardio ( Dr. Curtis&#x27;s note)    Atrial fibrillation  on Coumadin    Unsteady gait  uses walker PRN    CHF (congestive heart failure)  denies any recent exacerbations or intubation hx    HLD (hyperlipidemia)    MI (myocardial infarction)  2007    Obesity    Former smoker    Hyperthyroidism    CVA (Cerebral Vascular Accident)  &gt; 10 years ago    Diabetes Mellitus, Type 2    CAD (Coronary Artery Disease)    Benign Hypertension    S/P coronary artery stent placement  &gt; 2 years ago, 2 stents    AICD (automatic cardioverter/defibrillator) present  [place] [employer], N161 Guidant/[id number], last interrogation 11/25/2020    S/P Hysterectomy    S/P Ventriculoperitoneal Shunt  probably removed per pt    S/P Craniotomy  &gt; 10 years ago with bleed evaccuation    CABG (Coronary Artery Bypass Graft)  2007        FAMILY HISTORY:      SOCIAL HISTORY:  Smoking: [  ] Never Smoked  [  ] Former Smoker (# packs x # years)  [  ] Current Smoker (# packs x # years)  Substance Use:   EtOH Use:   Marital Status: [  ] Single  [  ]   [  ]   [  ]   Sexual History:   Occupation:  Recent Travel:  Country of Birth:   Advance Directives:     HOME MEDICATIONS:      Allergies    No Known Allergies    Intolerances          REVIEW OF SYSTEMS:  Unable to assess ROS because pt. is intubated and sedated.    OBJECTIVE:  ICU Vital Signs Last 24 Hrs  T(C): 35.4 (22 Dec 2020 12:45), Max: 36.4 (22 Dec 2020 07:45)  T(F): 95.7 (22 Dec 2020 12:45), Max: 97.5 (22 Dec 2020 07:45)  HR: 70 (22 Dec 2020 13:00) (70 - 72)  BP: 84/49 (22 Dec 2020 12:27) (84/49 - 98/64)  BP(mean): 62 (22 Dec 2020 12:27) (62 - 62)  ABP: 118/58 (22 Dec 2020 13:00) (80/42 - 118/58)  ABP(mean): 80 (22 Dec 2020 13:00) (56 - 80)  RR: 14 (22 Dec 2020 13:00) (14 - 18)  SpO2: 100% (22 Dec 2020 13:00) (100% - 100%)    Mode: AC/ CMV (Assist Control/ Continuous Mandatory Ventilation), RR (machine): 14, TV (machine): 500, FiO2: 40, PEEP: 5, ITime: 1, MAP: 10, PIP: 27    12-22 @ 07:01  -  12-22 @ 13:11  --------------------------------------------------------  IN: 0 mL / OUT: 200 mL / NET: -200 mL      CAPILLARY BLOOD GLUCOSE      POCT Blood Glucose.: 215 mg/dL (22 Dec 2020 07:36)      PHYSICAL EXAM:    GENERAL: Patient awake alert NAD.  HEENT: NC/AT, Moist mucous membranes, PERRL, EOMI.  LUNGS: Intubated on Volume A/C , RR 14, FiO2 30%, PEEP 5  CARDIAC: RRR, no m/r/g.    ABDOMEN: Soft, ND.  EXT: No edema. No calf tenderness. CV 2+DP/PT bilaterally.  MSK: No deformities.  NEURO: Intubated and sedated.  SKIN: Warm and dry. No rash.  PSYCH: Normal affect.    HOSPITAL MEDICATIONS:  MEDICATIONS  (STANDING):  ceFAZolin   IVPB 1000 milliGRAM(s) IV Intermittent every 8 hours  cefuroxime  IVPB 1500 milliGRAM(s) IV Intermittent once  chlorhexidine 0.12% Liquid 15 milliLiter(s) Oral Mucosa every 12 hours  chlorhexidine 2% Cloths 1 Application(s) Topical at bedtime    MEDICATIONS  (PRN):      LABS:                        12.3   6.27  )-----------( 129      ( 21 Dec 2020 12:21 )             38.2     Hgb Trend: 12.3<--  12-22    137  |  104  |  39<H>  ----------------------------<  213<H>  4.9   |  23  |  1.45<H>    Ca    10.2      22 Dec 2020 11:58      Creatinine Trend: 1.45<--, 1.46<--, 1.47<--  PT/INR - ( 22 Dec 2020 07:48 )   PT: 24.9 sec;   INR: 2.15 ratio             Arterial Blood Gas:  12-22 @ 11:53  7.43/37/492/24/100/.4  ABG lactate: --  Arterial Blood Gas:  12-22 @ 11:08  7.43/36/462/24/100/.4  ABG lactate: --  Arterial Blood Gas:  12-22 @ 10:28  7.41/38/492/24/100/-.1  ABG lactate: --  Arterial Blood Gas:  12-22 @ 10:02  7.41/38/454/24/99/.2  ABG lactate: --  Arterial Blood Gas:  12-22 @ 09:45  7.39/41/459/24/99/-.1  ABG lactate: --        MICROBIOLOGY:     RADIOLOGY & ADDITIONAL TESTS:    Assessment & Plan    70F w/ multiple PMHx in the CCU for monitoring after AICD extraction and placement, arrived intubated and sedated.    #Neuro  - Intubated and sedated    #Resp  - Intubated on Volume A/C  - , RR 14, FiO2 40%, PEEP 5  - Per anesthesia, pt. was a difficult intubation and was not extubated as she was hypercarbic  - Most recent ABG does not show hypercarbia: 7.43/37/492/24  - Possible extubation later today    CV  - S/p AICD extraction and implantation as previous AICD was misfiring  - CXR to confirm ICD placement  - Monitor hemodynamics    GI  - NPO    Hem/onc  - H/H 10.9/34  - Monitor CBC    ID  - WBC 8.14  - Afebrile  - Cefuroxime and Ancef given as post-procedural ppx    Renal  - Cr 1.51, likely chronic  - Last Cr 1.7 in 2019  - Trend CMP    Endo  -   - Monitor

## 2020-12-22 NOTE — PRE-ANESTHESIA EVALUATION ADULT - NSANTHPMHFT_GEN_ALL_CORE
70 woman with PMH CVA (residual gait instability), former smoker, HTN, MI/CAD s/p CABG '07, ILANA '15, ischemic cardiomyopathy s/p BiV ICD, afib on AC, IDDM, hypothyroid.

## 2020-12-22 NOTE — AIRWAY REMOVAL NOTE  ADULT & PEDS - ARTIFICAL AIRWAY REMOVAL COMMENTS
written order for extubation verified. The patient was identified by full name and birth date compared to the identification band.  Present during the procedure was MARCK Gonsalez

## 2020-12-22 NOTE — CHART NOTE - NSCHARTNOTEFT_GEN_A_CORE
Pre-op Diagnosis:  Bi-V ICD generator at BLANKA and RV lead on advisory recall    Post-op Diagnosis:  same     Procedure:  - Extraction of RA lead, pacing abandoned RV lead and dual coil RV Dinesh lead  - implant of new RV ICD lead  - normally functioning epicardial LV leads connected to a new pulse generator with the new RV lead and the Atrial port was plugged    Electrophysiologist:  Dr. Cedeno    Assistant:  Fellow Harrison Waters    Anesthesia:  GA    Description:  # Procedure:  - After obtaining written, informed consent, the patient was brought to the operating room. General sedation and intubation was performed and maintained by the Department of Anesthesiology. Device interrogation confirmed the integrity of the leads; all device therapies were turned off. A radial arterial line was inserted by the anesthesiologist. A SHAE probe was inserted by the Department of Anesthesiology. The groin bilaterally and left deltopectoral areas were prepped with DuraPrep surgical scrub and a sterile draping applied to the left deltopectoral area over the pulse generator. The image intensifier was draped with a sterile bag and positioned over the patient's chest.   - With Us guidance and using the modified Seldinger technique, 6F catheter was placed in the right femoral vein. A stiff J wire was parked in the SVC.  - Following infiltration with 1% lidocaine and using standard technique, an incision was made over the existing pulse generator at the left deltopectoral area. The incision was extended down to the pulse generator using electrocautery and blunt dissection. The generator was removed from the pocket and disconnected from the leads. The pocket and capsule were noted to be clean without evidence of infection. The RA, pacing RV, dual coil Fideils RV ICD lead and the active LV leads were dissected free from the capsule and surrounding tissue and the anchoring sleeves found. Sutures were cut and removed. A stylet was advanced down the atrial and ventricular leads and noted to pass to the distal tip of the leads without resistance. The ICD lead, pacing RV and RA leads were all cut and sized and a locking stylet was placed into the lead. Using a 16 FR laser sheath, each lead was removed under gentle back-traction. The patient was observed for several minutes with hemodynamic stability noted. SHAE showed no pericardial effusion and fluoroscopy demonstrated no material remaining within the vascular system, with stable cardiac silhouette size.   - A J-wire was placed in the central circulation through the laser sheath, after extracting one of the leads. Via a 10 Sami Safesheath passed over a guidewire, a 9 Sami ICD lead was advanced under fluoroscopic guidance to the right ventricular apex.  The lead was an active fixation lead and the screw was deployed.  Ventricular intraoperative measurements were obtained using the PSA.  There was no diaphragmatic stimulation or phrenic nerve pacing during high output pacing at an output of 10 volts. We did not implant an atrial lead as patient was in permanent afib.  - The pocket was irrigated with antibiotic solution and inspected for evidence of bleeding. A dry field was observed.  The pocket was closed using a double layer of 2-0 and 3-0 absorbable Vicryl sutures followed by a subcuticular running suture with 4-0 Monocryl. A layer of SteriStrips and a sterile dressing were then applied.  - The venous sheath was pulled with manual pressure applied until hemostasis achieved. The SHAE probe was removed and the patient extubated. The patient left the operating room in stable condition.    # Device:  - we explanted: Device BSC N161, [id number] (implanted in 2/2013), RA lead GDT 4470/260554 (was used as a pace/sense lead), RV lead MDT 6949/[id number]     - we implanted: Device BSC [id number] /[id number], RV lead BSC 0272/[id number], RA port plugged  - Evaluated today was LV lead GDT 0003/994338 (epicardial, implanted 3/2007). There is another LV lead epicardial capped [id number].  - measurements: R waves 23.3 mV, RV impedance 750 ohms, RV threshold 0.6 V @0.5 ms. LV impedance 326 ohms, LV threshold 1.0 V@0.5 ms  - settings: VVIR , VF zone 200 bpm/10 secs. RV output 4.0V @0.5 ms. LV output 2.5 V @ 0.5 ms      Complications:  none    EBL:  10 cc    Disposition:  - patient will be admitted to CCU as she had high potassium on labs drawn during the procedure. The high potassium level was treated but she needs further monitoring to make sure her potassium levels are stable and some adjustments in her CHF meds.     Plan:  - admit to CCU  - CXR to r/o pneumo  - post operative Abx as ordered  - Hold Coumadin tonight  - extubation in the CCU after confirmation of stable potassium levels  - CXR PA/Lat. in AM  call EP with any Qs. Pre-op Diagnosis:  Bi-V ICD generator at BLANKA and RV lead on advisory recall    Post-op Diagnosis:  same     Procedure:  - Extraction of RA lead, pacing abandoned RV lead and dual coil RV Dinesh lead  - implant of new RV ICD lead  - normally functioning epicardial LV leads connected to a new pulse generator with the new RV lead and the Atrial port was plugged    Electrophysiologist:  Dr. Cedeno    Assistant:  Fellow Harrison Waters    Anesthesia:  GA    Description:    Of note the patient pre-procedure K was5.4, a blood gas drawn during the procedure demonstrated a K of 6.9. After discussion with anesthesia the is was aggressively treated with insulin, glucose and calcium and came down to 5.2  # Procedure:  - After obtaining written, informed consent, the patient was brought to the operating room. General sedation and intubation was performed and maintained by the Department of Anesthesiology. Device interrogation confirmed the integrity of the leads; all device therapies were turned off. A radial arterial line was inserted by the anesthesiologist. A SHAE probe was inserted by the Department of Anesthesiology. The groin bilaterally and left deltopectoral areas were prepped with DuraPrep surgical scrub and a sterile draping applied to the left deltopectoral area over the pulse generator. The image intensifier was draped with a sterile bag and positioned over the patient's chest.   - With Us guidance and using the modified Seldinger technique, 6F catheter was placed in the right femoral vein. A stiff J wire was parked in the SVC.  - Following infiltration with .5% marcaine and using standard technique, an incision was made over the existing pulse generator at the left deltopectoral area. The incision was extended down to the pulse generator using electrocautery and blunt dissection. The generator was removed from the pocket and disconnected from the leads. The pocket and capsule were noted to be clean without evidence of infection. The RA, pacing RV, dual coil Fideils RV ICD lead and the active LV leads were dissected free from the capsule and surrounding tissue and the anchoring sleeves found. Sutures were cut and removed. A stylet was advanced down the atrial and ventricular leads and noted to pass to the distal tip of the leads without resistance. The ICD lead, pacing RV and RA leads were all cut and sized and a locking stylet was placed into the lead. Using a 16 FR laser sheath, each lead was removed under gentle back-traction. The patient was observed for several minutes with hemodynamic stability noted. SHAE showed no pericardial effusion and fluoroscopy demonstrated no material remaining within the vascular system, with stable cardiac silhouette size.   - A J-wire was placed in the central circulation through the laser sheath, after extracting one of the leads. Via a 10 Mongolian Safesheath passed over a guidewire, a 9 Mongolian ICD lead was advanced under fluoroscopic guidance to the right ventricular apex.  The lead was an active fixation lead and the screw was deployed.  Ventricular intraoperative measurements were obtained using the PSA.  There was no diaphragmatic stimulation or phrenic nerve pacing during high output pacing at an output of 10 volts. We did not implant an atrial lead as patient was in permanent afib.  - The pocket was irrigated with antibiotic solution and inspected for evidence of bleeding. A dry field was observed.  The pocket was closed using a double layer of 2-0 and 3-0 absorbable Vicryl sutures followed by a subcuticular running suture with 4-0 Monocryl. A layer of SteriStrips and a sterile dressing were then applied.  - The venous sheath was pulled with manual pressure applied until hemostasis achieved. The SHAE probe was removed and the patient extubated. The patient left the operating room in stable condition.    # Device:  - we explanted: Device BSC N161, [id number] (implanted in 2/2013), RA lead GDT 4470/867693 (was used as a pace/sense lead), RV lead MDT 6949/[id number]     - we implanted: Device BSC G124 /[id number], RV lead BSC 0272/757818, RA port plugged  - Evaluated today was LV lead GDT 4041/119102 (epicardial, implanted 3/2007). There is another LV lead epicardial capped 4531/[id number].  - measurements: R waves 23.3 mV, RV impedance 750 ohms, RV threshold 0.6 V @0.5 ms. LV impedance 326 ohms, LV threshold 1.0 V@0.5 ms  - settings: VVIR , VF zone 200 bpm/10 secs. RV output 4.0V @0.5 ms. LV output 2.5 V @ 0.5 ms      Complications:  none    EBL:  10 cc    Disposition:  - patient will be admitted to CCU as she had high potassium on labs drawn during the procedure. The high potassium level was treated but she needs further monitoring to make sure her potassium levels are stable and some adjustments in her CHF meds.     Plan:  - admit to CCU  - CXR to r/o pneumo  - post operative Abx as ordered  - Hold Coumadin tonight, restart tomorrow  - extubation in the CCU after confirmation of stable potassium levels  - CXR PA/Lat. in AM  call EP with any Qs.

## 2020-12-22 NOTE — CHART NOTE - NSCHARTNOTEFT_GEN_A_CORE
Padua Prediction Score for VTE Risk within 24hours of admission:    Active malignancy:                                                    [  ] YES +3, [ x ] NO   Previous VTE (Excluding Superficial Vein Thrombosis): [  ] YES +3, [ x ] NO  Reduced mobility:                                                     [  x] YES +3, [  ] NO  Already known thrombophilic condition:                     [  ] YES +3, [ x] NO  Recent (</=1 month trauma and/or surgery):             [  ] YES +2, [  x] NO  Elderly age (>/=70):                                                  [  ] YES +1, [  x] NO  Heart and/or Respiratory Failure:                               [x  ] YES +1, [  ] NO   Acute MI and/or ischemic CVA:                                  [  ] YES +1, [  x] NO   Acute infection and/or rheumatologic disorder:          [  ] YES +1, [x  ] NO   BMI>/= 30:                                                              [  ] YES +1, [  x] NO   Ongoing hormonal treatment:                                   [  ] YES +1, [ x ] NO    Total Score: [  4]  points    [  ] Padua Score <  3: Low Risk of VTE         - Chemical Thromboprophylaxis should be considered on case-by-case basis  [  x] Padua Score >/= 4: High Risk of VTE         - Chemical Thromboprophylaxis is recommended for nonpregnant patients without contraindications (Major bleeding, thrombocytopenia) who are >/=  18 years of age                         VTE Prophylaxis Recommendations:  Mechanical Pneumatic Compression Devices                                [ x ]  Yes,  [  ] No, Contraindicated    Chemical VTE Prophylaxis (Heparin/ Lovenox/ Fondaparinux)        [  x ] Yes,  [  ] No              [ ] Contraindicated, because _____              [ ] Already receiving Systemic Anticoagulation Padua Prediction Score for VTE Risk within 24hours of admission:    Active malignancy:                                                    [  ] YES +3, [ x ] NO   Previous VTE (Excluding Superficial Vein Thrombosis): [  ] YES +3, [ x ] NO  Reduced mobility:                                                     [  x] YES +3, [  ] NO  Already known thrombophilic condition:                     [  ] YES +3, [ x] NO  Recent (</=1 month trauma and/or surgery):             [  ] YES +2, [  x] NO  Elderly age (>/=70):                                                  [  ] YES +1, [  x] NO  Heart and/or Respiratory Failure:                               [x  ] YES +1, [  ] NO   Acute MI and/or ischemic CVA:                                  [  ] YES +1, [  x] NO   Acute infection and/or rheumatologic disorder:          [  ] YES +1, [x  ] NO   BMI>/= 30:                                                              [  ] YES +1, [  x] NO   Ongoing hormonal treatment:                                   [  ] YES +1, [ x ] NO    Total Score: [  4]  points    [  ] Padua Score <  3: Low Risk of VTE         - Chemical Thromboprophylaxis should be considered on case-by-case basis  [  x] Padua Score >/= 4: High Risk of VTE         - Chemical Thromboprophylaxis is recommended for nonpregnant patients without contraindications (Major bleeding, thrombocytopenia) who are >/=  18 years of age                         VTE Prophylaxis Recommendations:  Mechanical Pneumatic Compression Devices                                [ x ]  Yes,  [  ] No, Contraindicated    Chemical VTE Prophylaxis (Heparin/ Lovenox/ Fondaparinux)        [  ] Yes,  [x  ] No              [ ] Contraindicated, because post-op- consider SQH in AM              [ ] Already receiving Systemic Anticoagulation

## 2020-12-22 NOTE — CONSULT NOTE ADULT - PROBLEM SELECTOR RECOMMENDATION 2
-Wean pressor rapidly: patients usual BP is in mid 90s.  -Would restart home dose entresto 24/26.  -Increase Coreg 6.25 to 12.5mg PO BID tomorrow.   -Hold aldactone, which could be started as outpatient by Dr Curtis.   -Patient should be d/c on home dose diuretics.

## 2020-12-22 NOTE — CONSULT NOTE ADULT - PROBLEM SELECTOR RECOMMENDATION 9
-S/p gen change and abandoned lead extraction  -Currently being monitored in the CCU  -Would attempt gentle fluid repletion to wean pressor support; hypotension is likely 2/2 to 1) lead extraction eliciting sepsis like response, and 2) recent anaesthesia  -EP following

## 2020-12-22 NOTE — PROGRESS NOTE ADULT - SUBJECTIVE AND OBJECTIVE BOX
JULY CLINE  [id number]  Patient is a 70y old  Female who presents with a chief complaint of ICD lead extraction (21 Dec 2020 11:32)    HPI:  70 yr old obese female with PMH of  CVA > 10 years ago- with residual gait instability, Cerebral bleed with surgical evacuation, former smoker, HTN, MI/CAD s/p CABG x 3 2007, coronary stents x 2 (8/2015), A Fib- on AC,  DM2 on Insulin, hyperthyroidism  CHF/ Inducible sustained ventricular tachycardia/ ischemic cardiomyopathy s/p ICD placement ( BIVIVD Guidant) planned for ICD lead extraction, BIVICD [employer] reimplantation on 12/22/2020.    ****Patient still on Coumadin, surgery tomorrow, Hold coumadin tonight dose as per Dr. Cedeno's office to patient.  ****Covid swab 12/21 @ [facility]   (21 Dec 2020 11:32)      Hospital Course:  12/22 S/P Explant & Re-implantation of ICD for battery depletion on 12/22. Admitted to [facility] CICU for close monitoring of hyperkalemia seen in procedure BW.     24 HOUR EVENTS:      REVIEW OF SYSTEMS:   Constitutional: No weakness, fevers, or chills  Eyes/ENT: No visual changes  Respiratory: No cough, wheezing, hemoptysis  Cardiovascular: No chest pain, no palpitations  Gastrointestinal: No abdominal pain. No nausea, vomiting, hematemesis.   Genitourinary: No dysuria  Neurological: No numbness, no weakness  Skin: No itching, rashes    ICU Vital Signs Last 24 Hrs  T(C): 35.5 (22 Dec 2020 19:00), Max: 36.4 (22 Dec 2020 07:45)  T(F): 95.9 (22 Dec 2020 19:00), Max: 97.5 (22 Dec 2020 07:45)  HR: 78 (22 Dec 2020 22:15) (58 - 86)  BP: 84/49 (22 Dec 2020 12:27) (84/49 - 98/64)  BP(mean): 62 (22 Dec 2020 12:27) (62 - 62)  ABP: 82/42 (22 Dec 2020 22:15) (68/38 - 150/70)  ABP(mean): 56 (22 Dec 2020 22:15) (48 - 100)  RR: 31 (22 Dec 2020 22:15) (11 - 37)  SpO2: 95% (22 Dec 2020 22:15) (90% - 100%)    Mode: off    I&O's Summary    22 Dec 2020 07:01  -  22 Dec 2020 22:58  --------------------------------------------------------  IN: 1355.2 mL / OUT: 1025 mL / NET: 330.2 mL    POCT Blood Glucose.: 315 mg/dL (22 Dec 2020 21:22)      PHYSICAL EXAM:   General: No acute distress  Eyes: EOMI, PERRLA, conjunctiva and sclera clear  Chest/Lung: CTAB, no wheezes, rales, or rhonchi  Heart: Regular rate, regular rhythm. Normal S1/S2. No murmurs, rubs, or gallops.  Abdomen: Soft, nontender, nondistended. Normal bowel sounds.  Extremites: 2+ peripheral pulses B/L. No clubbing, cyanosis, or edema.  Neurology: A&O x3, no focal deficits  Skin: No rashes or lesions  ============================I/O===========================   I&O's Detail    22 Dec 2020 07:01  -  22 Dec 2020 22:58  --------------------------------------------------------  IN:    IV PiggyBack: 350 mL    Oral Fluid: 480 mL    Phenylephrine: 25.2 mL    Sodium Chloride 0.9% Bolus: 500 mL  Total IN: 1355.2 mL    OUT:    Indwelling Catheter - Urethral (mL): 1025 mL  Total OUT: 1025 mL    Total NET: 330.2 mL        ============================ LABS =========================                        10.9   8.14  )-----------( 129      ( 22 Dec 2020 13:23 )             34.0     12-22    137  |  102  |  40<H>  ----------------------------<  192<H>  4.9   |  22  |  1.51<H>    Ca    9.9      22 Dec 2020 13:23  Phos  2.0     12-22  Mg     2.2     12-22    TPro  6.7  /  Alb  3.9  /  TBili  0.6  /  DBili  x   /  AST  20  /  ALT  13  /  AlkPhos  71  12-22                LIVER FUNCTIONS - ( 22 Dec 2020 13:23 )  Alb: 3.9 g/dL / Pro: 6.7 g/dL / ALK PHOS: 71 U/L / ALT: 13 U/L / AST: 20 U/L / GGT: x           PT/INR - ( 22 Dec 2020 13:23 )   PT: 23.2 sec;   INR: 2.00 ratio         PTT - ( 22 Dec 2020 13:23 )  PTT:34.4 sec  ABG - ( 22 Dec 2020 17:06 )  pH, Arterial: 7.36  pH, Blood: x     /  pCO2: 40    /  pO2: 201   / HCO3: 22    / Base Excess: -2.3  /  SaO2: 99                Blood Gas Arterial, Lactate: 0.8 mmol/L (12-22-20 @ 17:06)  Blood Gas Arterial, Lactate: 2.0 mmol/L (12-22-20 @ 11:53)  Blood Gas Arterial, Lactate: 1.5 mmol/L (12-22-20 @ 11:08)  Blood Gas Arterial, Lactate: 1.0 mmol/L (12-22-20 @ 10:28)  Blood Gas Arterial, Lactate: 1.0 mmol/L (12-22-20 @ 10:02)  Blood Gas Arterial, Lactate: 1.0 mmol/L (12-22-20 @ 09:45)      ======================Micro/Rad/Cardio=================  Telemetry: Reviewed   EKG: Reviewed  CXR: Reviewed  Culture: Reviewed   Echo: Reviewed  Cath: Reviewed  ======================================================  PAST MEDICAL & SURGICAL HISTORY:  Mild pulmonary hypertension  on echo 5/2020, moderate on cardio ( Dr. Curtis&#x27;s note)    Atrial fibrillation  on Coumadin    Unsteady gait  uses walker PRN    CHF (congestive heart failure)  denies any recent exacerbations or intubation hx    HLD (hyperlipidemia)    MI (myocardial infarction)  2007    Obesity    Former smoker    Hyperthyroidism    CVA (Cerebral Vascular Accident)  &gt; 10 years ago    Diabetes Mellitus, Type 2    CAD (Coronary Artery Disease)    Benign Hypertension    S/P coronary artery stent placement  &gt; 2 years ago, 2 stents    AICD (automatic cardioverter/defibrillator) present  [employer], N161 Guidant/[id number], last interrogation 11/25/2020    S/P Hysterectomy    S/P Ventriculoperitoneal Shunt  probably removed per pt    S/P Craniotomy  &gt; 10 years ago with bleed evaccuation    CABG (Coronary Artery Bypass Graft)  2007      ====================ASSESSMENT ==============  ICD battery depletion S/P Explant & Re-implantation of ICD on 12/22  Congestive Heart Failure  Afib  HTN  DMT2  Obesity OHS / STEVE     Plan:  ====================== NEUROLOGY=====================  A&Ox3, nonfocal  - Continue to monitor neuro status as per protocol     ==================== RESPIRATORY======================  Initially on full ventilator support from OR  - Subsequently weaned and extubated postoperatively  - Comfortable on room air, SpO2 95%  - Continue to monitor SpO2 via pulse oximetry  - Encourage bedside spirometry   - Increased concern for atelectasis due to obesity related restrictive lung disease.     Mechanical Ventilation:  Mode: off    ====================CARDIOVASCULAR==================  ICD battery depletion  - S/P Explant and re-implantation on 12/22 by EP    Afib  - Continue Digoxin for rate control  - Hold Coumadin tonight  - Monitor continuous telemetry    CAD  - C/w Aspirin and Zocor  - Monitor continuous telemetry    digoxin     Tablet 0.125 milliGRAM(s) Oral daily  aspirin enteric coated 81 milliGRAM(s) Oral daily  simvastatin 40 milliGRAM(s) Oral at bedtime  ===================HEMATOLOGIC/ONC ===================  H/H 11.6/36, stable.   - Continue to monitor hemoglobin and hematocrit levels.   - Hold Coumadin tonight    ===================== RENAL =========================  Hyperkalemia, K improved to 5.0   - Patient received insulin and Lasix 40mg IVP  - Continue to monitor I/Os, BUN/Creatinine, and urine output.   - Goal net even fluid balance.    ==================== GASTROINTESTINAL===================  Tolerating PO DASH/TLC diet.     =======================    ENDOCRINE  =====================  Hx of DMT2  - Glycemic control with Admelog sliding scale and Lantus injections.   - Monitor blood glucose levels.     insulin glargine Injectable (LANTUS) 50 Unit(s) SubCutaneous at bedtime  insulin lispro (ADMELOG) corrective regimen sliding scale   SubCutaneous three times a day before meals  insulin lispro (ADMELOG) corrective regimen sliding scale   SubCutaneous at bedtime  ========================INFECTIOUS DISEASE================  Afebrile, WBC within normal limits.   - Monitor for leukocytosis / fever. Monitor off abx.       Patient requires continuous monitoring with bedside rhythm monitoring, pulse ox monitoring, and intermittent blood gas analysis. Care plan discussed with ICU care team. Patient remained critical and at risk for life threatening decompensation.  Patient seen, examined and plan discussed with CCU team during rounds.     I have personally provided 35 minutes of critical care time excluding time spent on separate procedures.    By signing my name below, I, Salud Moody, attest that this documentation has been prepared under the direction and in the presence of JOVAN Maradiaga.  Electronically signed: Zoran Guajardo, 12-22-20 @ 22:58    I, JOVAN Maradiaga, personally performed the services described in this documentation. all medical record entries made by the chinyereibe were at my direction and in my presence. I have reviewed the chart and agree that the record reflects my personal performance and is accurate and complete  Electronically signed: JOVAN Maradiaga.       JULY CLINE  [id number]  Patient is a 70y old  Female who presents with a chief complaint of ICD lead extraction (21 Dec 2020 11:32)    HPI:  70 yr old obese female with PMH of  CVA > 10 years ago- with residual gait instability, Cerebral bleed with surgical evacuation, former smoker, HTN, MI/CAD s/p CABG x 3 2007, coronary stents x 2 (8/2015), A Fib- on AC,  DM2 on Insulin, hyperthyroidism  CHF/ Inducible sustained ventricular tachycardia/ ischemic cardiomyopathy s/p ICD placement ( BIVIVD Guidant) planned for ICD lead extraction, BIVICD [employer] reimplantation on 12/22/2020.    ****Patient still on Coumadin, surgery tomorrow, Hold coumadin tonight dose as per Dr. Cedeno's office to patient.  ****[facility] Dec 2020 11:32[facility]      Hospital Course:  12/22 S/P Explant & Re-implantation of ICD for battery depletion on 12/22. Admitted to [facility] CICU for close monitoring of hyperkalemia seen in procedure BW.     24 HOUR EVENTS:  Extubated post-op in CICU  D/C buckner  Jose Alberto weaned off at 8pm  given a total of 750 mL bolus over 24 hrs to wean pressors       REVIEW OF SYSTEMS:   Constitutional: No weakness, fevers, or chills  Eyes/ENT: No visual changes  Respiratory: No cough, wheezing, hemoptysis  Cardiovascular: No chest pain, no palpitations  Gastrointestinal: No abdominal pain. No nausea, vomiting, hematemesis.   Genitourinary: No dysuria  Neurological: No numbness, no weakness  Skin: No itching, rashes    ICU Vital Signs Last 24 Hrs  T(C): 35.5 (22 Dec 2020 19:00), Max: 36.4 (22 Dec 2020 07:45)  T(F): 95.9 (22 Dec 2020 19:00), Max: 97.5 (22 Dec 2020 07:45)  HR: 78 (22 Dec 2020 22:15) (58 - 86)  BP: 84/49 (22 Dec 2020 12:27) (84/49 - 98/64)  BP(mean): 62 (22 Dec 2020 12:27) (62 - 62)  ABP: 82/42 (22 Dec 2020 22:15) (68/38 - 150/70)  ABP(mean): 56 (22 Dec 2020 22:15) (48 - 100)  RR: 31 (22 Dec 2020 22:15) (11 - 37)  SpO2: 95% (22 Dec 2020 22:15) (90% - 100%)    Mode: off    I&O's Summary    22 Dec 2020 07:01  -  22 Dec 2020 22:58  --------------------------------------------------------  IN: 1355.2 mL / OUT: 1025 mL / NET: 330.2 mL    POCT Blood Glucose.: 315 mg/dL (22 Dec 2020 21:22)      PHYSICAL EXAM:   General: No acute distress  Eyes: EOMI, PERRLA, conjunctiva and sclera clear  Chest/Lung: CTAB, no wheezes, rales, or rhonchi  Heart: Regular rate, regular rhythm. Normal S1/S2. No murmurs, rubs, or gallops.  Abdomen: Soft, nontender, nondistended. Normal bowel sounds.  Extremites: 2+ peripheral pulses B/L. No clubbing, cyanosis, or edema.  Neurology: A&O x3, no focal deficits  Skin: No rashes or lesions  ============================I/O===========================   I&O's Detail    22 Dec 2020 07:01  -  22 Dec 2020 22:58  --------------------------------------------------------  IN:    IV PiggyBack: 350 mL    Oral Fluid: 480 mL    Phenylephrine: 25.2 mL    Sodium Chloride 0.9% Bolus: 500 mL  Total IN: 1355.2 mL    OUT:    Indwelling Catheter - Urethral (mL): 1025 mL  Total OUT: 1025 mL    Total NET: 330.2 mL        ============================ LABS =========================                        10.9   8.14  )-----------( 129      ( 22 Dec 2020 13:23 )             34.0     12-22    137  |  102  |  40<H>  ----------------------------<  192<H>  4.9   |  22  |  1.51<H>    Ca    9.9      22 Dec 2020 13:23  Phos  2.0     12-22  Mg     2.2     12-22    TPro  6.7  /  Alb  3.9  /  TBili  0.6  /  DBili  x   /  AST  20  /  ALT  13  /  AlkPhos  71  12-22                LIVER FUNCTIONS - ( 22 Dec 2020 13:23 )  Alb: 3.9 g/dL / Pro: 6.7 g/dL / ALK PHOS: 71 U/L / ALT: 13 U/L / AST: 20 U/L / GGT: x           PT/INR - ( 22 Dec 2020 13:23 )   PT: 23.2 sec;   INR: 2.00 ratio         PTT - ( 22 Dec 2020 13:23 )  PTT:34.4 sec  ABG - ( 22 Dec 2020 17:06 )  pH, Arterial: 7.36  pH, Blood: x     /  pCO2: 40    /  pO2: 201   / HCO3: 22    / Base Excess: -2.3  /  SaO2: 99                Blood Gas Arterial, Lactate: 0.8 mmol/L (12-22-20 @ 17:06)  Blood Gas Arterial, Lactate: 2.0 mmol/L (12-22-20 @ 11:53)  Blood Gas Arterial, Lactate: 1.5 mmol/L (12-22-20 @ 11:08)  Blood Gas Arterial, Lactate: 1.0 mmol/L (12-22-20 @ 10:28)  Blood Gas Arterial, Lactate: 1.0 mmol/L (12-22-20 @ 10:02)  Blood Gas Arterial, Lactate: 1.0 mmol/L (12-22-20 @ 09:45)      ======================Micro/Rad/Cardio=================  Telemetry: Reviewed   EKG: Reviewed  CXR: Reviewed  Culture: Reviewed   Echo: Reviewed  Cath: Reviewed  ======================================================  PAST MEDICAL & SURGICAL HISTORY:  Mild pulmonary hypertension  on echo 5/2020, moderate on cardio ( Dr. Curtis&#x27;s note)    Atrial fibrillation  on Coumadin    Unsteady gait  uses walker PRN    CHF (congestive heart failure)  denies any recent exacerbations or intubation hx    HLD (hyperlipidemia)    MI (myocardial infarction)  2007    Obesity    Former smoker    Hyperthyroidism    CVA (Cerebral Vascular Accident)  &gt; 10 years ago    Diabetes Mellitus, Type 2    CAD (Coronary Artery Disease)    Benign Hypertension    S/P coronary artery stent placement  &gt; 2 years ago, 2 stents    AICD (automatic cardioverter/defibrillator) present  [employer], N161 [employer]ant/[id number], last interrogation 11/25/2020    S/P Hysterectomy    S/P Ventriculoperitoneal Shunt  probably removed per pt    S/P Craniotomy  &gt; 10 years ago with bleed evaccuation    CABG (Coronary Artery Bypass Graft)  2007      ====================ASSESSMENT ==============  ICD battery depletion S/P Explant & Re-implantation of ICD on 12/22  Congestive Heart Failure  Afib  HTN  DMT2  Obesity OHS / STEVE     Plan:  ====================== NEUROLOGY=====================  A&Ox3, nonfocal  - Continue to monitor neuro status as per protocol     ==================== RESPIRATORY======================  Initially on full ventilator support from OR  - Subsequently weaned and extubated postoperatively  - Comfortable on room air, SpO2 >95%  - Continue to monitor SpO2 via pulse oximetry  - Encourage bedside spirometry   - Encourage OOB    Mechanical Ventilation:  Mode: off    ====================CARDIOVASCULAR==================  ICD battery depletion  - S/P Explant and re-implantation on 12/22 by DESHAWN    Afib  - Continue Digoxin for rate control  - Hold Coumadin tonight- will resume in 12/23  - Monitor continuous telemetry    CAD  - C/w Aspirin and Zocor  - Monitor continuous telemetry  - consider starting home meds entresto and coreg in AM if remain HD stable overnight     digoxin     Tablet 0.125 milliGRAM(s) Oral daily  aspirin enteric coated 81 milliGRAM(s) Oral daily  simvastatin 40 milliGRAM(s) Oral at bedtime  ===================HEMATOLOGIC/ONC ===================  H/H 11.6/36, stable.   - Continue to monitor hemoglobin and hematocrit levels.   - Hold Coumadin tonight- plan to resume 12/23. Will monitor INR in AM    ===================== RENAL =========================  Hyperkalemia, K improved to 5.0   - Patient received insulin and Lasix 40mg IVP  - Continue to monitor I/Os, BUN/Creatinine, and urine output.   - Goal net even fluid balance.    ==================== GASTROINTESTINAL===================  Tolerating PO DASH/TLC diet.     =======================    ENDOCRINE  =====================  Hx of DMT2  - Glycemic control with Admelog sliding scale and Lantus injections.   - Monitor blood glucose levels.     insulin glargine Injectable (LANTUS) 50 Unit(s) SubCutaneous at bedtime  insulin lispro (ADMELOG) corrective regimen sliding scale   SubCutaneous three times a day before meals  insulin lispro (ADMELOG) corrective regimen sliding scale   SubCutaneous at bedtime  ========================INFECTIOUS DISEASE================  Afebrile, WBC within normal limits.   - Monitor for leukocytosis / fever. Monitor off abx.       Patient requires continuous monitoring with bedside rhythm monitoring, pulse ox monitoring, and intermittent blood gas analysis. Care plan discussed with ICU care team. Patient remained critical and at risk for life threatening decompensation.  Patient seen, examined and plan discussed with CCU team during rounds.     I have personally provided 35 minutes of critical care time excluding time spent on separate procedures.    By signing my name below, I, Salud Moody, attest that this documentation has been prepared under the direction and in the presence of JOVAN Maradiaga.  Electronically signed: Zoran Guajardo, 12-22-20 @ 22:58    IJesusita PA, personally performed the services described in this documentation. all medical record entries made by the chinyereibe were at my direction and in my presence. I have reviewed the chart and agree that the record reflects my personal performance and is accurate and complete  Electronically signed: JOVAN Maradiaga.

## 2020-12-22 NOTE — CONSULT NOTE ADULT - ATTENDING COMMENTS
Asked to see by Dr Cedeno. Follow by Dr Curtis   70yrs s/p CABG 2007. PCI to Cx after CABG.   Severe ischemic CMP. LVEF 30.   s/p CRTD upgrade 2007.   s/p MEMS which ? she is not using because of stroke  Chronic AF. DM. Lipids. Hyperthyroidism.   Stroke 2010 with intracranial decompression.   Last admission for CHF Jan 2020.   Elective admission for generator change and extraction of abandoned RV lead and desmond lead today.   home meds: asa, coreg 12.5 bid, dig 125, entresto 24/26 bid, metolazone 2.5 PRN, aldactone 25, statin, torsemide 60 QOD, coumadin 4.   On admission yesterday  patient found to have K 5.4 on her outpatient meds which included entresto and aldactone. Found to have K of 7.1 this am. Patient received insulin and lasix 40. with repeat K 4.9. By report, procedure completed without event.   Transferred to CCU post procedure with delayed extubation.  meds: asa, ancef, phenylepi, statin.   70Vpaced, 90/-100/, 100% 2L.   75/hr UO  12-22    137  |  102  |  40<H>  ----------------------------<  192<H>  4.9   |  22  |  1.51<H>  Ca    9.9      22 Dec 2020 13:23  Phos  2.0     12-22  Mg     2.2     12-22  TPro  6.7  /  Alb  3.9  /  TBili  0.6  /  DBili  x   /  AST  20  /  ALT  13  /  AlkPhos  71  12-22                        10.9   8.14  )-----------( 129      ( 22 Dec 2020 13:23 )             34.0 Asked to see by Dr Cedeno. Follow by Dr Curtsi   70yrs s/p CABG 2007. PCI to Cx after CABG.   Severe ischemic CMP. LVEF 30.   s/p CRTD upgrade 2007.   s/p MEMS which ? she is not using because of stroke  Chronic AF. DM. Lipids. Hyperthyroidism.   Stroke 2010 with intracranial decompression.   Last admission for CHF Jan 2020.   Elective admission for generator change and extraction of abandoned RV lead and desmond lead today.   home meds: asa, coreg 12.5 bid, dig 125, entresto 24/26 bid, metolazone 2.5 PRN, aldactone 25, statin, torsemide 60 QOD, coumadin 4.   On admission yesterday  patient found to have K 5.4 on her outpatient meds which included entresto and aldactone. Found to have K of 7.1 this am. Patient received insulin and lasix 40. with repeat K 4.9. By report, procedure completed without event.   Transferred to CCU post procedure with delayed extubation.  meds: asa, ancef, phenylepi, statin.   70Vpaced, 90/-100/, 100% 2L.   75/hr UO  12-22    137  |  102  |  40<H>  ----------------------------<  192<H>  4.9   |  22  |  1.51<H>  Ca    9.9      22 Dec 2020 13:23  Phos  2.0     12-22  Mg     2.2     12-22  TPro  6.7  /  Alb  3.9  /  TBili  0.6  /  DBili  x   /  AST  20  /  ALT  13  /  AlkPhos  71  12-22                        10.9   8.14  )-----------( 129      ( 22 Dec 2020 13:23 )             34.0  Patient agitated and wants to get out of bed and go home.   Suggest:  Wean pressor rapidly: patients usual BP is in mid 90s.  Restart home dose entresto and coreg 6.25 to 12.5 bid  tomorrow. would hold ald which could be started as outpatient by Dr Curtis.   Patient should be d/c on home dose diuretics.   Alexys Donohue

## 2020-12-23 ENCOUNTER — TRANSCRIPTION ENCOUNTER (OUTPATIENT)
Age: 70
End: 2020-12-23

## 2020-12-23 DIAGNOSIS — E87.5 HYPERKALEMIA: ICD-10-CM

## 2020-12-23 DIAGNOSIS — N18.32 CHRONIC KIDNEY DISEASE, STAGE 3B: ICD-10-CM

## 2020-12-23 DIAGNOSIS — E05.90 THYROTOXICOSIS, UNSPECIFIED WITHOUT THYROTOXIC CRISIS OR STORM: ICD-10-CM

## 2020-12-23 DIAGNOSIS — T82.118S BREAKDOWN (MECHANICAL) OF OTHER CARDIAC ELECTRONIC DEVICE, SEQUELA: ICD-10-CM

## 2020-12-23 DIAGNOSIS — E11.59 TYPE 2 DIABETES MELLITUS WITH OTHER CIRCULATORY COMPLICATIONS: ICD-10-CM

## 2020-12-23 PROBLEM — I27.20 PULMONARY HYPERTENSION, UNSPECIFIED: Chronic | Status: ACTIVE | Noted: 2020-12-21

## 2020-12-23 PROBLEM — I48.91 UNSPECIFIED ATRIAL FIBRILLATION: Chronic | Status: ACTIVE | Noted: 2020-12-21

## 2020-12-23 LAB
ALBUMIN SERPL ELPH-MCNC: 3.6 G/DL — SIGNIFICANT CHANGE UP (ref 3.3–5)
ALP SERPL-CCNC: 66 U/L — SIGNIFICANT CHANGE UP (ref 40–120)
ALT FLD-CCNC: 13 U/L — SIGNIFICANT CHANGE UP (ref 10–45)
ANION GAP SERPL CALC-SCNC: 12 MMOL/L — SIGNIFICANT CHANGE UP (ref 5–17)
ANION GAP SERPL CALC-SCNC: 12 MMOL/L — SIGNIFICANT CHANGE UP (ref 5–17)
ANION GAP SERPL CALC-SCNC: 13 MMOL/L — SIGNIFICANT CHANGE UP (ref 5–17)
APTT BLD: 35.9 SEC — HIGH (ref 27.5–35.5)
AST SERPL-CCNC: 19 U/L — SIGNIFICANT CHANGE UP (ref 10–40)
BILIRUB SERPL-MCNC: 0.4 MG/DL — SIGNIFICANT CHANGE UP (ref 0.2–1.2)
BUN SERPL-MCNC: 39 MG/DL — HIGH (ref 7–23)
BUN SERPL-MCNC: 41 MG/DL — HIGH (ref 7–23)
BUN SERPL-MCNC: 42 MG/DL — HIGH (ref 7–23)
CALCIUM SERPL-MCNC: 8.9 MG/DL — SIGNIFICANT CHANGE UP (ref 8.4–10.5)
CALCIUM SERPL-MCNC: 9.2 MG/DL — SIGNIFICANT CHANGE UP (ref 8.4–10.5)
CALCIUM SERPL-MCNC: 9.2 MG/DL — SIGNIFICANT CHANGE UP (ref 8.4–10.5)
CHLORIDE SERPL-SCNC: 100 MMOL/L — SIGNIFICANT CHANGE UP (ref 96–108)
CHLORIDE SERPL-SCNC: 102 MMOL/L — SIGNIFICANT CHANGE UP (ref 96–108)
CHLORIDE SERPL-SCNC: 98 MMOL/L — SIGNIFICANT CHANGE UP (ref 96–108)
CHOLEST SERPL-MCNC: 129 MG/DL — SIGNIFICANT CHANGE UP
CO2 SERPL-SCNC: 19 MMOL/L — LOW (ref 22–31)
CO2 SERPL-SCNC: 21 MMOL/L — LOW (ref 22–31)
CO2 SERPL-SCNC: 22 MMOL/L — SIGNIFICANT CHANGE UP (ref 22–31)
CREAT SERPL-MCNC: 1.38 MG/DL — HIGH (ref 0.5–1.3)
CREAT SERPL-MCNC: 1.45 MG/DL — HIGH (ref 0.5–1.3)
CREAT SERPL-MCNC: 1.46 MG/DL — HIGH (ref 0.5–1.3)
GLUCOSE SERPL-MCNC: 359 MG/DL — HIGH (ref 70–99)
GLUCOSE SERPL-MCNC: 376 MG/DL — HIGH (ref 70–99)
GLUCOSE SERPL-MCNC: 381 MG/DL — HIGH (ref 70–99)
HCT VFR BLD CALC: 30.4 % — LOW (ref 34.5–45)
HDLC SERPL-MCNC: 36 MG/DL — LOW
HGB BLD-MCNC: 10 G/DL — LOW (ref 11.5–15.5)
INR BLD: 1.87 RATIO — HIGH (ref 0.88–1.16)
LIPID PNL WITH DIRECT LDL SERPL: 52 MG/DL — SIGNIFICANT CHANGE UP
MAGNESIUM SERPL-MCNC: 2.3 MG/DL — SIGNIFICANT CHANGE UP (ref 1.6–2.6)
MCHC RBC-ENTMCNC: 30.6 PG — SIGNIFICANT CHANGE UP (ref 27–34)
MCHC RBC-ENTMCNC: 32.9 GM/DL — SIGNIFICANT CHANGE UP (ref 32–36)
MCV RBC AUTO: 93 FL — SIGNIFICANT CHANGE UP (ref 80–100)
NON HDL CHOLESTEROL: 93 MG/DL — SIGNIFICANT CHANGE UP
NRBC # BLD: 0 /100 WBCS — SIGNIFICANT CHANGE UP (ref 0–0)
PHOSPHATE SERPL-MCNC: 3.9 MG/DL — SIGNIFICANT CHANGE UP (ref 2.5–4.5)
PLATELET # BLD AUTO: 98 K/UL — LOW (ref 150–400)
POTASSIUM SERPL-MCNC: 5 MMOL/L — SIGNIFICANT CHANGE UP (ref 3.5–5.3)
POTASSIUM SERPL-MCNC: 5.6 MMOL/L — HIGH (ref 3.5–5.3)
POTASSIUM SERPL-MCNC: 5.6 MMOL/L — HIGH (ref 3.5–5.3)
POTASSIUM SERPL-SCNC: 5 MMOL/L — SIGNIFICANT CHANGE UP (ref 3.5–5.3)
POTASSIUM SERPL-SCNC: 5.6 MMOL/L — HIGH (ref 3.5–5.3)
POTASSIUM SERPL-SCNC: 5.6 MMOL/L — HIGH (ref 3.5–5.3)
PROT SERPL-MCNC: 6.1 G/DL — SIGNIFICANT CHANGE UP (ref 6–8.3)
PROTHROM AB SERPL-ACNC: 21.8 SEC — HIGH (ref 10.6–13.6)
RBC # BLD: 3.27 M/UL — LOW (ref 3.8–5.2)
RBC # FLD: 12.6 % — SIGNIFICANT CHANGE UP (ref 10.3–14.5)
SODIUM SERPL-SCNC: 132 MMOL/L — LOW (ref 135–145)
SODIUM SERPL-SCNC: 133 MMOL/L — LOW (ref 135–145)
SODIUM SERPL-SCNC: 134 MMOL/L — LOW (ref 135–145)
TRIGL SERPL-MCNC: 205 MG/DL — HIGH
TSH SERPL-MCNC: 2.41 UIU/ML — SIGNIFICANT CHANGE UP (ref 0.27–4.2)
WBC # BLD: 9.23 K/UL — SIGNIFICANT CHANGE UP (ref 3.8–10.5)
WBC # FLD AUTO: 9.23 K/UL — SIGNIFICANT CHANGE UP (ref 3.8–10.5)

## 2020-12-23 PROCEDURE — 71046 X-RAY EXAM CHEST 2 VIEWS: CPT | Mod: 26

## 2020-12-23 PROCEDURE — 93010 ELECTROCARDIOGRAM REPORT: CPT | Mod: 76

## 2020-12-23 PROCEDURE — 99222 1ST HOSP IP/OBS MODERATE 55: CPT

## 2020-12-23 PROCEDURE — 99223 1ST HOSP IP/OBS HIGH 75: CPT | Mod: AI

## 2020-12-23 RX ORDER — INSULIN LISPRO 100/ML
40 VIAL (ML) SUBCUTANEOUS
Refills: 0 | Status: DISCONTINUED | OUTPATIENT
Start: 2020-12-23 | End: 2020-12-24

## 2020-12-23 RX ORDER — ACETAMINOPHEN 500 MG
650 TABLET ORAL ONCE
Refills: 0 | Status: COMPLETED | OUTPATIENT
Start: 2020-12-23 | End: 2020-12-23

## 2020-12-23 RX ORDER — CARVEDILOL PHOSPHATE 80 MG/1
6.25 CAPSULE, EXTENDED RELEASE ORAL EVERY 12 HOURS
Refills: 0 | Status: DISCONTINUED | OUTPATIENT
Start: 2020-12-23 | End: 2020-12-24

## 2020-12-23 RX ORDER — INSULIN LISPRO 100/ML
30 VIAL (ML) SUBCUTANEOUS
Refills: 0 | Status: DISCONTINUED | OUTPATIENT
Start: 2020-12-23 | End: 2020-12-23

## 2020-12-23 RX ORDER — INSULIN LISPRO 100/ML
25 VIAL (ML) SUBCUTANEOUS
Refills: 0 | Status: DISCONTINUED | OUTPATIENT
Start: 2020-12-23 | End: 2020-12-23

## 2020-12-23 RX ORDER — SACUBITRIL AND VALSARTAN 24; 26 MG/1; MG/1
1 TABLET, FILM COATED ORAL
Refills: 0 | Status: DISCONTINUED | OUTPATIENT
Start: 2020-12-23 | End: 2020-12-24

## 2020-12-23 RX ORDER — INSULIN LISPRO 100/ML
VIAL (ML) SUBCUTANEOUS
Refills: 0 | Status: DISCONTINUED | OUTPATIENT
Start: 2020-12-23 | End: 2020-12-24

## 2020-12-23 RX ORDER — DIGOXIN 250 MCG
0.12 TABLET ORAL EVERY OTHER DAY
Refills: 0 | Status: DISCONTINUED | OUTPATIENT
Start: 2020-12-23 | End: 2020-12-24

## 2020-12-23 RX ORDER — SODIUM ZIRCONIUM CYCLOSILICATE 10 G/10G
10 POWDER, FOR SUSPENSION ORAL ONCE
Refills: 0 | Status: COMPLETED | OUTPATIENT
Start: 2020-12-23 | End: 2020-12-23

## 2020-12-23 RX ORDER — INSULIN HUMAN 100 [IU]/ML
10 INJECTION, SOLUTION SUBCUTANEOUS ONCE
Refills: 0 | Status: COMPLETED | OUTPATIENT
Start: 2020-12-23 | End: 2020-12-23

## 2020-12-23 RX ORDER — INSULIN GLARGINE 100 [IU]/ML
60 INJECTION, SOLUTION SUBCUTANEOUS AT BEDTIME
Refills: 0 | Status: DISCONTINUED | OUTPATIENT
Start: 2020-12-23 | End: 2020-12-24

## 2020-12-23 RX ORDER — WARFARIN SODIUM 2.5 MG/1
4 TABLET ORAL ONCE
Refills: 0 | Status: COMPLETED | OUTPATIENT
Start: 2020-12-23 | End: 2020-12-23

## 2020-12-23 RX ORDER — SODIUM ZIRCONIUM CYCLOSILICATE 10 G/10G
5 POWDER, FOR SUSPENSION ORAL ONCE
Refills: 0 | Status: COMPLETED | OUTPATIENT
Start: 2020-12-23 | End: 2020-12-23

## 2020-12-23 RX ORDER — DEXTROSE 50 % IN WATER 50 %
50 SYRINGE (ML) INTRAVENOUS ONCE
Refills: 0 | Status: COMPLETED | OUTPATIENT
Start: 2020-12-23 | End: 2020-12-23

## 2020-12-23 RX ADMIN — INSULIN GLARGINE 60 UNIT(S): 100 INJECTION, SOLUTION SUBCUTANEOUS at 21:52

## 2020-12-23 RX ADMIN — SODIUM ZIRCONIUM CYCLOSILICATE 5 GRAM(S): 10 POWDER, FOR SUSPENSION ORAL at 03:41

## 2020-12-23 RX ADMIN — SACUBITRIL AND VALSARTAN 1 TABLET(S): 24; 26 TABLET, FILM COATED ORAL at 17:04

## 2020-12-23 RX ADMIN — Medication 40 UNIT(S): at 17:04

## 2020-12-23 RX ADMIN — Medication 10: at 08:11

## 2020-12-23 RX ADMIN — Medication 650 MILLIGRAM(S): at 14:26

## 2020-12-23 RX ADMIN — Medication 650 MILLIGRAM(S): at 14:56

## 2020-12-23 RX ADMIN — Medication 0.12 MILLIGRAM(S): at 12:38

## 2020-12-23 RX ADMIN — INSULIN HUMAN 10 UNIT(S): 100 INJECTION, SOLUTION SUBCUTANEOUS at 03:41

## 2020-12-23 RX ADMIN — SODIUM ZIRCONIUM CYCLOSILICATE 10 GRAM(S): 10 POWDER, FOR SUSPENSION ORAL at 08:09

## 2020-12-23 RX ADMIN — Medication 10: at 11:41

## 2020-12-23 RX ADMIN — Medication 30 UNIT(S): at 11:41

## 2020-12-23 RX ADMIN — INSULIN GLARGINE 50 UNIT(S): 100 INJECTION, SOLUTION SUBCUTANEOUS at 00:35

## 2020-12-23 RX ADMIN — Medication 50 MILLILITER(S): at 03:41

## 2020-12-23 RX ADMIN — CARVEDILOL PHOSPHATE 6.25 MILLIGRAM(S): 80 CAPSULE, EXTENDED RELEASE ORAL at 17:04

## 2020-12-23 RX ADMIN — SIMVASTATIN 40 MILLIGRAM(S): 20 TABLET, FILM COATED ORAL at 21:01

## 2020-12-23 RX ADMIN — Medication 4: at 17:04

## 2020-12-23 RX ADMIN — WARFARIN SODIUM 4 MILLIGRAM(S): 2.5 TABLET ORAL at 21:01

## 2020-12-23 RX ADMIN — Medication 0.12 MILLIGRAM(S): at 06:12

## 2020-12-23 NOTE — PROGRESS NOTE ADULT - SUBJECTIVE AND OBJECTIVE BOX
24H hour events: BIV pacing on  telemetry     MEDICATIONS:  aspirin enteric coated 81 milliGRAM(s) Oral daily  carvedilol 6.25 milliGRAM(s) Oral every 12 hours  digoxin     Tablet 0.125 milliGRAM(s) Oral daily  warfarin 4 milliGRAM(s) Oral once  insulin glargine Injectable (LANTUS) 60 Unit(s) SubCutaneous at bedtime  insulin lispro (ADMELOG) corrective regimen sliding scale   SubCutaneous three times a day before meals  insulin lispro (ADMELOG) corrective regimen sliding scale   SubCutaneous at bedtime  insulin lispro Injectable (ADMELOG) 30 Unit(s) SubCutaneous three times a day before meals  simvastatin 40 milliGRAM(s) Oral at bedtime    REVIEW OF SYSTEMS:  See HPI, otherwise ROS negative.    PHYSICAL EXAM:  T(C): 36.9 (12-23-20 @ 04:39), Max: 36.9 (12-23-20 @ 04:39)  HR: 72 (12-23-20 @ 04:39) (58 - 90)  BP: 106/63 (12-23-20 @ 04:39) (84/49 - 106/63)  RR: 18 (12-23-20 @ 04:39) (11 - 37)  SpO2: 99% (12-23-20 @ 04:39) (90% - 100%)    I&O's Summary    22 Dec 2020 07:01  -  23 Dec 2020 07:00  --------------------------------------------------------  IN: 1355.2 mL / OUT: 1025 mL / NET: 330.2 mL    23 Dec 2020 07:01  -  23 Dec 2020 11:25  --------------------------------------------------------  IN: 120 mL / OUT: 0 mL / NET: 120 mL    Appearance: Obese Alert. NAD	  Cardiovascular: irregular rate rhythm no murmur   Respiratory: CTA B/L	  Psychiatry: A & O x 3, Mood & affect appropriate  Gastrointestinal:  Soft, NT. ND. +BS	  Skin: left infraclavicular implant site flat no hematoma DSD intact 	  Neurologic: Non-focal  Extremities: No edema BLE  Vascular: Peripheral pulses palpable 2+ bilaterally    LABS:	 	    CBC Full  -  ( 23 Dec 2020 02:43 )  WBC Count : 9.23 K/uL  Hemoglobin : 10.0 g/dL  Hematocrit : 30.4 %  Platelet Count - Automated : 98 K/uL  Mean Cell Volume : 93.0 fl  Mean Cell Hemoglobin : 30.6 pg  Mean Cell Hemoglobin Concentration : 32.9 gm/dL  Auto Neutrophil # : x  Auto Lymphocyte # : x  Auto Monocyte # : x  Auto Eosinophil # : x  Auto Basophil # : x  Auto Neutrophil % : x  Auto Lymphocyte % : x  Auto Monocyte % : x  Auto Eosinophil % : x  Auto Basophil % : x    12-23    134<L>  |  100  |  41<H>  ----------------------------<  376<H>  5.6<H>   |  21<L>  |  1.45<H>  12-23    133<L>  |  102  |  42<H>  ----------------------------<  359<H>  5.6<H>   |  19<L>  |  1.46<H>    Ca    9.2      23 Dec 2020 06:26  Ca    8.9      23 Dec 2020 02:43  Phos  3.9     12-23  Phos  2.0     12-22  Mg     2.3     12-23  Mg     2.2     12-22    TPro  6.1  /  Alb  3.6  /  TBili  0.4  /  DBili  x   /  AST  19  /  ALT  13  /  AlkPhos  66  12-23  TPro  6.7  /  Alb  3.9  /  TBili  0.6  /  DBili  x   /  AST  20  /  ALT  13  /  AlkPhos  71  12-22    TSH: Thyroid Stimulating Hormone, Serum (12.22.20 @ 23:49)   Thyroid Stimulating Hormone, Serum: 2.41 uIU/mL     TELEMETRY:   	  Afib V paced 80 -90 's  ECG:  	    RADIOLOGY:  < from: Xray Chest 1 View- PORTABLE-Urgent (Xray Chest 1 View- PORTABLE-Urgent .) (12.22.20 @ 14:21) >    INTERPRETATION:  XR CHEST URGENT    TECHNIQUE: One view of the chest, AP view, was obtained.    INDICATION: Lead reimplant.      COMPARISON: Radiograph 12/21/2020.      FINDINGS:      Lines and Tubes: Endotracheal tube in place. Single-lead AICD in place. The are AICD and an additional RV lead have been removed. CardioMEMS.      Lungs: The lungs are clear.      Pleura: No pleural effusion or pneumothorax.      Heart and Mediastinum: Cardiomegaly.      Skeletal: Sternotomy.      IMPRESSION:    1.  Removal of prior ICD and RV lead.  2.  Single-lead AICD in place.  3.  No unexpected radiopaque foreign bodies.

## 2020-12-23 NOTE — CONSULT NOTE ADULT - SUBJECTIVE AND OBJECTIVE BOX
[facility]         Ramy Solis, Ever, Julia, Nicole, José Miguel, Caron        [phone] (office)    CHIEF COMPLAINT: Patient is a 70y old  Female who presents with a chief complaint of ICD malfunction s/p removal and re-implantation (23 Dec 2020 08:34)      HPI:  Our office patient, sees Dr. Curtis  70yr old obese female with PMHx of CVA >10 years ago s/p decompression and now with residual gait instability, HTN, CAD s/p CABG x3 in 2007 followed by PCI with coronary stents x 2(8/2015), AFib- on Coumadin, DM2 on Insulin, hyperthyroidism, as well as ICM with BiV ICD (EF ~30% as of 5/2020 with LBBB, was upgraded from PPM in 2007 with [place] ICD lead and abandonment of RV lead). She presented for elective gen change and removal of old ICD Dinesh lead as well as old RV abandoned lead.     During the explant, she was found to have serum K 7.1 (was 5.4 the prior day). She takes Entresto and Aldactone at home, and has been doing this without change recently. She was medically treated with insulin 30 units IV and Lasix 40mg IVx1, after which serum K normalized to 4.9. Of note, she has been on Entresto since 5/2020, and follows closely with Dr. Curtis outpatient. She sees him every 2 weeks, with in office SBPs ranging in 100s.     She was a difficult intubation. Pt. was not extubated post procedure as she was hypercarbic, per anesthesia.  Was given post-procedural abx with ancef and cefuroxime. Was transferred to CCU thereafter. Was extubated successfully. HF team consulted for recommendations regarding GDMT in context of hyperkalemia, and their feeling was that if possible, Entresto should be continued and aldactone held, with the potential to resume it as an outpt with close monitoring of her k.    This morning she feels fine.  Her k is elevated.      PAST MEDICAL & SURGICAL HISTORY:  Mild pulmonary hypertension  on echo 5/2020, moderate on cardio ( Dr. Curtis&#x27;s note)    Atrial fibrillation  on Coumadin    Unsteady gait  uses walker PRN    CHF (congestive heart failure)  denies any recent exacerbations or intubation hx    HLD (hyperlipidemia)    MI (myocardial infarction)  2007    Obesity    Former smoker    Hyperthyroidism    CVA (Cerebral Vascular Accident)  &gt; 10 years ago    Diabetes Mellitus, Type 2    CAD (Coronary Artery Disease)    Benign Hypertension    S/P coronary artery stent placement  &gt; 2 years ago, 2 stents    AICD (automatic cardioverter/defibrillator) present  [employer], N161 [employer]/[id number], last interrogation 11/25/2020    S/P Hysterectomy    S/P Ventriculoperitoneal Shunt  probably removed per pt    S/P Craniotomy  &gt; 10 years ago with bleed evaccuation    CABG (Coronary Artery Bypass Graft)  2007        SOCIAL HISTORY: No active tobacco, alcohol or illicit drug use    FAMILY HISTORY:   No pertinent family history of CAD    Outpatient medications:    MEDICATIONS  (STANDING):  aspirin enteric coated 81 milliGRAM(s) Oral daily  carvedilol 6.25 milliGRAM(s) Oral every 12 hours  digoxin     Tablet 0.125 milliGRAM(s) Oral daily  insulin glargine Injectable (LANTUS) 60 Unit(s) SubCutaneous at bedtime  insulin lispro (ADMELOG) corrective regimen sliding scale   SubCutaneous three times a day before meals  insulin lispro (ADMELOG) corrective regimen sliding scale   SubCutaneous at bedtime  insulin lispro Injectable (ADMELOG) 30 Unit(s) SubCutaneous three times a day before meals  simvastatin 40 milliGRAM(s) Oral at bedtime  warfarin 4 milliGRAM(s) Oral once    MEDICATIONS  (PRN):      Allergies    No Known Allergies    Intolerances        REVIEW OF SYSTEMS: Is negative for eye, ENT, GI, , allergic, dermatologic, musculoskeletal and neurologic, except as described above.    VITAL SIGNS:   Vital Signs Last 24 Hrs  T(C): 36.9 (23 Dec 2020 04:39), Max: 36.9 (23 Dec 2020 04:39)  T(F): 98.5 (23 Dec 2020 04:39), Max: 98.5 (23 Dec 2020 04:39)  HR: 72 (23 Dec 2020 04:39) (58 - 90)  BP: 106/63 (23 Dec 2020 04:39) (84/49 - 106/63)  BP(mean): 77 (23 Dec 2020 04:39) (62 - 77)  RR: 18 (23 Dec 2020 04:39) (11 - 37)  SpO2: 99% (23 Dec 2020 04:39) (90% - 100%)    I&O's Summary    22 Dec 2020 07:01  -  23 Dec 2020 07:00  --------------------------------------------------------  IN: 1355.2 mL / OUT: 1025 mL / NET: 330.2 mL        PHYSICAL EXAM:    Constitutional: NAD, awake and alert, well-developed  Eyes:  EOMI, no oral cyanosis, conjunctivae clear, anicteric.  Pulmonary: Non-labored, breath sounds are clear bilaterally, no wheezing, rales or rhonchi  Cardiovascular:  regular S1 and S2. No murmur.  No rubs, gallops or clicks  Gastrointestinal: Bowel Sounds present, soft, nontender.   Lymph: No peripheral edema.   Neurological: Alert, strength and sensitivity are grossly intact  Skin: No obvious lesions/rashes.   Psych:  Mood & affect appropriate .    LABS: All Labs Reviewed:                        10.0   9.23  )-----------( 98       ( 23 Dec 2020 02:43 )             30.4                         10.9   8.14  )-----------( 129      ( 22 Dec 2020 13:23 )             34.0                         12.3   6.27  )-----------( 129      ( 21 Dec 2020 12:21 )             38.2     23 Dec 2020 06:26    134    |  100    |  41     ----------------------------<  376    5.6     |  21     |  1.45   23 Dec 2020 02:43    133    |  102    |  42     ----------------------------<  359    5.6     |  19     |  1.46   22 Dec 2020 13:23    137    |  102    |  40     ----------------------------<  192    4.9     |  22     |  1.51     Ca    9.2        23 Dec 2020 06:26  Ca    8.9        23 Dec 2020 02:43  Ca    9.9        22 Dec 2020 13:23  Phos  3.9       23 Dec 2020 02:43  Phos  2.0       22 Dec 2020 13:23  Mg     2.3       23 Dec 2020 02:43  Mg     2.2       22 Dec 2020 13:23    TPro  6.1    /  Alb  3.6    /  TBili  0.4    /  DBili  x      /  AST  19     /  ALT  13     /  AlkPhos  66     23 Dec 2020 02:43  TPro  6.7    /  Alb  3.9    /  TBili  0.6    /  DBili  x      /  AST  20     /  ALT  13     /  AlkPhos  71     22 Dec 2020 13:23    PT/INR - ( 23 Dec 2020 02:43 )   PT: 21.8 sec;   INR: 1.87 ratio         PTT - ( 23 Dec 2020 02:43 )  PTT:35.9 sec      Blood Culture:     12-22 @ 23:49  TSH: 2.41      RADIOLOGY:   
In-House Cardiology Consult Note  ---------------------------------------------    HPI:  70yr old obese female with PMHx of CVA >10 years ago s/p decompression and now with residual gait instability, HTN, CAD s/p CABG x3 in 2007 followed by PCI with coronary stents x 2(8/2015), AFib- on Coumadin, DM2 on Insulin, hyperthyroidism, as well as ICM with BiV ICD (EF ~30% as of 5/2020 with LBBB, was upgraded from PPM in 2007 with [place] ICD lead and abandonment of RV lead). She presented for elective gen change and old ICD [place] lead as well as old RV abandoned lead.     During the explant, she was found to have serum K 7.1 (was 5.4 yesterday). She takes Entresto and Aldactone at home. She was medically treated with insulin 30 units IV and Lasix 40mg IVx1, after which serum K normalized to 4.9. Of note, she has been on Entresto since 5/2020, and follows closely with Dr. Curtis outpatient. She sees him every 2 weeks, with in office SBPs ranging in 100s.     She was a difficult intubation. Pt. was not extubated as he was hypercarbic, per anesthesia.  Was given post-procedural abx with ancef and cefuroxime. Was transferred to CCU thereafter. Was extubated successfully. HF team consulted for recommendations regarding GDMT in context of hyperkalemia today.    PMHx:   Mild pulmonary hypertension  Atrial fibrillation  Unsteady gait  CHF (congestive heart failure)  Arrhythmia  HLD (hyperlipidemia)  MI (myocardial infarction)  Obesity  Former smoker  Hyperthyroidism  TIA (Transient ischemic Attack)  Cerebellar Infarction  CVA (Cerebral Vascular Accident)  Diabetes Mellitus, Type 2  CAD (Coronary Artery Disease)  Benign Hypertension    PSHx:   S/P coronary artery stent placement  AICD (automatic cardioverter/defibrillator) present  ICD  Hyperthyroidism  Cardiac Pacemaker  S/P Hysterectomy  S/P Ventriculoperitoneal Shunt  S/P Craniotomy  CABG (Coronary Artery Bypass Graft)    No Known Allergies    Current Medications:   ceFAZolin   IVPB 1000 milliGRAM(s) IV Intermittent every 8 hours  chlorhexidine 2% Cloths 1 Application(s) Topical at bedtime  insulin lispro (ADMELOG) corrective regimen sliding scale   SubCutaneous three times a day before meals  insulin lispro (ADMELOG) corrective regimen sliding scale   SubCutaneous at bedtime  phenylephrine    Infusion 0.2 MICROgram(s)/kG/Min IV Continuous <Continuous>  simvastatin 40 milliGRAM(s) Oral at bedtime    REVIEW OF SYSTEMS:  CONSTITUTIONAL: No weakness, fevers or chills  EYES/ENT: No visual changes;  No dysphagia  NECK: No pain or stiffness  RESPIRATORY: No cough, wheezing, hemoptysis; No shortness of breath  CARDIOVASCULAR: No chest pain or palpitations; No lower extremity edema  GASTROINTESTINAL: No abdominal or epigastric pain. No nausea, vomiting, or hematemesis; No diarrhea or constipation. No melena or hematochezia.  BACK: No back pain  GENITOURINARY: No dysuria, frequency or hematuria  NEUROLOGICAL: No numbness or weakness  SKIN: No itching, burning, rashes, or lesions   All other review of systems is negative unless indicated above.    Physical Exam:  T(F): 95.9 (12-22), Max: 97.5 (12-22)  HR: 74 (12-22) (69 - 76)  BP: 84/49 (12-22) (84/49 - 98/64)  RR: 21 (12-22)  SpO2: 94% (12-22)  GENERAL: Patient awake alert NAD.  HEENT: NC/AT, Moist mucous membranes, PERRL, EOMI.  LUNGS: Intubated on Volume A/C , RR 14, FiO2 30%, PEEP 5  CARDIAC: RRR, no m/r/g.    ABDOMEN: Soft, ND.  EXT: No edema. No calf tenderness. CV 2+DP/PT bilaterally.  MSK: No deformities.  NEURO: Intubated and sedated.  SKIN: Warm and dry. No rash.  PSYCH: Normal affect.    CXR: Personally reviewed    Labs: Personally reviewed                        10.9   8.14  )-----------( 129      ( 22 Dec 2020 13:23 )             34.0     12-22    137  |  102  |  40<H>  ----------------------------<  192<H>  4.9   |  22  |  1.51<H>    Ca    9.9      22 Dec 2020 13:23  Phos  2.0     12-22  Mg     2.2     12-22    TPro  6.7  /  Alb  3.9  /  TBili  0.6  /  DBili  x   /  AST  20  /  ALT  13  /  AlkPhos  71  12-22    PT/INR - ( 22 Dec 2020 13:23 )   PT: 23.2 sec;   INR: 2.00 ratio         PTT - ( 22 Dec 2020 13:23 )  PTT:34.4 sec

## 2020-12-23 NOTE — CONSULT NOTE ADULT - ASSESSMENT
70yr old obese female with PMHx of CVA >10 years ago s/p decompression and now with residual gait instability, HTN, CAD s/p CABG x3 in 2007 followed by PCI with coronary stents x 2(8/2015), AFib- on Coumadin, DM2 on Insulin, hyperthyroidism, as well as ICM with BiV ICD (EF ~30% as of 5/2020 with LBBB, was upgraded from PPM in 2007 with [place] ICD lead and abandonment of RV lead). She presented for elective gen change and removal of old ICD Dinesh lead as well as old RV abandoned lead.     During the explant, she was found to have serum K 7.1 (was 5.4 the prior day). She takes Entresto and Aldactone at home, and has been doing this without change recently. She was medically treated with insulin 30 units IV and Lasix 40mg IVx1, after which serum K normalized to 4.9. Of note, she has been on Entresto since 5/2020, and follows closely with Dr. Curtis outpatient. She sees him every 2 weeks, with in office SBPs ranging in 100s.     She was a difficult intubation. Pt. was not extubated post procedure as she was hypercarbic, per anesthesia.  Was given post-procedural abx with ancef and cefuroxime. Was transferred to CCU thereafter. Was extubated successfully. HF team consulted for recommendations regarding GDMT in context of hyperkalemia, and their feeling was that if possible, Entresto should be continued and aldactone held, with the potential to resume it as an outpt with close monitoring of her k.    This morning she feels fine.  Her k is elevated.    -there is no evidence of acute ischemia.  -known cad s/p cabg, pci  -cont asa  -cont bb  -cont statin    -there is no evidence of significant arrhythmia.  -chronic af with   -cont warfarin goal inr 2-3  -cont bb  -cont dig  -icd functioning well    -there is no evidence for meaningful  volume overload.  -ef 30  -has been on a good hf regimen with entresto, coreg, aldactone, dig, demadex  -in the context of severe hyperkalemia without definite trigger, is reaonable to hold the aldactone  -she has had a significant clinical improvement since starting entresto and would be ideal to continue  -if aldactone to be held would be important to monitor her volume closely  -if it is to be resume, k will need to followed carefully, and the use of k binder may need to be considered     -can be considered for dc once k is normal and adequate followup is in place
70yr old obese female with PMHx of CVA >10 years ago s/p decompression and now with residual gait instability, HTN, CAD s/p CABG x3 in 2007 followed by PCI with coronary stents x 2(8/2015), AFib- on Coumadin, DM2 on Insulin, hyperthyroidism, as well as ICM with BiV ICD (EF ~30% as of 5/2020 with LBBB, was upgraded from PPM in 2007 with [place] ICD lead and abandonment of RV lead). She presented for elective gen change and old ICD Dinesh lead as well as old RV abandoned lead. HF team consulted for GDMT management, as she had hyperkalemia while on Entresto and Aldactone while inpatient.

## 2020-12-23 NOTE — DISCHARGE NOTE PROVIDER - NSDCFUSCHEDAPPT_GEN_ALL_CORE_FT
JULY CLINE ; 12/29/2020 ; [facility] [place] [facility] ; 12/31/2020 ; [facility] [place] [facility] [address] JULY Schmitt ; 02/10/2021 ; [facility] [place] [facility] ; 03/15/2021 ; [facility] [place] [facility]

## 2020-12-23 NOTE — DISCHARGE NOTE PROVIDER - NSDCMRMEDTOKEN_GEN_ALL_CORE_FT
Aldactone 25 mg oral tablet: 1 tab(s) orally once a day  aspirin 81 mg oral delayed release tablet: 1 tab(s) orally once a day (at bedtime)  carvedilol 12.5 mg oral tablet: 1 tab(s) orally every 12 hours  Demadex 20 mg oral tablet: 3 tab(s) orally once a day  digoxin 125 mcg (0.125 mg) oral tablet: 1 tab(s) orally every 48 hours  Entresto 24 mg-26 mg oral tablet: 1 tab(s) orally 2 times a day  HumaLOG 100 units/mL subcutaneous solution: subcutaneous 3 times a day (before meals) sliding scale  Levemir 100 units/mL subcutaneous solution: 60 unit(s) subcutaneous once a day (at bedtime)  Tapazole 5 mg oral tablet: 0.5 tab(s) orally every 48 hours  Vitamin D3 2000 intl units (50 mcg) oral tablet: orally once a day  warfarin 4 mg oral tablet: orally once a day (at bedtime)  Zocor 40 mg oral tablet: 1 tab(s) orally once a day (at bedtime)   aspirin 81 mg oral delayed release tablet: 1 tab(s) orally once a day (at bedtime)  carvedilol 12.5 mg oral tablet: 1 tab(s) orally every 12 hours  Demadex 20 mg oral tablet: 3 tab(s) orally once a day  digoxin 125 mcg (0.125 mg) oral tablet: 1 tab(s) orally every 48 hours  Entresto 24 mg-26 mg oral tablet: 1 tab(s) orally 2 times a day  HumaLOG 100 units/mL subcutaneous solution: subcutaneous 3 times a day (before meals) sliding scale  Levemir 100 units/mL subcutaneous solution: 60 unit(s) subcutaneous once a day (at bedtime)  Tapazole 5 mg oral tablet: 0.5 tab(s) orally every 48 hours  Vitamin D3 2000 intl units (50 mcg) oral tablet: orally once a day  warfarin 4 mg oral tablet: orally once a day (at bedtime)  Zocor 40 mg oral tablet: 1 tab(s) orally once a day (at bedtime)   aspirin 81 mg oral delayed release tablet: 1 tab(s) orally once a day (at bedtime)  carvedilol 6.25 mg oral tablet: 1 tab(s) orally every 12 hours  Demadex 20 mg oral tablet: 3 tab(s) orally once a day  digoxin 125 mcg (0.125 mg) oral tablet: 1 tab(s) orally every 48 hours  Entresto 24 mg-26 mg oral tablet: 1 tab(s) orally 2 times a day  HumaLOG 100 units/mL subcutaneous solution: subcutaneous 3 times a day (before meals) sliding scale  Levemir 100 units/mL subcutaneous solution: 60 unit(s) subcutaneous once a day (at bedtime)  Tapazole 5 mg oral tablet: 0.5 tab(s) orally every 48 hours  Vitamin D3 2000 intl units (50 mcg) oral tablet: orally once a day  warfarin 4 mg oral tablet: orally once a day (at bedtime)  Zocor 40 mg oral tablet: 1 tab(s) orally once a day (at bedtime)

## 2020-12-23 NOTE — DISCHARGE NOTE PROVIDER - NSDCCPCAREPLAN_GEN_ALL_CORE_FT
PRINCIPAL DISCHARGE DIAGNOSIS  Diagnosis: Malfunction of implantable cardioverter-defibrillator (ICD), sequela  Assessment and Plan of Treatment: Your old device was extracted and new ICD was implanted ton 12/22/20.  ICD paired and checked by representative with normal function  Please follow up with EP for wound check as directed      SECONDARY DISCHARGE DIAGNOSES  Diagnosis: Hyperkalemia  Assessment and Plan of Treatment: Resolved. Your potassium noted to be high and your cardiac medications adjusted including holding Spironolactone. Please follow up with your PCP / Cards in a week for further management including repeating blood work to monitor your electrolytes

## 2020-12-23 NOTE — PROGRESS NOTE ADULT - PROBLEM SELECTOR PLAN 4
Was hyperkalemic in the OR to 7. Resolved after insulin, and diuretics. Is slightly hyperkalemic again this am to 5.6. Ddx for currently hyperkalemia includes 2/2 glucose induced potassium shift out of cells given hyperglycemia this am vs possible digitalis effect.   - Given Lokelma this am.   - Will add standing pre-meal insulin  - Monitor BMP and FS  - Hold aldactone and entresto for now

## 2020-12-23 NOTE — PROGRESS NOTE ADULT - SUBJECTIVE AND OBJECTIVE BOX
Daily In-House Cardiology Progress Note  -------------------------------------------------------    24-Hour Events/Subjective:        Telemetry:    ROS:   Constitutional: No Fatigue, weakness, fever, chills  HEENT: No sore throat, dryness, hoarseness, congestion  Cardiovascular: No chest pain, SOB, palpitations, RACHEL, lightheadedness, dizziness  Respiratory: No wheezing, cough, phlegm  GI: No nausea, vomiting, diarrhea, poor PO tolerance, dyspepsia, dysphagia  Musculoskeletal: No myalgias, arthralgias, joint swelling, back pain  Extremities: No swelling, coldness  Skin: No rashes, lesions, pruritis   Neuro: No weakness, confusion, blurry vision  Psych: No anxiety, depression    Current Meds:  aspirin enteric coated 81 milliGRAM(s) Oral daily  carvedilol 6.25 milliGRAM(s) Oral every 12 hours  digoxin     Tablet 0.125 milliGRAM(s) Oral daily  insulin glargine Injectable (LANTUS) 60 Unit(s) SubCutaneous at bedtime  insulin lispro (ADMELOG) corrective regimen sliding scale   SubCutaneous three times a day before meals  insulin lispro (ADMELOG) corrective regimen sliding scale   SubCutaneous at bedtime  insulin lispro Injectable (ADMELOG) 30 Unit(s) SubCutaneous three times a day before meals  simvastatin 40 milliGRAM(s) Oral at bedtime  warfarin 4 milliGRAM(s) Oral once      Vitals:  T(F): 98.5 (-), Max: 98.5 ()  HR: 72 (-) (58 - 90)  BP: 106/63 () (84/49 - 106/63)  RR: 18 (-)  SpO2: 99% ()  I&O's Summary    22 Dec 2020 07:01  -  23 Dec 2020 07:00  --------------------------------------------------------  IN: 1355.2 mL / OUT: 1025 mL / NET: 330.2 mL        Physical Exam:  Appearance: No acute distress; well appearing  Eyes: PERRL, EOMI, pink conjunctiva  HEENT: Normal oral mucosa  Cardiovascular: RRR, S1, S2, no murmurs, rubs, or gallops; no edema; no JVD  Respiratory: Clear to auscultation bilaterally  Gastrointestinal: soft, non-tender, non-distended with normal bowel sounds  Musculoskeletal: No clubbing; no joint deformity   Neurologic: Non-focal  Lymphatic: No lymphadenopathy  Psychiatry: AAOx3, mood & affect appropriate  Skin: No rashes, ecchymoses, or cyanosis                          10.0   9.23  )-----------( 98       ( 23 Dec 2020 02:43 )             30.4     12    134<L>  |  100  |  41<H>  ----------------------------<  376<H>  5.6<H>   |  21<L>  |  1.45<H>    Ca    9.2      23 Dec 2020 06:26  Phos  3.9       Mg     2.3         TPro  6.1  /  Alb  3.6  /  TBili  0.4  /  DBili  x   /  AST  19  /  ALT  13  /  AlkPhos  66  12    PT/INR - ( 23 Dec 2020 02:43 )   PT: 21.8 sec;   INR: 1.87 ratio         PTT - ( 23 Dec 2020 02:43 )  PTT:35.9 sec        Total Cholesterol: 129  LDL: --  HDL: 36  T

## 2020-12-23 NOTE — DISCHARGE NOTE PROVIDER - NSDCFUADDAPPT_GEN_ALL_CORE_FT
Please follow up with your PCP and Cards in a week for further management including repeating blood work to monitor your electrolytes (especially potassium) and assess volume status. A some point as outpatient may have spironolactone resumed.  Please follow up in EP clinic for wound check and device check post op on 12/31 at 10 am , [phone]   Please follow up with your PCP and Cards in a week for further management including repeating blood work to monitor your electrolytes (especially potassium) and assess volume status. A some point as outpatient may have spironolactone resumed.  Please follow up in EP clinic for wound check and device check post op on 12/31 at 10 am , [phone]  Your follow up for INR check is scheduled on 12/29/20 at 13:45.

## 2020-12-23 NOTE — PROGRESS NOTE ADULT - SUBJECTIVE AND OBJECTIVE BOX
MEDICINE ATTENDING TRANSFER NOTE    Hospital course  70 yr old obese female with PMH of  CVA > 10 years ago- with residual gait instability, Cerebral bleed with surgical evacuation, former smoker, HTN, MI/CAD s/p CABG x 3 2007, coronary stents x 2 (8/2015), A Fib- on AC,  DM2 on Insulin, hyperthyroidism  CHF/ Inducible sustained ventricular tachycardia/ ischemic cardiomyopathy s/p ICD placement (BIVIVD Guidant) planned for ICD lead extraction, BIVICD [employer] reimplantation on 12/22/2020.    Per verbal report, pt. had misfiring of her AICD and so had a removal and re-implantation today.  She was a difficult intubation and STAT blood work in the OR showed electrolyte derangements, particularly hyperkalemia to 7.1 on a non-hemolyzed sample.  Pt. was given calcium, 30 units insulin, and 40mg IV Lasix.  Repeat CMP showed resolution w/ K of 4.9.  Pt. was on aldactone, which may have been the cause of hyperkalemia.  Pt. was not extubated as she was hypercarbic, per anesthesia. She was given post-procedural antibiotics with Ancef and cefuroxime. Patient was transferred to CICU for further monitoring. She was eventually extubated and transferred to regular medicine floor.           SUBJECTIVE: Moved from CICU overnight. Did not sleep well. Aside from some discomfort at the ICD site pt feels fine. States her FS are high this am because she usually take 60u of sliding scale at home.       Home Medications:  Aldactone 25 mg oral tablet: 1 tab(s) orally once a day (22 Dec 2020 07:42)  aspirin 81 mg oral delayed release tablet: 1 tab(s) orally once a day (at bedtime) (22 Dec 2020 07:42)  carvedilol 12.5 mg oral tablet: 1 tab(s) orally every 12 hours (22 Dec 2020 07:42)  Demadex 20 mg oral tablet: 3 tab(s) orally once a day (22 Dec 2020 07:42)  digoxin 125 mcg (0.125 mg) oral tablet: 1 tab(s) orally every 48 hours (22 Dec 2020 07:42)  Entresto 24 mg-26 mg oral tablet: 1 tab(s) orally 2 times a day (22 Dec 2020 07:42)  HumaLOG 100 units/mL subcutaneous solution: subcutaneous 3 times a day (before meals) sliding scale (22 Dec 2020 07:42)  Levemir 100 units/mL subcutaneous solution: 60 unit(s) subcutaneous once a day (at bedtime) (22 Dec 2020 07:42)  Tapazole 5 mg oral tablet: 0.5 tab(s) orally every 48 hours (22 Dec 2020 07:42)  Vitamin D3 2000 intl units (50 mcg) oral tablet: orally once a day (22 Dec 2020 07:42)  warfarin 4 mg oral tablet: orally once a day (at bedtime) (22 Dec 2020 07:42)  Zocor 40 mg oral tablet: 1 tab(s) orally once a day (at bedtime) (22 Dec 2020 07:42)      PAST MEDICAL & SURGICAL HISTORY:  Mild pulmonary hypertension  on echo 5/2020, moderate on cardio ( Dr. Curtis&#x27;s note)    Atrial fibrillation  on Coumadin    Unsteady gait  uses walker PRN    CHF (congestive heart failure)  denies any recent exacerbations or intubation hx    HLD (hyperlipidemia)    MI (myocardial infarction)  2007    Obesity    Former smoker    Hyperthyroidism    CVA (Cerebral Vascular Accident)  &gt; 10 years ago    Diabetes Mellitus, Type 2    CAD (Coronary Artery Disease)    Benign Hypertension    S/P coronary artery stent placement  &gt; 2 years ago, 2 stents    AICD (automatic cardioverter/defibrillator) present  [employer], [id number] Guidant/[id number], last interrogation 11/25/2020    S/P Hysterectomy    S/P Ventriculoperitoneal Shunt  probably removed per pt    S/P Craniotomy  &gt; 10 years ago with bleed evacuation    CABG (Coronary Artery Bypass Graft)  2007        Review of Systems:   CONSTITUTIONAL: No fever, weight loss, or fatigue  EYES: No eye pain, visual disturbances, or discharge  ENMT:  No difficulty hearing, tinnitus, vertigo; No sinus or throat pain  NECK: No pain or stiffness  RESPIRATORY: No cough, wheezing, chills or hemoptysis; No shortness of breath  CARDIOVASCULAR: No chest pain, palpitations, dizziness, or leg swelling  GASTROINTESTINAL: No abdominal or epigastric pain. No nausea, vomiting, or hematemesis; No diarrhea or constipation. No melena or hematochezia.  GENITOURINARY: No dysuria, frequency, hematuria, or incontinence  NEUROLOGICAL: No headaches, memory loss, loss of strength, numbness, or tremors  ENDOCRINE: No heat or cold intolerance; No hair loss  MUSCULOSKELETAL: +discomfort at ICD implant site  PSYCHIATRIC: No depression, anxiety, mood swings, or difficulty sleeping      Allergies    No Known Allergies    Intolerances        Social History: Former smoker    FAMILY HISTORY:   No significant family history    Vital Signs Last 24 Hrs  T(C): 36.9 (23 Dec 2020 04:39), Max: 36.9 (23 Dec 2020 04:39)  T(F): 98.5 (23 Dec 2020 04:39), Max: 98.5 (23 Dec 2020 04:39)  HR: 72 (23 Dec 2020 04:39) (58 - 90)  BP: 106/63 (23 Dec 2020 04:39) (84/49 - 106/63)  BP(mean): 77 (23 Dec 2020 04:39) (62 - 77)  RR: 18 (23 Dec 2020 04:39) (11 - 37)  SpO2: 99% (23 Dec 2020 04:39) (90% - 100%)  CAPILLARY BLOOD GLUCOSE      POCT Blood Glucose.: 357 mg/dL (23 Dec 2020 07:26)  POCT Blood Glucose.: 395 mg/dL (23 Dec 2020 00:35)  POCT Blood Glucose.: 315 mg/dL (22 Dec 2020 21:22)      PHYSICAL EXAM:  GENERAL: NAD, well-developed  HEAD:  NCAT  EYES: EOMI  NECK: Supple, No JVD  CHEST/LUNG: Clear to auscultation bilaterally; No wheeze  HEART: Reg rate. No M/R/G.  ABDOMEN: Soft, NT/ND, Bowel sounds present  EXTREMITIES:  2+ Peripheral Pulses, No clubbing, cyanosis, trace edema of LLE  PSYCH: AAOx3  NEUROLOGY: non-focal  SKIN: No rashes or lesions    LABS:                        10.0   9.23  )-----------( 98       ( 23 Dec 2020 02:43 )             30.4     12-23    134<L>  |  100  |  41<H>  ----------------------------<  376<H>  5.6<H>   |  21<L>  |  1.45<H>    Ca    9.2      23 Dec 2020 06:26  Phos  3.9     12-23  Mg     2.3     12-23    TPro  6.1  /  Alb  3.6  /  TBili  0.4  /  DBili  x   /  AST  19  /  ALT  13  /  AlkPhos  66  12-23    PT/INR - ( 23 Dec 2020 02:43 )   PT: 21.8 sec;   INR: 1.87 ratio         PTT - ( 23 Dec 2020 02:43 )  PTT:35.9 sec            MEDICATIONS  (STANDING):  aspirin enteric coated 81 milliGRAM(s) Oral daily  carvedilol 6.25 milliGRAM(s) Oral every 12 hours  digoxin     Tablet 0.125 milliGRAM(s) Oral daily  insulin glargine Injectable (LANTUS) 60 Unit(s) SubCutaneous at bedtime  insulin lispro (ADMELOG) corrective regimen sliding scale   SubCutaneous three times a day before meals  insulin lispro (ADMELOG) corrective regimen sliding scale   SubCutaneous at bedtime  simvastatin 40 milliGRAM(s) Oral at bedtime    MEDICATIONS  (PRN):    Imaging:  PROCEDURE: Intra operative transeophageal echocardiogram  with 2D, M mode and complete  Doppler examination.  The  transesophageal probe was placed in the esophagus after  induction of anesthesia.  INDICATION: Breakdown (mechanical) of cardiac electrode,  initial encounter (T82.110A)  ------------------------------------------------------------------------  Dimensions:    Normal Values:  LA:            2.0 - 4.0 cm  Ao:            2.0 - 3.8 cm  SEPTUM: 0.9    0.6 - 1.2 cm  PWT:    0.9    0.6 - 1.1 cm  LVIDd:  5.7    3.0 - 5.6 cm  LVIDs:  5.1    1.8 - 4.0 cm  Derived variables:  LVMI: 105 g/m2  RWT: 0.31  Fractional short: 11 %  EF (3D Quantification): 35 %  ------------------------------------------------------------------------  Pre-Bypass Observations:  Mitral Valve: Thickened mitral valve. Mild mitral  regurgitation.  Aortic Valve/Aorta: Normal trileaflet aortic valve. No  aortic valve regurgitation seen.  Ascending Aorta: 3.3 cm. Normal aortic root.  Left Atrium: Enlarged left atrium. No thrombus visualized  in the left atrial appendage.  Left Ventricle: Severe global left ventricular systolic  dysfunction. Severe global hypokinesia. Inferior wall  dyskinesis. Moderate left ventricular enlargement.  Right Heart: Normal right atrium. Three device wires are  seen in the right heart. Normal right ventricular size with  decreased right ventricular systolic function. Normal  tricuspid valve. Mild tricuspid regurgitation. Normal  pulmonic valve.  Pericardium/Pleura: Normal pericardium with no pericardial  effusion.  Hemodynamic: Estimated right atrial pressure is 8 mm Hg.  Color Doppler demonstrates no evidence of a patent foramen  ovale.  ------------------------------------------------------------------------  Post-Bypass Observations:    S/p lead extraction and ICD reimplantation, there is no  pericardial effusion and the tricuspid regurgitation is  unchanged. There are no other changes from the  pre-extraction exam.  ------------------------------------------------------------------------  Conclusions:  1. Thickened mitral valve. Mild mitral regurgitation.  2. Normal trileaflet aortic valve.  3. Ascending Aorta: 3.3 cm. Normal aortic root.  4. Enlarged left atrium. No thrombus visualized in the left  atrial appendage.  5. Moderate left ventricular enlargement.  6. Severe global left ventricular systolic dysfunction.  Severe global hypokinesia. Inferior wall dyskinesis.  7. Normal right atrium. Three device wires are seen in the  right heart.  8. Normal right ventricular size with decreased right  ventricular systolic function.  9. Normal tricuspid valve. Mild tricuspid regurgitation.  10. Color Doppler demonstrates no evidence of a patent  foramen ovale.  11. Normal pericardium with no pericardial effusion

## 2020-12-23 NOTE — DISCHARGE NOTE PROVIDER - HOSPITAL COURSE
70 yr old obese female with PMH of  CVA > 10 years ago- with residual gait instability, Cerebral bleed with surgical evacuation, former smoker, HTN, MI/CAD s/p CABG x 3 2007, coronary stents x 2 (8/2015), A Fib- on AC,  DM2 on Insulin, hyperthyroidism  CHF/ Inducible sustained ventricular tachycardia/ ischemic cardiomyopathy s/p ICD placement (BIVIVD Guidant) presents with malfunction of ICD. Seen and evaluated by EP. s/p explant and re-implantation on 12/22 by EP. Hospital course complicated by hyperkalemia. s/p Insulin and diuretics, and again noted with mild hyperkalemia to 5.6 Ddx for currently hyperkalemia includes 2/2 glucose induced potassium shift out of cells given hyperglycemia this am vs possible digitalis effect. Cardiac meds adjusted. Hyperkalemia resolved. Patient remains stable for DC with close follow up with PCP, Cards, and EP as per Dr. Sarabia 70 yr old obese female with PMH of  CVA > 10 years ago- with residual gait instability, Cerebral bleed with surgical evacuation, former smoker, HTN, MI/CAD s/p CABG x 3 2007, CKD, coronary stents x 2 (8/2015), A Fib- on AC,  DM2 on Insulin, hyperthyroidism  CHF/ Inducible sustained ventricular tachycardia/ ischemic cardiomyopathy s/p ICD placement (BIVIVD Guidant) presents with malfunction of ICD. Seen and evaluated by EP. s/p explant and re-implantation on 12/22 by EP. Hospital course complicated by hyperkalemia. s/p Insulin and diuretics, and again noted with mild hyperkalemia to 5.6 Ddx for currently hyperkalemia includes 2/2 glucose induced potassium shift out of cells given hyperglycemia this am vs possible digitalis effect. Cardiac meds adjusted and aldactone held. Hyperkalemia resolved. Patient remains stable for DC with close follow up with PCP, Cards, and EP as per Dr. Sarabia

## 2020-12-23 NOTE — PROGRESS NOTE ADULT - ASSESSMENT
70 year old  obese female former smoker  with PMHx CVA greater than 10 years ago, with residual gait instability, Cerebral bleed with craniotomy evacuation thrombus,  hyperthyroidism, T2 DM  HTN HLD CAD, MI  s/p CABG x 3 2007, coronary stents x 2 (8/2015), A Fib- on AC, Inducible sustained ventricular tachycardia, HFrEF, s/p cardiomems, pacemaker  implant 2002, ischemic cardiomyopathy s/p ICD upgrade,  now s/p ICD approaching BLANKA with Dinesh  lead and abandoned RV lead extraction, and BIV ICD [employer] reimplantation on 12/22/2020      1. BIV ICD approaching BLANKA, with abaondoned RV lead and dinesh lead,   2. ICM  3. HFrEF  4. Hyperkalemia     - monitor telemetry  - Keep K+>4, MG++>2  - post procedure teaching done with patient  - ICD paired and checked by representative with normal function  - ICD booklet, ID card and d/c instructions given to patient    - chest xray PA/Lat check lead tip placement   - EKG today  - lower base rate at 70 bpm , please continue rate control to promote BIV pacing   - cardiology consult appreciated,  discontinued spironolactone, continue Entresto per HF ( has had improvement in HF since starting)  monitor potassium, at some point as out patient may have spironolactone resumed.   - follow up appointment in EP clinic for wound check and device check post op on 12/31 at 10 am , [phone]       70 year old  obese female former smoker  with PMHx CVA greater than 10 years ago, with residual gait instability, Cerebral bleed with craniotomy evacuation thrombus,  hyperthyroidism, T2 DM  HTN HLD CAD, MI  s/p CABG x 3 2007, coronary stents x 2 (8/2015), A Fib- on AC, Inducible sustained ventricular tachycardia, HFrEF, s/p cardiomems, pacemaker  implant 2002, ischemic cardiomyopathy s/p ICD upgrade,  now s/p ICD approaching BLANKA with Dinesh  lead and abandoned RV lead extraction, and BIV ICD [employer] reimplantation on 12/22/2020      1. BIV ICD approaching BLANKA, with abandoned  RV lead and dinesh lead, s/p generator change, new RV lead, patient also has 2 epicardial pacing wires   2. ICM  3. HFrEF  4. Hyperkalemia     - monitor telemetry  - Keep K+>4, MG++>2  - post procedure teaching done with patient  - ICD paired and checked by representative with normal function  - ICD booklet, ID card and d/c instructions given to patient    - chest xray PA/Lat check lead tip placement   - EKG today  - lower base rate at 70 bpm , please continue rate control to promote BIV pacing   - cardiology consult appreciated,  discontinued spironolactone, continue Entresto per HF ( has had improvement in HF since starting)  monitor potassium, at some point as out patient may have spironolactone resumed.   - follow up appointment in EP clinic for wound check and device check post op on 12/31 at 10 am , [phone]       70 year old  obese female former smoker  with PMHx CVA greater than 10 years ago, with residual gait instability, Cerebral bleed with craniotomy evacuation thrombus,  hyperthyroidism, T2 DM  HTN HLD CAD, MI  s/p CABG x 3 2007, coronary stents x 2 (8/2015), A Fib- on AC, pacemaker  implant 2002, Inducible sustained ventricular tachycardia, s/p ICD upgrade, HFrEF, s/p cardiomems, ischemic cardiomyopathy, LBBB, then BIV ICD upgrade now with BIV ICD at  BLANKA  with Dinesh dual coil lead and RV lead used for sense pace atrial lead but she is in permanent Afib and normally functioning epicardial LV lead now s/p extraction of RA, pacing RV lead and Dinesh ICD lead and implant of new ICD lead and a new pulse generator on 12/22 c/b hyperkalemia     1. BIV ICD at BLANKA,   2. HFrEF  3. Hyperkalemia   4. permanent Atrial fibrillation    - monitor telemetry  - Keep K+>4, MG++>2  - post procedure teaching done with patient  - ICD paired and checked by representative with normal function  - ICD booklet, ID card and d/c instructions given to patient    - chest xray PA/Lat check lead tip placement   - EKG today  - coumadin for AC   - lower base rate at 70 bpm , please continue rate control to promote BIV pacing   - cardiology consult appreciated,  discontinued spironolactone, continue Entresto per HF ( has had improvement in HF since starting)  monitor potassium, at some point as out patient may have spironolactone resumed.  Will need close follow up   - follow up appointment in EP clinic for wound check and device check post op on 12/31 at 10 am , [phone]

## 2020-12-23 NOTE — PROGRESS NOTE ADULT - ASSESSMENT
70 yr old obese female with PMH of  CVA > 10 years ago- with residual gait instability, Cerebral bleed with surgical evacuation, former smoker, HTN, MI/CAD s/p CABG x 3 2007, coronary stents x 2 (8/2015), A Fib- on AC,  DM2 on Insulin, hyperthyroidism  CHF/ Inducible sustained ventricular tachycardia/ ischemic cardiomyopathy s/p ICD placement (BIVIVD Guidant) s/p ICD lead extraction, BIVICD [employer] reimplantation on 12/22/2020

## 2020-12-23 NOTE — PROGRESS NOTE ADULT - PROBLEM/PLAN-9
How Severe Is Your Rosacea?: mild Is This A New Presentation, Or A Follow-Up?: Rosacea Additional History: Patient here for Rosacea on face. DISPLAY PLAN FREE TEXT

## 2020-12-23 NOTE — CHART NOTE - NSCHARTNOTEFT_GEN_A_CORE
MAR Accept Note  Transfer to:    Accepting Attending Physician: Dr. Pranav Cantor   Assigned Room: [address] [place] [address]    Patient seen and examined.   Labs and data reviewed.   No findings precluding transfer of service.       HPI/MICU COURSE:   Please refer to MICU transfer note for full details. Briefly, this is a 70 yr old obese female with PMH of  CVA > 10 years ago- with residual gait instability, Cerebral bleed with surgical evacuation, former smoker, HTN, MI/CAD s/p CABG x 3 2007, coronary stents x 2 (8/2015), A Fib- on AC,  DM2 on Insulin, hyperthyroidism  CHF/ Inducible sustained ventricular tachycardia/ ischemic cardiomyopathy s/p ICD placement (BIVIVD Guidant) planned for ICD lead extraction, BIVICD [employer] reimplantation on 12/22/2020.    Per verbal report, pt. had misfiring of her AICD and so had a removal and re-implantation today.  She was a difficult intubation and STAT blood work in the OR showed electrolyte derangements, particularly hyperkalemia to 7.1 on a non-hemolyzed sample.  Pt. was given calcium, 30 units insulin, and 40mg IV Lasix.  Repeat CMP showed resolution w/ K of 4.9.  Pt. was on aldactone, which may have been the cause of hyperkalemia.  Pt. was not extubated as she was hypercarbic, per anesthesia. She was given post-procedural antibiotics with Ancef and cefuroxime. Patient was transferred to CICU for further monitoring. She was eventually extubated; she is currently saturating well on room air. She has been evaluated and is a candidate for downgrade to the regular medicine floor. 70 yr old obese female with PMH of  CVA > 10 years ago- with residual gait instability, Cerebral bleed with surgical evacuation, former smoker, HTN, MI/CAD s/p CABG x 3 2007, coronary stents x 2 (8/2015), A Fib- on AC,  DM2 on Insulin, hyperthyroidism  CHF/ Inducible sustained ventricular tachycardia/ ischemic cardiomyopathy s/p ICD placement (BIVIVD Guidant) planned for ICD lead extraction, BIVICD [employer] reimplantation on 12/22/2020.    Per verbal report, pt. had misfiring of her AICD and so had a removal and re-implantation today.  She was a difficult intubation and STAT blood work in the OR showed electrolyte derangements, particularly hyperkalemia to 7.1 on a non-hemolyzed sample.  Pt. was given calcium, 30 units insulin, and 40mg IV Lasix.  Repeat CMP showed resolution w/ K of 4.9.  Pt. was on aldactone, which may have been the cause of hyperkalemia.  Pt. was not extubated as she was hypercarbic, per anesthesia. She was given post-procedural antibiotics with Ancef and cefuroxime. Patient was transferred to CICU for further monitoring. She was eventually extubated; she is currently saturating well on room air. She has been evaluated and is a candidate for downgrade to the regular medicine floor.       FOR FOLLOW-UP:  [ ] F/u AM CXR for confirmation of AICD implantation  [ ] F/u INR, administer Coumadin at bedtime 12/23  [ ] Monitor fingersticks  [ ] Trend BMP to ensure resolution of hyperkalemia  [ ] EP following; f/u recommendations  [ ] Heart Failure following; f/u recommendations regarding restarting HF medications if stable overnight  [ ] Monitor pain levels at surgical site  [ ] If SBP low, recommend gentle rehydration (250 cc bolus).    Cordell Crowe, PGY3  MAR [id number]

## 2020-12-23 NOTE — PROGRESS NOTE ADULT - PROBLEM SELECTOR PLAN 5
c/w Digoxin  Restart Coreg at 6.25mg BID this am  Dose Coumadin tonight c/w Digoxin Q48hrs  Restart Coreg at 6.25mg BID this am  Dose Coumadin tonight

## 2020-12-23 NOTE — PROGRESS NOTE ADULT - PROBLEM SELECTOR PLAN 3
On Coreg 12.5 bid, dig 125, Entresto 24/26 bid, metolazone 2.5 PRN, aldactone 25, torsemide 60 QOD. Coreg, Entresto, Aldactone and Torsemide held.   - Will restart Coreg at 6.25mg BID this am.  - Hold Aldactone on d/c and will have pt f/u with cardiologist on when to restart as outpatient.  - Will hold Entresto for now given slight hyperkalema on labs this am  - f/u Heart failure rec's On Coreg 12.5 bid, dig 125, Entresto 24/26 bid, metolazone 2.5 PRN, aldactone 25, torsemide 60 QOD. Coreg, Entresto, Aldactone and Torsemide held.   - Will restart Coreg at 6.25mg BID this am.  - Hold Aldactone on d/c and will have pt f/u with cardiologist on when to restart as outpatient.  - Will hold Entresto for now given slight hyperkalema on labs this am. If potassium improves will restart entresto  - f/u Heart failure rec's

## 2020-12-23 NOTE — CHART NOTE - [id number]_GEN_A_CORE
CICU Transfer Note    Transfer from: CICU    Transfer to: (  ) Medicine    ( x ) Telemetry     (   ) RCU        (    ) Palliative         (   ) Stroke Unit          (   ) __________________    Accepting Physician:  Signout given to:     HPI/CICU COURSE: 70 yr old obese female with PMH of  CVA > 10 years ago- with residual gait instability, Cerebral bleed with surgical evacuation, former smoker, HTN, MI/CAD s/p CABG x 3 2007, coronary stents x 2 (8/2015), A Fib- on AC,  DM2 on Insulin, hyperthyroidism  CHF/ Inducible sustained ventricular tachycardia/ ischemic cardiomyopathy s/p ICD placement (BIVIVD Guidant) planned for ICD lead extraction, BIVICD [employer] reimplantation on 12/22/2020.    Per verbal report, pt. had misfiring of her AICD and so had a removal and re-implantation today.  She was a difficult intubation and STAT blood work in the OR showed electrolyte derangements, particularly hyperkalemia to 7.1 on a non-hemolyzed sample.  Pt. was given calcium, 30 units insulin, and 40mg IV Lasix.  Repeat CMP showed resolution w/ K of 4.9.  Pt. was on aldactone, which may have been the cause of hyperkalemia.  Pt. was not extubated as she was hypercarbic, per anesthesia. She was given post-procedural antibiotics with Ancef and cefuroxime. Patient was transferred to CICU for further monitoring. She was eventually extubated; she is currently saturating well on room air. She has been evaluated and is a candidate for downgrade to the regular medicine floor.     Vital Signs Last 24 Hrs  T(C): 36.7 (23 Dec 2020 00:00), Max: 36.7 (23 Dec 2020 00:00)  T(F): 98.1 (23 Dec 2020 00:00), Max: 98.1 (23 Dec 2020 00:00)  HR: 70 (23 Dec 2020 02:00) (58 - 90)  BP: 84/49 (22 Dec 2020 12:27) (84/49 - 98/64)  BP(mean): 62 (22 Dec 2020 12:27) (62 - 62)  RR: 17 (23 Dec 2020 02:00) (11 - 37)  SpO2: 98% (23 Dec 2020 02:00) (90% - 100%)      General: No acute distress  Eyes: EOMI, PERRLA, conjunctiva and sclera clear  Chest/Lung: CTAB, no wheezes, rales, or rhonchi  Heart: Regular rate, regular rhythm. Normal S1/S2. No murmurs, rubs, or gallops.  Abdomen: Soft, nontender, nondistended. Normal bowel sounds.  Extremites: 2+ peripheral pulses B/L. No clubbing, cyanosis, or edema.  Neurology: A&O x3, no focal deficits  Skin: No rashes or lesions                          10.9   8.14  )-----------( 129      ( 22 Dec 2020 13:23 )             34.0     12-22    137  |  102  |  40<H>  ----------------------------<  192<H>  4.9   |  22  |  1.51<H>    Ca    9.9      22 Dec 2020 13:23  Phos  2.0     12-22  Mg     2.2     12-22    TPro  6.7  /  Alb  3.9  /  TBili  0.6  /  DBili  x   /  AST  20  /  ALT  13  /  AlkPhos  71  12-22        ASSESSMENT & PLAN:     A&Ox3, nonfocal  - Continue to monitor neuro status as per protocol     ==================== RESPIRATORY======================  Initially on full ventilator support from OR  - Subsequently weaned and extubated postoperatively  - Comfortable on room air, SpO2 >95%  - Continue to monitor SpO2 via pulse oximetry  - Encourage bedside spirometry   - Encourage OOB    ====================CARDIOVASCULAR==================  ICD battery depletion  - S/P Explant and re-implantation on 12/22 by EP    Afib  - Continue Digoxin for rate control  - Hold Coumadin tonight - will resume in 12/23  - Monitor continuous telemetry    CAD  - C/w Aspirin and Zocor  - Monitor continuous telemetry  - consider starting home meds Entresto and coreg in AM if remain HD stable overnight     ===================HEMATOLOGIC/ONC ===================  H/H 11.6/36, stable.   - Continue to monitor hemoglobin and hematocrit levels.   - Hold Coumadin tonight- plan to resume 12/23. Will monitor INR in AM    ===================== RENAL =========================  Hyperkalemia, K improved to 5.0   - Patient received insulin and Lasix 40mg IVP  - Continue to monitor I/Os, BUN/Creatinine, and urine output.   - Goal net even fluid balance.    ==================== GASTROINTESTINAL===================  Tolerating PO DASH/TLC diet.     =======================    ENDOCRINE  =====================  Hx of DMT2  - Glycemic control with Admelog sliding scale and Lantus injections.   - Monitor blood glucose levels.     ========================INFECTIOUS DISEASE================  Afebrile, WBC within normal limits.   - Monitor for leukocytosis / fever. Monitor off abx.         FOR FOLLOW UP:  [ ] F/u INR, administer Coumadin at bedtime 12/23  [ ] Monitor fingersticks  [ ] Trend BMP to ensure resolution of hyperkalemia  [ ] Heart Failure following; f/u recommendations regarding restarting HF medications if stable overnight  [ ] Monitor pain levels at surgical site CICU Transfer Note    Transfer from: CICU    Transfer to: (  ) Medicine    ( x ) Telemetry     (   ) RCU        (    ) Palliative         (   ) Stroke Unit          (   ) __________________    Accepting Physician: Pranav Cantor (Hospitalist)  Signout given to: Pranav Cantor (Hospitalist)    HPI/CICU COURSE: 70 yr old obese female with PMH of  CVA > 10 years ago- with residual gait instability, Cerebral bleed with surgical evacuation, former smoker, HTN, MI/CAD s/p CABG x 3 2007, coronary stents x 2 (8/2015), A Fib- on AC,  DM2 on Insulin, hyperthyroidism  CHF/ Inducible sustained ventricular tachycardia/ ischemic cardiomyopathy s/p ICD placement (BIVIVD Guidant) planned for ICD lead extraction, BIVICD [employer] reimplantation on 12/22/2020.    Per verbal report, pt. had misfiring of her AICD and so had a removal and re-implantation today.  She was a difficult intubation and STAT blood work in the OR showed electrolyte derangements, particularly hyperkalemia to 7.1 on a non-hemolyzed sample.  Pt. was given calcium, 30 units insulin, and 40mg IV Lasix.  Repeat CMP showed resolution w/ K of 4.9.  Pt. was on aldactone, which may have been the cause of hyperkalemia.  Pt. was not extubated as she was hypercarbic, per anesthesia. She was given post-procedural antibiotics with Ancef and cefuroxime. Patient was transferred to CICU for further monitoring. She was eventually extubated; she is currently saturating well on room air. She has been evaluated and is a candidate for downgrade to the regular medicine floor.     Vital Signs Last 24 Hrs  T(C): 36.7 (23 Dec 2020 00:00), Max: 36.7 (23 Dec 2020 00:00)  T(F): 98.1 (23 Dec 2020 00:00), Max: 98.1 (23 Dec 2020 00:00)  HR: 70 (23 Dec 2020 02:00) (58 - 90)  BP: 84/49 (22 Dec 2020 12:27) (84/49 - 98/64)  BP(mean): 62 (22 Dec 2020 12:27) (62 - 62)  RR: 17 (23 Dec 2020 02:00) (11 - 37)  SpO2: 98% (23 Dec 2020 02:00) (90% - 100%)      General: No acute distress  Eyes: EOMI, PERRLA, conjunctiva and sclera clear  Chest/Lung: CTAB, no wheezes, rales, or rhonchi  Heart: Regular rate, regular rhythm. Normal S1/S2. No murmurs, rubs, or gallops.  Abdomen: Soft, nontender, nondistended. Normal bowel sounds.  Extremites: 2+ peripheral pulses B/L. No clubbing, cyanosis, or edema.  Neurology: A&O x3, no focal deficits  Skin: No rashes or lesions                          10.9   8.14  )-----------( 129      ( 22 Dec 2020 13:23 )             34.0     12-22    137  |  102  |  40<H>  ----------------------------<  192<H>  4.9   |  22  |  1.51<H>    Ca    9.9      22 Dec 2020 13:23  Phos  2.0     12-22  Mg     2.2     12-22    TPro  6.7  /  Alb  3.9  /  TBili  0.6  /  DBili  x   /  AST  20  /  ALT  13  /  AlkPhos  71  12-22        ASSESSMENT & PLAN:     A&Ox3, nonfocal  - Continue to monitor neuro status as per protocol     ==================== RESPIRATORY======================  Initially on full ventilator support from OR  - Subsequently weaned and extubated postoperatively  - Comfortable on room air, SpO2 >95%  - Continue to monitor SpO2 via pulse oximetry  - Encourage bedside spirometry   - Encourage OOB    ====================CARDIOVASCULAR==================  ICD battery depletion  - S/P Explant and re-implantation on 12/22 by EP    Afib  - Continue Digoxin for rate control  - Hold Coumadin tonight - will resume in 12/23  - Monitor continuous telemetry    CAD  - C/w Aspirin and Zocor  - Monitor continuous telemetry  - consider starting home meds Entresto and coreg in AM if remain HD stable overnight     ===================HEMATOLOGIC/ONC ===================  H/H 11.6/36, stable.   - Continue to monitor hemoglobin and hematocrit levels.   - Hold Coumadin tonight- plan to resume 12/23. Will monitor INR in AM    ===================== RENAL =========================  Hyperkalemia, K improved to 5.0   - Patient received insulin and Lasix 40mg IVP  - Continue to monitor I/Os, BUN/Creatinine, and urine output.   - Goal net even fluid balance.    ==================== GASTROINTESTINAL===================  Tolerating PO DASH/TLC diet.     =======================    ENDOCRINE  =====================  Hx of DMT2  - Glycemic control with Admelog sliding scale and Lantus injections.   - Monitor blood glucose levels.     ========================INFECTIOUS DISEASE================  Afebrile, WBC within normal limits.   - Monitor for leukocytosis / fever. Monitor off abx.         FOR FOLLOW UP:  [ ] F/u INR, administer Coumadin at bedtime 12/23  [ ] Monitor fingersticks  [ ] Trend BMP to ensure resolution of hyperkalemia  [ ] Heart Failure following; f/u recommendations regarding restarting HF medications if stable overnight  [ ] Monitor pain levels at surgical site CICU Transfer Note    Transfer from: CICU    Transfer to: (  ) Medicine    ( x ) Telemetry     (   ) RCU        (    ) Palliative         (   ) Stroke Unit          (   ) __________________    Accepting Physician: Pranav Cantor (Hospitalist)  Signout given to: Pranav Cantor (Hospitalist)    Per patient, caring physician when hospitalized at [facility] is Elliott Dumont.    HPI/CICU COURSE: 70 yr old obese female with PMH of  CVA > 10 years ago- with residual gait instability, Cerebral bleed with surgical evacuation, former smoker, HTN, MI/CAD s/p CABG x 3 2007, coronary stents x 2 (8/2015), A Fib- on AC,  DM2 on Insulin, hyperthyroidism  CHF/ Inducible sustained ventricular tachycardia/ ischemic cardiomyopathy s/p ICD placement (BIVIVD Guidant) planned for ICD lead extraction, BIVICD [employer] reimplantation on 12/22/2020.    Per verbal report, pt. had misfiring of her AICD and so had a removal and re-implantation today.  She was a difficult intubation and STAT blood work in the OR showed electrolyte derangements, particularly hyperkalemia to 7.1 on a non-hemolyzed sample.  Pt. was given calcium, 30 units insulin, and 40mg IV Lasix.  Repeat CMP showed resolution w/ K of 4.9.  Pt. was on aldactone, which may have been the cause of hyperkalemia.  Pt. was not extubated as she was hypercarbic, per anesthesia. She was given post-procedural antibiotics with Ancef and cefuroxime. Patient was transferred to CICU for further monitoring. She was eventually extubated; she is currently saturating well on room air. She has been evaluated and is a candidate for downgrade to the regular medicine floor.     Vital Signs Last 24 Hrs  T(C): 36.7 (23 Dec 2020 00:00), Max: 36.7 (23 Dec 2020 00:00)  T(F): 98.1 (23 Dec 2020 00:00), Max: 98.1 (23 Dec 2020 00:00)  HR: 70 (23 Dec 2020 02:00) (58 - 90)  BP: 84/49 (22 Dec 2020 12:27) (84/49 - 98/64)  BP(mean): 62 (22 Dec 2020 12:27) (62 - 62)  RR: 17 (23 Dec 2020 02:00) (11 - 37)  SpO2: 98% (23 Dec 2020 02:00) (90% - 100%)      General: No acute distress  Eyes: EOMI, PERRLA, conjunctiva and sclera clear  Chest/Lung: CTAB, no wheezes, rales, or rhonchi  Heart: Regular rate, regular rhythm. Normal S1/S2. No murmurs, rubs, or gallops.  Abdomen: Soft, nontender, nondistended. Normal bowel sounds.  Extremites: 2+ peripheral pulses B/L. No clubbing, cyanosis, or edema.  Neurology: A&O x3, no focal deficits  Skin: No rashes or lesions                          10.9   8.14  )-----------( 129      ( 22 Dec 2020 13:23 )             34.0     12-22    137  |  102  |  40<H>  ----------------------------<  192<H>  4.9   |  22  |  1.51<H>    Ca    9.9      22 Dec 2020 13:23  Phos  2.0     12-22  Mg     2.2     12-22    TPro  6.7  /  Alb  3.9  /  TBili  0.6  /  DBili  x   /  AST  20  /  ALT  13  /  AlkPhos  71  12-22        ASSESSMENT & PLAN:     A&Ox3, nonfocal  - Continue to monitor neuro status as per protocol     ==================== RESPIRATORY======================  Initially on full ventilator support from OR  - Subsequently weaned and extubated postoperatively  - Comfortable on room air, SpO2 >95%  - Continue to monitor SpO2 via pulse oximetry  - Encourage bedside spirometry   - Encourage OOB    ====================CARDIOVASCULAR==================  ICD battery depletion  - S/P Explant and re-implantation on 12/22 by EP    Afib  - Continue Digoxin for rate control  - Hold Coumadin tonight - will resume in 12/23  - Monitor continuous telemetry    CAD  - C/w Aspirin and Zocor  - Monitor continuous telemetry  - consider starting home meds Entresto and coreg in AM if remain HD stable overnight     ===================HEMATOLOGIC/ONC ===================  H/H 11.6/36, stable.   - Continue to monitor hemoglobin and hematocrit levels.   - Hold Coumadin tonight- plan to resume 12/23. Will monitor INR in AM    ===================== RENAL =========================  Hyperkalemia, K improved to 5.0   - Patient received insulin and Lasix 40mg IVP  - Continue to monitor I/Os, BUN/Creatinine, and urine output.   - Goal net even fluid balance.    ==================== GASTROINTESTINAL===================  Tolerating PO DASH/TLC diet.     =======================    ENDOCRINE  =====================  Hx of DMT2  - Glycemic control with Admelog sliding scale and Lantus injections.   - Monitor blood glucose levels.     ========================INFECTIOUS DISEASE================  Afebrile, WBC within normal limits.   - Monitor for leukocytosis / fever. Monitor off abx.         FOR FOLLOW UP:  [ ] F/u INR, administer Coumadin at bedtime 12/23  [ ] Monitor fingersticks  [ ] Trend BMP to ensure resolution of hyperkalemia  [ ] Heart Failure following; f/u recommendations regarding restarting HF medications if stable overnight  [ ] Monitor pain levels at surgical site CICU Transfer Note    Transfer from: CICU    Transfer to: (  ) Medicine    ( x ) Telemetry     (   ) RCU        (    ) Palliative         (   ) Stroke Unit          (   ) __________________    Accepting Physician: Pranav Cantor (Hospitalist)  Signout given to: Pranav Cantor (Hospitalist)    Per patient, caring physician when hospitalized at [facility] is Elliott Dumont.    HPI/CICU COURSE: 70 yr old obese female with PMH of  CVA > 10 years ago- with residual gait instability, Cerebral bleed with surgical evacuation, former smoker, HTN, MI/CAD s/p CABG x 3 2007, coronary stents x 2 (8/2015), A Fib- on AC,  DM2 on Insulin, hyperthyroidism  CHF/ Inducible sustained ventricular tachycardia/ ischemic cardiomyopathy s/p ICD placement (BIVIVD Guidant) planned for ICD lead extraction, BIVICD [employer] reimplantation on 12/22/2020.    Per verbal report, pt. had misfiring of her AICD and so had a removal and re-implantation today.  She was a difficult intubation and STAT blood work in the OR showed electrolyte derangements, particularly hyperkalemia to 7.1 on a non-hemolyzed sample.  Pt. was given calcium, 30 units insulin, and 40mg IV Lasix.  Repeat CMP showed resolution w/ K of 4.9.  Pt. was on aldactone, which may have been the cause of hyperkalemia.  Pt. was not extubated as she was hypercarbic, per anesthesia. She was given post-procedural antibiotics with Ancef and cefuroxime. Patient was transferred to CICU for further monitoring. She was eventually extubated; she is currently saturating well on room air. She has been evaluated and is a candidate for downgrade to the regular medicine floor.     Vital Signs Last 24 Hrs  T(C): 36.7 (23 Dec 2020 00:00), Max: 36.7 (23 Dec 2020 00:00)  T(F): 98.1 (23 Dec 2020 00:00), Max: 98.1 (23 Dec 2020 00:00)  HR: 70 (23 Dec 2020 02:00) (58 - 90)  BP: 84/49 (22 Dec 2020 12:27) (84/49 - 98/64)  BP(mean): 62 (22 Dec 2020 12:27) (62 - 62)  RR: 17 (23 Dec 2020 02:00) (11 - 37)  SpO2: 98% (23 Dec 2020 02:00) (90% - 100%)      General: No acute distress  Eyes: EOMI, PERRLA, conjunctiva and sclera clear  Chest/Lung: CTAB, no wheezes, rales, or rhonchi  Heart: Regular rate, regular rhythm. Normal S1/S2. No murmurs, rubs, or gallops.  Abdomen: Soft, nontender, nondistended. Normal bowel sounds.  Extremites: 2+ peripheral pulses B/L. No clubbing, cyanosis, or edema.  Neurology: A&O x3, no focal deficits  Skin: No rashes or lesions                          10.9   8.14  )-----------( 129      ( 22 Dec 2020 13:23 )             34.0     12-22    137  |  102  |  40<H>  ----------------------------<  192<H>  4.9   |  22  |  1.51<H>    Ca    9.9      22 Dec 2020 13:23  Phos  2.0     12-22  Mg     2.2     12-22    TPro  6.7  /  Alb  3.9  /  TBili  0.6  /  DBili  x   /  AST  20  /  ALT  13  /  AlkPhos  71  12-22        ASSESSMENT & PLAN:     A&Ox3, nonfocal  - Continue to monitor neuro status as per protocol     ==================== RESPIRATORY======================  Initially on full ventilator support from OR  - Subsequently weaned and extubated postoperatively  - Comfortable on room air, SpO2 >95%  - Continue to monitor SpO2 via pulse oximetry  - Encourage bedside spirometry   - Encourage OOB    ====================CARDIOVASCULAR==================  ICD battery depletion  - S/P Explant and re-implantation on 12/22 by EP    Afib  - Continue Digoxin for rate control  - Hold Coumadin tonight - will resume in 12/23  - Monitor continuous telemetry    CAD  - C/w Aspirin and Zocor  - Monitor continuous telemetry  - consider starting home meds Entresto and coreg in AM if remain HD stable overnight     ===================HEMATOLOGIC/ONC ===================  H/H 11.6/36, stable.   - Continue to monitor hemoglobin and hematocrit levels.   - Hold Coumadin tonight- plan to resume 12/23. Will monitor INR in AM    ===================== RENAL =========================  Hyperkalemia, K improved to 5.0   - Patient received insulin and Lasix 40mg IVP  - Continue to monitor I/Os, BUN/Creatinine, and urine output.   - Goal net even fluid balance.    ==================== GASTROINTESTINAL===================  Tolerating PO DASH/TLC diet.     =======================    ENDOCRINE  =====================  Hx of DMT2  - Glycemic control with Admelog sliding scale and Lantus injections.   - Monitor blood glucose levels.     ========================INFECTIOUS DISEASE================  Afebrile, WBC within normal limits.   - Monitor for leukocytosis / fever. Monitor off abx.         FOR FOLLOW UP:  [ ] F/u AM CXR for confirmation of AICD implantation  [ ] F/u INR, administer Coumadin at bedtime 12/23  [ ] Monitor fingersticks  [ ] Trend BMP to ensure resolution of hyperkalemia  [ ] Heart Failure following; f/u recommendations regarding restarting HF medications if stable overnight  [ ] Monitor pain levels at surgical site CICU Transfer Note    Transfer from: CICU    Transfer to: (  ) Medicine    ( x ) Telemetry     (   ) RCU        (    ) Palliative         (   ) Stroke Unit          (   ) __________________    Accepting Physician: Pranav Cantor (Hospitalist)  Signout given to: Pranav Cantor (Hospitalist)    Per patient, caring physician when hospitalized at [facility] is Elliott Dumont.    HPI/CICU COURSE: 70 yr old obese female with PMH of  CVA > 10 years ago- with residual gait instability, Cerebral bleed with surgical evacuation, former smoker, HTN, MI/CAD s/p CABG x 3 2007, coronary stents x 2 (8/2015), A Fib- on AC,  DM2 on Insulin, hyperthyroidism  CHF/ Inducible sustained ventricular tachycardia/ ischemic cardiomyopathy s/p ICD placement (BIVIVD Guidant) planned for ICD lead extraction, BIVICD [employer] reimplantation on 12/22/2020.    Per verbal report, pt. had misfiring of her AICD and so had a removal and re-implantation today.  She was a difficult intubation and STAT blood work in the OR showed electrolyte derangements, particularly hyperkalemia to 7.1 on a non-hemolyzed sample.  Pt. was given calcium, 30 units insulin, and 40mg IV Lasix.  Repeat CMP showed resolution w/ K of 4.9.  Pt. was on aldactone, which may have been the cause of hyperkalemia.  Pt. was not extubated as she was hypercarbic, per anesthesia. She was given post-procedural antibiotics with Ancef and cefuroxime. Patient was transferred to CICU for further monitoring. She was eventually extubated; she is currently saturating well on room air. She has been evaluated and is a candidate for downgrade to the regular medicine floor.     Vital Signs Last 24 Hrs  T(C): 36.7 (23 Dec 2020 00:00), Max: 36.7 (23 Dec 2020 00:00)  T(F): 98.1 (23 Dec 2020 00:00), Max: 98.1 (23 Dec 2020 00:00)  HR: 70 (23 Dec 2020 02:00) (58 - 90)  BP: 84/49 (22 Dec 2020 12:27) (84/49 - 98/64)  BP(mean): 62 (22 Dec 2020 12:27) (62 - 62)  RR: 17 (23 Dec 2020 02:00) (11 - 37)  SpO2: 98% (23 Dec 2020 02:00) (90% - 100%)      General: No acute distress  Eyes: EOMI, PERRLA, conjunctiva and sclera clear  Chest/Lung: CTAB, no wheezes, rales, or rhonchi  Heart: Regular rate, regular rhythm. Normal S1/S2. No murmurs, rubs, or gallops.  Abdomen: Soft, nontender, nondistended. Normal bowel sounds.  Extremites: 2+ peripheral pulses B/L. No clubbing, cyanosis, or edema.  Neurology: A&O x3, no focal deficits  Skin: No rashes or lesions                          10.9   8.14  )-----------( 129      ( 22 Dec 2020 13:23 )             34.0     12-22    137  |  102  |  40<H>  ----------------------------<  192<H>  4.9   |  22  |  1.51<H>    Ca    9.9      22 Dec 2020 13:23  Phos  2.0     12-22  Mg     2.2     12-22    TPro  6.7  /  Alb  3.9  /  TBili  0.6  /  DBili  x   /  AST  20  /  ALT  13  /  AlkPhos  71  12-22        ASSESSMENT & PLAN:     A&Ox3, nonfocal  - Continue to monitor neuro status as per protocol     ==================== RESPIRATORY======================  Initially on full ventilator support from OR  - Subsequently weaned and extubated postoperatively  - Comfortable on room air, SpO2 >95%  - Continue to monitor SpO2 via pulse oximetry  - Encourage bedside spirometry   - Encourage OOB    ====================CARDIOVASCULAR==================  ICD battery depletion  - S/P Explant and re-implantation on 12/22 by EP    Afib  - Continue Digoxin for rate control  - Hold Coumadin tonight - will resume in 12/23  - Monitor continuous telemetry    CAD  - C/w Aspirin and Zocor  - Monitor continuous telemetry  - consider starting home meds Entresto and coreg in AM if remain HD stable overnight     ===================HEMATOLOGIC/ONC ===================  H/H 11.6/36, stable.   - Continue to monitor hemoglobin and hematocrit levels.   - Hold Coumadin tonight- plan to resume 12/23. Will monitor INR in AM    ===================== RENAL =========================  Hyperkalemia, K improved to 5.0   - Patient received insulin and Lasix 40mg IVP  - Continue to monitor I/Os, BUN/Creatinine, and urine output.   - Goal net even fluid balance.    ==================== GASTROINTESTINAL===================  Tolerating PO DASH/TLC diet.     =======================    ENDOCRINE  =====================  Hx of DMT2  - Glycemic control with Admelog sliding scale and Lantus injections.   - Monitor blood glucose levels.     ========================INFECTIOUS DISEASE================  Afebrile, WBC within normal limits.   - Monitor for leukocytosis / fever. Monitor off abx.         FOR FOLLOW UP:  [ ] F/u AM CXR for confirmation of AICD implantation  [ ] F/u INR, administer Coumadin at bedtime 12/23  [ ] Monitor fingersticks  [ ] Trend BMP to ensure resolution of hyperkalemia  [ ] Heart Failure following; f/u recommendations regarding restarting HF medications if stable overnight  [ ] Monitor pain levels at surgical site  [ ] If SBP low, recommend gentle rehydration (250 cc bolus) CICU Transfer Note    Transfer from: CICU    Transfer to: (  ) Medicine    ( x ) Telemetry     (   ) RCU        (    ) Palliative         (   ) Stroke Unit          (   ) __________________    Accepting Physician: Pranav Cantor (Hospitalist)  Signout given to: Pranav Cantor (Hospitalist)    Per patient, caring physician when hospitalized at [facility] is Elliott Dumont.    HPI/CICU COURSE: 70 yr old obese female with PMH of  CVA > 10 years ago- with residual gait instability, Cerebral bleed with surgical evacuation, former smoker, HTN, MI/CAD s/p CABG x 3 2007, coronary stents x 2 (8/2015), A Fib- on AC,  DM2 on Insulin, hyperthyroidism  CHF/ Inducible sustained ventricular tachycardia/ ischemic cardiomyopathy s/p ICD placement (BIVIVD Guidant) planned for ICD lead extraction, BIVICD [employer] reimplantation on 12/22/2020.    Per verbal report, pt. had misfiring of her AICD and so had a removal and re-implantation today.  She was a difficult intubation and STAT blood work in the OR showed electrolyte derangements, particularly hyperkalemia to 7.1 on a non-hemolyzed sample.  Pt. was given calcium, 30 units insulin, and 40mg IV Lasix.  Repeat CMP showed resolution w/ K of 4.9.  Pt. was on aldactone, which may have been the cause of hyperkalemia.  Pt. was not extubated as she was hypercarbic, per anesthesia. She was given post-procedural antibiotics with Ancef and cefuroxime. Patient was transferred to CICU for further monitoring. She was eventually extubated; she is currently saturating well on room air. She has been evaluated and is a candidate for downgrade to the regular medicine floor.     Vital Signs Last 24 Hrs  T(C): 36.7 (23 Dec 2020 00:00), Max: 36.7 (23 Dec 2020 00:00)  T(F): 98.1 (23 Dec 2020 00:00), Max: 98.1 (23 Dec 2020 00:00)  HR: 70 (23 Dec 2020 02:00) (58 - 90)  BP: 84/49 (22 Dec 2020 12:27) (84/49 - 98/64)  BP(mean): 62 (22 Dec 2020 12:27) (62 - 62)  RR: 17 (23 Dec 2020 02:00) (11 - 37)  SpO2: 98% (23 Dec 2020 02:00) (90% - 100%)      General: No acute distress  Eyes: EOMI, PERRLA, conjunctiva and sclera clear  Chest/Lung: CTAB, no wheezes, rales, or rhonchi  Heart: Regular rate, regular rhythm. Normal S1/S2. No murmurs, rubs, or gallops.  Abdomen: Soft, nontender, nondistended. Normal bowel sounds.  Extremites: 2+ peripheral pulses B/L. No clubbing, cyanosis, or edema.  Neurology: A&O x3, no focal deficits  Skin: No rashes or lesions                          10.9   8.14  )-----------( 129      ( 22 Dec 2020 13:23 )             34.0     12-22    137  |  102  |  40<H>  ----------------------------<  192<H>  4.9   |  22  |  1.51<H>    Ca    9.9      22 Dec 2020 13:23  Phos  2.0     12-22  Mg     2.2     12-22    TPro  6.7  /  Alb  3.9  /  TBili  0.6  /  DBili  x   /  AST  20  /  ALT  13  /  AlkPhos  71  12-22        ASSESSMENT & PLAN:     A&Ox3, nonfocal  - Continue to monitor neuro status as per protocol     ==================== RESPIRATORY======================  Initially on full ventilator support from OR  - Subsequently weaned and extubated postoperatively  - Comfortable on room air, SpO2 >95%  - Continue to monitor SpO2 via pulse oximetry  - Encourage bedside spirometry   - Encourage OOB    ====================CARDIOVASCULAR==================  ICD battery depletion  - S/P Explant and re-implantation on 12/22 by EP    Afib  - Continue Digoxin for rate control  - Hold Coumadin tonight - will resume in 12/23  - Monitor continuous telemetry    CAD  - C/w Aspirin and Zocor  - Monitor continuous telemetry  - consider starting home meds Entresto and coreg in AM if remain HD stable overnight     ===================HEMATOLOGIC/ONC ===================  H/H 11.6/36, stable.   - Continue to monitor hemoglobin and hematocrit levels.   - Hold Coumadin tonight- plan to resume 12/23. Will monitor INR in AM    ===================== RENAL =========================  Hyperkalemia, K improved to 5.0   - Patient received insulin and Lasix 40mg IVP  - Continue to monitor I/Os, BUN/Creatinine, and urine output.   - Goal net even fluid balance.    ==================== GASTROINTESTINAL===================  Tolerating PO DASH/TLC diet.     =======================    ENDOCRINE  =====================  Hx of DMT2  - Glycemic control with Admelog sliding scale and Lantus injections.   - Monitor blood glucose levels.     ========================INFECTIOUS DISEASE================  Afebrile, WBC within normal limits.   - Monitor for leukocytosis / fever. Monitor off abx.         FOR FOLLOW UP:  [ ] F/u AM CXR for confirmation of AICD implantation  [ ] F/u INR, administer Coumadin at bedtime 12/23  [ ] Monitor fingersticks  [ ] Trend BMP to ensure resolution of hyperkalemia  [ ] EP following; f/u recommendations  [ ] Heart Failure following; f/u recommendations regarding restarting HF medications if stable overnight  [ ] Monitor pain levels at surgical site  [ ] If SBP low, recommend gentle rehydration (250 cc bolus)

## 2020-12-23 NOTE — DISCHARGE NOTE PROVIDER - INSTRUCTIONS
Please continue a healthy and balanced diet that is low in sodium (salt) and cholesterol. Please keep your intake of carbohydrates (breads, pasta, rice) consistent. We recommend healthy or generous servings of fruits/vegetables (high-fiber containing foods)

## 2020-12-23 NOTE — PROGRESS NOTE ADULT - PROBLEM SELECTOR PLAN 2
Pt states she takes Levemir 60u QHS as well as sliding scale TID. Pt;s sliding scale reported can be up to 60U as per patient. Spoke with [facility] pharmacy, pt is prescribed Lispro 50u TID as outpatient by endocrinology. BG uncontrolled overnight, with FS in 300's and resultant hyperkalemia and pseudohyponatremia, likely 2/2 inadequate insulin dosing  - Will c/w Home dose Levemir.   - Will start on standing premeal insulin. Will start at half home dose, Humalog 25u TID  - Change to Diabetic diet Pt states she takes Levemir 60u QHS as well as sliding scale TID. Pt;s sliding scale reported can be up to 60U as per patient. Spoke with [facility] pharmacy, pt is prescribed Lispro 50u TID as outpatient by endocrinology. BG uncontrolled overnight, with FS in 300's and resultant hyperkalemia and pseudohyponatremia, likely 2/2 inadequate insulin dosing  - Will c/w Home dose Levemir.   - Will start on standing premeal insulin. Will start Humalog 30u TID  - Change to Diabetic diet Pt states she takes Levemir 60u QHS as well as sliding scale TID. Pt;s sliding scale reported can be up to 60U as per patient. Spoke with [facility] pharmacy, pt is prescribed Lispro 50u TID as outpatient by endocrinology. BG uncontrolled overnight, with FS in 300's and resultant hyperkalemia and pseudohyponatremia, likely 2/2 inadequate insulin dosing  - Will c/w Home dose Levemir.   - Will start on standing premeal insulin. Will start Humalog 40u TID  - Change to Diabetic diet

## 2020-12-23 NOTE — DISCHARGE NOTE PROVIDER - CARE PROVIDER_API CALL
Pito Curtis)  Cardiovascular Disease  [address] [place] [address]  Phone: [phone]  Fax: [phone]  Follow Up Time: 1 week

## 2020-12-24 ENCOUNTER — TRANSCRIPTION ENCOUNTER (OUTPATIENT)
Age: 70
End: 2020-12-24

## 2020-12-24 VITALS
DIASTOLIC BLOOD PRESSURE: 59 MMHG | SYSTOLIC BLOOD PRESSURE: 95 MMHG | RESPIRATION RATE: 16 BRPM | TEMPERATURE: 98 F | OXYGEN SATURATION: 99 % | HEART RATE: 70 BPM

## 2020-12-24 LAB
ANION GAP SERPL CALC-SCNC: 13 MMOL/L — SIGNIFICANT CHANGE UP (ref 5–17)
APTT BLD: 30.8 SEC — SIGNIFICANT CHANGE UP (ref 27.5–35.5)
BUN SERPL-MCNC: 41 MG/DL — HIGH (ref 7–23)
CALCIUM SERPL-MCNC: 9 MG/DL — SIGNIFICANT CHANGE UP (ref 8.4–10.5)
CHLORIDE SERPL-SCNC: 100 MMOL/L — SIGNIFICANT CHANGE UP (ref 96–108)
CO2 SERPL-SCNC: 21 MMOL/L — LOW (ref 22–31)
CREAT SERPL-MCNC: 1.45 MG/DL — HIGH (ref 0.5–1.3)
GLUCOSE BLDC GLUCOMTR-MCNC: 251 MG/DL — HIGH (ref 70–99)
GLUCOSE SERPL-MCNC: 136 MG/DL — HIGH (ref 70–99)
INR BLD: 1.5 RATIO — HIGH (ref 0.88–1.16)
POTASSIUM SERPL-MCNC: 4.7 MMOL/L — SIGNIFICANT CHANGE UP (ref 3.5–5.3)
POTASSIUM SERPL-SCNC: 4.7 MMOL/L — SIGNIFICANT CHANGE UP (ref 3.5–5.3)
PROTHROM AB SERPL-ACNC: 17.7 SEC — HIGH (ref 10.6–13.6)
SODIUM SERPL-SCNC: 134 MMOL/L — LOW (ref 135–145)

## 2020-12-24 PROCEDURE — 99232 SBSQ HOSP IP/OBS MODERATE 35: CPT

## 2020-12-24 PROCEDURE — 99239 HOSP IP/OBS DSCHRG MGMT >30: CPT

## 2020-12-24 RX ORDER — CARVEDILOL PHOSPHATE 80 MG/1
1 CAPSULE, EXTENDED RELEASE ORAL
Qty: 60 | Refills: 0
Start: 2020-12-24 | End: 2021-01-22

## 2020-12-24 RX ADMIN — Medication 40 UNIT(S): at 08:18

## 2020-12-24 RX ADMIN — Medication 2: at 08:17

## 2020-12-24 RX ADMIN — Medication 40 UNIT(S): at 12:07

## 2020-12-24 RX ADMIN — CARVEDILOL PHOSPHATE 6.25 MILLIGRAM(S): 80 CAPSULE, EXTENDED RELEASE ORAL at 05:04

## 2020-12-24 RX ADMIN — SACUBITRIL AND VALSARTAN 1 TABLET(S): 24; 26 TABLET, FILM COATED ORAL at 05:04

## 2020-12-24 RX ADMIN — Medication 81 MILLIGRAM(S): at 05:04

## 2020-12-24 RX ADMIN — Medication 6: at 12:06

## 2020-12-24 NOTE — PROGRESS NOTE ADULT - REASON FOR ADMISSION
ICD lead extraction
BIV ICD malfunction, abandoned leads
s/p BIVICD revision
ICD malfunction s/p removal and re-implantation

## 2020-12-24 NOTE — PROGRESS NOTE ADULT - SUBJECTIVE AND OBJECTIVE BOX
[facility]    Ramy Solis, Ever, Julia, Nicloe, José Miguel, Caron      [phone]    CHIEF COMPLAINT: Patient is a 70y old  Female who presents with a chief complaint of ICD malfunction s/p removal and re-implantation (23 Dec 2020 08:34)      Follow Up: icm, hyperkalemia    Interim history: The patient reports no new symptoms.  Denies chest discomfort and shortness of breath.  No abdominal pain.  No new neurologic symptoms.      MEDICATIONS  (STANDING):  aspirin enteric coated 81 milliGRAM(s) Oral daily  carvedilol 6.25 milliGRAM(s) Oral every 12 hours  digoxin     Tablet 0.125 milliGRAM(s) Oral every other day  insulin glargine Injectable (LANTUS) 60 Unit(s) SubCutaneous at bedtime  insulin lispro (ADMELOG) corrective regimen sliding scale   SubCutaneous three times a day before meals  insulin lispro (ADMELOG) corrective regimen sliding scale   SubCutaneous at bedtime  insulin lispro Injectable (ADMELOG) 40 Unit(s) SubCutaneous three times a day before meals  methimazole 2.5 milliGRAM(s) Oral <User Schedule>  sacubitril 24 mG/valsartan 26 mG 1 Tablet(s) Oral two times a day  simvastatin 40 milliGRAM(s) Oral at bedtime    MEDICATIONS  (PRN):      REVIEW OF SYSTEMS:  eye, ent, GI, , allergic, dermatologic, musculoskeletal and neurologic are negative except as described above    Vital Signs Last 24 Hrs  T(C): 37 (24 Dec 2020 04:38), Max: 37 (24 Dec 2020 04:38)  T(F): 98.6 (24 Dec 2020 04:38), Max: 98.6 (24 Dec 2020 04:38)  HR: 80 (24 Dec 2020 04:38) (67 - 80)  BP: 117/68 (24 Dec 2020 04:38) (103/63 - 117/68)  BP(mean): --  RR: 16 (24 Dec 2020 04:38) (16 - 18)  SpO2: 100% (24 Dec 2020 04:38) (100% - 100%)    I&O's Summary    23 Dec 2020 07:01  -  24 Dec 2020 07:00  --------------------------------------------------------  IN: 570 mL / OUT: 0 mL / NET: 570 mL        Telemetry past 24h:     PHYSICAL EXAM:    Constitutional: well-nourished, well-developed, NAD   HEENT:  MMM, sclerae anicteric, conjunctivae clear, no oral cyanosis.  Pulmonary: Non-labored, breath sounds are clear bilaterally, No wheezing, rales or rhonchi  Cardiovascular: Regular, S1 and S2.  No murmur.  No rubs, gallops or clicks  Gastrointestinal: Bowel Sounds present, soft, nontender.   Lymph: No peripheral edema.   Neurological: Alert, no focal deficits  Skin: No rashes.  Psych:  Mood & affect appropriate    LABS: All Labs Reviewed:                        10.0   9.23  )-----------( 98       ( 23 Dec 2020 02:43 )             30.4                         10.9   8.14  )-----------( 129      ( 22 Dec 2020 13:23 )             34.0                         12.3   6.27  )-----------( 129      ( 21 Dec 2020 12:21 )             38.2     24 Dec 2020 05:44    134    |  100    |  41     ----------------------------<  136    4.7     |  21     |  1.45   23 Dec 2020 12:38    132    |  98     |  39     ----------------------------<  381    5.0     |  22     |  1.38   23 Dec 2020 06:26    134    |  100    |  41     ----------------------------<  376    5.6     |  21     |  1.45     Ca    9.0        24 Dec 2020 05:44  Ca    9.2        23 Dec 2020 12:38  Ca    9.2        23 Dec 2020 06:26  Phos  3.9       23 Dec 2020 02:43  Phos  2.0       22 Dec 2020 13:23  Mg     2.3       23 Dec 2020 02:43  Mg     2.2       22 Dec 2020 13:23    TPro  6.1    /  Alb  3.6    /  TBili  0.4    /  DBili  x      /  AST  19     /  ALT  13     /  AlkPhos  66     23 Dec 2020 02:43  TPro  6.7    /  Alb  3.9    /  TBili  0.6    /  DBili  x      /  AST  20     /  ALT  13     /  AlkPhos  71     22 Dec 2020 13:23    PT/INR - ( 24 Dec 2020 05:44 )   PT: 17.7 sec;   INR: 1.50 ratio         PTT - ( 24 Dec 2020 05:44 )  PTT:30.8 sec      Blood Culture:     12-22 @ 23:49  TSH: 2.41      RADIOLOGY:    EKG:    Echo:

## 2020-12-24 NOTE — DISCHARGE NOTE NURSING/CASE MANAGEMENT/SOCIAL WORK - PATIENT PORTAL LINK FT
You can access the FollowMyHealth Patient Portal offered by [facility] by registering at the following website: [email or web address]. By joining [employer]’s FollowMyHealth portal, you will also be able to view your health information using other applications (apps) compatible with our system.

## 2020-12-24 NOTE — PROGRESS NOTE ADULT - ASSESSMENT
70yr old obese female with PMHx of CVA >10 years ago s/p decompression and now with residual gait instability, HTN, CAD s/p CABG x3 in 2007 followed by PCI with coronary stents x 2(8/2015), AFib- on Coumadin, DM2 on Insulin, hyperthyroidism, as well as ICM with BiV ICD (EF ~30% as of 5/2020 with LBBB, was upgraded from PPM in 2007 with Dinesh ICD lead and abandonment of RV lead). She presented for elective gen change and removal of old ICD Dinesh lead as well as old RV abandoned lead.     During the explant, she was found to have serum K 7.1 (was 5.4 the prior day). She takes Entresto and Aldactone at home, and has been doing this without change recently. She was medically treated with insulin 30 units IV and Lasix 40mg IVx1, after which serum K normalized to 4.9. Of note, she has been on Entresto since 5/2020, and follows closely with Dr. Hernadez outpatient. She sees him every 2 weeks, with in office SBPs ranging in 100s.     She was a difficult intubation. Pt. was not extubated post procedure as she was hypercarbic, per anesthesia.  Was given post-procedural abx with ancef and cefuroxime. Was transferred to CCU thereafter. Was extubated successfully. HF team consulted for recommendations regarding GDMT in context of hyperkalemia, and their feeling was that if possible, Entresto should be continued and aldactone held, with the potential to resume it as an outpt with close monitoring of her k.    This morning she feels fine.  Her k is elevated.    -there is no evidence of acute ischemia.  -known cad s/p cabg, pci  -cont asa  -cont bb  -cont statin    -there is no evidence of significant arrhythmia.  -chronic af with   -cont warfarin goal inr 2-3  -cont bb  -cont dig  -icd functioning well    -there is no evidence for meaningful  volume overload.  -ef 30  -has been on a good hf regimen with entresto, coreg, aldactone, dig, demadex  -in the context of severe hyperkalemia without definite trigger, is reaonable to hold the aldactone  -she has had a significant clinical improvement since starting entresto and would be ideal to continue  -if aldactone to be held would be important to monitor her volume closely, thus far she is doing fine  -if it is to be resumed, k will need to followed carefully, and the use of k binder may need to be considered, this can be handled by dr hernadez outpt     -can be considered for dc today from my perspctive as k is normal and adequate followup is in place

## 2020-12-24 NOTE — CHART NOTE - NSCHARTNOTESELECT_GEN_ALL_CORE
CICU Transfer Note/Transfer Note
dc/Event Note
Brief Op Note/Event Note
CICU Accept Note/Event Note
Event Note
MAR ACCEPT NOTE/Event Note

## 2020-12-24 NOTE — CHART NOTE - NSCHARTNOTEFT_GEN_A_CORE
seen at bedside, denies complaints  feels well, some pain in incision site but otherwise controlled  vss, sbp soft 90s-100s  PE well appearing f, lying in bed, obese, nad, lungs cta, abd sntnd, left chest wall site w/ steristrips, no erythema, legs nonedematous  labs reviewed hyperkalemia resolved  stable for dc home today w/ close outpt f/u with cardiology and EP already scheduled.  dc time 41 min. see dc summary.  d/w JOVAN Soto

## 2020-12-24 NOTE — PROGRESS NOTE ADULT - SUBJECTIVE AND OBJECTIVE BOX
24H hour events: No new events overnight    MEDICATIONS:  aspirin enteric coated 81 milliGRAM(s) Oral daily  carvedilol 6.25 milliGRAM(s) Oral every 12 hours  digoxin     Tablet 0.125 milliGRAM(s) Oral every other day  sacubitril 24 mG/valsartan 26 mG 1 Tablet(s) Oral two times a day            insulin glargine Injectable (LANTUS) 60 Unit(s) SubCutaneous at bedtime  insulin lispro (ADMELOG) corrective regimen sliding scale   SubCutaneous three times a day before meals  insulin lispro (ADMELOG) corrective regimen sliding scale   SubCutaneous at bedtime  insulin lispro Injectable (ADMELOG) 40 Unit(s) SubCutaneous three times a day before meals  methimazole 2.5 milliGRAM(s) Oral <User Schedule>  simvastatin 40 milliGRAM(s) Oral at bedtime        REVIEW OF SYSTEMS:  See HPI, otherwise ROS negative.    PHYSICAL EXAM:  T(C): 37 (12-24-20 @ 04:38), Max: 37 (12-24-20 @ 04:38)  HR: 80 (12-24-20 @ 04:38) (67 - 80)  BP: 117/68 (12-24-20 @ 04:38) (103/63 - 117/68)  RR: 16 (12-24-20 @ 04:38) (16 - 18)  SpO2: 100% (12-24-20 @ 04:38) (100% - 100%)  Wt(kg): --  I&O's Summary    23 Dec 2020 07:01  -  24 Dec 2020 07:00  --------------------------------------------------------  IN: 570 mL / OUT: 0 mL / NET: 570 mL        Appearance: Alert. NAD	  Cardiovascular: +S1S2 RRR no m/g/r  Chest: anterior chest wall with bandage intact. No hematoma  Respiratory: CTA B/L	  Psychiatry: A & O x 3, Mood & affect appropriate  Gastrointestinal:  Soft, NT. ND. +BS	  Skin: No rashes	  Neurologic: Non-focal  Extremities: No edema BLE  Groin: right groin no bleeding/hematoma/bruit  Vascular: Peripheral pulses palpable 2+ bilaterally      LABS:	 	    CBC Full  -  ( 23 Dec 2020 02:43 )  WBC Count : 9.23 K/uL  Hemoglobin : 10.0 g/dL  Hematocrit : 30.4 %  Platelet Count - Automated : 98 K/uL  Mean Cell Volume : 93.0 fl  Mean Cell Hemoglobin : 30.6 pg  Mean Cell Hemoglobin Concentration : 32.9 gm/dL  Auto Neutrophil # : x  Auto Lymphocyte # : x  Auto Monocyte # : x  Auto Eosinophil # : x  Auto Basophil # : x  Auto Neutrophil % : x  Auto Lymphocyte % : x  Auto Monocyte % : x  Auto Eosinophil % : x  Auto Basophil % : x    12-24    134<L>  |  100  |  41<H>  ----------------------------<  136<H>  4.7   |  21<L>  |  1.45<H>  12-23    132<L>  |  98  |  39<H>  ----------------------------<  381<H>  5.0   |  22  |  1.38<H>    Ca    9.0      24 Dec 2020 05:44  Ca    9.2      23 Dec 2020 12:38  Phos  3.9     12-23  Phos  2.0     12-22  Mg     2.3     12-23  Mg     2.2     12-22    TPro  6.1  /  Alb  3.6  /  TBili  0.4  /  DBili  x   /  AST  19  /  ALT  13  /  AlkPhos  66  12-23  TPro  6.7  /  Alb  3.9  /  TBili  0.6  /  DBili  x   /  AST  20  /  ALT  13  /  AlkPhos  71  12-22      INR: 1.50    TELEMETRY: v paced 70-80 bpm  	    ECG:  	demand paced at 81 bpm    RADIOLOGY:  No PTX,lead intact    	  ASSESSMENT/PLAN: 	  70 year old  obese female former smoker  with PMHx CVA greater than 10 years ago, with residual gait instability, Cerebral bleed with craniotomy evacuation thrombus,  hyperthyroidism, T2 DM  HTN HLD CAD, MI  s/p CABG x 3 2007, coronary stents x 2 (8/2015), A Fib- on coumadin, pacemaker  implant 2002, Inducible sustained ventricular tachycardia, s/p ICD upgrade, HFrEF, s/p cardiomems, ischemic cardiomyopathy, LBBB, then BIV ICD upgrade now with [facility] ICD at  [facility]  with Dinesh dual coil lead and RV lead used for sense pace atrial lead but she is in permanent Afib and normally functioning epicardial LV lead now s/p extraction of RA, pacing RV lead and Dinesh ICD lead and implant of new ICD lead and a new pulse generator on 12/22 c/b hyperkalemia.    1. BIV ICD at [facility],   2. HFrEF  3. Hyperkalemia   4. permanent Atrial fibrillation    - monitor telemetry  - Keep K+>4, MG++>2  - post procedure teaching done with patient  - coumadin for AC. INR today is 1.50.  - cardiology consult noted.  Will need close follow up for medication management and potassium levels.  - follow up appointment in EP clinic for wound check and device check post op on 12/31 at 10 am , [phone]  -No further EP recommendations at this time.    Tiara ALDANA    [id number]

## 2020-12-24 NOTE — DISCHARGE NOTE NURSING/CASE MANAGEMENT/SOCIAL WORK - NSDCFUADDAPPT_GEN_ALL_CORE_FT
Please follow up with your PCP and Cards in a week for further management including repeating blood work to monitor your electrolytes (especially potassium) and assess volume status. A some point as outpatient may have spironolactone resumed.  Please follow up in EP clinic for wound check and device check post op on 12/31 at 10 am , [phone]

## 2020-12-29 ENCOUNTER — APPOINTMENT (OUTPATIENT)
Dept: CARDIOLOGY | Facility: CLINIC | Age: 70
End: 2020-12-29
Payer: MEDICARE

## 2020-12-29 LAB
INR PPP: 1.4 RATIO
QUALITY CONTROL: YES

## 2020-12-29 PROCEDURE — 85610 PROTHROMBIN TIME: CPT | Mod: QW

## 2020-12-29 PROCEDURE — 93793 ANTICOAG MGMT PT WARFARIN: CPT

## 2020-12-31 ENCOUNTER — NON-APPOINTMENT (OUTPATIENT)
Age: 70
End: 2020-12-31

## 2020-12-31 ENCOUNTER — APPOINTMENT (OUTPATIENT)
Dept: ELECTROPHYSIOLOGY | Facility: CLINIC | Age: 70
End: 2020-12-31
Payer: MEDICARE

## 2020-12-31 VITALS
WEIGHT: 185 LBS | OXYGEN SATURATION: 100 % | HEIGHT: 64 IN | BODY MASS INDEX: 31.58 KG/M2 | DIASTOLIC BLOOD PRESSURE: 70 MMHG | HEART RATE: 70 BPM | SYSTOLIC BLOOD PRESSURE: 109 MMHG

## 2020-12-31 PROCEDURE — 93283 PRGRMG EVAL IMPLANTABLE DFB: CPT

## 2020-12-31 PROCEDURE — 99024 POSTOP FOLLOW-UP VISIT: CPT

## 2020-12-31 RX ORDER — ENOXAPARIN SODIUM 100 MG/ML
100 INJECTION SUBCUTANEOUS TWICE DAILY
Qty: 8 | Refills: 3 | Status: DISCONTINUED | COMMUNITY
Start: 2018-08-17 | End: 2020-12-31

## 2021-01-05 ENCOUNTER — APPOINTMENT (OUTPATIENT)
Dept: CARDIOLOGY | Facility: CLINIC | Age: 71
End: 2021-01-05
Payer: MEDICARE

## 2021-01-05 VITALS
DIASTOLIC BLOOD PRESSURE: 60 MMHG | HEIGHT: 64 IN | WEIGHT: 185 LBS | BODY MASS INDEX: 31.58 KG/M2 | OXYGEN SATURATION: 100 % | RESPIRATION RATE: 16 BRPM | SYSTOLIC BLOOD PRESSURE: 100 MMHG | HEART RATE: 70 BPM

## 2021-01-05 LAB
INR PPP: 1.5 RATIO
QUALITY CONTROL: YES

## 2021-01-05 PROCEDURE — 93793 ANTICOAG MGMT PT WARFARIN: CPT

## 2021-01-05 PROCEDURE — 85610 PROTHROMBIN TIME: CPT | Mod: QW

## 2021-01-06 LAB
ANION GAP SERPL CALC-SCNC: 14 MMOL/L
BUN SERPL-MCNC: 29 MG/DL
CALCIUM SERPL-MCNC: 9.2 MG/DL
CHLORIDE SERPL-SCNC: 100 MMOL/L
CO2 SERPL-SCNC: 25 MMOL/L
CREAT SERPL-MCNC: 1.36 MG/DL
GLUCOSE SERPL-MCNC: 216 MG/DL
POTASSIUM SERPL-SCNC: 4.7 MMOL/L
SODIUM SERPL-SCNC: 140 MMOL/L

## 2021-01-12 ENCOUNTER — APPOINTMENT (OUTPATIENT)
Dept: CARDIOLOGY | Facility: CLINIC | Age: 71
End: 2021-01-12
Payer: MEDICARE

## 2021-01-12 VITALS
HEART RATE: 69 BPM | HEIGHT: 64 IN | OXYGEN SATURATION: 98 % | WEIGHT: 185 LBS | RESPIRATION RATE: 16 BRPM | BODY MASS INDEX: 31.58 KG/M2

## 2021-01-12 VITALS
SYSTOLIC BLOOD PRESSURE: 110 MMHG | RESPIRATION RATE: 16 BRPM | HEIGHT: 64 IN | OXYGEN SATURATION: 98 % | WEIGHT: 185 LBS | BODY MASS INDEX: 31.58 KG/M2 | HEART RATE: 69 BPM | DIASTOLIC BLOOD PRESSURE: 68 MMHG

## 2021-01-12 LAB
INR PPP: 1.7 RATIO
QUALITY CONTROL: YES

## 2021-01-12 PROCEDURE — 85610 PROTHROMBIN TIME: CPT | Mod: QW

## 2021-01-12 PROCEDURE — 93793 ANTICOAG MGMT PT WARFARIN: CPT

## 2021-01-20 PROCEDURE — 83036 HEMOGLOBIN GLYCOSYLATED A1C: CPT

## 2021-01-20 PROCEDURE — 80048 BASIC METABOLIC PNL TOTAL CA: CPT

## 2021-01-20 PROCEDURE — 82565 ASSAY OF CREATININE: CPT

## 2021-01-20 PROCEDURE — 85730 THROMBOPLASTIN TIME PARTIAL: CPT

## 2021-01-20 PROCEDURE — 82330 ASSAY OF CALCIUM: CPT

## 2021-01-20 PROCEDURE — 86850 RBC ANTIBODY SCREEN: CPT

## 2021-01-20 PROCEDURE — 86901 BLOOD TYPING SEROLOGIC RH(D): CPT

## 2021-01-20 PROCEDURE — 85014 HEMATOCRIT: CPT

## 2021-01-20 PROCEDURE — 84443 ASSAY THYROID STIM HORMONE: CPT

## 2021-01-20 PROCEDURE — 84100 ASSAY OF PHOSPHORUS: CPT

## 2021-01-20 PROCEDURE — 71046 X-RAY EXAM CHEST 2 VIEWS: CPT

## 2021-01-20 PROCEDURE — 85027 COMPLETE CBC AUTOMATED: CPT

## 2021-01-20 PROCEDURE — 82947 ASSAY GLUCOSE BLOOD QUANT: CPT

## 2021-01-20 PROCEDURE — 76000 FLUOROSCOPY <1 HR PHYS/QHP: CPT

## 2021-01-20 PROCEDURE — C2629: CPT

## 2021-01-20 PROCEDURE — G0463: CPT

## 2021-01-20 PROCEDURE — 82435 ASSAY OF BLOOD CHLORIDE: CPT

## 2021-01-20 PROCEDURE — 85610 PROTHROMBIN TIME: CPT

## 2021-01-20 PROCEDURE — 80053 COMPREHEN METABOLIC PANEL: CPT

## 2021-01-20 PROCEDURE — 82962 GLUCOSE BLOOD TEST: CPT

## 2021-01-20 PROCEDURE — C1769: CPT

## 2021-01-20 PROCEDURE — 82803 BLOOD GASES ANY COMBINATION: CPT

## 2021-01-20 PROCEDURE — 86900 BLOOD TYPING SEROLOGIC ABO: CPT

## 2021-01-20 PROCEDURE — 80061 LIPID PANEL: CPT

## 2021-01-20 PROCEDURE — 86923 COMPATIBILITY TEST ELECTRIC: CPT

## 2021-01-20 PROCEDURE — 83735 ASSAY OF MAGNESIUM: CPT

## 2021-01-20 PROCEDURE — C1894: CPT

## 2021-01-20 PROCEDURE — 85018 HEMOGLOBIN: CPT

## 2021-01-20 PROCEDURE — 93005 ELECTROCARDIOGRAM TRACING: CPT

## 2021-01-20 PROCEDURE — 71045 X-RAY EXAM CHEST 1 VIEW: CPT

## 2021-01-20 PROCEDURE — C1773: CPT

## 2021-01-20 PROCEDURE — C1889: CPT

## 2021-01-20 PROCEDURE — C1892: CPT

## 2021-01-20 PROCEDURE — 94002 VENT MGMT INPAT INIT DAY: CPT

## 2021-01-20 PROCEDURE — 84132 ASSAY OF SERUM POTASSIUM: CPT

## 2021-01-20 PROCEDURE — C1777: CPT

## 2021-01-20 PROCEDURE — C1882: CPT

## 2021-01-20 PROCEDURE — 84295 ASSAY OF SERUM SODIUM: CPT

## 2021-01-20 PROCEDURE — 83605 ASSAY OF LACTIC ACID: CPT

## 2021-01-21 ENCOUNTER — APPOINTMENT (OUTPATIENT)
Dept: CARDIOLOGY | Facility: CLINIC | Age: 71
End: 2021-01-21
Payer: MEDICARE

## 2021-01-21 VITALS
BODY MASS INDEX: 31.58 KG/M2 | WEIGHT: 185 LBS | DIASTOLIC BLOOD PRESSURE: 68 MMHG | SYSTOLIC BLOOD PRESSURE: 110 MMHG | HEART RATE: 66 BPM | HEIGHT: 64 IN

## 2021-01-21 LAB
INR PPP: 2.3 RATIO
QUALITY CONTROL: YES

## 2021-01-21 PROCEDURE — 93793 ANTICOAG MGMT PT WARFARIN: CPT

## 2021-01-21 PROCEDURE — 85610 PROTHROMBIN TIME: CPT | Mod: QW

## 2021-02-02 ENCOUNTER — APPOINTMENT (OUTPATIENT)
Dept: CARDIOLOGY | Facility: CLINIC | Age: 71
End: 2021-02-02

## 2021-02-04 ENCOUNTER — APPOINTMENT (OUTPATIENT)
Dept: CARDIOLOGY | Facility: CLINIC | Age: 71
End: 2021-02-04
Payer: MEDICARE

## 2021-02-04 VITALS
HEIGHT: 64 IN | OXYGEN SATURATION: 98 % | HEART RATE: 71 BPM | RESPIRATION RATE: 16 BRPM | DIASTOLIC BLOOD PRESSURE: 64 MMHG | BODY MASS INDEX: 31.58 KG/M2 | WEIGHT: 185 LBS | SYSTOLIC BLOOD PRESSURE: 98 MMHG

## 2021-02-04 LAB
INR PPP: 2 RATIO
QUALITY CONTROL: YES

## 2021-02-04 PROCEDURE — 93793 ANTICOAG MGMT PT WARFARIN: CPT

## 2021-02-04 PROCEDURE — 85610 PROTHROMBIN TIME: CPT | Mod: QW

## 2021-02-10 ENCOUNTER — APPOINTMENT (OUTPATIENT)
Dept: CARDIOLOGY | Facility: CLINIC | Age: 71
End: 2021-02-10

## 2021-02-16 ENCOUNTER — APPOINTMENT (OUTPATIENT)
Dept: CARDIOLOGY | Facility: CLINIC | Age: 71
End: 2021-02-16
Payer: MEDICARE

## 2021-02-16 VITALS
RESPIRATION RATE: 16 BRPM | BODY MASS INDEX: 31.58 KG/M2 | WEIGHT: 185 LBS | HEART RATE: 69 BPM | DIASTOLIC BLOOD PRESSURE: 62 MMHG | HEIGHT: 64 IN | SYSTOLIC BLOOD PRESSURE: 100 MMHG

## 2021-02-16 LAB
INR PPP: 1.9 RATIO
QUALITY CONTROL: YES

## 2021-02-16 PROCEDURE — 93793 ANTICOAG MGMT PT WARFARIN: CPT

## 2021-02-16 PROCEDURE — 85610 PROTHROMBIN TIME: CPT | Mod: QW

## 2021-02-17 ENCOUNTER — RX RENEWAL (OUTPATIENT)
Age: 71
End: 2021-02-17

## 2021-02-22 ENCOUNTER — RX RENEWAL (OUTPATIENT)
Age: 71
End: 2021-02-22

## 2021-03-02 ENCOUNTER — APPOINTMENT (OUTPATIENT)
Dept: CARDIOLOGY | Facility: CLINIC | Age: 71
End: 2021-03-02
Payer: MEDICARE

## 2021-03-02 VITALS — SYSTOLIC BLOOD PRESSURE: 104 MMHG | DIASTOLIC BLOOD PRESSURE: 60 MMHG | OXYGEN SATURATION: 98 % | HEART RATE: 68 BPM

## 2021-03-02 PROCEDURE — 93793 ANTICOAG MGMT PT WARFARIN: CPT

## 2021-03-02 PROCEDURE — 85610 PROTHROMBIN TIME: CPT | Mod: QW

## 2021-03-04 LAB
INR PPP: 2.7 RATIO
QUALITY CONTROL: YES

## 2021-03-16 ENCOUNTER — APPOINTMENT (OUTPATIENT)
Dept: CARDIOLOGY | Facility: CLINIC | Age: 71
End: 2021-03-16
Payer: MEDICARE

## 2021-03-16 VITALS — SYSTOLIC BLOOD PRESSURE: 106 MMHG | OXYGEN SATURATION: 99 % | DIASTOLIC BLOOD PRESSURE: 62 MMHG | HEART RATE: 69 BPM

## 2021-03-16 LAB
INR PPP: 2.6 RATIO
QUALITY CONTROL: YES

## 2021-03-16 PROCEDURE — 85610 PROTHROMBIN TIME: CPT | Mod: QW

## 2021-03-16 PROCEDURE — 93793 ANTICOAG MGMT PT WARFARIN: CPT

## 2021-03-24 ENCOUNTER — APPOINTMENT (OUTPATIENT)
Dept: CARDIOLOGY | Facility: CLINIC | Age: 71
End: 2021-03-24
Payer: MEDICARE

## 2021-03-24 ENCOUNTER — NON-APPOINTMENT (OUTPATIENT)
Age: 71
End: 2021-03-24

## 2021-03-24 VITALS
SYSTOLIC BLOOD PRESSURE: 106 MMHG | WEIGHT: 195 LBS | BODY MASS INDEX: 33.29 KG/M2 | HEART RATE: 64 BPM | DIASTOLIC BLOOD PRESSURE: 70 MMHG | HEIGHT: 64 IN | OXYGEN SATURATION: 99 %

## 2021-03-24 VITALS
DIASTOLIC BLOOD PRESSURE: 62 MMHG | OXYGEN SATURATION: 99 % | RESPIRATION RATE: 16 BRPM | SYSTOLIC BLOOD PRESSURE: 102 MMHG

## 2021-03-24 LAB
INR PPP: 2.5 RATIO
QUALITY CONTROL: YES

## 2021-03-24 PROCEDURE — 99214 OFFICE O/P EST MOD 30 MIN: CPT

## 2021-03-24 PROCEDURE — 93000 ELECTROCARDIOGRAM COMPLETE: CPT

## 2021-03-24 PROCEDURE — 85610 PROTHROMBIN TIME: CPT | Mod: QW

## 2021-03-24 NOTE — HISTORY OF PRESENT ILLNESS
[FreeTextEntry1] : Ms. Barrios returns for evaluation of her cardiomyopathy and coronary artery disease. She has had several episodes of atypical chest pain and underwent cardiac catheterization 2018 leading  to percutaneous intervention of the left circumflex artery and the first obtuse marginal artery with drug eluting stents. \par She was seen in the emergency room at [facility] last year with decompensated heart failure has had adjustment of her medication and has been doing relatively well with no leg edema and no new symptoms. She had a Cardiomems placed but has not been able to get ongoing readings given her inability to lie flat from her prior stroke and brain surgery.\par Since beginning Entresto, her breathing is much improved and her leg edema is much improved. She is now taking torsemide only every other day. She gets occasional mild lightheadedness\par She is now s/p extraction of her desmond lead and ICD generator replacement\par \par Past medical history is remarkable for myocardial infarction, coronary artery disease status post coronary artery bypass surgery in 2007, history of inducible sustained ventricular tachycardia leading to placement of a biventricular/ICD (Guidant), history of ischemic cardiomyopathy, history of diabetes, history of hyperlipidemia,history of hyperthyroidism, history of proliferative diabetic retinopathy, and history of stroke with hemorrhage in 2010 at which time she had a long complicated course requiring craniotomy for intracranial decompression and removal of thrombus. In August of 2010, she had several ICD discharges felt to be due to rapid atrial fibrillation.

## 2021-03-24 NOTE — DISCUSSION/SUMMARY
[FreeTextEntry1] : Lisa's leg edema is much improved. She does not wish to wear compression stockings. She will continue her medications including torsemide and Entresto.. She saw Dr. Mills of the heart failure service and has a Cardiomems device but we have not been able to obtain accurate readings given her inability to lie flat. She will underwent extraction of her desmond lead and replacement of her ICD generator. Counseling on this represents a greater than 50% of her visit which was 30 minutes in duration. We will continue to follow her in our ICD monitoring program

## 2021-03-24 NOTE — REASON FOR VISIT
[Cardiomyopathy] : cardiomyopathy [Coronary Artery Disease] : coronary artery disease [Fatigue] : feeling tired (fatigue) [Heart Failure] : congestive heart failure [Hyperlipidemia] : hyperlipidemia

## 2021-03-24 NOTE — PHYSICAL EXAM
[General Appearance - Well Developed] : well developed [Normal Appearance] : normal appearance [Well Groomed] : well groomed [General Appearance - Well Nourished] : well nourished [No Deformities] : no deformities [General Appearance - In No Acute Distress] : no acute distress [Normal Conjunctiva] : the conjunctiva exhibited no abnormalities [Eyelids - No Xanthelasma] : the eyelids demonstrated no xanthelasmas [Normal Oral Mucosa] : normal oral mucosa [No Oral Pallor] : no oral pallor [No Oral Cyanosis] : no oral cyanosis [Normal Jugular Venous A Waves Present] : normal jugular venous A waves present [Normal Jugular Venous V Waves Present] : normal jugular venous V waves present [No Jugular Venous Merino A Waves] : no jugular venous merino A waves [Respiration, Rhythm And Depth] : normal respiratory rhythm and effort [Exaggerated Use Of Accessory Muscles For Inspiration] : no accessory muscle use [Auscultation Breath Sounds / Voice Sounds] : lungs were clear to auscultation bilaterally [Abdomen Soft] : soft [Abdomen Tenderness] : non-tender [Abdomen Mass (___ Cm)] : no abdominal mass palpated [Abnormal Walk] : normal gait [Gait - Sufficient For Exercise Testing] : the gait was sufficient for exercise testing [Nail Clubbing] : no clubbing of the fingernails [Cyanosis, Localized] : no localized cyanosis [Petechial Hemorrhages (___cm)] : no petechial hemorrhages [Skin Color & Pigmentation] : normal skin color and pigmentation [] : no rash [No Venous Stasis] : no venous stasis [Skin Lesions] : no skin lesions [No Skin Ulcers] : no skin ulcer [No Xanthoma] : no  xanthoma was observed [Oriented To Time, Place, And Person] : oriented to person, place, and time [Affect] : the affect was normal [Mood] : the mood was normal [No Anxiety] : not feeling anxious [Not Palpable] : not palpable [No Precordial Heave] : no precordial heave was noted [Apical Thrill] : a thrill was present at the apex [Normal Rate] : normal [Heart Rate ___] : [unfilled] bpm [Rhythm Regular] : regular [Normal S1] : normal S1 [Normal S2] : normal S2 [No Gallop] : no gallop heard [Click] : no click [Pericardial Rub] : no pericardial rub [No Murmur] : no murmurs heard [Right Carotid Bruit] : no bruit heard over the right carotid [Left Carotid Bruit] : no bruit heard over the left carotid [Right Femoral Bruit] : no bruit heard over the right femoral artery [Left Femoral Bruit] : no bruit heard over the left femoral artery [2+] : left 2+ [No Abnormalities] : the abdominal aorta was not enlarged and no bruit was heard [Bruit] : no bruit heard [No Pitting Edema] : no pitting edema present [Rt] : varicose veins of the right leg noted [Lt] : varicose veins of the left leg noted

## 2021-03-24 NOTE — REVIEW OF SYSTEMS
[Shortness Of Breath] : no shortness of breath [Dyspnea on exertion] : not dyspnea during exertion [Chest  Pressure] : no chest pressure [Chest Pain] : no chest pain [Lower Ext Edema] : no extremity edema [Palpitations] : no palpitations [Cough] : no cough [Wheezing] : no wheezing [Abdominal Pain] : no abdominal pain [Heartburn] : no heartburn [Dysphagia] : no dysphagia [Dysuria] : no dysuria [Incontinence] : no incontinence [Negative] : Psychiatric

## 2021-04-06 ENCOUNTER — APPOINTMENT (OUTPATIENT)
Dept: CARDIOLOGY | Facility: CLINIC | Age: 71
End: 2021-04-06
Payer: MEDICARE

## 2021-04-06 LAB
INR PPP: 2.9 RATIO
QUALITY CONTROL: YES

## 2021-04-06 PROCEDURE — 93793 ANTICOAG MGMT PT WARFARIN: CPT

## 2021-04-06 PROCEDURE — 85610 PROTHROMBIN TIME: CPT | Mod: QW

## 2021-04-14 ENCOUNTER — APPOINTMENT (OUTPATIENT)
Dept: ELECTROPHYSIOLOGY | Facility: CLINIC | Age: 71
End: 2021-04-14

## 2021-04-20 ENCOUNTER — APPOINTMENT (OUTPATIENT)
Dept: CARDIOLOGY | Facility: CLINIC | Age: 71
End: 2021-04-20
Payer: MEDICARE

## 2021-04-20 VITALS
TEMPERATURE: 98 F | HEIGHT: 64 IN | RESPIRATION RATE: 16 BRPM | SYSTOLIC BLOOD PRESSURE: 108 MMHG | DIASTOLIC BLOOD PRESSURE: 62 MMHG

## 2021-04-20 PROCEDURE — 93793 ANTICOAG MGMT PT WARFARIN: CPT

## 2021-04-20 PROCEDURE — 85610 PROTHROMBIN TIME: CPT | Mod: QW

## 2021-04-21 LAB
INR PPP: 1.8 RATIO
QUALITY CONTROL: YES

## 2021-04-23 ENCOUNTER — RX RENEWAL (OUTPATIENT)
Age: 71
End: 2021-04-23

## 2021-04-27 ENCOUNTER — APPOINTMENT (OUTPATIENT)
Dept: CARDIOLOGY | Facility: CLINIC | Age: 71
End: 2021-04-27
Payer: MEDICARE

## 2021-04-27 VITALS
SYSTOLIC BLOOD PRESSURE: 104 MMHG | OXYGEN SATURATION: 99 % | HEIGHT: 64 IN | DIASTOLIC BLOOD PRESSURE: 58 MMHG | HEART RATE: 64 BPM

## 2021-04-27 PROCEDURE — 85610 PROTHROMBIN TIME: CPT | Mod: QW

## 2021-04-27 PROCEDURE — 93793 ANTICOAG MGMT PT WARFARIN: CPT

## 2021-04-29 LAB
INR PPP: 2.3 RATIO
QUALITY CONTROL: YES

## 2021-05-11 ENCOUNTER — APPOINTMENT (OUTPATIENT)
Dept: CARDIOLOGY | Facility: CLINIC | Age: 71
End: 2021-05-11
Payer: MEDICARE

## 2021-05-11 VITALS
SYSTOLIC BLOOD PRESSURE: 106 MMHG | HEART RATE: 70 BPM | DIASTOLIC BLOOD PRESSURE: 58 MMHG | OXYGEN SATURATION: 95 % | HEIGHT: 64 IN

## 2021-05-11 PROCEDURE — 85610 PROTHROMBIN TIME: CPT | Mod: QW

## 2021-05-11 PROCEDURE — 93793 ANTICOAG MGMT PT WARFARIN: CPT

## 2021-05-14 LAB
INR PPP: 2.6 RATIO
QUALITY CONTROL: YES

## 2021-05-21 ENCOUNTER — RX RENEWAL (OUTPATIENT)
Age: 71
End: 2021-05-21

## 2021-05-25 ENCOUNTER — APPOINTMENT (OUTPATIENT)
Dept: CARDIOLOGY | Facility: CLINIC | Age: 71
End: 2021-05-25
Payer: MEDICARE

## 2021-05-25 VITALS
SYSTOLIC BLOOD PRESSURE: 100 MMHG | RESPIRATION RATE: 16 BRPM | BODY MASS INDEX: 33.29 KG/M2 | DIASTOLIC BLOOD PRESSURE: 60 MMHG | WEIGHT: 195 LBS | HEIGHT: 64 IN

## 2021-05-25 PROCEDURE — 85610 PROTHROMBIN TIME: CPT | Mod: QW

## 2021-05-25 PROCEDURE — 93793 ANTICOAG MGMT PT WARFARIN: CPT

## 2021-05-27 LAB
INR PPP: 2.6 RATIO
QUALITY CONTROL: YES

## 2021-06-10 ENCOUNTER — APPOINTMENT (OUTPATIENT)
Dept: CARDIOLOGY | Facility: CLINIC | Age: 71
End: 2021-06-10
Payer: MEDICARE

## 2021-06-10 VITALS
HEIGHT: 64 IN | OXYGEN SATURATION: 98 % | HEART RATE: 70 BPM | DIASTOLIC BLOOD PRESSURE: 60 MMHG | SYSTOLIC BLOOD PRESSURE: 108 MMHG

## 2021-06-10 LAB
INR PPP: 2.9 RATIO
QUALITY CONTROL: YES

## 2021-06-10 PROCEDURE — 85610 PROTHROMBIN TIME: CPT | Mod: QW

## 2021-06-10 PROCEDURE — 93793 ANTICOAG MGMT PT WARFARIN: CPT

## 2021-06-23 ENCOUNTER — APPOINTMENT (OUTPATIENT)
Dept: CARDIOLOGY | Facility: CLINIC | Age: 71
End: 2021-06-23
Payer: MEDICARE

## 2021-06-23 PROCEDURE — 93296 REM INTERROG EVL PM/IDS: CPT

## 2021-06-23 PROCEDURE — 93295 DEV INTERROG REMOTE 1/2/MLT: CPT

## 2021-06-24 ENCOUNTER — APPOINTMENT (OUTPATIENT)
Dept: CARDIOLOGY | Facility: CLINIC | Age: 71
End: 2021-06-24
Payer: MEDICARE

## 2021-06-24 VITALS
BODY MASS INDEX: 33.29 KG/M2 | DIASTOLIC BLOOD PRESSURE: 64 MMHG | HEIGHT: 64 IN | OXYGEN SATURATION: 98 % | SYSTOLIC BLOOD PRESSURE: 100 MMHG | WEIGHT: 195 LBS | RESPIRATION RATE: 16 BRPM | HEART RATE: 71 BPM

## 2021-06-24 LAB
INR PPP: 2.9 RATIO
QUALITY CONTROL: YES

## 2021-06-24 PROCEDURE — 85610 PROTHROMBIN TIME: CPT | Mod: QW

## 2021-06-24 PROCEDURE — 93793 ANTICOAG MGMT PT WARFARIN: CPT

## 2021-07-06 ENCOUNTER — APPOINTMENT (OUTPATIENT)
Dept: CARDIOLOGY | Facility: CLINIC | Age: 71
End: 2021-07-06
Payer: MEDICARE

## 2021-07-06 VITALS
BODY MASS INDEX: 31.58 KG/M2 | SYSTOLIC BLOOD PRESSURE: 110 MMHG | RESPIRATION RATE: 18 BRPM | HEART RATE: 70 BPM | HEIGHT: 64 IN | OXYGEN SATURATION: 98 % | DIASTOLIC BLOOD PRESSURE: 70 MMHG | WEIGHT: 185 LBS

## 2021-07-06 LAB
INR PPP: 2.2 RATIO
QUALITY CONTROL: YES

## 2021-07-06 PROCEDURE — 93793 ANTICOAG MGMT PT WARFARIN: CPT

## 2021-07-06 PROCEDURE — 85610 PROTHROMBIN TIME: CPT | Mod: QW

## 2021-07-19 ENCOUNTER — RX RENEWAL (OUTPATIENT)
Age: 71
End: 2021-07-19

## 2021-07-20 ENCOUNTER — APPOINTMENT (OUTPATIENT)
Dept: CARDIOLOGY | Facility: CLINIC | Age: 71
End: 2021-07-20
Payer: MEDICARE

## 2021-07-20 VITALS
OXYGEN SATURATION: 99 % | SYSTOLIC BLOOD PRESSURE: 108 MMHG | BODY MASS INDEX: 31.58 KG/M2 | DIASTOLIC BLOOD PRESSURE: 66 MMHG | HEIGHT: 64 IN | WEIGHT: 185 LBS | HEART RATE: 71 BPM | RESPIRATION RATE: 18 BRPM

## 2021-07-20 LAB
INR PPP: 1.9 RATIO
QUALITY CONTROL: YES

## 2021-07-20 PROCEDURE — 93793 ANTICOAG MGMT PT WARFARIN: CPT

## 2021-07-20 PROCEDURE — 85610 PROTHROMBIN TIME: CPT | Mod: QW

## 2021-08-05 ENCOUNTER — APPOINTMENT (OUTPATIENT)
Dept: CARDIOLOGY | Facility: CLINIC | Age: 71
End: 2021-08-05
Payer: MEDICARE

## 2021-08-05 ENCOUNTER — NON-APPOINTMENT (OUTPATIENT)
Age: 71
End: 2021-08-05

## 2021-08-05 VITALS — DIASTOLIC BLOOD PRESSURE: 70 MMHG | SYSTOLIC BLOOD PRESSURE: 109 MMHG | HEART RATE: 70 BPM | OXYGEN SATURATION: 99 %

## 2021-08-05 LAB
INR PPP: 2.1 RATIO
QUALITY CONTROL: YES

## 2021-08-05 PROCEDURE — 99214 OFFICE O/P EST MOD 30 MIN: CPT

## 2021-08-05 PROCEDURE — 93000 ELECTROCARDIOGRAM COMPLETE: CPT

## 2021-08-05 PROCEDURE — 85610 PROTHROMBIN TIME: CPT | Mod: QW

## 2021-08-17 ENCOUNTER — APPOINTMENT (OUTPATIENT)
Dept: CARDIOLOGY | Facility: CLINIC | Age: 71
End: 2021-08-17
Payer: MEDICARE

## 2021-08-17 VITALS
HEART RATE: 70 BPM | OXYGEN SATURATION: 98 % | HEIGHT: 64 IN | RESPIRATION RATE: 16 BRPM | DIASTOLIC BLOOD PRESSURE: 68 MMHG | SYSTOLIC BLOOD PRESSURE: 110 MMHG | BODY MASS INDEX: 31.58 KG/M2 | WEIGHT: 185 LBS

## 2021-08-17 PROCEDURE — 85610 PROTHROMBIN TIME: CPT | Mod: QW

## 2021-08-17 PROCEDURE — 93793 ANTICOAG MGMT PT WARFARIN: CPT

## 2021-08-18 LAB
INR PPP: 2.1 RATIO
QUALITY CONTROL: YES

## 2021-08-31 ENCOUNTER — RX RENEWAL (OUTPATIENT)
Age: 71
End: 2021-08-31

## 2021-08-31 ENCOUNTER — APPOINTMENT (OUTPATIENT)
Dept: CARDIOLOGY | Facility: CLINIC | Age: 71
End: 2021-08-31
Payer: MEDICARE

## 2021-08-31 VITALS
HEART RATE: 72 BPM | HEIGHT: 60 IN | SYSTOLIC BLOOD PRESSURE: 120 MMHG | DIASTOLIC BLOOD PRESSURE: 60 MMHG | OXYGEN SATURATION: 97 %

## 2021-08-31 LAB
INR PPP: 2.3 RATIO
QUALITY CONTROL: YES

## 2021-08-31 PROCEDURE — 85610 PROTHROMBIN TIME: CPT | Mod: QW

## 2021-08-31 PROCEDURE — 93793 ANTICOAG MGMT PT WARFARIN: CPT

## 2021-09-14 ENCOUNTER — APPOINTMENT (OUTPATIENT)
Dept: CARDIOLOGY | Facility: CLINIC | Age: 71
End: 2021-09-14
Payer: MEDICARE

## 2021-09-14 VITALS
OXYGEN SATURATION: 97 % | HEART RATE: 81 BPM | HEIGHT: 60 IN | DIASTOLIC BLOOD PRESSURE: 66 MMHG | SYSTOLIC BLOOD PRESSURE: 108 MMHG

## 2021-09-14 LAB
INR PPP: 2.4 RATIO
QUALITY CONTROL: YES

## 2021-09-14 PROCEDURE — 93793 ANTICOAG MGMT PT WARFARIN: CPT

## 2021-09-14 PROCEDURE — 85610 PROTHROMBIN TIME: CPT | Mod: QW

## 2021-09-22 ENCOUNTER — APPOINTMENT (OUTPATIENT)
Dept: CARDIOLOGY | Facility: CLINIC | Age: 71
End: 2021-09-22
Payer: MEDICARE

## 2021-09-22 PROCEDURE — 93296 REM INTERROG EVL PM/IDS: CPT

## 2021-09-22 PROCEDURE — 93295 DEV INTERROG REMOTE 1/2/MLT: CPT

## 2021-09-28 ENCOUNTER — APPOINTMENT (OUTPATIENT)
Dept: CARDIOLOGY | Facility: CLINIC | Age: 71
End: 2021-09-28
Payer: MEDICARE

## 2021-09-28 VITALS
SYSTOLIC BLOOD PRESSURE: 110 MMHG | OXYGEN SATURATION: 98 % | HEART RATE: 70 BPM | DIASTOLIC BLOOD PRESSURE: 60 MMHG | HEIGHT: 60 IN

## 2021-09-28 LAB
INR PPP: 2.5 RATIO
QUALITY CONTROL: YES

## 2021-09-28 PROCEDURE — 93793 ANTICOAG MGMT PT WARFARIN: CPT

## 2021-09-28 PROCEDURE — 85610 PROTHROMBIN TIME: CPT | Mod: QW

## 2021-10-08 NOTE — ED ADULT NURSE NOTE - CAS TRG GEN SKIN COLOR
[facility]  Operative Note    Espinoza Alex Location: OR   CSN [id number] MRN [id number]    1951 Age 72year old   Admission Date 10/8/2021 Operation Date 10/8/2021   Attending Physician Sudeep Liao MD Operating Physician Beth Rust identified. Three 8 mm trocars were placed under direct visualization; one in the left mid abdomen out laterally, one in the left lower quadrant just off the ASIS, and one in the left upper abdomen just below the costal margin.   The robot was docked and in All needle, sponge, instrument counts were correct at the end of procedure.      Nesha Chang MD  10/8/2021  12:42 PM Normal for race

## 2021-10-14 ENCOUNTER — APPOINTMENT (OUTPATIENT)
Dept: CARDIOLOGY | Facility: CLINIC | Age: 71
End: 2021-10-14
Payer: MEDICARE

## 2021-10-14 VITALS
WEIGHT: 185 LBS | HEIGHT: 62 IN | RESPIRATION RATE: 15 BRPM | DIASTOLIC BLOOD PRESSURE: 60 MMHG | SYSTOLIC BLOOD PRESSURE: 104 MMHG | BODY MASS INDEX: 34.04 KG/M2

## 2021-10-14 LAB
INR PPP: 2.7 RATIO
QUALITY CONTROL: YES

## 2021-10-14 PROCEDURE — 93793 ANTICOAG MGMT PT WARFARIN: CPT

## 2021-10-14 PROCEDURE — 85610 PROTHROMBIN TIME: CPT | Mod: QW

## 2021-11-04 ENCOUNTER — APPOINTMENT (OUTPATIENT)
Dept: CARDIOLOGY | Facility: CLINIC | Age: 71
End: 2021-11-04
Payer: MEDICARE

## 2021-11-04 ENCOUNTER — NON-APPOINTMENT (OUTPATIENT)
Age: 71
End: 2021-11-04

## 2021-11-04 VITALS
DIASTOLIC BLOOD PRESSURE: 76 MMHG | HEIGHT: 62 IN | SYSTOLIC BLOOD PRESSURE: 122 MMHG | BODY MASS INDEX: 35.7 KG/M2 | WEIGHT: 194 LBS | HEART RATE: 62 BPM | OXYGEN SATURATION: 94 %

## 2021-11-04 LAB
INR PPP: 4.7 RATIO
QUALITY CONTROL: YES

## 2021-11-04 PROCEDURE — 99214 OFFICE O/P EST MOD 30 MIN: CPT

## 2021-11-04 PROCEDURE — 93000 ELECTROCARDIOGRAM COMPLETE: CPT

## 2021-11-04 PROCEDURE — 85610 PROTHROMBIN TIME: CPT | Mod: QW

## 2021-11-11 ENCOUNTER — APPOINTMENT (OUTPATIENT)
Dept: CARDIOLOGY | Facility: CLINIC | Age: 71
End: 2021-11-11
Payer: MEDICARE

## 2021-11-11 VITALS
HEART RATE: 70 BPM | HEIGHT: 62 IN | DIASTOLIC BLOOD PRESSURE: 66 MMHG | OXYGEN SATURATION: 100 % | SYSTOLIC BLOOD PRESSURE: 112 MMHG

## 2021-11-11 LAB
INR PPP: 3 RATIO
QUALITY CONTROL: YES

## 2021-11-11 PROCEDURE — 93793 ANTICOAG MGMT PT WARFARIN: CPT

## 2021-11-11 PROCEDURE — 85610 PROTHROMBIN TIME: CPT | Mod: QW

## 2021-11-15 LAB
ALBUMIN SERPL ELPH-MCNC: 4.3 G/DL
ALP BLD-CCNC: 73 U/L
ALT SERPL-CCNC: 15 U/L
ANION GAP SERPL CALC-SCNC: 12 MMOL/L
AST SERPL-CCNC: 19 U/L
BILIRUB SERPL-MCNC: 0.6 MG/DL
BUN SERPL-MCNC: 34 MG/DL
CALCIUM SERPL-MCNC: 9.6 MG/DL
CHLORIDE SERPL-SCNC: 101 MMOL/L
CHOLEST SERPL-MCNC: 132 MG/DL
CO2 SERPL-SCNC: 26 MMOL/L
CREAT SERPL-MCNC: 1.35 MG/DL
GLUCOSE SERPL-MCNC: 118 MG/DL
HDLC SERPL-MCNC: 45 MG/DL
LDLC SERPL CALC-MCNC: 59 MG/DL
NONHDLC SERPL-MCNC: 87 MG/DL
POTASSIUM SERPL-SCNC: 4.4 MMOL/L
PROT SERPL-MCNC: 6.9 G/DL
SODIUM SERPL-SCNC: 140 MMOL/L
TRIGL SERPL-MCNC: 141 MG/DL

## 2021-11-19 ENCOUNTER — RX RENEWAL (OUTPATIENT)
Age: 71
End: 2021-11-19

## 2021-11-23 ENCOUNTER — APPOINTMENT (OUTPATIENT)
Dept: CARDIOLOGY | Facility: CLINIC | Age: 71
End: 2021-11-23
Payer: MEDICARE

## 2021-11-23 VITALS
HEIGHT: 62 IN | DIASTOLIC BLOOD PRESSURE: 50 MMHG | SYSTOLIC BLOOD PRESSURE: 104 MMHG | OXYGEN SATURATION: 99 % | HEART RATE: 72 BPM

## 2021-11-23 LAB
INR PPP: 2.7 RATIO
QUALITY CONTROL: YES

## 2021-11-23 PROCEDURE — 93793 ANTICOAG MGMT PT WARFARIN: CPT

## 2021-11-23 PROCEDURE — 85610 PROTHROMBIN TIME: CPT | Mod: QW

## 2021-12-07 ENCOUNTER — APPOINTMENT (OUTPATIENT)
Dept: CARDIOLOGY | Facility: CLINIC | Age: 71
End: 2021-12-07
Payer: MEDICARE

## 2021-12-07 VITALS
OXYGEN SATURATION: 98 % | SYSTOLIC BLOOD PRESSURE: 106 MMHG | HEIGHT: 62 IN | DIASTOLIC BLOOD PRESSURE: 56 MMHG | HEART RATE: 70 BPM

## 2021-12-07 LAB
INR PPP: 2.8 RATIO
QUALITY CONTROL: YES

## 2021-12-07 PROCEDURE — 93793 ANTICOAG MGMT PT WARFARIN: CPT

## 2021-12-07 PROCEDURE — 85610 PROTHROMBIN TIME: CPT | Mod: QW

## 2021-12-21 ENCOUNTER — APPOINTMENT (OUTPATIENT)
Dept: CARDIOLOGY | Facility: CLINIC | Age: 71
End: 2021-12-21
Payer: MEDICARE

## 2021-12-21 VITALS — DIASTOLIC BLOOD PRESSURE: 70 MMHG | HEART RATE: 72 BPM | SYSTOLIC BLOOD PRESSURE: 116 MMHG | OXYGEN SATURATION: 100 %

## 2021-12-21 LAB
INR PPP: 2 RATIO
QUALITY CONTROL: YES

## 2021-12-21 PROCEDURE — 93793 ANTICOAG MGMT PT WARFARIN: CPT

## 2021-12-21 PROCEDURE — 85610 PROTHROMBIN TIME: CPT | Mod: QW

## 2021-12-22 ENCOUNTER — APPOINTMENT (OUTPATIENT)
Dept: CARDIOLOGY | Facility: CLINIC | Age: 71
End: 2021-12-22
Payer: MEDICARE

## 2021-12-22 PROCEDURE — 93295 DEV INTERROG REMOTE 1/2/MLT: CPT | Mod: NC

## 2021-12-22 PROCEDURE — 93296 REM INTERROG EVL PM/IDS: CPT | Mod: NC

## 2022-01-04 ENCOUNTER — APPOINTMENT (OUTPATIENT)
Dept: CARDIOLOGY | Facility: CLINIC | Age: 72
End: 2022-01-04
Payer: MEDICARE

## 2022-01-04 VITALS
HEART RATE: 70 BPM | HEIGHT: 62 IN | OXYGEN SATURATION: 96 % | DIASTOLIC BLOOD PRESSURE: 60 MMHG | SYSTOLIC BLOOD PRESSURE: 104 MMHG

## 2022-01-04 LAB
INR PPP: 2.2 RATIO
QUALITY CONTROL: YES

## 2022-01-04 PROCEDURE — 85610 PROTHROMBIN TIME: CPT | Mod: QW

## 2022-01-04 PROCEDURE — 93793 ANTICOAG MGMT PT WARFARIN: CPT

## 2022-01-18 ENCOUNTER — APPOINTMENT (OUTPATIENT)
Dept: CARDIOLOGY | Facility: CLINIC | Age: 72
End: 2022-01-18
Payer: MEDICARE

## 2022-01-18 VITALS — DIASTOLIC BLOOD PRESSURE: 72 MMHG | SYSTOLIC BLOOD PRESSURE: 110 MMHG | OXYGEN SATURATION: 97 % | HEART RATE: 65 BPM

## 2022-01-18 LAB
INR PPP: 2.1 RATIO
QUALITY CONTROL: YES

## 2022-01-18 PROCEDURE — 85610 PROTHROMBIN TIME: CPT | Mod: QW

## 2022-01-18 PROCEDURE — 93793 ANTICOAG MGMT PT WARFARIN: CPT

## 2022-02-01 ENCOUNTER — APPOINTMENT (OUTPATIENT)
Dept: CARDIOLOGY | Facility: CLINIC | Age: 72
End: 2022-02-01
Payer: MEDICARE

## 2022-02-01 VITALS
DIASTOLIC BLOOD PRESSURE: 78 MMHG | HEART RATE: 96 BPM | SYSTOLIC BLOOD PRESSURE: 115 MMHG | OXYGEN SATURATION: 95 % | HEIGHT: 62 IN

## 2022-02-01 LAB
INR PPP: 2 RATIO
QUALITY CONTROL: YES

## 2022-02-01 PROCEDURE — 85610 PROTHROMBIN TIME: CPT | Mod: QW

## 2022-02-01 PROCEDURE — 93793 ANTICOAG MGMT PT WARFARIN: CPT

## 2022-02-02 ENCOUNTER — RX RENEWAL (OUTPATIENT)
Age: 72
End: 2022-02-02

## 2022-02-02 RX ORDER — CARVEDILOL 12.5 MG/1
12.5 TABLET, FILM COATED ORAL
Qty: 180 | Refills: 0 | Status: DISCONTINUED | COMMUNITY
Start: 2021-02-17 | End: 2022-02-02

## 2022-02-16 ENCOUNTER — APPOINTMENT (OUTPATIENT)
Dept: CARDIOLOGY | Facility: CLINIC | Age: 72
End: 2022-02-16
Payer: MEDICARE

## 2022-02-16 ENCOUNTER — NON-APPOINTMENT (OUTPATIENT)
Age: 72
End: 2022-02-16

## 2022-02-16 VITALS
BODY MASS INDEX: 36.25 KG/M2 | DIASTOLIC BLOOD PRESSURE: 72 MMHG | WEIGHT: 197 LBS | SYSTOLIC BLOOD PRESSURE: 111 MMHG | HEIGHT: 62 IN | HEART RATE: 78 BPM | OXYGEN SATURATION: 99 %

## 2022-02-16 DIAGNOSIS — R06.02 SHORTNESS OF BREATH: ICD-10-CM

## 2022-02-16 LAB
INR PPP: 2.7 RATIO
QUALITY CONTROL: YES

## 2022-02-16 PROCEDURE — 99214 OFFICE O/P EST MOD 30 MIN: CPT

## 2022-02-16 PROCEDURE — 93000 ELECTROCARDIOGRAM COMPLETE: CPT | Mod: 59

## 2022-02-16 PROCEDURE — 93283 PRGRMG EVAL IMPLANTABLE DFB: CPT

## 2022-02-16 PROCEDURE — 85610 PROTHROMBIN TIME: CPT | Mod: QW

## 2022-03-03 ENCOUNTER — APPOINTMENT (OUTPATIENT)
Dept: CARDIOLOGY | Facility: CLINIC | Age: 72
End: 2022-03-03
Payer: MEDICARE

## 2022-03-03 LAB
INR PPP: 1.7 RATIO
QUALITY CONTROL: YES

## 2022-03-03 PROCEDURE — 93793 ANTICOAG MGMT PT WARFARIN: CPT

## 2022-03-03 PROCEDURE — 85610 PROTHROMBIN TIME: CPT | Mod: QW

## 2022-03-10 ENCOUNTER — APPOINTMENT (OUTPATIENT)
Dept: CARDIOLOGY | Facility: CLINIC | Age: 72
End: 2022-03-10
Payer: MEDICARE

## 2022-03-10 VITALS — DIASTOLIC BLOOD PRESSURE: 78 MMHG | SYSTOLIC BLOOD PRESSURE: 106 MMHG | OXYGEN SATURATION: 99 % | HEART RATE: 86 BPM

## 2022-03-10 LAB
INR PPP: 2 RATIO
QUALITY CONTROL: YES

## 2022-03-10 PROCEDURE — 93793 ANTICOAG MGMT PT WARFARIN: CPT

## 2022-03-10 PROCEDURE — 85610 PROTHROMBIN TIME: CPT | Mod: QW

## 2022-03-16 NOTE — ED ADULT NURSE NOTE - MUSCULOSKELETAL WDL
Spontaneous, unlabored and symmetrical
Full range of motion of upper and lower extremities, no joint tenderness/swelling.

## 2022-03-23 ENCOUNTER — APPOINTMENT (OUTPATIENT)
Dept: CARDIOLOGY | Facility: CLINIC | Age: 72
End: 2022-03-23
Payer: MEDICARE

## 2022-03-23 PROCEDURE — 93296 REM INTERROG EVL PM/IDS: CPT

## 2022-03-23 PROCEDURE — 93295 DEV INTERROG REMOTE 1/2/MLT: CPT

## 2022-03-24 ENCOUNTER — APPOINTMENT (OUTPATIENT)
Dept: CARDIOLOGY | Facility: CLINIC | Age: 72
End: 2022-03-24
Payer: MEDICARE

## 2022-03-24 ENCOUNTER — APPOINTMENT (OUTPATIENT)
Dept: CARDIOLOGY | Facility: CLINIC | Age: 72
End: 2022-03-24

## 2022-03-24 VITALS — DIASTOLIC BLOOD PRESSURE: 62 MMHG | OXYGEN SATURATION: 99 % | SYSTOLIC BLOOD PRESSURE: 102 MMHG | HEART RATE: 85 BPM

## 2022-03-24 PROCEDURE — 93793 ANTICOAG MGMT PT WARFARIN: CPT

## 2022-03-24 PROCEDURE — 85610 PROTHROMBIN TIME: CPT | Mod: QW

## 2022-03-25 LAB
INR PPP: 2.4 RATIO
QUALITY CONTROL: YES

## 2022-04-08 ENCOUNTER — APPOINTMENT (OUTPATIENT)
Dept: CARDIOLOGY | Facility: CLINIC | Age: 72
End: 2022-04-08
Payer: MEDICARE

## 2022-04-08 ENCOUNTER — MED ADMIN CHARGE (OUTPATIENT)
Age: 72
End: 2022-04-08

## 2022-04-08 PROCEDURE — 85610 PROTHROMBIN TIME: CPT | Mod: QW

## 2022-04-08 PROCEDURE — 93306 TTE W/DOPPLER COMPLETE: CPT

## 2022-04-08 PROCEDURE — 93793 ANTICOAG MGMT PT WARFARIN: CPT

## 2022-04-08 RX ADMIN — PERFLUTREN MG/ML: 6.52 INJECTION, SUSPENSION INTRAVENOUS at 00:00

## 2022-04-09 LAB
INR PPP: 2.3 RATIO
QUALITY CONTROL: YES

## 2022-04-09 RX ORDER — PERFLUTREN 6.52 MG/ML
6.52 INJECTION, SUSPENSION INTRAVENOUS
Qty: 1 | Refills: 0 | Status: COMPLETED | OUTPATIENT
Start: 2022-04-08

## 2022-04-25 ENCOUNTER — RX RENEWAL (OUTPATIENT)
Age: 72
End: 2022-04-25

## 2022-04-26 ENCOUNTER — APPOINTMENT (OUTPATIENT)
Dept: CARDIOLOGY | Facility: CLINIC | Age: 72
End: 2022-04-26
Payer: MEDICARE

## 2022-04-26 VITALS — DIASTOLIC BLOOD PRESSURE: 56 MMHG | SYSTOLIC BLOOD PRESSURE: 98 MMHG

## 2022-04-26 LAB
INR PPP: 2 RATIO
QUALITY CONTROL: YES

## 2022-04-26 PROCEDURE — 93793 ANTICOAG MGMT PT WARFARIN: CPT

## 2022-04-26 PROCEDURE — 85610 PROTHROMBIN TIME: CPT | Mod: QW

## 2022-05-10 ENCOUNTER — APPOINTMENT (OUTPATIENT)
Dept: CARDIOLOGY | Facility: CLINIC | Age: 72
End: 2022-05-10
Payer: MEDICARE

## 2022-05-10 VITALS
DIASTOLIC BLOOD PRESSURE: 62 MMHG | SYSTOLIC BLOOD PRESSURE: 100 MMHG | OXYGEN SATURATION: 98 % | HEART RATE: 85 BPM | HEIGHT: 62 IN

## 2022-05-10 LAB
INR PPP: 2.2 RATIO
QUALITY CONTROL: YES

## 2022-05-10 PROCEDURE — 93793 ANTICOAG MGMT PT WARFARIN: CPT

## 2022-05-10 PROCEDURE — 85610 PROTHROMBIN TIME: CPT | Mod: QW

## 2022-05-26 ENCOUNTER — APPOINTMENT (OUTPATIENT)
Dept: CARDIOLOGY | Facility: CLINIC | Age: 72
End: 2022-05-26
Payer: MEDICARE

## 2022-05-26 VITALS
DIASTOLIC BLOOD PRESSURE: 78 MMHG | HEART RATE: 88 BPM | HEIGHT: 62 IN | SYSTOLIC BLOOD PRESSURE: 106 MMHG | OXYGEN SATURATION: 97 %

## 2022-05-26 LAB
INR PPP: 2.4 RATIO
QUALITY CONTROL: YES

## 2022-05-26 PROCEDURE — 85610 PROTHROMBIN TIME: CPT | Mod: QW

## 2022-05-26 PROCEDURE — 93793 ANTICOAG MGMT PT WARFARIN: CPT

## 2022-06-09 ENCOUNTER — APPOINTMENT (OUTPATIENT)
Dept: CARDIOLOGY | Facility: CLINIC | Age: 72
End: 2022-06-09
Payer: MEDICARE

## 2022-06-09 VITALS — HEART RATE: 93 BPM | DIASTOLIC BLOOD PRESSURE: 74 MMHG | OXYGEN SATURATION: 98 % | SYSTOLIC BLOOD PRESSURE: 112 MMHG

## 2022-06-09 LAB
INR PPP: 2.5 RATIO
QUALITY CONTROL: YES

## 2022-06-09 PROCEDURE — 85610 PROTHROMBIN TIME: CPT | Mod: QW

## 2022-06-09 PROCEDURE — 93793 ANTICOAG MGMT PT WARFARIN: CPT

## 2022-06-20 NOTE — H&P PST ADULT - EJECTION FRACTION % NO
Lisa Hankins is a 63 y.o. female  YOB: 1958        Chief Complaint   Patient presents with   • Gynecologic Exam     pt here for annual and denies any problems, last pap 06/23/2020 normal/-HPV       Gynecologic Exam  The patient's pertinent negatives include no pelvic pain. Pertinent negatives include no abdominal pain, back pain, constipation, diarrhea, dysuria, fever, frequency, hematuria, nausea, rash, sore throat, urgency or vomiting.       The following portions of the patient's history were reviewed and updated as appropriate: allergies, current medications, past family history, past medical history, past social history, past surgical history and problem list.    No Known Allergies    Past Medical History:   Diagnosis Date   • Menopausal symptom        Family History   Problem Relation Age of Onset   • Pancreatic cancer Mother    • Heart disease Father    • No Known Problems Sister    • No Known Problems Brother    • No Known Problems Daughter    • No Known Problems Son    • No Known Problems Maternal Grandmother    • No Known Problems Paternal Grandmother    • No Known Problems Maternal Aunt    • No Known Problems Paternal Aunt    • Breast cancer Neg Hx    • Ovarian cancer Neg Hx    • Colon cancer Neg Hx    • Melanoma Neg Hx    • Uterine cancer Neg Hx    • BRCA 1/2 Neg Hx    • Endometrial cancer Neg Hx        Social History     Socioeconomic History   • Marital status:      Spouse name: Not on file   • Number of children: Not on file   • Years of education: Not on file   • Highest education level: Not on file   Tobacco Use   • Smoking status: Never Smoker   • Smokeless tobacco: Never Used   Substance and Sexual Activity   • Alcohol use: Yes     Comment: occasionally    • Drug use: No   • Sexual activity: Defer     Birth control/protection: Post-menopausal         Current Outpatient Medications:   •  Calcium Carbonate-Vit D-Min (CALTRATE 600+D PLUS MINERALS) 600-800 MG-UNIT 
tablet, Take 2 tablets by mouth Daily., Disp: , Rfl:   •  cetirizine (zyrTEC) 10 MG tablet, Take 10 mg by mouth Daily., Disp: , Rfl:   •  conjugated estrogens (PREMARIN) 0.625 MG/GM vaginal cream, Insert  into the vagina 2 (Two) Times a Week., Disp: 30 g, Rfl: 3  •  Cyanocobalamin (Vitamin B 12) 100 MCG lozenge, , Disp: , Rfl:   •  Estradiol-Norethindrone Acet 0.5-0.1 MG per tablet, Take 1 tablet by mouth Daily., Disp: 84 tablet, Rfl: 3  •  Magnesium Citrate powder, 88 mg 2 (two) times a day., Disp: , Rfl:   •  multivitamin with minerals (MULTIVITAMIN ADULT PO), , Disp: , Rfl:   •  Omega-3 1000 MG capsule, , Disp: , Rfl:   •  Zinc 100 MG tablet, , Disp: , Rfl:     No LMP recorded (lmp unknown). Patient is postmenopausal.    Sexual History:           Could not be calculated    Past Surgical History:   Procedure Laterality Date   • BREAST BIOPSY Left 2005   • DILATATION AND CURETTAGE     • EYE SURGERY     • KNEE MENISCAL REPAIR     • MOUTH SURGERY         Review of Systems   Constitutional: Negative for activity change, appetite change, fatigue, fever, unexpected weight gain and unexpected weight loss.   HENT: Negative for congestion, ear pain, hearing loss, nosebleeds, rhinorrhea, sore throat, tinnitus and trouble swallowing.    Eyes: Negative for blurred vision, pain, discharge, itching and visual disturbance.   Respiratory: Negative for apnea, chest tightness, shortness of breath and wheezing.    Cardiovascular: Negative for chest pain and leg swelling.   Gastrointestinal: Negative for abdominal pain, blood in stool, constipation, diarrhea, nausea, vomiting and GERD.   Endocrine: Negative for heat intolerance, polydipsia and polyuria.   Genitourinary: Negative for breast lump, decreased libido, difficulty urinating, dyspareunia, dysuria, frequency, genital sores, hematuria, menstrual problem, pelvic pain, urgency, urinary incontinence and vaginal pain.   Musculoskeletal: Negative for arthralgias, back pain, joint 
swelling and myalgias.   Skin: Negative for color change, rash and skin lesions.   Allergic/Immunologic: Negative for environmental allergies, food allergies and immunocompromised state.   Neurological: Negative for dizziness, tremors, seizures, syncope, facial asymmetry, numbness and headache.   Hematological: Negative for adenopathy. Does not bruise/bleed easily.   Psychiatric/Behavioral: Negative for agitation, hallucinations, sleep disturbance, suicidal ideas and depressed mood. The patient is not nervous/anxious.        Objective   Physical Exam  Vitals and nursing note reviewed. Exam conducted with a chaperone present.   Constitutional:       General: She is not in acute distress.     Appearance: She is well-developed. She is not diaphoretic.   HENT:      Head: Normocephalic.      Right Ear: External ear normal.      Left Ear: External ear normal.      Nose: Nose normal.   Eyes:      General: No scleral icterus.        Right eye: No discharge.         Left eye: No discharge.      Conjunctiva/sclera: Conjunctivae normal.      Pupils: Pupils are equal, round, and reactive to light.   Neck:      Thyroid: No thyromegaly.      Vascular: No carotid bruit.      Trachea: No tracheal deviation.   Cardiovascular:      Rate and Rhythm: Normal rate and regular rhythm.      Heart sounds: Normal heart sounds. No murmur heard.     Pulmonary:      Effort: Pulmonary effort is normal. No respiratory distress.      Breath sounds: Normal breath sounds. No wheezing.   Chest:      Breasts: Breasts are symmetrical.         Right: Normal. No swelling, bleeding, inverted nipple, mass, nipple discharge, skin change or tenderness.         Left: Normal. No swelling, bleeding, inverted nipple, mass, nipple discharge, skin change or tenderness.   Abdominal:      General: There is no distension.      Palpations: Abdomen is soft. There is no mass.      Tenderness: There is no abdominal tenderness. There is no right CVA tenderness, left CVA 
No-Patient/Caregiver offered and refused free interpretation services.
tenderness or guarding.      Hernia: No hernia is present. There is no hernia in the left inguinal area or right inguinal area.   Genitourinary:     General: Normal vulva.      Exam position: Lithotomy position.      Labia:         Right: No rash, tenderness, lesion or injury.         Left: No rash, tenderness, lesion or injury.       Vagina: Normal. No signs of injury and foreign body. No vaginal discharge, erythema, tenderness or bleeding.      Cervix: Normal.      Uterus: Normal. Not enlarged, not fixed and not tender.       Adnexa: Right adnexa normal and left adnexa normal.        Right: No mass, tenderness or fullness.          Left: No mass, tenderness or fullness.        Rectum: Normal. No mass.      Comments:   BSU normal  Urethral meatus  Normal  Perineum  Normal  Musculoskeletal:         General: No tenderness. Normal range of motion.      Cervical back: Normal range of motion and neck supple.   Lymphadenopathy:      Head:      Right side of head: No submental, submandibular, tonsillar, preauricular, posterior auricular or occipital adenopathy.      Left side of head: No submental, submandibular, tonsillar, preauricular, posterior auricular or occipital adenopathy.      Cervical: No cervical adenopathy.      Right cervical: No superficial, deep or posterior cervical adenopathy.     Left cervical: No superficial, deep or posterior cervical adenopathy.      Upper Body:      Right upper body: No supraclavicular, axillary or pectoral adenopathy.      Left upper body: No supraclavicular, axillary or pectoral adenopathy.      Lower Body: No right inguinal adenopathy. No left inguinal adenopathy.   Skin:     General: Skin is warm and dry.      Findings: No bruising, erythema or rash.   Neurological:      Mental Status: She is alert and oriented to person, place, and time.      Coordination: Coordination normal.   Psychiatric:         Mood and Affect: Mood normal.         Behavior: Behavior normal.         
"Thought Content: Thought content normal.         Judgment: Judgment normal.           Vitals:    06/24/21 1302   BP: 104/62   BP Location: Left arm   Patient Position: Sitting   Cuff Size: Adult   Weight: 48.5 kg (107 lb)   Height: 162.6 cm (64\")       Diagnoses and all orders for this visit:    1. Well woman exam with routine gynecological exam (Primary)  Comments:  Normal well woman exam.  ThinPrep Pap smear done.  Mammogram and bone density ordered.    2. Cervical cancer screening  Comments:  ThinPrep Pap smear done.  Orders:  -     Liquid-based Pap Smear, Screening    3. Osteoporosis screening  Comments:  DEXA bone scan scheduled.  Orders:  -     DEXA Bone Density Axial; Future    4. Menopausal symptoms  Comments:  Doing well on Lopreeza and wants to remain on it.  Refills sent to pharmacy  Orders:  -     Estradiol-Norethindrone Acet 0.5-0.1 MG per tablet; Take 1 tablet by mouth Daily.  Dispense: 84 tablet; Refill: 3        Normal GYN exam. Will have lab work here. Encouraged SBE.  Pt is aware how to do self breast exam and the importance of same. Discussed weight management and importance of maintaining a healthy weight. Discussed Vitamin D intake and the importance of adequate vitamin D for both bone health and a healthy immune system.  Discussed daily exercise and the importance of same in regards to a healthy heart as well as helping to maintain her weight and improving her mental health.  Body mass index is 18.37 kg/m². Colonoscopy is up to date.  Mammogram will be scheduled at [facility]. Pap smear is done per ASCCP guidelines.    Patient's Body mass index is 18.37 kg/m². indicating that she is within normal range (BMI 18.5-24.9). No BMI management plan needed..             Non-Smoker    MyChart Instructions Given       "
30

## 2022-06-22 ENCOUNTER — APPOINTMENT (OUTPATIENT)
Dept: CARDIOLOGY | Facility: CLINIC | Age: 72
End: 2022-06-22
Payer: MEDICARE

## 2022-06-22 PROCEDURE — 93295 DEV INTERROG REMOTE 1/2/MLT: CPT

## 2022-06-22 PROCEDURE — 93296 REM INTERROG EVL PM/IDS: CPT

## 2022-06-23 ENCOUNTER — APPOINTMENT (OUTPATIENT)
Dept: CARDIOLOGY | Facility: CLINIC | Age: 72
End: 2022-06-23
Payer: MEDICARE

## 2022-06-23 LAB
INR PPP: 3.2 RATIO
QUALITY CONTROL: YES

## 2022-06-23 PROCEDURE — 85610 PROTHROMBIN TIME: CPT | Mod: QW

## 2022-06-23 PROCEDURE — 93793 ANTICOAG MGMT PT WARFARIN: CPT

## 2022-07-05 ENCOUNTER — APPOINTMENT (OUTPATIENT)
Dept: CARDIOLOGY | Facility: CLINIC | Age: 72
End: 2022-07-05

## 2022-07-05 VITALS
HEART RATE: 93 BPM | DIASTOLIC BLOOD PRESSURE: 70 MMHG | SYSTOLIC BLOOD PRESSURE: 110 MMHG | OXYGEN SATURATION: 96 % | BODY MASS INDEX: 36.25 KG/M2 | HEIGHT: 62 IN | WEIGHT: 197 LBS

## 2022-07-05 LAB
INR PPP: 2.7 RATIO
QUALITY CONTROL: YES

## 2022-07-05 PROCEDURE — 93793 ANTICOAG MGMT PT WARFARIN: CPT

## 2022-07-05 PROCEDURE — 85610 PROTHROMBIN TIME: CPT | Mod: QW

## 2022-07-12 ENCOUNTER — INPATIENT (INPATIENT)
Facility: HOSPITAL | Age: 72
LOS: 0 days | Discharge: HOME | DRG: 291 | End: 2022-07-12
Attending: FAMILY MEDICINE | Admitting: FAMILY MEDICINE
Payer: COMMERCIAL

## 2022-07-12 VITALS
TEMPERATURE: 98 F | OXYGEN SATURATION: 98 % | DIASTOLIC BLOOD PRESSURE: 65 MMHG | SYSTOLIC BLOOD PRESSURE: 101 MMHG | RESPIRATION RATE: 18 BRPM | HEART RATE: 70 BPM

## 2022-07-12 VITALS
HEART RATE: 67 BPM | HEIGHT: 64 IN | SYSTOLIC BLOOD PRESSURE: 111 MMHG | OXYGEN SATURATION: 96 % | RESPIRATION RATE: 18 BRPM | WEIGHT: 190.04 LBS | DIASTOLIC BLOOD PRESSURE: 67 MMHG | TEMPERATURE: 98 F

## 2022-07-12 DIAGNOSIS — Z95.5 PRESENCE OF CORONARY ANGIOPLASTY IMPLANT AND GRAFT: Chronic | ICD-10-CM

## 2022-07-12 DIAGNOSIS — I50.23 ACUTE ON CHRONIC SYSTOLIC (CONGESTIVE) HEART FAILURE: ICD-10-CM

## 2022-07-12 DIAGNOSIS — E11.9 TYPE 2 DIABETES MELLITUS WITHOUT COMPLICATIONS: ICD-10-CM

## 2022-07-12 DIAGNOSIS — I50.9 HEART FAILURE, UNSPECIFIED: ICD-10-CM

## 2022-07-12 DIAGNOSIS — I25.10 ATHEROSCLEROTIC HEART DISEASE OF NATIVE CORONARY ARTERY WITHOUT ANGINA PECTORIS: ICD-10-CM

## 2022-07-12 DIAGNOSIS — Z95.810 PRESENCE OF AUTOMATIC (IMPLANTABLE) CARDIAC DEFIBRILLATOR: Chronic | ICD-10-CM

## 2022-07-12 LAB
ALBUMIN SERPL ELPH-MCNC: 3.5 G/DL — SIGNIFICANT CHANGE UP (ref 3.3–5)
ALP SERPL-CCNC: 62 U/L — SIGNIFICANT CHANGE UP (ref 40–120)
ALT FLD-CCNC: 17 U/L — SIGNIFICANT CHANGE UP (ref 12–78)
ANION GAP SERPL CALC-SCNC: 4 MMOL/L — LOW (ref 5–17)
APTT BLD: 41.5 SEC — HIGH (ref 27.5–35.5)
AST SERPL-CCNC: 21 U/L — SIGNIFICANT CHANGE UP (ref 15–37)
BASOPHILS # BLD AUTO: 0.06 K/UL — SIGNIFICANT CHANGE UP (ref 0–0.2)
BASOPHILS NFR BLD AUTO: 0.7 % — SIGNIFICANT CHANGE UP (ref 0–2)
BILIRUB SERPL-MCNC: 0.7 MG/DL — SIGNIFICANT CHANGE UP (ref 0.2–1.2)
BUN SERPL-MCNC: 30 MG/DL — HIGH (ref 7–23)
CALCIUM SERPL-MCNC: 9 MG/DL — SIGNIFICANT CHANGE UP (ref 8.5–10.1)
CHLORIDE SERPL-SCNC: 101 MMOL/L — SIGNIFICANT CHANGE UP (ref 96–108)
CO2 SERPL-SCNC: 31 MMOL/L — SIGNIFICANT CHANGE UP (ref 22–31)
CREAT SERPL-MCNC: 1.5 MG/DL — HIGH (ref 0.5–1.3)
EGFR: 37 ML/MIN/1.73M2 — LOW
EOSINOPHIL # BLD AUTO: 0.2 K/UL — SIGNIFICANT CHANGE UP (ref 0–0.5)
EOSINOPHIL NFR BLD AUTO: 2.4 % — SIGNIFICANT CHANGE UP (ref 0–6)
GLUCOSE SERPL-MCNC: 170 MG/DL — HIGH (ref 70–99)
HCT VFR BLD CALC: 38.8 % — SIGNIFICANT CHANGE UP (ref 34.5–45)
HGB BLD-MCNC: 12.4 G/DL — SIGNIFICANT CHANGE UP (ref 11.5–15.5)
IMM GRANULOCYTES NFR BLD AUTO: 0.4 % — SIGNIFICANT CHANGE UP (ref 0–1.5)
INR BLD: 2.58 RATIO — HIGH (ref 0.88–1.16)
LYMPHOCYTES # BLD AUTO: 0.95 K/UL — LOW (ref 1–3.3)
LYMPHOCYTES # BLD AUTO: 11.2 % — LOW (ref 13–44)
MAGNESIUM SERPL-MCNC: 2.1 MG/DL — SIGNIFICANT CHANGE UP (ref 1.6–2.6)
MCHC RBC-ENTMCNC: 28.7 PG — SIGNIFICANT CHANGE UP (ref 27–34)
MCHC RBC-ENTMCNC: 32 GM/DL — SIGNIFICANT CHANGE UP (ref 32–36)
MCV RBC AUTO: 89.8 FL — SIGNIFICANT CHANGE UP (ref 80–100)
MONOCYTES # BLD AUTO: 0.73 K/UL — SIGNIFICANT CHANGE UP (ref 0–0.9)
MONOCYTES NFR BLD AUTO: 8.6 % — SIGNIFICANT CHANGE UP (ref 2–14)
NEUTROPHILS # BLD AUTO: 6.52 K/UL — SIGNIFICANT CHANGE UP (ref 1.8–7.4)
NEUTROPHILS NFR BLD AUTO: 76.7 % — SIGNIFICANT CHANGE UP (ref 43–77)
NRBC # BLD: 0 /100 WBCS — SIGNIFICANT CHANGE UP (ref 0–0)
NT-PROBNP SERPL-SCNC: 3274 PG/ML — HIGH (ref 0–125)
PLATELET # BLD AUTO: 135 K/UL — LOW (ref 150–400)
POTASSIUM SERPL-MCNC: 4.1 MMOL/L — SIGNIFICANT CHANGE UP (ref 3.5–5.3)
POTASSIUM SERPL-SCNC: 4.1 MMOL/L — SIGNIFICANT CHANGE UP (ref 3.5–5.3)
PROT SERPL-MCNC: 7.2 G/DL — SIGNIFICANT CHANGE UP (ref 6–8.3)
PROTHROM AB SERPL-ACNC: 30.5 SEC — HIGH (ref 10.5–13.4)
RBC # BLD: 4.32 M/UL — SIGNIFICANT CHANGE UP (ref 3.8–5.2)
RBC # FLD: 15 % — HIGH (ref 10.3–14.5)
SARS-COV-2 RNA SPEC QL NAA+PROBE: SIGNIFICANT CHANGE UP
SODIUM SERPL-SCNC: 136 MMOL/L — SIGNIFICANT CHANGE UP (ref 135–145)
TROPONIN I, HIGH SENSITIVITY RESULT: 27.1 NG/L — SIGNIFICANT CHANGE UP
WBC # BLD: 8.49 K/UL — SIGNIFICANT CHANGE UP (ref 3.8–10.5)
WBC # FLD AUTO: 8.49 K/UL — SIGNIFICANT CHANGE UP (ref 3.8–10.5)

## 2022-07-12 PROCEDURE — 85730 THROMBOPLASTIN TIME PARTIAL: CPT

## 2022-07-12 PROCEDURE — 71045 X-RAY EXAM CHEST 1 VIEW: CPT

## 2022-07-12 PROCEDURE — 71045 X-RAY EXAM CHEST 1 VIEW: CPT | Mod: 26

## 2022-07-12 PROCEDURE — 36415 COLL VENOUS BLD VENIPUNCTURE: CPT

## 2022-07-12 PROCEDURE — 84484 ASSAY OF TROPONIN QUANT: CPT

## 2022-07-12 PROCEDURE — 93010 ELECTROCARDIOGRAM REPORT: CPT

## 2022-07-12 PROCEDURE — 83880 ASSAY OF NATRIURETIC PEPTIDE: CPT

## 2022-07-12 PROCEDURE — 83735 ASSAY OF MAGNESIUM: CPT

## 2022-07-12 PROCEDURE — 82962 GLUCOSE BLOOD TEST: CPT

## 2022-07-12 PROCEDURE — 85610 PROTHROMBIN TIME: CPT

## 2022-07-12 PROCEDURE — 87635 SARS-COV-2 COVID-19 AMP PRB: CPT

## 2022-07-12 PROCEDURE — 85025 COMPLETE CBC W/AUTO DIFF WBC: CPT

## 2022-07-12 PROCEDURE — 99215 OFFICE O/P EST HI 40 MIN: CPT

## 2022-07-12 PROCEDURE — 99285 EMERGENCY DEPT VISIT HI MDM: CPT | Mod: 25

## 2022-07-12 PROCEDURE — 93005 ELECTROCARDIOGRAM TRACING: CPT

## 2022-07-12 PROCEDURE — 99285 EMERGENCY DEPT VISIT HI MDM: CPT

## 2022-07-12 PROCEDURE — 80053 COMPREHEN METABOLIC PANEL: CPT

## 2022-07-12 RX ORDER — CHOLECALCIFEROL (VITAMIN D3) 125 MCG
2000 CAPSULE ORAL DAILY
Refills: 0 | Status: DISCONTINUED | OUTPATIENT
Start: 2022-07-12 | End: 2022-07-12

## 2022-07-12 RX ORDER — SACUBITRIL AND VALSARTAN 24; 26 MG/1; MG/1
1 TABLET, FILM COATED ORAL
Refills: 0 | Status: DISCONTINUED | OUTPATIENT
Start: 2022-07-12 | End: 2022-07-12

## 2022-07-12 RX ORDER — DEXTROSE 50 % IN WATER 50 %
25 SYRINGE (ML) INTRAVENOUS ONCE
Refills: 0 | Status: DISCONTINUED | OUTPATIENT
Start: 2022-07-12 | End: 2022-07-12

## 2022-07-12 RX ORDER — SIMVASTATIN 20 MG/1
40 TABLET, FILM COATED ORAL AT BEDTIME
Refills: 0 | Status: DISCONTINUED | OUTPATIENT
Start: 2022-07-12 | End: 2022-07-12

## 2022-07-12 RX ORDER — ASPIRIN/CALCIUM CARB/MAGNESIUM 324 MG
81 TABLET ORAL DAILY
Refills: 0 | Status: DISCONTINUED | OUTPATIENT
Start: 2022-07-12 | End: 2022-07-12

## 2022-07-12 RX ORDER — INSULIN LISPRO 100/ML
VIAL (ML) SUBCUTANEOUS
Refills: 0 | Status: DISCONTINUED | OUTPATIENT
Start: 2022-07-12 | End: 2022-07-12

## 2022-07-12 RX ORDER — INSULIN LISPRO 100/ML
VIAL (ML) SUBCUTANEOUS AT BEDTIME
Refills: 0 | Status: DISCONTINUED | OUTPATIENT
Start: 2022-07-12 | End: 2022-07-12

## 2022-07-12 RX ORDER — DEXTROSE 50 % IN WATER 50 %
15 SYRINGE (ML) INTRAVENOUS ONCE
Refills: 0 | Status: DISCONTINUED | OUTPATIENT
Start: 2022-07-12 | End: 2022-07-12

## 2022-07-12 RX ORDER — ASPIRIN/CALCIUM CARB/MAGNESIUM 324 MG
325 TABLET ORAL ONCE
Refills: 0 | Status: COMPLETED | OUTPATIENT
Start: 2022-07-12 | End: 2022-07-12

## 2022-07-12 RX ORDER — INSULIN DETEMIR 100/ML (3)
60 INSULIN PEN (ML) SUBCUTANEOUS AT BEDTIME
Refills: 0 | Status: DISCONTINUED | OUTPATIENT
Start: 2022-07-12 | End: 2022-07-12

## 2022-07-12 RX ORDER — CARVEDILOL PHOSPHATE 80 MG/1
6.25 CAPSULE, EXTENDED RELEASE ORAL EVERY 12 HOURS
Refills: 0 | Status: DISCONTINUED | OUTPATIENT
Start: 2022-07-12 | End: 2022-07-12

## 2022-07-12 RX ORDER — DIGOXIN 250 MCG
125 TABLET ORAL DAILY
Refills: 0 | Status: DISCONTINUED | OUTPATIENT
Start: 2022-07-12 | End: 2022-07-12

## 2022-07-12 RX ORDER — LANOLIN ALCOHOL/MO/W.PET/CERES
3 CREAM (GRAM) TOPICAL AT BEDTIME
Refills: 0 | Status: DISCONTINUED | OUTPATIENT
Start: 2022-07-12 | End: 2022-07-12

## 2022-07-12 RX ORDER — WARFARIN SODIUM 2.5 MG/1
3 TABLET ORAL DAILY
Refills: 0 | Status: DISCONTINUED | OUTPATIENT
Start: 2022-07-12 | End: 2022-07-12

## 2022-07-12 RX ORDER — ONDANSETRON 8 MG/1
4 TABLET, FILM COATED ORAL EVERY 8 HOURS
Refills: 0 | Status: DISCONTINUED | OUTPATIENT
Start: 2022-07-12 | End: 2022-07-12

## 2022-07-12 RX ORDER — FUROSEMIDE 40 MG
40 TABLET ORAL ONCE
Refills: 0 | Status: COMPLETED | OUTPATIENT
Start: 2022-07-12 | End: 2022-07-12

## 2022-07-12 RX ORDER — GLUCAGON INJECTION, SOLUTION 0.5 MG/.1ML
1 INJECTION, SOLUTION SUBCUTANEOUS ONCE
Refills: 0 | Status: DISCONTINUED | OUTPATIENT
Start: 2022-07-12 | End: 2022-07-12

## 2022-07-12 RX ORDER — ACETAMINOPHEN 500 MG
650 TABLET ORAL EVERY 6 HOURS
Refills: 0 | Status: DISCONTINUED | OUTPATIENT
Start: 2022-07-12 | End: 2022-07-12

## 2022-07-12 RX ORDER — DEXTROSE 50 % IN WATER 50 %
12.5 SYRINGE (ML) INTRAVENOUS ONCE
Refills: 0 | Status: DISCONTINUED | OUTPATIENT
Start: 2022-07-12 | End: 2022-07-12

## 2022-07-12 RX ORDER — FUROSEMIDE 40 MG
40 TABLET ORAL EVERY 12 HOURS
Refills: 0 | Status: DISCONTINUED | OUTPATIENT
Start: 2022-07-12 | End: 2022-07-12

## 2022-07-12 RX ADMIN — Medication 40 MILLIGRAM(S): at 06:32

## 2022-07-12 RX ADMIN — Medication 1: at 07:55

## 2022-07-12 NOTE — ED PROVIDER NOTE - NSFOLLOWUPINSTRUCTIONS_ED_ALL_ED_FT
Heart Failure, Diagnosis       Heart failure is a condition in which the heart has trouble pumping blood. This may mean that the heart cannot pump enough blood out to the body or that the heart does not fill up with enough blood. For some people with heart failure, fluid may back up into the lungs. There may also be swelling (edema) in the lower legs. Heart failure is usually a long-term (chronic) condition. It is important for you to take good care of yourself and follow the treatment plan from your health care provider.      What are the causes?    This condition may be caused by:  •High blood pressure (hypertension). Hypertension causes the heart muscle to work harder than normal.      •Coronary artery disease, or CAD. CAD is the buildup of cholesterol and fat (plaque) in the arteries of the heart.      •Heart attack, also called myocardial infarction. This injures the heart muscle, making it hard for the heart to pump blood.      •Abnormal heart valves. The valves do not open and close properly, forcing the heart to pump harder to keep the blood flowing.      •Heart muscle disease, inflammation, or infection (cardiomyopathy or myocarditis). This is damage to the heart muscle. It can increase the risk of heart failure.      •Lung disease. The heart works harder when the lungs are not healthy.         What increases the risk?    The risk of heart failure increases as a person ages. This condition is also more likely to develop in people who:  •Are obese.      •Are male.      •Use tobacco or nicotine products.      •Abuse alcohol or drugs.      •Have taken medicines that can damage the heart, such as chemotherapy drugs.    •Have any of these conditions:  •Diabetes.      •Abnormal heart rhythms.      •Thyroid problems.      •Low blood counts (anemia).      •Chronic kidney disease.        •Have a family history of heart failure.        What are the signs or symptoms?    Symptoms of this condition include:  •Shortness of breath with activity, such as when climbing stairs.      •A cough that does not go away.      •Swelling of the feet, ankles, legs, or abdomen.      •Losing or gaining weight for no reason.      •Trouble breathing when lying flat.      •Waking from sleep because of the need to sit up and get more air.      •Rapid heartbeat.      •Tiredness (fatigue) and loss of energy.      •Feeling light-headed, dizzy, or close to fainting.      •Nausea or loss of appetite.      •Waking up more often during the night to urinate (nocturia).      •Confusion.        How is this diagnosed?    This condition is diagnosed based on:  •Your medical history, symptoms, and a physical exam.    •Diagnostic tests, which may include:  •Echocardiogram.      •Electrocardiogram (ECG).      •Chest X-ray.      •Blood tests.      •Exercise stress test.      •Cardiac MRI.      •Cardiac catheterization and angiogram.      •Radionuclide scans.          How is this treated?    Treatment for this condition is aimed at managing the symptoms of heart failure.    Medicines     Treatment may include medicines that:  •Help lower blood pressure by relaxing (dilating) the blood vessels. These medicines are called ACE inhibitors (angiotensin-converting enzyme), ARBs (angiotensin receptor blockers), or vasodilators.      •Cause the kidneys to remove salt and water from the blood through urination (diuretics).      •Improve heart muscle strength and prevent the heart from beating too fast (beta blockers).      •Increase the force of the heartbeat (digoxin).      •Lower heart rates.      Certain diabetes medicines (SGLT-2 inhibitors) may also be used in treatment.    Healthy behavior changes    Treatment may also include making healthy lifestyle changes, such as:  •Reaching and staying at a healthy weight.      •Not using tobacco or nicotine products.      •Eating heart-healthy foods.      •Limiting or avoiding alcohol.      •Stopping the use of illegal drugs.      •Being physically active.      •Participating in a cardiac rehabilitation program, which is a treatment program to improve your health and well-being through exercise training, education, and counseling.      Other treatments    Other treatments may include:  •Procedures to open blocked arteries or repair damaged valves.      •Placing a pacemaker to improve heart function (cardiac resynchronization therapy).      •Placing a device to treat serious abnormal heart rhythms (implantable cardioverter defibrillator, or ICD).      •Placing a device to improve the pumping ability of the heart (left ventricular assist device, or LVAD).      •Receiving a healthy heart from a donor (heart transplant). This is done when other treatments have not helped.        Follow these instructions at home:    •Manage other health conditions as told by your health care provider. These may include hypertension, diabetes, thyroid disease, or abnormal heart rhythms.      •Get ongoing education and support as needed. Learn as much as you can about heart failure.      •Keep all follow-up visits. This is important.        Summary    •Heart failure is a condition in which the heart has trouble pumping blood.      •This condition is commonly caused by high blood pressure and other diseases of the heart and lungs.      •Symptoms of this condition include shortness of breath, tiredness (fatigue), nausea, and swelling of the feet, ankles, legs, or abdomen.      •Treatments for this condition may include medicines, lifestyle changes, and surgery.      •Manage other health conditions as told by your health care provider.      This information is not intended to replace advice given to you by your health care provider. Make sure you discuss any questions you have with your health care provider.      Document Revised: 07/10/2021 Document Reviewed: 07/10/2021    [employer] Patient Education © 2022 [employer]       **Follow up with your doctor in 2-3 days and Dr. Dumont for further evaluation of your CHF. Return for worsening symptoms, any concerns.

## 2022-07-12 NOTE — H&P ADULT - NSHPLABSRESULTS_GEN_ALL_CORE
12.4   8.49  )-----------( 135      ( 12 Jul 2022 05:29 )             38.8     12 Jul 2022 05:29    136    |  101    |  30     ----------------------------<  170    4.1     |  31     |  1.50     Ca    9.0        12 Jul 2022 05:29  Mg     2.1       12 Jul 2022 05:29    TPro  7.2    /  Alb  3.5    /  TBili  0.7    /  DBili  x      /  AST  21     /  ALT  17     /  AlkPhos  62     12 Jul 2022 05:29    LIVER FUNCTIONS - ( 12 Jul 2022 05:29 )  Alb: 3.5 g/dL / Pro: 7.2 g/dL / ALK PHOS: 62 U/L / ALT: 17 U/L / AST: 21 U/L / GGT: x           PT/INR - ( 12 Jul 2022 05:29 )   PT: 30.5 sec;   INR: 2.58 ratio         PTT - ( 12 Jul 2022 05:29 )  PTT:41.5 sec  CAPILLARY BLOOD GLUCOSE      POCT Blood Glucose.: 160 mg/dL (12 Jul 2022 07:25)

## 2022-07-12 NOTE — ED PROVIDER NOTE - PROGRESS NOTE DETAILS
Patient initially admitted prior to my shift. still in the ED, seen by Dr. Pedro (cardio) and recommended for d/c with some medication timing adjustment (d/w patient) and to be followed as outpatient. Dr. Elder called me to d/c patient from the ED. ([facility])

## 2022-07-12 NOTE — ED PROVIDER NOTE - PATIENT PORTAL LINK FT
You can access the FollowMyHealth Patient Portal offered by [facility] by registering at the following website: [email or web address]. By joining [facility]’s FollowMyHealth portal, you will also be able to view your health information using other applications (apps) compatible with our system.

## 2022-07-12 NOTE — CONSULT NOTE ADULT - ATTENDING COMMENTS
presents with dyspnea and exertional fatigue  dietary indiscretion  not hypoxic, no meaningful hf on cxr  edema, which is chronic  would take diuretics daily instead of every other day, follow with dr hernadez within 1w

## 2022-07-12 NOTE — ED PROVIDER NOTE - OBJECTIVE STATEMENT
73yo female bib ems with chest pressure and sob for the last 3 days, pt c/o worsening sob and chest pain, states she did not sleep last night, had to sit up, no cough, fever, chills, no other complaints

## 2022-07-12 NOTE — ED PROVIDER NOTE - NSICDXPASTMEDICALHX_GEN_ALL_CORE_FT
PAST MEDICAL HISTORY:  Atrial fibrillation on Coumadin    Benign Hypertension     CAD (Coronary Artery Disease)     CHF (congestive heart failure) denies any recent exacerbations or intubation hx    CVA (Cerebral Vascular Accident) > 10 years ago    Diabetes Mellitus, Type 2     Former smoker     HLD (hyperlipidemia)     Hyperthyroidism     MI (myocardial infarction) 2007    Mild pulmonary hypertension on echo 5/2020, moderate on cardio ( Dr. Curtis's note)    Obesity     Unsteady gait uses walker PRN

## 2022-07-12 NOTE — CONSULT NOTE ADULT - ASSESSMENT
Patient is a 71 y/o F with pmhx HFrEF, CAD (s/p CABG 2007, ICD placement with replacement 1 year ago), CVA, Afib (on Coumadin), T2DM, HTN, HLD, pHTN, who presents with RACHEL, orthopnea and associated chest pressure.    #volume status  Hx HFrEF   - Patient with b/l LE edema, unchanged from baseline. Now with RACHEL, orthopnea, no appreciable rales on exam.  - Likely HFrEF exacerbation, TTE 4/22 with EF 30-35%  - CXR on personal read without R costophrenic blunting, less likely severe CHF exac  - Elevated BNP 3274 likely chronic in nature given last TTE  - Patient is s/p Lasix 40mg IV x 1 in ED  - Could dc patient and have patient take home torsemide 60mg bid instead of qd starting today  - If dc today, would have patient follow up with Dr Curtis within the week     #arrhythmia  Hx Afib  - currently rate controlled w/back up ventricular pacing  - continue home medications, digoxin, carvedilol  - INR 2.58 in ED, maintain goal INR 2-3  - can continue home coumadin 4mg po qd with daily INR monitoring if inpatient, or biweekly monitoring as outpatient    #ischemia  - EKG difficult to interpret due to ventricular pacing, though negative trop  - no need to trend troponins - atypical chest pain more suggestive of CHF exacerbation than ACS    #valvular  - 4/22 TTE with mod MR, TR  - no significant valvular dysfunction noted on exam    - BP well controlled, monitor routine hemodynamics.  - Monitor and replete lytes, keep K>4, Mg>2.  - Strict I/Os, daily weights.    - Other cardiovascular workup will depend on clinical course.  - All other workup per primary team.  - Will continue to follow.             Patient is a 73 y/o F with pmhx HFrEF, CAD (s/p CABG 2007, ICD placement with replacement 1 year ago), CVA, Afib (on Coumadin), T2DM, HTN, HLD, pHTN, who presents with RACHEL, orthopnea and associated chest pressure.    #volume status  Hx HFrEF   - Patient with b/l LE edema, unchanged from baseline. Now with RACHEL, orthopnea, no appreciable rales on exam.  - Likely HFrEF exacerbation, TTE 4/22 with EF 30-35%  - CXR on personal read without R costophrenic blunting, less likely severe CHF exac  - Elevated BNP 3274 likely chronic in nature given last TTE  - Patient is s/p Lasix 40mg IV x 1 in ED  - Could dc patient and have patient take home torsemide 60mg bid instead of qd starting today  - If dc today, would have patient follow up with Dr Curtis within the week     #arrhythmia  Hx Afib  - currently rate controlled w/back up ventricular pacing  - continue home medications, digoxin, carvedilol  - INR 2.58 in ED, maintain goal INR 2-3  - can continue home coumadin 4mg po qd with daily INR monitoring if inpatient, or biweekly monitoring as outpatient    #ischemia  Hx CAD (s/p CABG 2007, ICD placement with replacement 1 year ago), CVA  - EKG difficult to interpret due to ventricular pacing, though negative trop  - no need to trend troponins - atypical chest pain more suggestive of CHF exacerbation than ACS    #valvular  - 4/22 TTE with mod MR, TR  - no significant valvular dysfunction noted on exam    - BP well controlled, monitor routine hemodynamics.  - Monitor and replete lytes, keep K>4, Mg>2.  - Strict I/Os, daily weights.    - Other cardiovascular workup will depend on clinical course.  - All other workup per primary team.  - Will continue to follow.             Patient is a 71 y/o F with pmhx HFrEF, CAD (s/p CABG 2007, ICD placement with replacement 1 year ago), CVA, Afib (on Coumadin), T2DM, HTN, HLD, pHTN, who presents with RACHEL, orthopnea and associated chest pressure.    #volume status  Hx HFrEF   - Patient with b/l LE edema, unchanged from baseline. Now with RACHEL, orthopnea, no appreciable rales on exam.  - Likely mild HFrEF exacerbation, TTE 4/22 with EF 30-35%  - CXR on personal read without R costophrenic blunting, less likely severe CHF exac  - Elevated BNP 3274 likely chronic in nature given last TTE  - Patient is s/p Lasix 40mg IV x 1 in ED  - Could dc patient and have patient take home torsemide 60mg daily instead of qod starting today  - If dc today, would have patient follow up with Dr Curtis within the week     #arrhythmia  Hx Afib  - currently rate controlled w/back up ventricular pacing  - continue home medications, digoxin, carvedilol  - INR 2.58 in ED, maintain goal INR 2-3  - can continue home coumadin 4mg po qd with daily INR monitoring if inpatient, or biweekly monitoring as outpatient    #ischemia  Hx CAD (s/p CABG 2007, ICD placement with replacement 1 year ago), CVA  - EKG difficult to interpret due to ventricular pacing, though negative trop  - no need to trend troponins - atypical chest pain more suggestive of CHF exacerbation than ACS    #valvular  - 4/22 TTE with mod MR, TR  - no significant valvular dysfunction noted on exam    - BP well controlled, monitor routine hemodynamics.  - Monitor and replete lytes, keep K>4, Mg>2.  - Strict I/Os, daily weights.    - Other cardiovascular workup will depend on clinical course.  - All other workup per primary team.  - Will continue to follow.

## 2022-07-12 NOTE — CONSULT NOTE ADULT - SUBJECTIVE AND OBJECTIVE BOX
[facility]         Ramy Solis, Ever, Julia, Nicole, Caron Valdez        [phone] (office)    Reason for Consult: RACHEL, chest pressure    Interval HPI: Patient seen and examined at bedside. Patient states she has had dyspnea on exertion, orthopnea, and associated chest pressure for the past few days. Patient admits to taking torsemide / other medications as prescribed. States the SOB is associated with exertion and improves when she is resting/sitting up, but worsens again when lying flat. Chest pressure is associated with SOB but does not change much with activity. Patient admits to recent anxiety/stress around the recent passing of her  and living alone, admits her diet isn't the best. Denies day-to-day weight changes or changes in leg swelling.    HPI:   73yo female bib ems with chest pressure and sob for the last 3 days, pt c/o worsening sob and chest pain, states she did not sleep last night, had to sit up, no cough, fever, chills, no other complaints (12 Jul 2022 08:04)      PAST MEDICAL & SURGICAL HISTORY:  Benign Hypertension      CAD (Coronary Artery Disease)      Diabetes Mellitus, Type 2      CVA (Cerebral Vascular Accident)  &gt; 10 years ago      Hyperthyroidism      Former smoker      Obesity      MI (myocardial infarction)  2007      HLD (hyperlipidemia)      CHF (congestive heart failure)  denies any recent exacerbations or intubation hx      Unsteady gait  uses walker PRN      Atrial fibrillation  on Coumadin      Mild pulmonary hypertension  on echo 5/2020, moderate on cardio ( Dr. Curtis&#x27;s note)      CABG (Coronary Artery Bypass Graft)  2007      S/P Craniotomy  &gt; 10 years ago with bleed evaccuation      S/P Ventriculoperitoneal Shunt  probably removed per pt      S/P Hysterectomy      AICD (automatic cardioverter/defibrillator) present  [employer], N161 Guidant/[id number], last interrogation 11/25/2020      S/P coronary artery stent placement  &gt; 2 years ago, 2 stents          SOCIAL HISTORY: No active tobacco, alcohol or illicit drug use    FAMILY HISTORY:      Home Medications:  aspirin 81 mg oral delayed release tablet: 1 tab(s) orally once a day (at bedtime) (12 Jul 2022 05:22)  Demadex 20 mg oral tablet: 3 tab(s) orally every other day (12 Jul 2022 06:36)  digoxin 125 mcg (0.125 mg) oral tablet: 1 tab(s) orally every other day (12 Jul 2022 06:36)  Entresto 24 mg-26 mg oral tablet: 1 tab(s) orally 2 times a day (12 Jul 2022 05:22)  HumaLOG 100 units/mL subcutaneous solution: subcutaneous 3 times a day (before meals) sliding scale (12 Jul 2022 05:22)  Levemir 100 units/mL subcutaneous solution: 60 unit(s) subcutaneous once a day (at bedtime) (12 Jul 2022 05:22)  Tapazole 5 mg oral tablet: 0.5 tab(s) orally every other day (12 Jul 2022 06:36)  Vitamin D3 2000 intl units (50 mcg) oral tablet: orally once a day (12 Jul 2022 05:22)  warfarin 4 mg oral tablet: orally once a day (at bedtime) (12 Jul 2022 05:22)  Zocor 40 mg oral tablet: 1 tab(s) orally once a day (at bedtime) (12 Jul 2022 05:22)      MEDICATIONS  (STANDING):  aspirin enteric coated 81 milliGRAM(s) Oral daily  carvedilol 6.25 milliGRAM(s) Oral every 12 hours  cholecalciferol 2000 Unit(s) Oral daily  dextrose 50% Injectable 25 Gram(s) IV Push once  dextrose 50% Injectable 12.5 Gram(s) IV Push once  dextrose 50% Injectable 25 Gram(s) IV Push once  dextrose Oral Gel 15 Gram(s) Oral once  digoxin     Tablet 125 MICROGram(s) Oral daily  furosemide   Injectable 40 milliGRAM(s) IV Push every 12 hours  glucagon  Injectable 1 milliGRAM(s) IntraMuscular once  insulin detemir injectable (LEVEMIR) 60 Unit(s) SubCutaneous at bedtime  insulin lispro (ADMELOG) corrective regimen sliding scale   SubCutaneous three times a day before meals  insulin lispro (ADMELOG) corrective regimen sliding scale   SubCutaneous at bedtime  methimazole 5 milliGRAM(s) Oral daily  sacubitril 24 mG/valsartan 26 mG 1 Tablet(s) Oral two times a day  simvastatin 40 milliGRAM(s) Oral at bedtime  warfarin 3 milliGRAM(s) Oral daily    MEDICATIONS  (PRN):  acetaminophen     Tablet .. 650 milliGRAM(s) Oral every 6 hours PRN Temp greater or equal to 38C (100.4F), Mild Pain (1 - 3)  aluminum hydroxide/magnesium hydroxide/simethicone Suspension 30 milliLiter(s) Oral every 4 hours PRN Dyspepsia  melatonin 3 milliGRAM(s) Oral at bedtime PRN Insomnia  ondansetron Injectable 4 milliGRAM(s) IV Push every 8 hours PRN Nausea and/or Vomiting      Allergies    No Known Allergies    Intolerances        REVIEW OF SYSTEMS: Negative except as per HPI.    VITAL SIGNS:   Vital Signs Last 24 Hrs  T(C): 36.7 (12 Jul 2022 04:59), Max: 36.7 (12 Jul 2022 04:59)  T(F): 98.1 (12 Jul 2022 04:59), Max: 98.1 (12 Jul 2022 04:59)  HR: 67 (12 Jul 2022 04:59) (67 - 67)  BP: 111/67 (12 Jul 2022 04:59) (111/67 - 111/67)  BP(mean): --  RR: 18 (12 Jul 2022 04:59) (18 - 18)  SpO2: 96% (12 Jul 2022 04:59) (96% - 96%)    Parameters below as of 12 Jul 2022 04:59  Patient On (Oxygen Delivery Method): room air        I&O's Summary      PHYSICAL EXAM:  Constitutional: NAD, sitting up in bed comfortably  HEENT NC/AT, moist mucous membranes  Pulmonary: Non-labored, breath sounds are clear with scattered exp wheezing, more notable in bases  Cardiovascular: +S1, S2, irregular rhythm, regular rate, no murmurs  Gastrointestinal: Soft, nontender, nondistended, normoactive bowel sounds  Extremities: RLE with 2+ pitting edema up to mid shin. LLE with nonpitting edema up to below the knee   Neurological: Alert, strength and sensitivity are grossly intact  Skin: No obvious lesions/rashes  Psych: Mood & affect appropriate    LABS: All Labs Reviewed:                        12.4   8.49  )-----------( 135      ( 12 Jul 2022 05:29 )             38.8     12 Jul 2022 05:29    136    |  101    |  30     ----------------------------<  170    4.1     |  31     |  1.50     Ca    9.0        12 Jul 2022 05:29  Mg     2.1       12 Jul 2022 05:29    TPro  7.2    /  Alb  3.5    /  TBili  0.7    /  DBili  x      /  AST  21     /  ALT  17     /  AlkPhos  62     12 Jul 2022 05:29    PT/INR - ( 12 Jul 2022 05:29 )   PT: 30.5 sec;   INR: 2.58 ratio         PTT - ( 12 Jul 2022 05:29 )  PTT:41.5 sec      Blood Culture:   07-12 @ 05:29  Pro Bnp 3274        EKG difficult to interpret due to ventricular pacing    CXR performed, official read pending. Personal read clear R costophrenic angle
PULMONARY/CRITICAL CARE        Patient is a 72y old  Female who presents with a chief complaint of SOB.CHF  BRIEF HOSPITAL COURSE: ***    Events last 24 hours: ***  · Chief Complaint: The patient is a 72y Female complaining of chest pressure.  · HPI Objective Statement: 73yo female bib ems with chest pressure and sob for the last 3 days, pt c/o worsening sob and chest pain, states she did not sleep last night, had to sit up, no cough, fever, chills, no other complaints  · Presenting Symptoms: CHEST PAIN, SHORTNESS OF BREATH  · Negative Findings: no cough, no diaphoresis, no dizziness, no fever, no nausea  · Radiation: no radiation  · Timing: gradual onset, constant  · Duration: day(s)  3  No prior lung disease.    PAST MEDICAL & SURGICAL HISTORY:  Benign Hypertension      CAD (Coronary Artery Disease)      Diabetes Mellitus, Type 2      CVA (Cerebral Vascular Accident)  &gt; 10 years ago      Hyperthyroidism      Former smoker      Obesity      MI (myocardial infarction)  2007      HLD (hyperlipidemia)      CHF (congestive heart failure)  denies any recent exacerbations or intubation hx      Unsteady gait  uses walker PRN      Atrial fibrillation  on Coumadin      Mild pulmonary hypertension  on echo 5/2020, moderate on cardio ( Dr. Curtis&#x27;s note)      CABG (Coronary Artery Bypass Graft)  2007      S/P Craniotomy  &gt; 10 years ago with bleed evaccuation      S/P Ventriculoperitoneal Shunt  probably removed per pt      S/P Hysterectomy      AICD (automatic cardioverter/defibrillator) present  [employer], N161 Guidant/[id number], last interrogation 11/25/2020      S/P coronary artery stent placement  &gt; 2 years ago, 2 stents        Allergies    No Known Allergies    Intolerances      FAMILY HISTORY:/ social: no cigs, etoh; retired    Review of Systems:  CONSTITUTIONAL: No fever, chills, or fatigue  EYES: No eye pain, visual disturbances, or discharge  ENMT:  No difficulty hearing, tinnitus, vertigo; No sinus or throat pain  NECK: No pain or stiffness  RESPIRATORY: No cough, wheezing, chills or hemoptysis; mild orthopnea and shortness of breath  CARDIOVASCULAR: mild chest discomfort; no, palpitations, dizziness, chronic left  leg swelling  GASTROINTESTINAL: No abdominal or epigastric pain. No nausea, vomiting, or hematemesis; No diarrhea or constipation. No melena or hematochezia.  GENITOURINARY: No dysuria, frequency, hematuria, or incontinence  NEUROLOGICAL: No headaches, memory loss, loss of strength, numbness, or tremors  SKIN: No itching, burning, rashes, or lesions   MUSCULOSKELETAL: No joint pain or swelling; No muscle, back, or extremity pain  PSYCHIATRIC: No depression, anxiety, mood swings, or difficulty sleeping      Medications:    carvedilol 6.25 milliGRAM(s) Oral every 12 hours  digoxin     Tablet 125 MICROGram(s) Oral daily  furosemide   Injectable 40 milliGRAM(s) IV Push every 12 hours  sacubitril 24 mG/valsartan 26 mG 1 Tablet(s) Oral two times a day      acetaminophen     Tablet .. 650 milliGRAM(s) Oral every 6 hours PRN  melatonin 3 milliGRAM(s) Oral at bedtime PRN  ondansetron Injectable 4 milliGRAM(s) IV Push every 8 hours PRN      aspirin enteric coated 81 milliGRAM(s) Oral daily  warfarin 3 milliGRAM(s) Oral daily    aluminum hydroxide/magnesium hydroxide/simethicone Suspension 30 milliLiter(s) Oral every 4 hours PRN      dextrose 50% Injectable 25 Gram(s) IV Push once  dextrose 50% Injectable 12.5 Gram(s) IV Push once  dextrose 50% Injectable 25 Gram(s) IV Push once  dextrose Oral Gel 15 Gram(s) Oral once  glucagon  Injectable 1 milliGRAM(s) IntraMuscular once  insulin detemir injectable (LEVEMIR) 60 Unit(s) SubCutaneous at bedtime  insulin lispro (ADMELOG) corrective regimen sliding scale   SubCutaneous three times a day before meals  insulin lispro (ADMELOG) corrective regimen sliding scale   SubCutaneous at bedtime  methimazole 5 milliGRAM(s) Oral daily  simvastatin 40 milliGRAM(s) Oral at bedtime    cholecalciferol 2000 Unit(s) Oral daily                ICU Vital Signs Last 24 Hrs  T(C): 36.7 (12 Jul 2022 04:59), Max: 36.7 (12 Jul 2022 04:59)  T(F): 98.1 (12 Jul 2022 04:59), Max: 98.1 (12 Jul 2022 04:59)  HR: 67 (12 Jul 2022 04:59) (67 - 67)  BP: 111/67 (12 Jul 2022 04:59) (111/67 - 111/67)  BP(mean): --  ABP: --  ABP(mean): --  RR: 18 (12 Jul 2022 04:59) (18 - 18)  SpO2: 96% (12 Jul 2022 04:59) (96% - 96%)    O2 Parameters below as of 12 Jul 2022 04:59  Patient On (Oxygen Delivery Method): room air          Vital Signs Last 24 Hrs  T(C): 36.7 (12 Jul 2022 04:59), Max: 36.7 (12 Jul 2022 04:59)  T(F): 98.1 (12 Jul 2022 04:59), Max: 98.1 (12 Jul 2022 04:59)  HR: 67 (12 Jul 2022 04:59) (67 - 67)  BP: 111/67 (12 Jul 2022 04:59) (111/67 - 111/67)  BP(mean): --  RR: 18 (12 Jul 2022 04:59) (18 - 18)  SpO2: 96% (12 Jul 2022 04:59) (96% - 96%)    Parameters below as of 12 Jul 2022 04:59  Patient On (Oxygen Delivery Method): room air            I&O's Detail        LABS:                        12.4   8.49  )-----------( 135      ( 12 Jul 2022 05:29 )             38.8     07-12    136  |  101  |  30<H>  ----------------------------<  170<H>  4.1   |  31  |  1.50<H>    Ca    9.0      12 Jul 2022 05:29  Mg     2.1     07-12    TPro  7.2  /  Alb  3.5  /  TBili  0.7  /  DBili  x   /  AST  21  /  ALT  17  /  AlkPhos  62  07-12          CAPILLARY BLOOD GLUCOSE        PT/INR - ( 12 Jul 2022 05:29 )   PT: 30.5 sec;   INR: 2.58 ratio         PTT - ( 12 Jul 2022 05:29 )  PTT:41.5 sec    CULTURES:      Physical Examination:    General: No acute distress.  overweight female    HEENT: Pupils equal, reactive to light.  Symmetric.    PULM: Clear to auscultation bilaterally, no wheeze rhonchi rales no change percussion.    CVS: Regular rate and rhythm, no murmurs, rubs, or gallops    ABD: Soft, nondistended, nontender, normoactive bowel sounds, no masses    EXT: 1 plus  edema legs left greater than right nontender calves    SKIN: Warm and well perfused, no rashes noted.    NEURO: Alert, oriented, interactive, nonfocal    RADIOLOGY: ***  CXR cardiomegaly.  EKG paced  CRITICAL CARE TIME SPENT: ***

## 2022-07-12 NOTE — H&P ADULT - HISTORY OF PRESENT ILLNESS
71yo female bib ems with chest pressure and sob for the last 3 days, pt c/o worsening sob and chest pain, states she did not sleep last night, had to sit up, no cough, fever, chills, no other complaints

## 2022-07-12 NOTE — ED ADULT NURSE NOTE - NSIMPLEMENTINTERV_GEN_ALL_ED
Implemented All Fall with Harm Risk Interventions:  [place] to call system. Call bell, personal items and telephone within reach. Instruct patient to call for assistance. Room bathroom lighting operational. Non-slip footwear when patient is off stretcher. Physically safe environment: no spills, clutter or unnecessary equipment. Stretcher in lowest position, wheels locked, appropriate side rails in place. Provide visual cue, wrist band, yellow gown, etc. Monitor gait and stability. Monitor for mental status changes and reorient to person, place, and time. Review medications for side effects contributing to fall risk. Reinforce activity limits and safety measures with patient and family. Provide visual clues: red socks.

## 2022-07-12 NOTE — ED PROVIDER NOTE - AGGRAVATING FACTORS
A Vault[employer] message has been sent to the patient for PATIENT ROUNDING with [facility]      
none

## 2022-07-12 NOTE — ED PROVIDER NOTE - NSICDXPASTSURGICALHX_GEN_ALL_CORE_FT
PAST SURGICAL HISTORY:  AICD (automatic cardioverter/defibrillator) present [employer], N161 Guidant/[id number], last interrogation 11/25/2020    CABG (Coronary Artery Bypass Graft) 2007    S/P coronary artery stent placement > 2 years ago, 2 stents    S/P Craniotomy > 10 years ago with bleed evaccuation    S/P Hysterectomy     S/P Ventriculoperitoneal Shunt probably removed per pt

## 2022-07-12 NOTE — CONSULT NOTE ADULT - ASSESSMENT
Pt. with cardiac hx, presents with chest discomfort, chf.  Clinically stable.  Would diurese.  Check nocturnal oximetry.

## 2022-07-19 ENCOUNTER — APPOINTMENT (OUTPATIENT)
Dept: CARDIOLOGY | Facility: CLINIC | Age: 72
End: 2022-07-19

## 2022-07-19 ENCOUNTER — NON-APPOINTMENT (OUTPATIENT)
Age: 72
End: 2022-07-19

## 2022-07-19 VITALS
WEIGHT: 200 LBS | OXYGEN SATURATION: 98 % | DIASTOLIC BLOOD PRESSURE: 81 MMHG | HEIGHT: 62 IN | HEART RATE: 74 BPM | SYSTOLIC BLOOD PRESSURE: 123 MMHG | BODY MASS INDEX: 36.8 KG/M2

## 2022-07-19 DIAGNOSIS — I50.9 HEART FAILURE, UNSPECIFIED: ICD-10-CM

## 2022-07-19 LAB
INR PPP: 2.7 RATIO
QUALITY CONTROL: YES

## 2022-07-19 PROCEDURE — 85610 PROTHROMBIN TIME: CPT | Mod: QW

## 2022-07-19 PROCEDURE — 99214 OFFICE O/P EST MOD 30 MIN: CPT

## 2022-07-19 PROCEDURE — 93000 ELECTROCARDIOGRAM COMPLETE: CPT

## 2022-07-20 RX ORDER — TORSEMIDE 20 MG/1
20 TABLET ORAL EVERY OTHER DAY
Qty: 135 | Refills: 3 | Status: DISCONTINUED | COMMUNITY
Start: 2018-07-09 | End: 2022-07-20

## 2022-07-25 ENCOUNTER — APPOINTMENT (OUTPATIENT)
Dept: HEART FAILURE | Facility: CLINIC | Age: 72
End: 2022-07-25

## 2022-07-25 VITALS
BODY MASS INDEX: 36.25 KG/M2 | OXYGEN SATURATION: 97 % | HEART RATE: 82 BPM | WEIGHT: 197 LBS | HEIGHT: 62 IN | DIASTOLIC BLOOD PRESSURE: 77 MMHG | SYSTOLIC BLOOD PRESSURE: 118 MMHG

## 2022-07-25 DIAGNOSIS — E11.9 TYPE 2 DIABETES MELLITUS W/OUT COMPLICATIONS: ICD-10-CM

## 2022-07-25 PROCEDURE — 99205 OFFICE O/P NEW HI 60 MIN: CPT

## 2022-07-25 RX ORDER — POTASSIUM CHLORIDE 1500 MG/1
20 TABLET, EXTENDED RELEASE ORAL
Refills: 1 | Status: ACTIVE | COMMUNITY
Start: 2018-07-06

## 2022-07-25 NOTE — END OF VISIT
[Time Spent: ___ minutes] : I have spent [unfilled] minutes of time on the encounter. [>50% of the face to face encounter time was spent on counseling and/or coordination of care for ___] : Greater than 50% of the face to face encounter time was spent on counseling and/or coordination of care for [unfilled] [FreeTextEntry3] :  I, Lucie Main MD independently performed a history and physical examination of the patient, and formulated the management plan.\par I have reviewed the note by IRMA Goel and agree with the documented findings and plan of care.\par \par 71 y/o female with h/o chronic systolic HF ACC/AHA stage C, ICMP LVEF 33% LVIDd 6cm, CAD s/p CABG/PCI, cardiomems (not following due to malfunction), atrial fibrillation on coumadin, CKD 3, CRT-D, DM 2 and CVA s/p craniotomy who comes to establish care. Mrs. Zimmerman reports worsening symptoms in the past few months associated to a decline in her functional class. She has also demonstrated worsening LE edema despite of escalation of diuretics. She had an ED visit in [facility] recently in setting of HF exacerbation. No recent hospitalizations or ICD shocks. Clinically looks hypervolemic, warm extremities. Will benefit from GDMT optimization and cardiac rehab. Will start with jardiance to help decongest. If she remains hypervolemic will continue to uptitrate diuretics.\par Plan as above.

## 2022-07-25 NOTE — PHYSICAL EXAM
[No Acute Distress] : no acute distress [Elevated JVD ____cm] : elevated JVD ~Vcm [Normal S1, S2] : normal S1, S2 [No Murmur] : no murmur [No Respiratory Distress] : no respiratory distress  [Soft] : abdomen soft [Non Tender] : non-tender [Normal Gait] : normal gait [Normal Radial B/L] : normal radial B/L [No Rash] : no rash [Moves all extremities] : moves all extremities [Alert and Oriented] : alert and oriented [de-identified] : diminished breath sounds bilaterally [de-identified] : +1 LLE edema, trace RLE edema

## 2022-07-25 NOTE — HISTORY OF PRESENT ILLNESS
[FreeTextEntry1] : \par This is a 71 y/o female with chronic systolic HF ACC/AHA stage C,  ICMP LVEF 30-35%, CAD s/p CABG () and PCI (ILANA to LCx, OM1 in ), [place] s/p BiV ICD (, since upgraded in  due to inappropriate ICD discharge for AF), atrial fibrillation on coumadin, CardioMEMs implant (2018-not following due to malfunction?), HTN, HLD, DM 2, CVA c/b hemorrhage () requiring craniotomy for intracranial decompression and removal of thrombus and CKD 3 who was referred for HF management. \par \par Her cardiac disease dates back to  when she underwent CABG for multivessel cAD. She's had serial TTEs at least since  that demonstrate severely reduced systolic HF with EF ~30%. She has been following with dr. Nicole since then. She underwent PCI to native coronaries LCx/Om1 in 2018. She has been on some GDMT for many years now and was doing well until the past few months. She reports worsening symptoms in the past 6-12 months. She feels more fatigue and tired. Associated presents intermittent LE edema (L>R since CABG). Her diuretics have been uptitrated with very minimal improvement. She used to follow at [facility] with Dr. Estela Mills. Unfortunately, she was never able to use cardioMEMs. It seems there was a reception issue in part due to her inability to lay flat. \par \par She went to [facility] ED (22) with atypical chest discomfort. Her pro BNP at that time as 3274. She was given IV diuresis and sent home. Today she reports that she has progressively gotten more fatigued. She endorses occasional SOB/RACHEL, cough, and intermittent LE swelling. She uses 2-3 pillows and is unable to lay flat which is chronic due to hx of brain surgery. She adheres to her medication regimen and tries to maintain a low sodium diet and fluid restriction.\par \par She is a former smoker and denies ETOH/illicit drug use. Her father had heart disease, her mother  over 20 years ago and she is unsure if she had heart disease.\par \par She specifically denies CP, palpitations, SOB at rest, dizziness/lightheadedness, presyncope/syncope. She has not felt any ICD shocks. Besides her ED visit a few weeks ago she has not been hospitalized for HF in "years."\par

## 2022-07-25 NOTE — CARDIOLOGY SUMMARY
[de-identified] : \par 7/19/22: AF, Vpaced\par \par 2/16/22: Paced [de-identified] : \par 11/7/14: LVEF 42%, apical, inferior, mid septal infarction with minimal suzette-infarct ischemia [de-identified] : \par \par 4/8/22: LVEF 30%, LVIDd 6cm, eccentric LVH, RV not well visualized, LAE, mild-mod MR, mild-mod TR, mod pHTN, small pericardial effusion [de-identified] : \par 12/2020: Extraction of Guidant ICD and implantation of new [employer] BiV ICD\par \par 8/22/2018: S/p CardioMEMs implant\par \par 2007: Guidant BIV-ICD [de-identified] : \par 6/22/22: no reported episodes, RV 84% paced, LV 98% paced [de-identified] : \par 8/22/2018: RHC: RA 12 RV 62/6 (13) PA 62/24 (40) PCWP 20 PA sat 62% CO/CI 4.04/2.09\par \par 8/31/2015: PCI to ILANA to OM1 and LCx (SVG to OM1 occluded) [de-identified] : \par 2007: CABG x3 (LIMA-LAD, SVG to OM1, SVG to RPDA)

## 2022-07-25 NOTE — DISCUSSION/SUMMARY
[___ Month(s)] : in [unfilled] month(s) [With ___] : with [unfilled] [FreeTextEntry1] : \par This is a 71 y/o female with multiple comorbidities, most notably HFrEF (LVEF 30%), ICMP, CAD s/p CABG and PCI, s/p BiV ICD, AF, CVA with hemorrhage s/p craniotomy, HTN, HLD, DMII. She recently presented to the ED with chest discomfort/ADHF requiring IV diuresis and was sent home. She is ACC/AHA Stage C, NYHA Class II-III symptoms. She is mildly hypervolemic on exam. We will optimize GDMT and start Jardiance 10mg daily with repeat labs in 7-10 days. She will benefit from cardiac rehab, referral placed. Will need close follow up for GDMT optimization and assessment of her volume status.\par Plan as above.

## 2022-07-25 NOTE — REASON FOR VISIT
[Cardiac Failure] : cardiac failure [FreeTextEntry3] : Dr. Pito Curtis [FreeTextEntry1] : \par General Cardiologist: Dr. Pito Curtis\par \par Electrophysiologist: Dr. Cedeno\par \par Endocrinologist: Dr. Talley

## 2022-07-25 NOTE — ASSESSMENT
[FreeTextEntry1] : \par #Chronic systolic HF ACC/AHA Stage C, NYHA Class II-III\par ICMP LVEF 33% LVIDd 6cm\par -She appears mildly hypervolemic on exam with warm extremities. She is normotensive.\par -GDMT: -Continue Entresto 24-26 mg BID\par              -Continue Carvedilol 6.25 mg BID\par              -Continue Spironolactone 25mg daily\par              -Start Jardiance 10mg daily\par -Continue digoxin every other day\par -Diuretics: Continue Torsemide 60mg 5 days a week\par -Labs: Repeat labs 7-10 days after starting Jardiance\par -Device: s/p BiV ICD, follows with EP Dr. Cedeno \par -Has CardioMEMs device but unable to use (has had issues with transmission). She is not interested in re-calibration or re-implantation at this time\par -HF education provided including lifestyle changes (low Na diet, fluid restriction, increase physical activity), current clinical condition, natural progression and prognosis\par -Pt is interested in cardiac rehab but requesting to go close to home in [facility]. Cardiac rehab referral placed today\par \par #CAD s/p CABG, PCI\par -Continue ASA, statin, BB as above\par -Follows closely with Dr. Curtis\par \par #AF and CVA\par -AC: Coumadin\par -INR checks through Dr. Curtis's office\par \par \par #DM 2\par -Remains on insulin\par -Starting Jardiance as above\par -Follows with Dr. Talley\par \par Follow up in 1 month with Dr. Main for further GDMT optimization and assessment of volume status.

## 2022-07-27 ENCOUNTER — RX RENEWAL (OUTPATIENT)
Age: 72
End: 2022-07-27

## 2022-08-03 NOTE — ED PROVIDER NOTE - RESPIRATORY WHEEZES
Hospital Medicine History & Physical      PCP: Lauren Craig MD    Date of Admission: 8/2/2022    Date of Service: Pt seen/examined on 8/3/2022     Chief Complaint:    Chief Complaint   Patient presents with    Anemia     Sent from [facility], they said her H/H was 6.2 and when she went to [facility] today they said it was 7.1. [facility] [place] said they would not accept her back and had transport bring her here. History Of Present Illness: The patient is a 67 y.o. female with pmhx of recent hip fracture, thallasemia minor, OA, DM, GERD, HTN who presented to [facility] ED, she was at [facility] rehab facility, Hgb was found to be 6.2 there so they brought her to the ED. Patient herself was feeling ok, she just reports that she was increasingly fatigued and sleepy. She denies any dizziness, light-headness, falls. No dark tarry stool or rectal bleeding. No hemoptysis or hematemesis. No fever, chills, chest pain, dyspnea, nausea, vomiting, diarrhea,hematuria, dysuria.      Past Medical History:        Diagnosis Date    Anemia     thallasemia minor    Arthritis     osteo-arthritis    Asthma     Back pain     Back pain     Bronchitis chronic     Diabetes mellitus (HCC)     GERD (gastroesophageal reflux disease)     Gout     Hypertension     Melanoma of scalp or neck (HCC)     Osteoarthritis     Osteoporosis     Squamous cell carcinoma, arm     Thal trait        Past Surgical History:        Procedure Laterality Date    APPENDECTOMY      CARPAL TUNNEL RELEASE Left 02/05/2018    CATARACT REMOVAL WITH IMPLANT Right 07/18/2018    CATARACT REMOVAL WITH IMPLANT Left 08/15/2018    COLONOSCOPY  12/2014    FEMUR FRACTURE SURGERY Right 6/20/2022    RIGHT INTRAMEDULLARY NAILING performed by Juliann Harris DO at [address] (CERVIX STATUS UNKNOWN)      KNEE ARTHROSCOPY Bilateral     PARATHYROIDECTOMY      PARTIAL HYSTERECTOMY (CERVIX NOT REMOVED)      WI XCAPSL CTRC RMVL INSJ IO LENS PROSTH W/O ECP Right 7/18/2018    PHACOEMULSIFICATION OF CATARACT WITH INTRAOCULAR LENS IMPLANT RIGHT EYE performed by Nisa Goldberg MD at [address] W/O ECP Left 8/15/2018    PHACOEMULSIFICATION OF CATARACT WITH INTRAOCULAR LENS IMPLANT LEFT EYE performed by Nisa Goldberg MD at [address] ENDOSCOPY N/A 6/25/2022    EGD BIOPSY performed by Chris Hillman DO at [facility]  6/25/2022    EGD POLYP HOT FORCEP/CAUTERY performed by Chris Hillman DO at [facility] ENDOSCOPY       Medications Prior to Admission:    Prior to Admission medications    Medication Sig Start Date End Date Taking? Authorizing Provider   carvedilol (COREG) 12.5 MG tablet Take 1 tablet by mouth 2 times daily (with meals) 6/30/22   Kacey Cardona MD   ferrous sulfate (IRON 325) 325 (65 Fe) MG tablet Take 1 tablet by mouth daily (with breakfast) 6/30/22   Kacey Cardona MD   terazosin (HYTRIN) 2 MG capsule TAKE ONE CAPSULE BY MOUTH ONCE NIGHTLY 6/16/22   Epi Ramsey MD   omeprazole (PRILOSEC) 20 MG delayed release capsule TAKE ONE CAPSULE BY MOUTH TWICE A DAY 6/16/22   Epi Ramsey MD   alendronate (FOSAMAX) 70 MG tablet TAKE 1 TABLET BY MOUTH  WEEKLY WITH 8 OZ OF PLAIN  WATER 30 MINUTES BEFORE  FIRST FOOD, DRINK OR MEDS. STAY UPRIGHT FOR 30 MINS 6/6/22   Epi Ramsey MD   Fluticasone furoate-vilanterol (BREO ELLIPTA) 200-25 MCG/INH AEPB inhaler INHALE ONE DOSE BY MOUTH DAILY 3/16/22   Epi Ramsey MD   Umeclidinium Bromide (INCRUSE ELLIPTA) 62.5 MCG/INH AEPB INHALE ONE PUFF BY MOUTH DAILY 3/16/22   Epi Ramsey MD   gabapentin (NEURONTIN) 300 MG capsule Take 1 capsule by mouth 2 times daily for 180 days.  Intended supply: 90 days 3/16/22 9/12/22  Epi Ramsey MD   blood glucose test strips (ASCENSIA AUTODISC VI;ONE TOUCH ULTRA TEST VI) strip Used to test once daily. DX: E11.9 3/16/22   Mikel Antonio MD   albuterol sulfate HFA (PROAIR HFA) 108 (90 Base) MCG/ACT inhaler INHALE TWO PUFFS BY MOUTH EVERY 6 HOURS AS NEEDED FOR WHEEZING 11/12/21   Mikel Antonio MD   metFORMIN (GLUCOPHAGE) 1000 MG tablet TAKE 1 TABLET BY MOUTH  TWICE DAILY WITH MEALS 9/7/21   Mikel Antonio MD   PARoxetine (PAXIL) 10 MG tablet TAKE 1 TABLET BY MOUTH  DAILY 9/7/21   Mikel Antonio MD   atorvastatin (LIPITOR) 10 MG tablet TAKE 1 TABLET BY MOUTH AT  NIGHT 9/7/21   Mikel Antonio MD   allopurinol (ZYLOPRIM) 300 MG tablet TAKE 1 TABLET BY MOUTH  DAILY 9/7/21   Mikel Antonio MD   losartan (COZAAR) 100 MG tablet TAKE 1 TABLET BY MOUTH  DAILY 9/7/21   Mikel Antonio MD   glipiZIDE (GLUCOTROL) 10 MG tablet TAKE 1 TABLET BY MOUTH  TWICE DAILY BEFORE MEALS 9/7/21   Mikel Antonio MD   clotrimazole-betamethasone (LOTRISONE) 1-0.05 % cream Apply topically 2 times daily. 6/10/21   Mikel Antonio MD   acetaminophen (TYLENOL) 500 MG tablet Take 500 mg by mouth every 6 hours as needed for Pain    Historical Provider, MD   Coenzyme Q10 (CO Q 10 PO) Take by mouth    Historical Provider, MD   cetirizine (ZYRTEC) 10 MG tablet Take 10 mg by mouth daily    Historical Provider, MD   Cyanocobalamin (VITAMIN B 12 PO) Take 1,000 mcg by mouth daily. Historical Provider, MD   Ascorbic Acid (VITAMIN C) 500 MG CAPS Take  by mouth daily. Historical Provider, MD   vitamin E 400 UNIT capsule Take 400 Units by mouth daily. Historical Provider, MD   Calcium Carbonate-Vitamin D (CALCIUM + D PO) Take  by mouth daily. Historical Provider, MD       Allergies:  Hctz [hydrochlorothiazide] and Lisinopril    Social History:      TOBACCO:   reports that she is a non-smoker but has been exposed to tobacco smoke.  She has never used smokeless tobacco.  ETOH:   reports no history of alcohol use. Family History:   Positive as follows:        Problem Relation Age of Onset    Heart Failure Mother     Emphysema Mother     Stroke Mother     Heart Failure Father     Hypertension Father     Heart Disease Father     High Blood Pressure Father     Cancer Maternal Aunt     Cancer Paternal Aunt     Cancer Maternal Grandmother     Asthma Neg Hx     Diabetes Neg Hx        REVIEW OF SYSTEMS:       Constitutional: Negative for fever, +fatigue   HENT: Negative for sore throat   Eyes: Negative for redness   Respiratory: Negative  for dyspnea, cough   Cardiovascular: Negative for chest pain   Gastrointestinal: Negative for vomiting, diarrhea   Genitourinary: Negative for hematuria   Musculoskeletal: Negative for arthralgias   Skin: Negative for rash   Neurological: Negative for syncope   Hematological: Negative for adenopathy   Psychiatric/Behavorial: Negative for anxiety    PHYSICAL EXAM:    BP (!) 159/73   Pulse 83   Temp 99 °F (37.2 °C) (Oral)   Resp 16   Ht 5' 3\" (1.6 m)   Wt 209 lb 8 oz (95 kg)   SpO2 93%   BMI 37.11 kg/m²     Gen: No distress. Alert. Pleasant elderly female   Eyes: PERRL. No sclera icterus. No conjunctival injection. Neck: No JVD. No Carotid Bruit. Trachea midline. Resp: No accessory muscle use. No crackles. No wheezes. No rhonchi. CV: Regular rate. Regular rhythm. No murmur. No rub. No edema. Peripheral Pulses: +2 palpable, equal bilaterally   GI: Non-tender. Non-distended. No masses. No organomegaly. Normal bowel sounds. No hernia. Skin: Warm and dry. No nodule on exposed extremities. No rash on exposed extremities. M/S: No cyanosis. No joint deformity. No clubbing. Neuro: Awake. Grossly nonfocal    Psych: Oriented x 3. No anxiety or agitation.      Lab Results   Component Value Date    WBC 5.3 08/02/2022    HGB 7.1 (L) 08/03/2022    HCT 21.9 (L) 08/03/2022    MCV 63.8 (L) 08/02/2022     08/02/2022     Lab Results   Component Value Date    NA 138 08/03/2022    K 4.1 08/03/2022     08/03/2022    CO2 25 08/03/2022    BUN 13 08/03/2022    CREATININE 0.7 08/03/2022    GLUCOSE 168 (H) 08/03/2022    CALCIUM 8.0 (L) 08/03/2022    PROT 6.4 08/02/2022    LABALBU 3.2 (L) 08/02/2022    BILITOT 0.6 08/02/2022    ALKPHOS 98 08/02/2022    AST 8 (L) 08/02/2022    ALT 6 (L) 08/02/2022    LABGLOM >60 08/03/2022    GFRAA >60 08/03/2022    AGRATIO 1.0 (L) 08/02/2022    GLOB 1.9 09/23/2021           CARDIAC ENZYMES  Recent Labs     08/02/22  1344   TROPONINI <0.01       U/A:    Lab Results   Component Value Date/Time    NITRITE neg 09/23/2021 01:54 PM    COLORU Yellow 06/20/2022 10:30 AM    WBCUA >100 06/20/2022 10:30 AM    RBCUA see below 06/20/2022 10:30 AM    BACTERIA 2+ 08/04/2016 06:00 AM    CLARITYU CLOUDY 06/20/2022 10:30 AM    SPECGRAV >=1.030 06/20/2022 10:30 AM    LEUKOCYTESUR MODERATE 06/20/2022 10:30 AM    BLOODU MODERATE 06/20/2022 10:30 AM    GLUCOSEU Negative 06/20/2022 10:30 AM    AMORPHOUS Rare 01/06/2015 03:24 PM       ABG    Lab Results   Component Value Date/Time    BNT4DOX 17.4 06/23/2022 06:15 AM    BEART -7.0 06/23/2022 06:15 AM    J9PRZCLR 95.9 06/23/2022 06:15 AM    PHART 7.377 06/23/2022 06:15 AM    RVG5MFO 30.3 06/23/2022 06:15 AM    PO2ART 80.6 06/23/2022 06:15 AM    THX2TXL 18.3 06/23/2022 06:15 AM       CULTURES  COVID not detected     EKG: Normal sinus rhythmLeft axis deviationVoltage criteria for left ventricular hypertrophyAbnormal ECGWhen compared with ECG of 23-JUN-2022 18:42,Sinus rhythm has replaced Atrial fibrillationVent. rate has decreased BY  52 BPMST no longer depressed in Lateral leadsT wave inversion no longer evident in Lateral leadsConfirmed by Clemente Solis ([id number]) on 8/2/2022 5:38:08 PM     RADIOLOGY  XR CHEST PORTABLE   Final Result   No focal lung opacity.              ASSESSMENT/PLAN:  #Acute on chronic microcytic anemia   -pt has hx of thallasemia minor and MOISES   -she had recent admission with acute blood loss and possible hemorrhagic shock, required total of 4 units of pRBCs during that admission   -hemoccult negative   -Hgb 7.2 on presentation, 7.1 today   -trend H & H   -continue venofer   -s/p 1 unit pRBCs in ED, transfuse for Hgb <7.0  -IVF    -heme/onc consulted     #Paroxysmal atrial fibrillation   -NSR on presentation   -rate controlled   -on coreg   -not on [facility]     #Intertrochanteric hip fracture; right  -Osteoporosis contributing  -s.p IM nailing of right hip  -was at inpatient rehab facility   -continue PT/OT     #DM Type 2   -On sliding scale insulin  -continue to monitor BG     #HTN   -continue coreg and losartan     #Asthma without AE   -Continued home inhalers     #HLD  -Continued statin     #Osteoporosis  -Receives Fosamax weekly      #GERD  -On PPI     #Gout  -Continued allopurinol      DVT Prophylaxis: SCDs  Diet: ADULT DIET;  Regular; 4 carb choices (60 gm/meal)  Code Status: Full Code    MARIUSZ Whiting  08/03/22  1:47 PM DIFFUSE

## 2022-08-04 ENCOUNTER — APPOINTMENT (OUTPATIENT)
Dept: CARDIOLOGY | Facility: CLINIC | Age: 72
End: 2022-08-04

## 2022-08-04 VITALS — DIASTOLIC BLOOD PRESSURE: 68 MMHG | SYSTOLIC BLOOD PRESSURE: 110 MMHG | HEART RATE: 75 BPM

## 2022-08-04 LAB
INR PPP: 2.7 RATIO
QUALITY CONTROL: YES

## 2022-08-04 PROCEDURE — 93793 ANTICOAG MGMT PT WARFARIN: CPT

## 2022-08-04 PROCEDURE — 85610 PROTHROMBIN TIME: CPT | Mod: QW

## 2022-08-18 ENCOUNTER — APPOINTMENT (OUTPATIENT)
Dept: CARDIOLOGY | Facility: CLINIC | Age: 72
End: 2022-08-18

## 2022-08-18 PROCEDURE — 93793 ANTICOAG MGMT PT WARFARIN: CPT

## 2022-08-18 PROCEDURE — 85610 PROTHROMBIN TIME: CPT | Mod: QW

## 2022-08-21 LAB
INR PPP: 1.9 RATIO
QUALITY CONTROL: YES

## 2022-08-24 NOTE — ED PROVIDER NOTE - CONSTITUTIONAL, MLM
normal... Well appearing, well nourished, awake, alert, oriented to person, place, time/situation and in no apparent distress. Clindamycin Pregnancy And Lactation Text: This medication can be used in pregnancy if certain situations. Clindamycin is also present in breast milk.

## 2022-09-01 ENCOUNTER — APPOINTMENT (OUTPATIENT)
Dept: CARDIOLOGY | Facility: CLINIC | Age: 72
End: 2022-09-01

## 2022-09-01 VITALS — HEIGHT: 62 IN | HEART RATE: 76 BPM | OXYGEN SATURATION: 97 %

## 2022-09-01 LAB
INR PPP: 2.7 RATIO
QUALITY CONTROL: YES

## 2022-09-01 PROCEDURE — 93793 ANTICOAG MGMT PT WARFARIN: CPT

## 2022-09-01 PROCEDURE — 85610 PROTHROMBIN TIME: CPT | Mod: QW

## 2022-09-15 ENCOUNTER — APPOINTMENT (OUTPATIENT)
Dept: CARDIOLOGY | Facility: CLINIC | Age: 72
End: 2022-09-15

## 2022-09-15 VITALS — RESPIRATION RATE: 16 BRPM | SYSTOLIC BLOOD PRESSURE: 100 MMHG | DIASTOLIC BLOOD PRESSURE: 62 MMHG

## 2022-09-15 LAB
INR PPP: 2 RATIO
QUALITY CONTROL: YES

## 2022-09-15 PROCEDURE — 93793 ANTICOAG MGMT PT WARFARIN: CPT

## 2022-09-15 PROCEDURE — 85610 PROTHROMBIN TIME: CPT | Mod: QW

## 2022-09-19 ENCOUNTER — APPOINTMENT (OUTPATIENT)
Dept: HEART FAILURE | Facility: CLINIC | Age: 72
End: 2022-09-19

## 2022-09-19 VITALS
SYSTOLIC BLOOD PRESSURE: 117 MMHG | BODY MASS INDEX: 36.07 KG/M2 | HEART RATE: 79 BPM | HEIGHT: 62 IN | OXYGEN SATURATION: 96 % | WEIGHT: 196 LBS | DIASTOLIC BLOOD PRESSURE: 60 MMHG

## 2022-09-19 PROCEDURE — 99214 OFFICE O/P EST MOD 30 MIN: CPT

## 2022-09-21 ENCOUNTER — APPOINTMENT (OUTPATIENT)
Dept: CARDIOLOGY | Facility: CLINIC | Age: 72
End: 2022-09-21

## 2022-09-21 PROCEDURE — 93295 DEV INTERROG REMOTE 1/2/MLT: CPT

## 2022-09-21 PROCEDURE — 93296 REM INTERROG EVL PM/IDS: CPT

## 2022-09-22 NOTE — HISTORY OF PRESENT ILLNESS
[FreeTextEntry1] : \par This is a 73 y/o female with chronic systolic HF ACC/AHA stage C,  ICMP LVEF 30-35%, CAD s/p CABG () and PCI (ILANA to LCx, OM1 in ), [place] s/p BiV ICD (, since upgraded in  due to inappropriate ICD discharge for AF), atrial fibrillation on coumadin, CardioMEMs implant (2018-not following due to malfunction?), HTN, HLD, DM 2, CVA c/b hemorrhage () requiring craniotomy for intracranial decompression and removal of thrombus and CKD 3 who was referred for HF management. \par \par Her cardiac disease dates back to  when she underwent CABG for multivessel cAD. She's had serial TTEs at least since  that demonstrate severely reduced systolic HF with EF ~30%. She has been following with dr. Nicole since then. She underwent PCI to native coronaries LCx/Om1 in 2018. She has been on some GDMT for many years now and was doing well until the past few months. She reports worsening symptoms in the past 6-12 months. She feels more fatigue and tired. Associated presents intermittent LE edema (L>R since CABG). Her diuretics have been uptitrated with very minimal improvement. She used to follow at [facility] with Dr. Estela Mills. Unfortunately, she was never able to use cardioMEMs. It seems there was a reception issue in part due to her inability to lay flat. \par \par She went to [facility] ED (22) with atypical chest discomfort. Her pro BNP at that time as 3274. She was given IV diuresis and sent home. Today she reports that she has progressively gotten more fatigued. She endorses occasional SOB/RACHEL, cough, and intermittent LE swelling. She uses 2-3 pillows and is unable to lay flat which is chronic due to hx of brain surgery. She adheres to her medication regimen and tries to maintain a low sodium diet and fluid restriction.\par \par She is a former smoker and denies ETOH/illicit drug use. Her father had heart disease, her mother  over 20 years ago and she is unsure if she had heart disease.\par \par She specifically denies CP, palpitations, SOB at rest, dizziness/lightheadedness, presyncope/syncope. She has not felt any ICD shocks. Besides her ED visit a few weeks ago she has not been hospitalized for HF in "years."\par She has been unable to start Jardiance due to high copay. \par We were unable to find a cardiac rehab center close enough to her house. She states she only drives locally. \par

## 2022-09-22 NOTE — PHYSICAL EXAM
[Normal] : soft, non-tender, no masses/organomegaly, normal bowel sounds [No Edema] : no edema [No Rash] : no rash

## 2022-09-22 NOTE — CARDIOLOGY SUMMARY
[de-identified] : \par 7/19/22: AF, Vpaced\par \par 2/16/22: Paced [de-identified] : \par 11/7/14: LVEF 42%, apical, inferior, mid septal infarction with minimal suzette-infarct ischemia [de-identified] : \par \par 4/8/22: LVEF 30%, LVIDd 6cm, eccentric LVH, RV not well visualized, LAE, mild-mod MR, mild-mod TR, mod pHTN, small pericardial effusion [de-identified] : \par 12/2020: Extraction of Guidant ICD and implantation of new [employer] BiV ICD\par \par 8/22/2018: S/p CardioMEMs implant\par \par 2007: Guidant BIV-ICD [de-identified] : \par 6/22/22: no reported episodes, RV 84% paced, LV 98% paced [de-identified] : \par 8/22/2018: RHC: RA 12 RV 62/6 (13) PA 62/24 (40) PCWP 20 PA sat 62% CO/CI 4.04/2.09\par \par 8/31/2015: PCI to ILANA to OM1 and LCx (SVG to OM1 occluded) [de-identified] : \par 2007: CABG x3 (LIMA-LAD, SVG to OM1, SVG to RPDA)

## 2022-09-22 NOTE — ASSESSMENT
[FreeTextEntry1] : \par #Chronic systolic HF ACC/AHA Stage C, NYHA Class II-III\par ICMP LVEF 33% LVIDd 6cm\par -She appears euvolemic, warm extremities.\par -GDMT: -Continue Entresto 24-26 mg BID, Carvedilol 6.25 mg BID and Spironolactone 25mg daily\par              -PENDING Jardiance 10mg daily - financial assist program application filled today\par -Continue digoxin every other day\par -Diuretics: Continue Torsemide 60mg 5 days a week\par -Labs: Repeat labs 7-10 days after starting Jardiance\par -Device: s/p BiV ICD, follows with EP Dr. Cedeno \par -Has CardioMEMs device but unable to use (has had issues with transmission). She is not interested in re-calibration or re-implantation at this time\par -HF education provided including lifestyle changes (low Na diet, fluid restriction, increase physical activity), current clinical condition, natural progression and prognosis\par -Pt is interested in cardiac rehab but requesting to go close to home in [facility]. No center close enough, she states she only drives locally. \par \par #CAD s/p CABG, PCI\par -Continue ASA, statin, BB as above\par -Follows closely with Dr. Curtis\par \par #AF and CVA\par -AC: Coumadin\par -INR checks through Dr. Curtis's office\par \par \par #DM 2\par -Remains on insulin\par -Starting Jardiance as above PENDING\par -Follows with Dr. Talley\par

## 2022-09-22 NOTE — DISCUSSION/SUMMARY
[___ Month(s)] : in [unfilled] month(s) [With ___] : with [unfilled] [FreeTextEntry1] : \par This is a 71 y/o female with multiple comorbidities, most notably HFrEF (LVEF 30%), ICMP, CAD s/p CABG and PCI, s/p BiV ICD, AF, CVA with hemorrhage s/p craniotomy, HTN, HLD, DMII. She recently presented to the ED with chest discomfort/ADHF requiring IV diuresis and was sent home. She is ACC/AHA Stage C, NYHA Class II-III symptoms. She is close to euvolemia, warm extremities. We will continue to optimize GDMT and start Jardiance 10mg daily with repeat labs in 7-10 days. \par Plan as above.

## 2022-09-29 ENCOUNTER — APPOINTMENT (OUTPATIENT)
Dept: CARDIOLOGY | Facility: CLINIC | Age: 72
End: 2022-09-29

## 2022-09-29 LAB
INR PPP: 2.2 RATIO
QUALITY CONTROL: YES

## 2022-09-29 PROCEDURE — 93793 ANTICOAG MGMT PT WARFARIN: CPT

## 2022-09-29 PROCEDURE — 85610 PROTHROMBIN TIME: CPT | Mod: QW

## 2022-10-13 ENCOUNTER — APPOINTMENT (OUTPATIENT)
Dept: CARDIOLOGY | Facility: CLINIC | Age: 72
End: 2022-10-13

## 2022-10-13 VITALS — DIASTOLIC BLOOD PRESSURE: 78 MMHG | HEART RATE: 82 BPM | OXYGEN SATURATION: 97 % | SYSTOLIC BLOOD PRESSURE: 108 MMHG

## 2022-10-13 LAB
INR PPP: 2.6 RATIO
QUALITY CONTROL: YES

## 2022-10-13 PROCEDURE — 93793 ANTICOAG MGMT PT WARFARIN: CPT

## 2022-10-13 PROCEDURE — 85610 PROTHROMBIN TIME: CPT | Mod: QW

## 2022-10-28 ENCOUNTER — APPOINTMENT (OUTPATIENT)
Dept: CARDIOLOGY | Facility: CLINIC | Age: 72
End: 2022-10-28

## 2022-10-28 ENCOUNTER — NON-APPOINTMENT (OUTPATIENT)
Age: 72
End: 2022-10-28

## 2022-10-28 VITALS
SYSTOLIC BLOOD PRESSURE: 113 MMHG | OXYGEN SATURATION: 95 % | HEIGHT: 62 IN | DIASTOLIC BLOOD PRESSURE: 73 MMHG | HEART RATE: 68 BPM

## 2022-10-28 LAB
INR PPP: 2.6 RATIO
QUALITY CONTROL: YES

## 2022-10-28 PROCEDURE — 99214 OFFICE O/P EST MOD 30 MIN: CPT

## 2022-10-28 PROCEDURE — 85610 PROTHROMBIN TIME: CPT | Mod: QW

## 2022-10-28 PROCEDURE — 93000 ELECTROCARDIOGRAM COMPLETE: CPT

## 2022-10-28 RX ORDER — AMOXICILLIN 500 MG/1
500 CAPSULE ORAL
Qty: 24 | Refills: 0 | Status: ACTIVE | COMMUNITY
Start: 2021-09-14

## 2022-10-28 NOTE — PHYSICAL EXAM

## 2022-10-28 NOTE — HISTORY OF PRESENT ILLNESS
[FreeTextEntry1] : Ms. Barrios returns for evaluation of her cardiomyopathy and coronary artery disease. \par \par Past medical history is remarkable for myocardial infarction, coronary artery disease status post coronary artery bypass surgery in 2007, history of inducible sustained ventricular tachycardia leading to placement of a biventricular/ICD (Guidant), history of ischemic cardiomyopathy, history of diabetes, history of hyperlipidemia,history of hyperthyroidism, history of proliferative diabetic retinopathy, and history of stroke with hemorrhage in 2010 at which time she had a long complicated course requiring craniotomy for intracranial decompression and removal of thrombus. In August of 2010, she had several ICD discharges felt to be due to rapid atrial fibrillation.\par \par She last saw Dr. Curtis in 7/2022.\par \par She has had several episodes of atypical chest pain and underwent cardiac catheterization 2018 leading  to percutaneous intervention of the left circumflex artery and the first obtuse marginal artery with drug eluting stents. \par A year later, she presented with decompensated heart failure has had adjustment of her medication. She had a Cardiomems placed but has not been able to get ongoing readings given her inability to lie flat from her prior stroke and brain surgery.\par She is now s/p extraction of her desmond lead and ICD generator replacement in 2020.\par \par A week before last visit in 7/12/22, she was seen in the emergency department last week for mild decompensation of heart failure.  Her diuretic has been increased to daily dosing.  Her BNP at this time was 3274\par \par Since last visit, she has been evaluated by the congestive heart failure team.  She has been unable to start Jardiance because of a high co-pay.  She continues to report fatigue, and occasional dyspnea, cough, and lower extremity swelling.  She uses pillows to sleep at night.\par She is now taking torsemide 60 mg po daily.\par \par Her last echo performed in this office was in 4/2022, and demonstrated mild to moderate MR, a severely dilated left atrium, eccentric LVH, moderate to severe global left ventricular systolic dysfunction, and borderline pulmonary pressures.\par

## 2022-10-28 NOTE — DISCUSSION/SUMMARY
[FreeTextEntry1] : From a volume standpoint, Lisa has been doing better.  She will remain on her current dose of torsemide (while monitoring her weights), along with Entresto, Coreg and spironolactone.  Her main complaint today is fatigue, which seems to be secondary to her stress and insomnia.\par \par I am sending a full set of blood work, including a digoxin level.  She will remain on her current medication regimen at this time.  She will remain on Coumadin for anticoagulation.\par \par We will speak after the blood work.  She is willing to try physical rehab; I recommended cardiac rehab, though there is not one close enough to her house.  If no issues, I will see her again in 3 months.\par \par  [EKG obtained to assist in diagnosis and management of assessed problem(s)] : EKG obtained to assist in diagnosis and management of assessed problem(s)

## 2022-11-11 ENCOUNTER — TRANSCRIPTION ENCOUNTER (OUTPATIENT)
Age: 72
End: 2022-11-11

## 2022-11-11 LAB
ALBUMIN SERPL ELPH-MCNC: 4.4 G/DL
ALP BLD-CCNC: 70 U/L
ALT SERPL-CCNC: 15 U/L
ANION GAP SERPL CALC-SCNC: 11 MMOL/L
AST SERPL-CCNC: 19 U/L
BASOPHILS # BLD AUTO: 0.06 K/UL
BASOPHILS NFR BLD AUTO: 1 %
BILIRUB SERPL-MCNC: 0.6 MG/DL
BUN SERPL-MCNC: 27 MG/DL
CALCIUM SERPL-MCNC: 9.3 MG/DL
CHLORIDE SERPL-SCNC: 100 MMOL/L
CHOLEST SERPL-MCNC: 151 MG/DL
CO2 SERPL-SCNC: 28 MMOL/L
CREAT SERPL-MCNC: 1.29 MG/DL
EGFR: 44 ML/MIN/1.73M2
EOSINOPHIL # BLD AUTO: 0.15 K/UL
EOSINOPHIL NFR BLD AUTO: 2.5 %
FERRITIN SERPL-MCNC: 100 NG/ML
FOLATE SERPL-MCNC: 14.7 NG/ML
GLUCOSE SERPL-MCNC: 110 MG/DL
HCT VFR BLD CALC: 38.9 %
HDLC SERPL-MCNC: 44 MG/DL
HGB BLD-MCNC: 12.5 G/DL
IMM GRANULOCYTES NFR BLD AUTO: 0.2 %
IRON SATN MFR SERPL: 23 %
IRON SERPL-MCNC: 56 UG/DL
LDLC SERPL CALC-MCNC: 72 MG/DL
LYMPHOCYTES # BLD AUTO: 0.93 K/UL
LYMPHOCYTES NFR BLD AUTO: 15.2 %
MAN DIFF?: NORMAL
MCHC RBC-ENTMCNC: 29.3 PG
MCHC RBC-ENTMCNC: 32.1 GM/DL
MCV RBC AUTO: 91.3 FL
MONOCYTES # BLD AUTO: 0.54 K/UL
MONOCYTES NFR BLD AUTO: 8.9 %
NEUTROPHILS # BLD AUTO: 4.41 K/UL
NEUTROPHILS NFR BLD AUTO: 72.2 %
NONHDLC SERPL-MCNC: 106 MG/DL
NT-PROBNP SERPL-MCNC: 688 PG/ML
PLATELET # BLD AUTO: 129 K/UL
POTASSIUM SERPL-SCNC: 4.3 MMOL/L
PROT SERPL-MCNC: 7.2 G/DL
RBC # BLD: 4.26 M/UL
RBC # FLD: 14.3 %
SODIUM SERPL-SCNC: 139 MMOL/L
TIBC SERPL-MCNC: 245 UG/DL
TRIGL SERPL-MCNC: 171 MG/DL
TSH SERPL-ACNC: 1.52 UIU/ML
UIBC SERPL-MCNC: 189 UG/DL
VIT B12 SERPL-MCNC: 418 PG/ML
WBC # FLD AUTO: 6.1 K/UL

## 2022-11-14 LAB
DIGOXIN SERPL-MCNC: 0.4 NG/ML
ESTIMATED AVERAGE GLUCOSE: 194 MG/DL
HBA1C MFR BLD HPLC: 8.4 %

## 2022-11-17 ENCOUNTER — APPOINTMENT (OUTPATIENT)
Dept: CARDIOLOGY | Facility: CLINIC | Age: 72
End: 2022-11-17

## 2022-11-17 VITALS — HEIGHT: 62 IN | DIASTOLIC BLOOD PRESSURE: 60 MMHG | SYSTOLIC BLOOD PRESSURE: 104 MMHG | RESPIRATION RATE: 16 BRPM

## 2022-11-17 LAB
INR PPP: 2.7 RATIO
QUALITY CONTROL: YES

## 2022-11-17 PROCEDURE — 85610 PROTHROMBIN TIME: CPT | Mod: QW

## 2022-11-17 PROCEDURE — 93793 ANTICOAG MGMT PT WARFARIN: CPT

## 2022-11-29 ENCOUNTER — APPOINTMENT (OUTPATIENT)
Dept: CARDIOLOGY | Facility: CLINIC | Age: 72
End: 2022-11-29

## 2022-11-29 LAB
INR PPP: 3.2 RATIO
QUALITY CONTROL: YES

## 2022-11-29 PROCEDURE — 85610 PROTHROMBIN TIME: CPT | Mod: QW

## 2022-11-29 PROCEDURE — 93793 ANTICOAG MGMT PT WARFARIN: CPT

## 2022-12-08 ENCOUNTER — APPOINTMENT (OUTPATIENT)
Dept: CARDIOLOGY | Facility: CLINIC | Age: 72
End: 2022-12-08

## 2022-12-08 VITALS — DIASTOLIC BLOOD PRESSURE: 60 MMHG | SYSTOLIC BLOOD PRESSURE: 104 MMHG | HEART RATE: 67 BPM

## 2022-12-08 LAB
INR PPP: 2.3 RATIO
QUALITY CONTROL: YES

## 2022-12-08 PROCEDURE — 85610 PROTHROMBIN TIME: CPT | Mod: QW

## 2022-12-08 PROCEDURE — 93793 ANTICOAG MGMT PT WARFARIN: CPT

## 2022-12-20 ENCOUNTER — APPOINTMENT (OUTPATIENT)
Dept: CARDIOLOGY | Facility: CLINIC | Age: 72
End: 2022-12-20

## 2022-12-20 LAB
INR PPP: 2.9 RATIO
QUALITY CONTROL: YES

## 2022-12-20 PROCEDURE — 85610 PROTHROMBIN TIME: CPT | Mod: QW

## 2022-12-20 PROCEDURE — 93793 ANTICOAG MGMT PT WARFARIN: CPT

## 2022-12-21 ENCOUNTER — APPOINTMENT (OUTPATIENT)
Dept: CARDIOLOGY | Facility: CLINIC | Age: 72
End: 2022-12-21

## 2022-12-21 PROCEDURE — 93296 REM INTERROG EVL PM/IDS: CPT

## 2022-12-21 PROCEDURE — 93295 DEV INTERROG REMOTE 1/2/MLT: CPT

## 2023-01-05 ENCOUNTER — APPOINTMENT (OUTPATIENT)
Dept: CARDIOLOGY | Facility: CLINIC | Age: 73
End: 2023-01-05
Payer: MEDICARE

## 2023-01-05 VITALS — HEART RATE: 76 BPM | OXYGEN SATURATION: 98 % | SYSTOLIC BLOOD PRESSURE: 118 MMHG | DIASTOLIC BLOOD PRESSURE: 68 MMHG

## 2023-01-05 LAB
INR PPP: 2.5 RATIO
QUALITY CONTROL: YES

## 2023-01-05 PROCEDURE — 85610 PROTHROMBIN TIME: CPT | Mod: QW

## 2023-01-05 PROCEDURE — 93793 ANTICOAG MGMT PT WARFARIN: CPT

## 2023-01-13 ENCOUNTER — APPOINTMENT (OUTPATIENT)
Dept: CARDIOLOGY | Facility: CLINIC | Age: 73
End: 2023-01-13
Payer: MEDICARE

## 2023-01-13 ENCOUNTER — NON-APPOINTMENT (OUTPATIENT)
Age: 73
End: 2023-01-13

## 2023-01-13 VITALS
SYSTOLIC BLOOD PRESSURE: 109 MMHG | OXYGEN SATURATION: 95 % | HEART RATE: 88 BPM | BODY MASS INDEX: 36.44 KG/M2 | HEIGHT: 62 IN | WEIGHT: 198 LBS | DIASTOLIC BLOOD PRESSURE: 67 MMHG

## 2023-01-13 LAB
INR PPP: 2.3 RATIO
QUALITY CONTROL: YES

## 2023-01-13 PROCEDURE — 93000 ELECTROCARDIOGRAM COMPLETE: CPT

## 2023-01-13 PROCEDURE — 85610 PROTHROMBIN TIME: CPT | Mod: QW

## 2023-01-13 PROCEDURE — 99214 OFFICE O/P EST MOD 30 MIN: CPT

## 2023-01-13 NOTE — PHYSICAL EXAM

## 2023-01-13 NOTE — DISCUSSION/SUMMARY
[FreeTextEntry1] : From a volume standpoint, Lisa has been doing better.  She will remain on her current dose of torsemide (while monitoring her weights), along with Entresto, Coreg and spironolactone.  Her main complaint today is fatigue, which seems to be secondary to her stress and insomnia.\par \par She will remain on her current medication regimen at this time.  She will remain on Coumadin for anticoagulation. She is following with the heart failure team in our office next month.\par \par She is willing to try physical rehab; I recommended cardiac rehab, though there is not one close enough to her house. \par  If no issues, I will see her again in 3 months.\par \par  [EKG obtained to assist in diagnosis and management of assessed problem(s)] : EKG obtained to assist in diagnosis and management of assessed problem(s)

## 2023-01-23 ENCOUNTER — APPOINTMENT (OUTPATIENT)
Dept: HEART FAILURE | Facility: CLINIC | Age: 73
End: 2023-01-23
Payer: MEDICARE

## 2023-01-23 ENCOUNTER — APPOINTMENT (OUTPATIENT)
Dept: CARDIOLOGY | Facility: CLINIC | Age: 73
End: 2023-01-23
Payer: MEDICARE

## 2023-01-23 VITALS
SYSTOLIC BLOOD PRESSURE: 106 MMHG | DIASTOLIC BLOOD PRESSURE: 71 MMHG | OXYGEN SATURATION: 96 % | HEART RATE: 72 BPM | HEIGHT: 62 IN

## 2023-01-23 DIAGNOSIS — I42.9 CARDIOMYOPATHY, UNSPECIFIED: ICD-10-CM

## 2023-01-23 LAB
INR PPP: 2.3 RATIO
QUALITY CONTROL: YES

## 2023-01-23 PROCEDURE — 93793 ANTICOAG MGMT PT WARFARIN: CPT

## 2023-01-23 PROCEDURE — 85610 PROTHROMBIN TIME: CPT | Mod: QW

## 2023-01-23 PROCEDURE — 99214 OFFICE O/P EST MOD 30 MIN: CPT

## 2023-01-23 RX ORDER — EMPAGLIFLOZIN 10 MG/1
10 TABLET, FILM COATED ORAL
Qty: 90 | Refills: 1 | Status: DISCONTINUED | COMMUNITY
Start: 2022-07-25 | End: 2023-01-23

## 2023-01-24 ENCOUNTER — NON-APPOINTMENT (OUTPATIENT)
Age: 73
End: 2023-01-24

## 2023-01-24 ENCOUNTER — APPOINTMENT (OUTPATIENT)
Dept: OTOLARYNGOLOGY | Facility: CLINIC | Age: 73
End: 2023-01-24
Payer: MEDICARE

## 2023-01-24 VITALS
DIASTOLIC BLOOD PRESSURE: 73 MMHG | HEIGHT: 64 IN | BODY MASS INDEX: 33.8 KG/M2 | SYSTOLIC BLOOD PRESSURE: 122 MMHG | HEART RATE: 73 BPM | WEIGHT: 198 LBS

## 2023-01-24 DIAGNOSIS — H93.293 OTHER ABNORMAL AUDITORY PERCEPTIONS, BILATERAL: ICD-10-CM

## 2023-01-24 DIAGNOSIS — J31.0 CHRONIC RHINITIS: ICD-10-CM

## 2023-01-24 PROCEDURE — 92557 COMPREHENSIVE HEARING TEST: CPT

## 2023-01-24 PROCEDURE — 99204 OFFICE O/P NEW MOD 45 MIN: CPT

## 2023-01-24 PROCEDURE — 92567 TYMPANOMETRY: CPT

## 2023-01-24 RX ORDER — ALBUTEROL SULFATE 90 UG/1
108 (90 BASE) AEROSOL, METERED RESPIRATORY (INHALATION)
Qty: 18 | Refills: 0 | Status: DISCONTINUED | COMMUNITY
Start: 2019-05-08 | End: 2023-01-24

## 2023-01-24 RX ORDER — BENZONATATE 200 MG/1
200 CAPSULE ORAL
Qty: 30 | Refills: 0 | Status: DISCONTINUED | COMMUNITY
Start: 2019-06-17 | End: 2023-01-24

## 2023-01-24 NOTE — REVIEW OF SYSTEMS
[Seasonal Allergies] : seasonal allergies [Post Nasal Drip] : post nasal drip [Ear Itch] : ear itch [Hearing Loss] : hearing loss [Dizziness] : dizziness [Vertigo] : vertigo [Throat Clearing] : throat clearing [Throat Dryness] : throat dryness [Throat Itching] : throat itching [Negative] : Heme/Lymph [As Noted in HPI] : as noted in HPI [FreeTextEntry1] : fatigue,daytime sleepiness

## 2023-01-24 NOTE — HISTORY OF PRESENT ILLNESS
[de-identified] : 72 yr old female aware of hearing loss since the use of masks, worse over the past few weeks.\par denies recent URI\par -tinnitus\par +dizzy since CVA 2012 tx w evacuation of clot \par -hx otitis, noise exp\par +FH father and brother have unilat HL\par \par \par c/o dry nose in the winter\par rare epistaxis

## 2023-01-24 NOTE — PHYSICAL EXAM
[Normal] : mucosa is normal [Midline] : trachea located in midline position [de-identified] : dry mucosa

## 2023-01-24 NOTE — DATA REVIEWED
[de-identified] : type A tymps AU\par mild to severe SNHL 250-8000 Hz\par *asymmetry AD 0330-0251 Hz

## 2023-01-24 NOTE — ASSESSMENT
[FreeTextEntry1] : NS\par \par  mild to severe SNHL w type A AU, asymm AD 1000-1500Hz\par \par ABR\par HAE\par f/u afater testing is complete\par \par

## 2023-02-07 ENCOUNTER — APPOINTMENT (OUTPATIENT)
Dept: OTOLARYNGOLOGY | Facility: CLINIC | Age: 73
End: 2023-02-07
Payer: MEDICARE

## 2023-02-07 PROCEDURE — 92653 AEP NEURODIAGNOSTIC I&R: CPT

## 2023-02-10 NOTE — ASSESSMENT
[FreeTextEntry1] : \par #Chronic systolic HF ACC/AHA Stage C, NYHA Class II-III\par ICMP LVEF 33% LVIDd 6cm\par -She appears euvolemic, warm extremities.\par -GDMT: -Continue Entresto 24-26 mg BID, Carvedilol 6.25 mg BID, digoxin 125 mcg every other day and Spironolactone 25mg daily\par              -Unable to obtain Jardiance/Farxiga due to high copay. \par -Diuretics: Continue Torsemide 60mg 5 days a week\par -Device: s/p BiV ICD, follows with EP Dr. Cedeno \par -Has CardioMEMs device but unable to use (has had issues with transmission). She is not interested in re-calibration or re-implantation at this time\par -HF education provided including lifestyle changes (low Na diet, fluid restriction, increase physical activity), current clinical condition, natural progression and prognosis\par -Pt is interested in cardiac rehab but requesting to go close to home in [facility]. No center close enough, she states she only drives locally. \par \par #CAD s/p CABG, PCI\par -Continue ASA, statin, BB as above\par -Follows closely with Dr. Curtis\par \par #AF and CVA\par -AC: Coumadin\par -INR checks through Dr. Curtis's office\par \par \par #DM 2\par -Remains on insulin\par -Starting Jardiance as above PENDING\par -Follows with Dr. Talley\par

## 2023-02-10 NOTE — CARDIOLOGY SUMMARY
[de-identified] : \par 7/19/22: AF, Vpaced\par \par 2/16/22: Paced [de-identified] : \par 11/7/14: LVEF 42%, apical, inferior, mid septal infarction with minimal suzette-infarct ischemia [de-identified] : \par \par 4/8/22: LVEF 30%, LVIDd 6cm, eccentric LVH, RV not well visualized, LAE, mild-mod MR, mild-mod TR, mod pHTN, small pericardial effusion [de-identified] : \par 12/2020: Extraction of Guidant ICD and implantation of new [employer] BiV ICD\par \par 8/22/2018: S/p CardioMEMs implant\par \par 2007: Guidant BIV-ICD [de-identified] : \par 6/22/22: no reported episodes, RV 84% paced, LV 98% paced [de-identified] : \par 8/22/2018: RHC: RA 12 RV 62/6 (13) PA 62/24 (40) PCWP 20 PA sat 62% CO/CI 4.04/2.09\par \par 8/31/2015: PCI to ILANA to OM1 and LCx (SVG to OM1 occluded) [de-identified] : \par 2007: CABG x3 (LIMA-LAD, SVG to OM1, SVG to RPDA)

## 2023-02-10 NOTE — DISCUSSION/SUMMARY
[___ Month(s)] : in [unfilled] month(s) [With ___] : with [unfilled] [FreeTextEntry1] : \par This is a 71 y/o female with multiple comorbidities, most notably HFrEF (LVEF 30%), ICMP, CAD s/p CABG and PCI, s/p BiV ICD, AF, CVA with hemorrhage s/p craniotomy, HTN, HLD, DMII. She recently presented to the ED with chest discomfort/ADHF requiring IV diuresis and was sent home. She is ACC/AHA Stage C, NYHA Class II-III symptoms. She is close to euvolemia, warm extremities. We will continue GDMT. \par Plan as above.

## 2023-02-10 NOTE — HISTORY OF PRESENT ILLNESS
[FreeTextEntry1] : \par This is a 73 y/o female with chronic systolic HF ACC/AHA stage C,  ICMP LVEF 30-35%, CAD s/p CABG () and PCI (ILANA to LCx, OM1 in ), [place] s/p BiV ICD (, since upgraded in  due to inappropriate ICD discharge for AF), atrial fibrillation on coumadin, CardioMEMs implant (2018-not following due to malfunction?), HTN, HLD, DM 2, CVA c/b hemorrhage () requiring craniotomy for intracranial decompression and removal of thrombus and CKD 3 who was referred for HF management. \par \par Her cardiac disease dates back to  when she underwent CABG for multivessel cAD. She's had serial TTEs at least since  that demonstrate severely reduced systolic HF with EF ~30%. She has been following with dr. Nicole since then. She underwent PCI to native coronaries LCx/Om1 in 2018. She has been on some GDMT for many years now and was doing well until the past few months. She reports worsening symptoms in the past 6-12 months. She feels more fatigue and tired. Associated presents intermittent LE edema (L>R since CABG). Her diuretics have been uptitrated with very minimal improvement. She used to follow at [facility] with Dr. Estela Mills. Unfortunately, she was never able to use cardioMEMs. It seems there was a reception issue in part due to her inability to lay flat. \par \par She went to [facility] ED (22) with atypical chest discomfort. Her pro BNP at that time as 3274. She was given IV diuresis and sent home. Today she reports that she has progressively gotten more fatigued. She endorses occasional SOB/RACHEL, cough, and intermittent LE swelling. She uses 2-3 pillows and is unable to lay flat which is chronic due to hx of brain surgery. She adheres to her medication regimen and tries to maintain a low sodium diet and fluid restriction.\par \par She is a former smoker and denies ETOH/illicit drug use. Her father had heart disease, her mother  over 20 years ago and she is unsure if she had heart disease.\par \par She COMES FOR ROUTINE FOLLOW UP. sHE reports no overt heart failure symptoms, specifically denies orthopnea, PND, bendopnea, LE edema, chest discomfort, dizziness/lightheadedness, palpitations or syncope. Patient denies any recent ICD shocks. No recent hospitalizations or visits to the ED.\par \par

## 2023-02-13 ENCOUNTER — APPOINTMENT (OUTPATIENT)
Dept: OTOLARYNGOLOGY | Facility: CLINIC | Age: 73
End: 2023-02-13
Payer: MEDICARE

## 2023-02-13 VITALS
BODY MASS INDEX: 33.8 KG/M2 | SYSTOLIC BLOOD PRESSURE: 105 MMHG | DIASTOLIC BLOOD PRESSURE: 63 MMHG | WEIGHT: 198 LBS | HEIGHT: 64 IN

## 2023-02-13 DIAGNOSIS — H90.3 SENSORINEURAL HEARING LOSS, BILATERAL: ICD-10-CM

## 2023-02-13 PROCEDURE — 99214 OFFICE O/P EST MOD 30 MIN: CPT

## 2023-02-13 NOTE — PHYSICAL EXAM
[de-identified] : dry mucosa [Normal] : mucosa is normal [Midline] : trachea located in midline position

## 2023-02-13 NOTE — REVIEW OF SYSTEMS
[Seasonal Allergies] : seasonal allergies [Post Nasal Drip] : post nasal drip [As Noted in HPI] : as noted in HPI [Ear Itch] : ear itch [Hearing Loss] : hearing loss [Dizziness] : dizziness [Vertigo] : vertigo [Throat Clearing] : throat clearing [Throat Dryness] : throat dryness [Throat Itching] : throat itching [Negative] : Heme/Lymph [FreeTextEntry1] : fatigue,daytime sleepiness

## 2023-02-13 NOTE — HISTORY OF PRESENT ILLNESS
[de-identified] : 72 yr old female aware of hearing loss since the use of masks, worse over the past few weeks.\par denies recent URI\par -tinnitus\par +dizzy since CVA 2012 tx w evacuation of clot \par -hx otitis, noise exp\par +FH father and brother have unilat HL\par \par \par comes in today for results of the ABR

## 2023-02-13 NOTE — ASSESSMENT
[FreeTextEntry1] : discussed further eval of asymmetry and abnl ABR with CT head.  Can't have MRI due to PPM and defibirillator\par prefers to repeat audio in 3-4 months and asymmetry and abnormal ABR likely related to brain bleed\par HAE is scheduled to tomorrow

## 2023-02-16 ENCOUNTER — APPOINTMENT (OUTPATIENT)
Dept: PHARMACY | Facility: CLINIC | Age: 73
End: 2023-02-16
Payer: SELF-PAY

## 2023-02-16 PROCEDURE — V5010 ASSESSMENT FOR HEARING AID: CPT | Mod: NC

## 2023-02-17 ENCOUNTER — APPOINTMENT (OUTPATIENT)
Dept: CARDIOLOGY | Facility: CLINIC | Age: 73
End: 2023-02-17
Payer: MEDICARE

## 2023-02-17 LAB
INR PPP: 2.7 RATIO
QUALITY CONTROL: YES

## 2023-02-17 PROCEDURE — 93793 ANTICOAG MGMT PT WARFARIN: CPT

## 2023-02-17 PROCEDURE — 85610 PROTHROMBIN TIME: CPT | Mod: QW

## 2023-03-09 ENCOUNTER — APPOINTMENT (OUTPATIENT)
Dept: CARDIOLOGY | Facility: CLINIC | Age: 73
End: 2023-03-09
Payer: MEDICARE

## 2023-03-09 LAB
INR PPP: 3.3 RATIO
QUALITY CONTROL: YES

## 2023-03-09 PROCEDURE — 85610 PROTHROMBIN TIME: CPT | Mod: QW

## 2023-03-09 PROCEDURE — 93793 ANTICOAG MGMT PT WARFARIN: CPT

## 2023-03-21 ENCOUNTER — APPOINTMENT (OUTPATIENT)
Dept: CARDIOLOGY | Facility: CLINIC | Age: 73
End: 2023-03-21
Payer: MEDICARE

## 2023-03-21 LAB
INR PPP: 2.7 RATIO
QUALITY CONTROL: YES

## 2023-03-21 PROCEDURE — 85610 PROTHROMBIN TIME: CPT | Mod: QW

## 2023-03-21 PROCEDURE — 93793 ANTICOAG MGMT PT WARFARIN: CPT

## 2023-03-22 ENCOUNTER — NON-APPOINTMENT (OUTPATIENT)
Age: 73
End: 2023-03-22

## 2023-03-22 ENCOUNTER — APPOINTMENT (OUTPATIENT)
Dept: CARDIOLOGY | Facility: CLINIC | Age: 73
End: 2023-03-22
Payer: MEDICARE

## 2023-03-22 PROCEDURE — 93295 DEV INTERROG REMOTE 1/2/MLT: CPT

## 2023-03-22 PROCEDURE — 93296 REM INTERROG EVL PM/IDS: CPT

## 2023-03-29 ENCOUNTER — NON-APPOINTMENT (OUTPATIENT)
Age: 73
End: 2023-03-29

## 2023-03-29 ENCOUNTER — APPOINTMENT (OUTPATIENT)
Dept: CARDIOLOGY | Facility: CLINIC | Age: 73
End: 2023-03-29
Payer: MEDICARE

## 2023-03-29 VITALS
SYSTOLIC BLOOD PRESSURE: 99 MMHG | DIASTOLIC BLOOD PRESSURE: 64 MMHG | BODY MASS INDEX: 33.29 KG/M2 | HEIGHT: 64 IN | OXYGEN SATURATION: 96 % | WEIGHT: 195 LBS | HEART RATE: 89 BPM

## 2023-03-29 PROCEDURE — 99214 OFFICE O/P EST MOD 30 MIN: CPT

## 2023-03-29 PROCEDURE — 93000 ELECTROCARDIOGRAM COMPLETE: CPT

## 2023-03-29 NOTE — PHYSICAL EXAM

## 2023-03-29 NOTE — DISCUSSION/SUMMARY
[FreeTextEntry1] : From a volume standpoint, Lisa has been doing better.  She will remain on her current dose of torsemide (while monitoring her weights), along with Entresto, Coreg and spironolactone.  Her main complaint today is fatigue, which seems to be secondary to her stress and insomnia.\par \par She will remain on her current medication regimen at this time.  She will remain on Coumadin for anticoagulation. She is following with the heart failure team in our office as well.\par \par She is willing to try physical rehab; I recommended cardiac rehab, though there is not one close enough to her house.  I have ordered a full set of BW here.\par \par  If no issues, I will see her again in 3 months.\par \par  [EKG obtained to assist in diagnosis and management of assessed problem(s)] : EKG obtained to assist in diagnosis and management of assessed problem(s)

## 2023-03-29 NOTE — HISTORY OF PRESENT ILLNESS
[FreeTextEntry1] : Ms. Barrios returns for evaluation of her cardiomyopathy and coronary artery disease. \par \par Past medical history is remarkable for myocardial infarction, coronary artery disease status post coronary artery bypass surgery in 2007, history of inducible sustained ventricular tachycardia leading to placement of a biventricular/ICD (Guidant), history of ischemic cardiomyopathy, history of diabetes, history of hyperlipidemia,history of hyperthyroidism, history of proliferative diabetic retinopathy, and history of stroke with hemorrhage in 2010 at which time she had a long complicated course requiring craniotomy for intracranial decompression and removal of thrombus. In August of 2010, she had several ICD discharges felt to be due to rapid atrial fibrillation.\par \par I last saw her in 1/23.\par She has had several episodes of atypical chest pain and underwent cardiac catheterization 2018 leading  to percutaneous intervention of the left circumflex artery and the first obtuse marginal artery with drug eluting stents. \par A year later, she presented with decompensated heart failure has had adjustment of her medication. She had a Cardiomems placed but has not been able to get ongoing readings given her inability to lie flat from her prior stroke and brain surgery. She is now s/p extraction of her desmond lead and ICD generator replacement in 2020.\par \par In 7/12/22, she was seen in the emergency department last week for mild decompensation of heart failure.  Her diuretic has been increased to daily dosing.  Her BNP at this time was 3274\par \par She has been doing well lately. She has been unable to start Jardiance because of a high co-pay.  She continues to report some fatigue, and occasional dyspnea, cough, and lower extremity swelling, though everything is at baseline.  She uses pillows to sleep at night.\par She is now taking torsemide 60 mg po daily. She took metolazone 1x since I saw her last. \par \par Her last echo performed in this office was in 4/2022, and demonstrated mild to moderate MR, a severely dilated left atrium, eccentric LVH, moderate to severe global left ventricular systolic dysfunction, and borderline pulmonary pressures.\par

## 2023-03-30 ENCOUNTER — APPOINTMENT (OUTPATIENT)
Dept: PHARMACY | Facility: CLINIC | Age: 73
End: 2023-03-30

## 2023-04-06 ENCOUNTER — APPOINTMENT (OUTPATIENT)
Dept: CARDIOLOGY | Facility: CLINIC | Age: 73
End: 2023-04-06
Payer: MEDICARE

## 2023-04-06 LAB
INR PPP: 1.9 RATIO
QUALITY CONTROL: YES

## 2023-04-06 PROCEDURE — 93793 ANTICOAG MGMT PT WARFARIN: CPT

## 2023-04-06 PROCEDURE — 85610 PROTHROMBIN TIME: CPT | Mod: QW

## 2023-04-25 ENCOUNTER — APPOINTMENT (OUTPATIENT)
Dept: CARDIOLOGY | Facility: CLINIC | Age: 73
End: 2023-04-25
Payer: MEDICARE

## 2023-04-25 VITALS — SYSTOLIC BLOOD PRESSURE: 108 MMHG | DIASTOLIC BLOOD PRESSURE: 68 MMHG

## 2023-04-25 PROCEDURE — 93793 ANTICOAG MGMT PT WARFARIN: CPT

## 2023-04-25 PROCEDURE — 85610 PROTHROMBIN TIME: CPT | Mod: QW

## 2023-04-26 LAB
INR PPP: 2.6 RATIO
QUALITY CONTROL: YES

## 2023-05-09 ENCOUNTER — APPOINTMENT (OUTPATIENT)
Dept: CARDIOLOGY | Facility: CLINIC | Age: 73
End: 2023-05-09
Payer: MEDICARE

## 2023-05-09 LAB
INR PPP: 2 RATIO
QUALITY CONTROL: YES

## 2023-05-09 PROCEDURE — 85610 PROTHROMBIN TIME: CPT | Mod: QW

## 2023-05-09 PROCEDURE — 93793 ANTICOAG MGMT PT WARFARIN: CPT

## 2023-05-30 ENCOUNTER — APPOINTMENT (OUTPATIENT)
Dept: CARDIOLOGY | Facility: CLINIC | Age: 73
End: 2023-05-30
Payer: MEDICARE

## 2023-05-30 LAB
INR PPP: 2 RATIO
QUALITY CONTROL: YES

## 2023-05-30 PROCEDURE — 85610 PROTHROMBIN TIME: CPT | Mod: QW

## 2023-05-30 PROCEDURE — 93793 ANTICOAG MGMT PT WARFARIN: CPT

## 2023-06-13 ENCOUNTER — APPOINTMENT (OUTPATIENT)
Dept: CARDIOLOGY | Facility: CLINIC | Age: 73
End: 2023-06-13
Payer: MEDICARE

## 2023-06-13 LAB
INR PPP: 3 RATIO
QUALITY CONTROL: YES

## 2023-06-13 PROCEDURE — 93793 ANTICOAG MGMT PT WARFARIN: CPT

## 2023-06-13 PROCEDURE — 85610 PROTHROMBIN TIME: CPT | Mod: QW

## 2023-06-21 ENCOUNTER — NON-APPOINTMENT (OUTPATIENT)
Age: 73
End: 2023-06-21

## 2023-06-21 ENCOUNTER — APPOINTMENT (OUTPATIENT)
Dept: CARDIOLOGY | Facility: CLINIC | Age: 73
End: 2023-06-21
Payer: MEDICARE

## 2023-06-21 PROCEDURE — 93295 DEV INTERROG REMOTE 1/2/MLT: CPT

## 2023-06-21 PROCEDURE — 93296 REM INTERROG EVL PM/IDS: CPT

## 2023-06-28 ENCOUNTER — NON-APPOINTMENT (OUTPATIENT)
Age: 73
End: 2023-06-28

## 2023-06-29 ENCOUNTER — APPOINTMENT (OUTPATIENT)
Dept: CARDIOLOGY | Facility: CLINIC | Age: 73
End: 2023-06-29
Payer: MEDICARE

## 2023-06-29 LAB
INR PPP: 2.6 RATIO
QUALITY CONTROL: YES

## 2023-06-29 PROCEDURE — 85610 PROTHROMBIN TIME: CPT | Mod: QW

## 2023-06-29 PROCEDURE — 93793 ANTICOAG MGMT PT WARFARIN: CPT

## 2023-07-13 ENCOUNTER — APPOINTMENT (OUTPATIENT)
Dept: CARDIOLOGY | Facility: CLINIC | Age: 73
End: 2023-07-13
Payer: MEDICARE

## 2023-07-13 LAB
INR PPP: 2.8 RATIO
QUALITY CONTROL: YES

## 2023-07-13 PROCEDURE — 85610 PROTHROMBIN TIME: CPT | Mod: QW

## 2023-07-13 PROCEDURE — 93793 ANTICOAG MGMT PT WARFARIN: CPT

## 2023-08-01 ENCOUNTER — APPOINTMENT (OUTPATIENT)
Dept: CARDIOLOGY | Facility: CLINIC | Age: 73
End: 2023-08-01
Payer: MEDICARE

## 2023-08-01 VITALS — HEIGHT: 64 IN | RESPIRATION RATE: 16 BRPM

## 2023-08-01 LAB
INR PPP: 1.9 RATIO
QUALITY CONTROL: YES

## 2023-08-01 PROCEDURE — 93793 ANTICOAG MGMT PT WARFARIN: CPT

## 2023-08-01 PROCEDURE — 85610 PROTHROMBIN TIME: CPT | Mod: QW

## 2023-08-07 ENCOUNTER — APPOINTMENT (OUTPATIENT)
Dept: HEART FAILURE | Facility: CLINIC | Age: 73
End: 2023-08-07
Payer: MEDICARE

## 2023-08-07 ENCOUNTER — APPOINTMENT (OUTPATIENT)
Dept: CARDIOLOGY | Facility: CLINIC | Age: 73
End: 2023-08-07
Payer: MEDICARE

## 2023-08-07 VITALS — OXYGEN SATURATION: 97 % | BODY MASS INDEX: 34.49 KG/M2 | HEIGHT: 64 IN | WEIGHT: 202 LBS | HEART RATE: 76 BPM

## 2023-08-07 DIAGNOSIS — I25.5 ISCHEMIC CARDIOMYOPATHY: ICD-10-CM

## 2023-08-07 DIAGNOSIS — I50.20 UNSPECIFIED SYSTOLIC (CONGESTIVE) HEART FAILURE: ICD-10-CM

## 2023-08-07 DIAGNOSIS — I10 ESSENTIAL (PRIMARY) HYPERTENSION: ICD-10-CM

## 2023-08-07 DIAGNOSIS — I25.10 ATHEROSCLEROTIC HEART DISEASE OF NATIVE CORONARY ARTERY W/OUT ANGINA PECTORIS: ICD-10-CM

## 2023-08-07 DIAGNOSIS — I48.91 UNSPECIFIED ATRIAL FIBRILLATION: ICD-10-CM

## 2023-08-07 LAB
INR PPP: 2.1 RATIO
QUALITY CONTROL: YES

## 2023-08-07 PROCEDURE — 99213 OFFICE O/P EST LOW 20 MIN: CPT

## 2023-08-07 PROCEDURE — 85610 PROTHROMBIN TIME: CPT | Mod: QW

## 2023-08-07 NOTE — PHYSICAL EXAM
[Normal] : soft, non-tender, no masses/organomegaly, normal bowel sounds [No Edema] : no edema [No Rash] : no rash [Alert and Oriented] : alert and oriented [de-identified] : Manual BP 94/60 [de-identified] : +1 b/l LE edema (RLE>LLE)

## 2023-08-07 NOTE — ASSESSMENT
[FreeTextEntry1] : #Chronic systolic HF ACC/AHA Stage C, NYHA Class II-III ICMP LVEF 33% LVIDd 6cm -She appears mildly hypervolemic, warm extremities. -GDMT: -Continue Entresto 24-26 mg BID, Carvedilol 6.25 mg BID, digoxin 125 mcg every other day and Spironolactone 25mg daily              -Unable to obtain Jardiance/Farxiga due to high copay.  -Diuretics: Continue Torsemide 60mg daily. Advised to take Metolazone 2.5mg tomorrow and then PRN -Device: s/p BiV ICD, follows with EP Dr. Cedeno  -Has [employer] device but unable to use (has had issues with transmission). She is not interested in re-calibration or re-implantation at this time -HF education provided including lifestyle changes (low Na diet, fluid restriction, increase physical activity), current clinical condition, natural progression and prognosis -Pt is interested in cardiac rehab but requesting to go close to home in [facility]. No center close enough, she states she only drives locally.  - Will check BMP/pBNP prior to next visit   #CAD s/p CABG, PCI -Continue ASA, statin, BB as above -Follows closely with Dr. Curtis  #AF and CVA -AC: Coumadin -INR checks through Dr. Curtis's office   #DM 2 -Remains on insulin -Follows with Dr. Dumont

## 2023-08-07 NOTE — DISCUSSION/SUMMARY
[___ Month(s)] : in [unfilled] month(s) [With ___] : with [unfilled] [FreeTextEntry1] : This is a 74 y/o female with multiple comorbidities, most notably HFrEF (LVEF 30%), ICMP, CAD s/p CABG and PCI, s/p BiV ICD, AF, CVA with hemorrhage s/p craniotomy, HTN, HLD, DMII. She had a prior ED visit with chest discomfort/ADHF requiring IV diuresis and was sent home. She is ACC/AHA Stage C, NYHA Class II-III symptoms. Today, she appears mildly hypervolemic, warm extremities. She will take metolazone tomorrow and then PRN. We will continue GDMT.  Plan as above.

## 2023-08-07 NOTE — CARDIOLOGY SUMMARY
[de-identified] : \par  7/19/22: AF, Vpaced\par  \par  2/16/22: Paced [de-identified] : \par  11/7/14: LVEF 42%, apical, inferior, mid septal infarction with minimal suzette-infarct ischemia [de-identified] : \par  \par  4/8/22: LVEF 30%, LVIDd 6cm, eccentric LVH, RV not well visualized, LAE, mild-mod MR, mild-mod TR, mod pHTN, small pericardial effusion [de-identified] : \par  12/2020: Extraction of Guidant ICD and implantation of new [employer] BiV ICD\par  \par  8/22/2018: S/p CardioMEMs implant\par  \par  2007: Guidant BIV-ICD [de-identified] : \par  6/22/22: no reported episodes, RV 84% paced, LV 98% paced [de-identified] : \par  8/22/2018: RHC: RA 12 RV 62/6 (13) PA 62/24 (40) PCWP 20 PA sat 62% CO/CI 4.04/2.09\par  \par  8/31/2015: PCI to ILANA to OM1 and LCx (SVG to OM1 occluded) [de-identified] : \par  2007: CABG x3 (LIMA-LAD, SVG to OM1, SVG to RPDA)

## 2023-08-07 NOTE — HISTORY OF PRESENT ILLNESS
[FreeTextEntry1] : This is a 72 y/o female with chronic systolic HF ACC/AHA stage C,  ICMP LVEF 30-35%, CAD s/p CABG () and PCI (ILANA to LCx, OM1 in ), [place] s/p BiV ICD (, since upgraded in  due to inappropriate ICD discharge for AF), atrial fibrillation on coumadin, CardioMEMs implant (2018-not following due to malfunction?), HTN, HLD, DM 2, CVA c/b hemorrhage () requiring craniotomy for intracranial decompression and removal of thrombus and CKD 3 who was referred for HF management.   Her cardiac disease dates back to  when she underwent CABG for multivessel cAD. She's had serial TTEs at least since  that demonstrate severely reduced systolic HF with EF ~30%. She has been following with dr. Nicole since then. She underwent PCI to native coronaries LCx/Om1 in 2018. She has been on some GDMT for many years now and was doing well until the past few months. She reports worsening symptoms in the past 6-12 months. She feels more fatigue and tired. Associated presents intermittent LE edema (L>R since CABG). Her diuretics have been uptitrated with very minimal improvement. She used to follow at [facility] with Dr. Estela Mills. Unfortunately, she was never able to use cardioMEMs. It seems there was a reception issue in part due to her inability to lay flat.   She went to [facility] ED (22) with atypical chest discomfort. Her pro BNP at that time as 3274. She was given IV diuresis and sent home. Today she reports that she has progressively gotten more fatigued. She endorses occasional SOB/RACHEL, cough, and intermittent LE swelling. She uses 2-3 pillows and is unable to lay flat which is chronic due to hx of brain surgery. She adheres to her medication regimen and tries to maintain a low sodium diet and fluid restriction.  She is a former smoker and denies ETOH/illicit drug use. Her father had heart disease, her mother  over 20 years ago and she is unsure if she had heart disease.  Today presents for routine follow up. She reports she feels well except when she needs to walk long distances or perform moderate intensity activities. She takes metolazone PRN, average once monthly. She denies orthopnea, PND, bendopnea, LE edema, chest discomfort, dizziness/lightheadedness, palpitations or syncope. Patient denies any recent ICD shocks. No recent hospitalizations or visits to the ED. She recently moved to an Assisted Living Facility. She is not cooking more, she eats the meals provided. She is not sure about the amount of sodium in these meals.

## 2023-08-21 ENCOUNTER — APPOINTMENT (OUTPATIENT)
Dept: CARDIOLOGY | Facility: CLINIC | Age: 73
End: 2023-08-21
Payer: MEDICARE

## 2023-08-21 ENCOUNTER — NON-APPOINTMENT (OUTPATIENT)
Age: 73
End: 2023-08-21

## 2023-08-21 VITALS
WEIGHT: 200 LBS | OXYGEN SATURATION: 95 % | SYSTOLIC BLOOD PRESSURE: 81 MMHG | DIASTOLIC BLOOD PRESSURE: 53 MMHG | HEART RATE: 76 BPM | BODY MASS INDEX: 34.15 KG/M2 | HEIGHT: 64 IN

## 2023-08-21 VITALS — SYSTOLIC BLOOD PRESSURE: 92 MMHG | DIASTOLIC BLOOD PRESSURE: 62 MMHG

## 2023-08-21 PROCEDURE — 85610 PROTHROMBIN TIME: CPT | Mod: QW

## 2023-08-21 PROCEDURE — 99214 OFFICE O/P EST MOD 30 MIN: CPT

## 2023-08-21 PROCEDURE — 93000 ELECTROCARDIOGRAM COMPLETE: CPT

## 2023-08-21 NOTE — HISTORY OF PRESENT ILLNESS
[FreeTextEntry1] : Ms. Barrios returns for evaluation of her cardiomyopathy and coronary artery disease.   Past medical history is remarkable for myocardial infarction, coronary artery disease status post coronary artery bypass surgery in 2007, history of inducible sustained ventricular tachycardia leading to placement of a biventricular/ICD (Guidant), history of ischemic cardiomyopathy, history of diabetes, history of hyperlipidemia,history of hyperthyroidism, history of proliferative diabetic retinopathy, and history of stroke with hemorrhage in 2010 at which time she had a long complicated course requiring craniotomy for intracranial decompression and removal of thrombus. In August of 2010, she had several ICD discharges felt to be due to rapid atrial fibrillation.  I last saw her in 3/23. She has had several episodes of atypical chest pain and underwent cardiac catheterization 2018 leading  to percutaneous intervention of the left circumflex artery and the first obtuse marginal artery with drug eluting stents.  A year later, she presented with decompensated heart failure has had adjustment of her medication. She had a Cardiomems placed but has not been able to get ongoing readings given her inability to lie flat from her prior stroke and brain surgery. She is now s/p extraction of her desmond lead and ICD generator replacement in 2020.  In 7/12/22, she was seen in the emergency department last week for mild decompensation of heart failure.  Her diuretic has been increased to daily dosing.  Her BNP at this time was 3274  She has been doing well lately. She continues to report some fatigue, and occasional dyspnea, cough, and lower extremity swelling. Her edema was worse at the appt with Dr. Main, and she took metolazone x 2 in the last 2 weeks. She is now taking torsemide 60 mg po daily.   Her last echo performed in this office was in 4/2022, and demonstrated mild to moderate MR, a severely dilated left atrium, eccentric LVH, moderate to severe global left ventricular systolic dysfunction, and borderline pulmonary pressures.

## 2023-08-21 NOTE — DISCUSSION/SUMMARY
[___ Month(s)] : in [unfilled] month(s) [FreeTextEntry1] : From a volume standpoint, Lisa has been doing ok. She reports intermittent worsening LE edema, that improves with metolazone. She will keep an eye on her weights and will use it on an as needed basis. She will remain on her current dose of torsemide, along with Entresto, Coreg and spironolactone. She is ventricular paced, with underlying AF. She will remain on Digoxin. I am sending a full set of BW and we will repeat an echocardiogram.  She will remain on her current medication regimen at this time.  She will remain on Coumadin for anticoagulation. She is following with the heart failure team in our office as well.  She is willing to try physical rehab; I recommended cardiac rehab, though there is not one close enough to her house.   If no issues, I will see her again in 3 months. [EKG obtained to assist in diagnosis and management of assessed problem(s)] : EKG obtained to assist in diagnosis and management of assessed problem(s)

## 2023-08-21 NOTE — PHYSICAL EXAM

## 2023-08-22 LAB
INR PPP: 2.1 RATIO
QUALITY CONTROL: YES

## 2023-09-07 ENCOUNTER — APPOINTMENT (OUTPATIENT)
Dept: CARDIOLOGY | Facility: CLINIC | Age: 73
End: 2023-09-07
Payer: MEDICARE

## 2023-09-07 LAB
ALBUMIN SERPL ELPH-MCNC: 4.2 G/DL
ALP BLD-CCNC: 69 U/L
ALT SERPL-CCNC: 12 U/L
ANION GAP SERPL CALC-SCNC: 15 MMOL/L
AST SERPL-CCNC: 18 U/L
BILIRUB SERPL-MCNC: 0.6 MG/DL
BUN SERPL-MCNC: 51 MG/DL
CALCIUM SERPL-MCNC: 9.2 MG/DL
CHLORIDE SERPL-SCNC: 99 MMOL/L
CO2 SERPL-SCNC: 26 MMOL/L
CREAT SERPL-MCNC: 1.55 MG/DL
EGFR: 35 ML/MIN/1.73M2
GLUCOSE SERPL-MCNC: 121 MG/DL
NT-PROBNP SERPL-MCNC: 1044 PG/ML
POTASSIUM SERPL-SCNC: 4.2 MMOL/L
PROT SERPL-MCNC: 6.9 G/DL
SODIUM SERPL-SCNC: 141 MMOL/L

## 2023-09-07 PROCEDURE — 93793 ANTICOAG MGMT PT WARFARIN: CPT

## 2023-09-07 PROCEDURE — 85610 PROTHROMBIN TIME: CPT | Mod: QW

## 2023-09-08 LAB
DIGOXIN SERPL-MCNC: 0.3 NG/ML
INR PPP: 2.3 RATIO
QUALITY CONTROL: YES

## 2023-09-20 ENCOUNTER — APPOINTMENT (OUTPATIENT)
Dept: CARDIOLOGY | Facility: CLINIC | Age: 73
End: 2023-09-20
Payer: MEDICARE

## 2023-09-20 PROCEDURE — 93296 REM INTERROG EVL PM/IDS: CPT

## 2023-09-20 PROCEDURE — 93295 DEV INTERROG REMOTE 1/2/MLT: CPT

## 2023-09-21 ENCOUNTER — APPOINTMENT (OUTPATIENT)
Dept: CARDIOLOGY | Facility: CLINIC | Age: 73
End: 2023-09-21
Payer: MEDICARE

## 2023-09-21 LAB
INR PPP: 2 RATIO
QUALITY CONTROL: YES

## 2023-09-21 PROCEDURE — 85610 PROTHROMBIN TIME: CPT | Mod: QW

## 2023-09-21 PROCEDURE — 93793 ANTICOAG MGMT PT WARFARIN: CPT

## 2023-10-05 ENCOUNTER — APPOINTMENT (OUTPATIENT)
Dept: CARDIOLOGY | Facility: CLINIC | Age: 73
End: 2023-10-05
Payer: MEDICARE

## 2023-10-05 LAB
INR PPP: 2.7 RATIO
QUALITY CONTROL: YES

## 2023-10-05 PROCEDURE — 85610 PROTHROMBIN TIME: CPT | Mod: QW

## 2023-10-05 PROCEDURE — 93793 ANTICOAG MGMT PT WARFARIN: CPT

## 2023-10-26 ENCOUNTER — APPOINTMENT (OUTPATIENT)
Dept: CARDIOLOGY | Facility: CLINIC | Age: 73
End: 2023-10-26
Payer: MEDICARE

## 2023-10-26 PROCEDURE — 93306 TTE W/DOPPLER COMPLETE: CPT

## 2023-10-26 PROCEDURE — G0250: CPT

## 2023-10-26 PROCEDURE — 85610 PROTHROMBIN TIME: CPT | Mod: QW

## 2023-10-27 LAB
INR PPP: 2.4 RATIO
QUALITY CONTROL: YES

## 2023-11-01 RX ORDER — SACUBITRIL AND VALSARTAN 24; 26 MG/1; MG/1
24-26 TABLET, FILM COATED ORAL
Qty: 180 | Refills: 3 | Status: ACTIVE | COMMUNITY
Start: 2020-05-07 | End: 1900-01-01

## 2023-11-01 RX ORDER — TORSEMIDE 20 MG/1
20 TABLET ORAL DAILY
Qty: 270 | Refills: 3 | Status: ACTIVE | COMMUNITY
Start: 1900-01-01 | End: 1900-01-01

## 2023-11-09 ENCOUNTER — APPOINTMENT (OUTPATIENT)
Dept: CARDIOLOGY | Facility: CLINIC | Age: 73
End: 2023-11-09
Payer: MEDICARE

## 2023-11-09 VITALS — DIASTOLIC BLOOD PRESSURE: 56 MMHG | SYSTOLIC BLOOD PRESSURE: 98 MMHG | HEART RATE: 76 BPM

## 2023-11-09 VITALS — OXYGEN SATURATION: 95 %

## 2023-11-09 LAB
INR PPP: 2.6 RATIO
QUALITY CONTROL: YES

## 2023-11-09 PROCEDURE — 85610 PROTHROMBIN TIME: CPT | Mod: QW

## 2023-11-09 PROCEDURE — 93793 ANTICOAG MGMT PT WARFARIN: CPT

## 2023-11-15 ENCOUNTER — RX RENEWAL (OUTPATIENT)
Age: 73
End: 2023-11-15

## 2023-11-29 ENCOUNTER — APPOINTMENT (OUTPATIENT)
Dept: CARDIOLOGY | Facility: CLINIC | Age: 73
End: 2023-11-29
Payer: MEDICARE

## 2023-11-29 ENCOUNTER — NON-APPOINTMENT (OUTPATIENT)
Age: 73
End: 2023-11-29

## 2023-11-29 VITALS
HEIGHT: 64 IN | DIASTOLIC BLOOD PRESSURE: 64 MMHG | HEART RATE: 71 BPM | OXYGEN SATURATION: 96 % | SYSTOLIC BLOOD PRESSURE: 109 MMHG | WEIGHT: 205 LBS | BODY MASS INDEX: 35 KG/M2

## 2023-11-29 LAB
INR PPP: 2.3 RATIO
QUALITY CONTROL: YES

## 2023-11-29 PROCEDURE — G0250: CPT | Mod: 59

## 2023-11-29 PROCEDURE — 99214 OFFICE O/P EST MOD 30 MIN: CPT

## 2023-11-29 PROCEDURE — 93000 ELECTROCARDIOGRAM COMPLETE: CPT

## 2023-12-04 ENCOUNTER — APPOINTMENT (OUTPATIENT)
Dept: HEART FAILURE | Facility: CLINIC | Age: 73
End: 2023-12-04

## 2023-12-07 ENCOUNTER — INPATIENT (INPATIENT)
Facility: HOSPITAL | Age: 73
LOS: 2 days | Discharge: HOME | DRG: 178 | End: 2023-12-10
Attending: INTERNAL MEDICINE | Admitting: INTERNAL MEDICINE
Payer: MEDICARE

## 2023-12-07 VITALS
SYSTOLIC BLOOD PRESSURE: 104 MMHG | DIASTOLIC BLOOD PRESSURE: 66 MMHG | OXYGEN SATURATION: 97 % | RESPIRATION RATE: 18 BRPM | HEART RATE: 84 BPM | TEMPERATURE: 99 F

## 2023-12-07 DIAGNOSIS — Z29.9 ENCOUNTER FOR PROPHYLACTIC MEASURES, UNSPECIFIED: ICD-10-CM

## 2023-12-07 DIAGNOSIS — U07.1 COVID-19: ICD-10-CM

## 2023-12-07 DIAGNOSIS — R79.89 OTHER SPECIFIED ABNORMAL FINDINGS OF BLOOD CHEMISTRY: ICD-10-CM

## 2023-12-07 DIAGNOSIS — I25.10 ATHEROSCLEROTIC HEART DISEASE OF NATIVE CORONARY ARTERY WITHOUT ANGINA PECTORIS: ICD-10-CM

## 2023-12-07 DIAGNOSIS — I50.9 HEART FAILURE, UNSPECIFIED: ICD-10-CM

## 2023-12-07 DIAGNOSIS — E11.9 TYPE 2 DIABETES MELLITUS WITHOUT COMPLICATIONS: ICD-10-CM

## 2023-12-07 DIAGNOSIS — D69.6 THROMBOCYTOPENIA, UNSPECIFIED: ICD-10-CM

## 2023-12-07 DIAGNOSIS — I10 ESSENTIAL (PRIMARY) HYPERTENSION: ICD-10-CM

## 2023-12-07 DIAGNOSIS — Z95.5 PRESENCE OF CORONARY ANGIOPLASTY IMPLANT AND GRAFT: Chronic | ICD-10-CM

## 2023-12-07 DIAGNOSIS — I48.20 CHRONIC ATRIAL FIBRILLATION, UNSPECIFIED: ICD-10-CM

## 2023-12-07 DIAGNOSIS — Z95.810 PRESENCE OF AUTOMATIC (IMPLANTABLE) CARDIAC DEFIBRILLATOR: Chronic | ICD-10-CM

## 2023-12-07 LAB
ALBUMIN SERPL ELPH-MCNC: 3.1 G/DL — LOW (ref 3.3–5)
ALBUMIN SERPL ELPH-MCNC: 3.1 G/DL — LOW (ref 3.3–5)
ALP SERPL-CCNC: 59 U/L — SIGNIFICANT CHANGE UP (ref 40–120)
ALP SERPL-CCNC: 59 U/L — SIGNIFICANT CHANGE UP (ref 40–120)
ALT FLD-CCNC: 17 U/L — SIGNIFICANT CHANGE UP (ref 12–78)
ALT FLD-CCNC: 17 U/L — SIGNIFICANT CHANGE UP (ref 12–78)
ANION GAP SERPL CALC-SCNC: 9 MMOL/L — SIGNIFICANT CHANGE UP (ref 5–17)
ANION GAP SERPL CALC-SCNC: 9 MMOL/L — SIGNIFICANT CHANGE UP (ref 5–17)
APTT BLD: 43 SEC — HIGH (ref 24.5–35.6)
APTT BLD: 43 SEC — HIGH (ref 24.5–35.6)
AST SERPL-CCNC: 27 U/L — SIGNIFICANT CHANGE UP (ref 15–37)
AST SERPL-CCNC: 27 U/L — SIGNIFICANT CHANGE UP (ref 15–37)
BASOPHILS # BLD AUTO: 0.01 K/UL — SIGNIFICANT CHANGE UP (ref 0–0.2)
BASOPHILS # BLD AUTO: 0.01 K/UL — SIGNIFICANT CHANGE UP (ref 0–0.2)
BASOPHILS NFR BLD AUTO: 0.2 % — SIGNIFICANT CHANGE UP (ref 0–2)
BASOPHILS NFR BLD AUTO: 0.2 % — SIGNIFICANT CHANGE UP (ref 0–2)
BILIRUB DIRECT SERPL-MCNC: 0.3 MG/DL — SIGNIFICANT CHANGE UP (ref 0–0.3)
BILIRUB DIRECT SERPL-MCNC: 0.3 MG/DL — SIGNIFICANT CHANGE UP (ref 0–0.3)
BILIRUB INDIRECT FLD-MCNC: 0.5 MG/DL — SIGNIFICANT CHANGE UP (ref 0.2–1)
BILIRUB INDIRECT FLD-MCNC: 0.5 MG/DL — SIGNIFICANT CHANGE UP (ref 0.2–1)
BILIRUB SERPL-MCNC: 0.8 MG/DL — SIGNIFICANT CHANGE UP (ref 0.2–1.2)
BILIRUB SERPL-MCNC: 0.8 MG/DL — SIGNIFICANT CHANGE UP (ref 0.2–1.2)
BUN SERPL-MCNC: 73 MG/DL — HIGH (ref 7–23)
BUN SERPL-MCNC: 73 MG/DL — HIGH (ref 7–23)
CALCIUM SERPL-MCNC: 7.9 MG/DL — LOW (ref 8.5–10.1)
CALCIUM SERPL-MCNC: 7.9 MG/DL — LOW (ref 8.5–10.1)
CHLORIDE SERPL-SCNC: 97 MMOL/L — SIGNIFICANT CHANGE UP (ref 96–108)
CHLORIDE SERPL-SCNC: 97 MMOL/L — SIGNIFICANT CHANGE UP (ref 96–108)
CK MB BLD-MCNC: 0.9 % — SIGNIFICANT CHANGE UP (ref 0–3.5)
CK MB BLD-MCNC: 0.9 % — SIGNIFICANT CHANGE UP (ref 0–3.5)
CK MB CFR SERPL CALC: 1.2 NG/ML — SIGNIFICANT CHANGE UP (ref 0–3.6)
CK MB CFR SERPL CALC: 1.2 NG/ML — SIGNIFICANT CHANGE UP (ref 0–3.6)
CK SERPL-CCNC: 129 U/L — SIGNIFICANT CHANGE UP (ref 26–192)
CK SERPL-CCNC: 129 U/L — SIGNIFICANT CHANGE UP (ref 26–192)
CO2 SERPL-SCNC: 31 MMOL/L — SIGNIFICANT CHANGE UP (ref 22–31)
CO2 SERPL-SCNC: 31 MMOL/L — SIGNIFICANT CHANGE UP (ref 22–31)
CREAT SERPL-MCNC: 1.5 MG/DL — HIGH (ref 0.5–1.3)
CREAT SERPL-MCNC: 1.5 MG/DL — HIGH (ref 0.5–1.3)
CREAT SERPL-MCNC: 1.7 MG/DL — HIGH (ref 0.5–1.3)
CREAT SERPL-MCNC: 1.7 MG/DL — HIGH (ref 0.5–1.3)
EGFR: 32 ML/MIN/1.73M2 — LOW
EGFR: 32 ML/MIN/1.73M2 — LOW
EGFR: 37 ML/MIN/1.73M2 — LOW
EGFR: 37 ML/MIN/1.73M2 — LOW
EOSINOPHIL # BLD AUTO: 0.01 K/UL — SIGNIFICANT CHANGE UP (ref 0–0.5)
EOSINOPHIL # BLD AUTO: 0.01 K/UL — SIGNIFICANT CHANGE UP (ref 0–0.5)
EOSINOPHIL NFR BLD AUTO: 0.2 % — SIGNIFICANT CHANGE UP (ref 0–6)
EOSINOPHIL NFR BLD AUTO: 0.2 % — SIGNIFICANT CHANGE UP (ref 0–6)
FLUAV AG NPH QL: SIGNIFICANT CHANGE UP
FLUAV AG NPH QL: SIGNIFICANT CHANGE UP
FLUBV AG NPH QL: SIGNIFICANT CHANGE UP
FLUBV AG NPH QL: SIGNIFICANT CHANGE UP
GLUCOSE SERPL-MCNC: 191 MG/DL — HIGH (ref 70–99)
GLUCOSE SERPL-MCNC: 191 MG/DL — HIGH (ref 70–99)
HCT VFR BLD CALC: 34.8 % — SIGNIFICANT CHANGE UP (ref 34.5–45)
HCT VFR BLD CALC: 34.8 % — SIGNIFICANT CHANGE UP (ref 34.5–45)
HGB BLD-MCNC: 11.2 G/DL — LOW (ref 11.5–15.5)
HGB BLD-MCNC: 11.2 G/DL — LOW (ref 11.5–15.5)
IMM GRANULOCYTES NFR BLD AUTO: 0.2 % — SIGNIFICANT CHANGE UP (ref 0–0.9)
IMM GRANULOCYTES NFR BLD AUTO: 0.2 % — SIGNIFICANT CHANGE UP (ref 0–0.9)
INR BLD: 2.14 RATIO — HIGH (ref 0.85–1.18)
INR BLD: 2.14 RATIO — HIGH (ref 0.85–1.18)
INR BLD: 2.23 RATIO — HIGH (ref 0.85–1.18)
INR BLD: 2.23 RATIO — HIGH (ref 0.85–1.18)
LYMPHOCYTES # BLD AUTO: 0.42 K/UL — LOW (ref 1–3.3)
LYMPHOCYTES # BLD AUTO: 0.42 K/UL — LOW (ref 1–3.3)
LYMPHOCYTES # BLD AUTO: 8.9 % — LOW (ref 13–44)
LYMPHOCYTES # BLD AUTO: 8.9 % — LOW (ref 13–44)
MAGNESIUM SERPL-MCNC: 2.6 MG/DL — SIGNIFICANT CHANGE UP (ref 1.6–2.6)
MAGNESIUM SERPL-MCNC: 2.6 MG/DL — SIGNIFICANT CHANGE UP (ref 1.6–2.6)
MCHC RBC-ENTMCNC: 28.7 PG — SIGNIFICANT CHANGE UP (ref 27–34)
MCHC RBC-ENTMCNC: 28.7 PG — SIGNIFICANT CHANGE UP (ref 27–34)
MCHC RBC-ENTMCNC: 32.2 GM/DL — SIGNIFICANT CHANGE UP (ref 32–36)
MCHC RBC-ENTMCNC: 32.2 GM/DL — SIGNIFICANT CHANGE UP (ref 32–36)
MCV RBC AUTO: 89.2 FL — SIGNIFICANT CHANGE UP (ref 80–100)
MCV RBC AUTO: 89.2 FL — SIGNIFICANT CHANGE UP (ref 80–100)
MONOCYTES # BLD AUTO: 0.64 K/UL — SIGNIFICANT CHANGE UP (ref 0–0.9)
MONOCYTES # BLD AUTO: 0.64 K/UL — SIGNIFICANT CHANGE UP (ref 0–0.9)
MONOCYTES NFR BLD AUTO: 13.5 % — SIGNIFICANT CHANGE UP (ref 2–14)
MONOCYTES NFR BLD AUTO: 13.5 % — SIGNIFICANT CHANGE UP (ref 2–14)
NEUTROPHILS # BLD AUTO: 3.64 K/UL — SIGNIFICANT CHANGE UP (ref 1.8–7.4)
NEUTROPHILS # BLD AUTO: 3.64 K/UL — SIGNIFICANT CHANGE UP (ref 1.8–7.4)
NEUTROPHILS NFR BLD AUTO: 77 % — SIGNIFICANT CHANGE UP (ref 43–77)
NEUTROPHILS NFR BLD AUTO: 77 % — SIGNIFICANT CHANGE UP (ref 43–77)
NRBC # BLD: 0 /100 WBCS — SIGNIFICANT CHANGE UP (ref 0–0)
NRBC # BLD: 0 /100 WBCS — SIGNIFICANT CHANGE UP (ref 0–0)
NT-PROBNP SERPL-SCNC: 2054 PG/ML — HIGH (ref 0–125)
NT-PROBNP SERPL-SCNC: 2054 PG/ML — HIGH (ref 0–125)
PLATELET # BLD AUTO: 65 K/UL — LOW (ref 150–400)
PLATELET # BLD AUTO: 65 K/UL — LOW (ref 150–400)
POTASSIUM SERPL-MCNC: 3.9 MMOL/L — SIGNIFICANT CHANGE UP (ref 3.5–5.3)
POTASSIUM SERPL-MCNC: 3.9 MMOL/L — SIGNIFICANT CHANGE UP (ref 3.5–5.3)
POTASSIUM SERPL-SCNC: 3.9 MMOL/L — SIGNIFICANT CHANGE UP (ref 3.5–5.3)
POTASSIUM SERPL-SCNC: 3.9 MMOL/L — SIGNIFICANT CHANGE UP (ref 3.5–5.3)
PROT SERPL-MCNC: 6.9 G/DL — SIGNIFICANT CHANGE UP (ref 6–8.3)
PROT SERPL-MCNC: 6.9 G/DL — SIGNIFICANT CHANGE UP (ref 6–8.3)
PROTHROM AB SERPL-ACNC: 24.5 SEC — HIGH (ref 9.5–13)
PROTHROM AB SERPL-ACNC: 24.5 SEC — HIGH (ref 9.5–13)
PROTHROM AB SERPL-ACNC: 25.5 SEC — HIGH (ref 9.5–13)
PROTHROM AB SERPL-ACNC: 25.5 SEC — HIGH (ref 9.5–13)
RBC # BLD: 3.9 M/UL — SIGNIFICANT CHANGE UP (ref 3.8–5.2)
RBC # BLD: 3.9 M/UL — SIGNIFICANT CHANGE UP (ref 3.8–5.2)
RBC # FLD: 14.9 % — HIGH (ref 10.3–14.5)
RBC # FLD: 14.9 % — HIGH (ref 10.3–14.5)
RSV RNA NPH QL NAA+NON-PROBE: SIGNIFICANT CHANGE UP
RSV RNA NPH QL NAA+NON-PROBE: SIGNIFICANT CHANGE UP
SARS-COV-2 RNA SPEC QL NAA+PROBE: DETECTED
SARS-COV-2 RNA SPEC QL NAA+PROBE: DETECTED
SODIUM SERPL-SCNC: 137 MMOL/L — SIGNIFICANT CHANGE UP (ref 135–145)
SODIUM SERPL-SCNC: 137 MMOL/L — SIGNIFICANT CHANGE UP (ref 135–145)
TROPONIN I, HIGH SENSITIVITY RESULT: 53.9 NG/L — HIGH
TROPONIN I, HIGH SENSITIVITY RESULT: 53.9 NG/L — HIGH
TROPONIN I, HIGH SENSITIVITY RESULT: 55.3 NG/L — HIGH
TROPONIN I, HIGH SENSITIVITY RESULT: 55.3 NG/L — HIGH
TROPONIN I, HIGH SENSITIVITY RESULT: 58.6 NG/L — HIGH
TROPONIN I, HIGH SENSITIVITY RESULT: 58.6 NG/L — HIGH
WBC # BLD: 4.73 K/UL — SIGNIFICANT CHANGE UP (ref 3.8–10.5)
WBC # BLD: 4.73 K/UL — SIGNIFICANT CHANGE UP (ref 3.8–10.5)
WBC # FLD AUTO: 4.73 K/UL — SIGNIFICANT CHANGE UP (ref 3.8–10.5)
WBC # FLD AUTO: 4.73 K/UL — SIGNIFICANT CHANGE UP (ref 3.8–10.5)

## 2023-12-07 PROCEDURE — 71250 CT THORAX DX C-: CPT | Mod: 26,MA

## 2023-12-07 PROCEDURE — 71045 X-RAY EXAM CHEST 1 VIEW: CPT | Mod: 26

## 2023-12-07 PROCEDURE — 93010 ELECTROCARDIOGRAM REPORT: CPT

## 2023-12-07 PROCEDURE — 99223 1ST HOSP IP/OBS HIGH 75: CPT | Mod: GC,AI

## 2023-12-07 PROCEDURE — 99285 EMERGENCY DEPT VISIT HI MDM: CPT

## 2023-12-07 RX ORDER — INSULIN LISPRO 100/ML
VIAL (ML) SUBCUTANEOUS
Refills: 0 | Status: DISCONTINUED | OUTPATIENT
Start: 2023-12-07 | End: 2023-12-10

## 2023-12-07 RX ORDER — SODIUM CHLORIDE 9 MG/ML
1000 INJECTION, SOLUTION INTRAVENOUS
Refills: 0 | Status: DISCONTINUED | OUTPATIENT
Start: 2023-12-07 | End: 2023-12-10

## 2023-12-07 RX ORDER — ACETAMINOPHEN 500 MG
650 TABLET ORAL EVERY 6 HOURS
Refills: 0 | Status: DISCONTINUED | OUTPATIENT
Start: 2023-12-07 | End: 2023-12-10

## 2023-12-07 RX ORDER — LANOLIN ALCOHOL/MO/W.PET/CERES
3 CREAM (GRAM) TOPICAL AT BEDTIME
Refills: 0 | Status: DISCONTINUED | OUTPATIENT
Start: 2023-12-07 | End: 2023-12-10

## 2023-12-07 RX ORDER — DEXTROSE 50 % IN WATER 50 %
25 SYRINGE (ML) INTRAVENOUS ONCE
Refills: 0 | Status: DISCONTINUED | OUTPATIENT
Start: 2023-12-07 | End: 2023-12-10

## 2023-12-07 RX ORDER — CARVEDILOL PHOSPHATE 80 MG/1
6.25 CAPSULE, EXTENDED RELEASE ORAL EVERY 12 HOURS
Refills: 0 | Status: DISCONTINUED | OUTPATIENT
Start: 2023-12-07 | End: 2023-12-10

## 2023-12-07 RX ORDER — INSULIN LISPRO 100/ML
VIAL (ML) SUBCUTANEOUS AT BEDTIME
Refills: 0 | Status: DISCONTINUED | OUTPATIENT
Start: 2023-12-07 | End: 2023-12-10

## 2023-12-07 RX ORDER — CHOLECALCIFEROL (VITAMIN D3) 125 MCG
2000 CAPSULE ORAL DAILY
Refills: 0 | Status: DISCONTINUED | OUTPATIENT
Start: 2023-12-07 | End: 2023-12-10

## 2023-12-07 RX ORDER — INSULIN GLARGINE 100 [IU]/ML
20 INJECTION, SOLUTION SUBCUTANEOUS EVERY MORNING
Refills: 0 | Status: DISCONTINUED | OUTPATIENT
Start: 2023-12-08 | End: 2023-12-10

## 2023-12-07 RX ORDER — SACUBITRIL AND VALSARTAN 24; 26 MG/1; MG/1
1 TABLET, FILM COATED ORAL
Qty: 0 | Refills: 0 | DISCHARGE

## 2023-12-07 RX ORDER — DIGOXIN 250 MCG
125 TABLET ORAL EVERY OTHER DAY
Refills: 0 | Status: DISCONTINUED | OUTPATIENT
Start: 2023-12-07 | End: 2023-12-10

## 2023-12-07 RX ORDER — CHOLECALCIFEROL (VITAMIN D3) 125 MCG
0 CAPSULE ORAL
Qty: 0 | Refills: 0 | DISCHARGE

## 2023-12-07 RX ORDER — METHIMAZOLE 10 MG/1
0.5 TABLET ORAL
Qty: 0 | Refills: 0 | DISCHARGE

## 2023-12-07 RX ORDER — DEXTROSE 50 % IN WATER 50 %
15 SYRINGE (ML) INTRAVENOUS ONCE
Refills: 0 | Status: DISCONTINUED | OUTPATIENT
Start: 2023-12-07 | End: 2023-12-10

## 2023-12-07 RX ORDER — REMDESIVIR 5 MG/ML
INJECTION INTRAVENOUS
Refills: 0 | Status: DISCONTINUED | OUTPATIENT
Start: 2023-12-07 | End: 2023-12-07

## 2023-12-07 RX ORDER — SACUBITRIL AND VALSARTAN 24; 26 MG/1; MG/1
1 TABLET, FILM COATED ORAL
Refills: 0 | Status: DISCONTINUED | OUTPATIENT
Start: 2023-12-07 | End: 2023-12-10

## 2023-12-07 RX ORDER — WARFARIN SODIUM 2.5 MG/1
0 TABLET ORAL
Qty: 0 | Refills: 0 | DISCHARGE

## 2023-12-07 RX ORDER — INSULIN LISPRO 100/ML
VIAL (ML) SUBCUTANEOUS
Refills: 0 | Status: DISCONTINUED | OUTPATIENT
Start: 2023-12-07 | End: 2023-12-07

## 2023-12-07 RX ORDER — WARFARIN SODIUM 2.5 MG/1
4 TABLET ORAL DAILY
Refills: 0 | Status: DISCONTINUED | OUTPATIENT
Start: 2023-12-07 | End: 2023-12-07

## 2023-12-07 RX ORDER — DIGOXIN 250 MCG
1 TABLET ORAL
Qty: 0 | Refills: 0 | DISCHARGE

## 2023-12-07 RX ORDER — GLUCAGON INJECTION, SOLUTION 0.5 MG/.1ML
1 INJECTION, SOLUTION SUBCUTANEOUS ONCE
Refills: 0 | Status: DISCONTINUED | OUTPATIENT
Start: 2023-12-07 | End: 2023-12-10

## 2023-12-07 RX ORDER — HEPARIN SODIUM 5000 [USP'U]/ML
5000 INJECTION INTRAVENOUS; SUBCUTANEOUS EVERY 8 HOURS
Refills: 0 | Status: DISCONTINUED | OUTPATIENT
Start: 2023-12-07 | End: 2023-12-07

## 2023-12-07 RX ORDER — REMDESIVIR 5 MG/ML
200 INJECTION INTRAVENOUS EVERY 24 HOURS
Refills: 0 | Status: DISCONTINUED | OUTPATIENT
Start: 2023-12-07 | End: 2023-12-07

## 2023-12-07 RX ORDER — DEXTROSE 50 % IN WATER 50 %
12.5 SYRINGE (ML) INTRAVENOUS ONCE
Refills: 0 | Status: DISCONTINUED | OUTPATIENT
Start: 2023-12-07 | End: 2023-12-10

## 2023-12-07 RX ORDER — SACUBITRIL AND VALSARTAN 24; 26 MG/1; MG/1
1 TABLET, FILM COATED ORAL ONCE
Refills: 0 | Status: COMPLETED | OUTPATIENT
Start: 2023-12-07 | End: 2023-12-07

## 2023-12-07 RX ORDER — ASPIRIN/CALCIUM CARB/MAGNESIUM 324 MG
81 TABLET ORAL DAILY
Refills: 0 | Status: DISCONTINUED | OUTPATIENT
Start: 2023-12-07 | End: 2023-12-10

## 2023-12-07 RX ORDER — INSULIN GLARGINE 100 [IU]/ML
22 INJECTION, SOLUTION SUBCUTANEOUS AT BEDTIME
Refills: 0 | Status: DISCONTINUED | OUTPATIENT
Start: 2023-12-07 | End: 2023-12-10

## 2023-12-07 RX ORDER — SIMVASTATIN 20 MG/1
40 TABLET, FILM COATED ORAL AT BEDTIME
Refills: 0 | Status: DISCONTINUED | OUTPATIENT
Start: 2023-12-07 | End: 2023-12-10

## 2023-12-07 RX ADMIN — SIMVASTATIN 40 MILLIGRAM(S): 20 TABLET, FILM COATED ORAL at 21:57

## 2023-12-07 RX ADMIN — Medication 200 MILLIGRAM(S): at 18:12

## 2023-12-07 RX ADMIN — INSULIN GLARGINE 22 UNIT(S): 100 INJECTION, SOLUTION SUBCUTANEOUS at 21:58

## 2023-12-07 RX ADMIN — Medication 2000 UNIT(S): at 17:47

## 2023-12-07 RX ADMIN — Medication 100 MILLIGRAM(S): at 21:57

## 2023-12-07 RX ADMIN — SACUBITRIL AND VALSARTAN 1 TABLET(S): 24; 26 TABLET, FILM COATED ORAL at 17:47

## 2023-12-07 RX ADMIN — CARVEDILOL PHOSPHATE 6.25 MILLIGRAM(S): 80 CAPSULE, EXTENDED RELEASE ORAL at 17:47

## 2023-12-07 RX ADMIN — Medication 81 MILLIGRAM(S): at 17:47

## 2023-12-07 RX ADMIN — Medication 6: at 17:48

## 2023-12-07 RX ADMIN — SACUBITRIL AND VALSARTAN 1 TABLET(S): 24; 26 TABLET, FILM COATED ORAL at 08:59

## 2023-12-07 NOTE — H&P ADULT - NSHPREVIEWOFSYSTEMS_GEN_ALL_CORE
REVIEW OF SYSTEMS:  CONSTITUTIONAL: No fever/chills or appetite changes.  HENMT: No HA, lightheadedness/dizziness  RESPIRATORY: +cough, no wheezing, hemoptysis; + shortness of breath on exertion  CARDIOVASCULAR: No chest pain, palpitations.  GASTROINTESTINAL: No abdominal or epigastric pain. No nausea or vomiting; No diarrhea or constipation.  GENITOURINARY: No dysuria, changes in frequency, hematuria, or incontinence  NEUROLOGICAL: Baseline strength. Sensation intact bilaterally.  EXT: + leg swelling worse on left  MUSCULOSKELETAL: No joint pain or swelling; No muscle, back, or extremity pain

## 2023-12-07 NOTE — H&P ADULT - ATTENDING COMMENTS
73 yF with PMHx of CAD status post CABG (2007), V. tach status post pacemaker/defibrillator(Guidant), cardiac cath 2018 with PCI, diabetes, hyperlipidemia, A-fib on warfarin, CVA with hemorrhage (2010), cardiomems (unable to do readings as pt unable to lay flat), admitted for COVID19 and thrombocytopenia    Pt's O2 sat is 95% on room air, breathing comfortably at this time, will hold off on remdesivir at this time, but ID consulted, and will f/u on recs.  Care d/w heme-onc, recommends holding warfarin tonight in setting of thrombocytopenia, and will re-assess coags in am.  Weakness may be related to recently started metolazone, cards following, trops negative, recommend holding metolazone.    Real Stephenson, Attending Physician

## 2023-12-07 NOTE — H&P ADULT - NSHPSOCIALHISTORY_GEN_ALL_CORE
Tobacco: Former smoker defers to elaborate, denies current smoking  EtOH: Denies  Recreational drug use: denies  Lives with: alone  Ambulates: w/o assistance  ADLs: w/o assistance  Diet: regular

## 2023-12-07 NOTE — H&P ADULT - PROBLEM SELECTOR PLAN 9
- patient on Coumadin - patient on Coumadin, being held tonight, but may consider restarting in am upon coordination with heme-onc and coags, cbc results  - SCD

## 2023-12-07 NOTE — ED ADULT TRIAGE NOTE - CHIEF COMPLAINT QUOTE
per ems from [facility] with general malaise, feeling unwell, feels dry. pt's doctor increased water pill last week. per ems from [facility] with general malaise, feeling unwell, feels dry. pt's doctor increased water pill last week.

## 2023-12-07 NOTE — ED PROVIDER NOTE - CARE PLAN
1 Principal Discharge DX:	2019 novel coronavirus disease (COVID-19)  Secondary Diagnosis:	Thrombocytopenia

## 2023-12-07 NOTE — ED ADULT NURSE NOTE - OBJECTIVE STATEMENT
Received the patient in the Er. Patient is alert and oriented. Patient is from assisted living. C/O Chest pressure for 4 days. 12 lead EKG done and presented to MD. Received the patient in the Er. Patient is alert and oriented. Patient is from assisted living. C/O Chest pressure for 4 days. 12 lead EKG done and presented to MD. Patient is attached to Cardiac monitor.

## 2023-12-07 NOTE — ED PROVIDER NOTE - OBJECTIVE STATEMENT
Patient with a past medical history of coronary artery disease status post CABG, V. tach status post pacemaker/defibrillator, ischemic cardiomyopathy, diabetes, hyperlipidemia, A-fib on warfarin is presenting for complaints of fatigue and chest tightness.  She saw Dr. Reardon last week and was started on metolazone for 2 days.  She is currently on torsemide daily.  She is also on digoxin.  States she has been feeling fatigued and tired since starting the metolazone a few days ago.  Also notes intermittent chest tightness.  Denies any new shortness of breath and states she has chronic orthopnea.  Leg swelling has been baseline per patient.  Chest tightness not worsened with exertion.

## 2023-12-07 NOTE — CONSULT NOTE ADULT - SUBJECTIVE AND OBJECTIVE BOX
Patient is a 73y old  Female who presents with a chief complaint of low platelets/covid + (07 Dec 2023 16:07)      HPI:  73 yF with PMHx of CAD status post CABG (2007), V. tach status post pacemaker/defibrillator(Guidant), cardiac cath 2018 with PCI, diabetes, hyperlipidemia, A-fib on warfarin, CVA with hemorrhage (2010), cardiomems (unable to do readings as pt unable to lay flat), is presenting for complaints of fatigue, chest tightness, and cough.  Recently prescribed metolazone when he saw her cardiologist last week (Dr. Reardon). Patient states ever since starting the metolazone she has felt weak. Patient also states she has a similar cough every year at the beginning of winter. Patient denies other symptoms of Covid. Patient has bilateral leg swelling with the left leg always worse. Patient states the chest tightness has resolved since arriving to hospital and denies current chest pain, and shortness of breath. Patient denies recent bleeding or symptoms of GI bleed.     Denies fever, chills, cough, abdominal pain, nausea, vomiting, diarrhea, constipation, urinary frequency, urgency, or dysuria, headaches.  Denies recent travel, recent antibiotic use, or sick contacts.    ED Course:   Vitals: BP: 105/66, HR: 87, Temp:97.5 , RR: 17, SpO2: 97% on RA   Labs:  WBC: 4.73 HGB: 11.2 Platelet: 65 INR: 2.14 Trop 53.9 -> 58.6 COVID +  CT Chest:   Small pericardial effusion. Cardiomegaly. Mild peribronchial thickening and bronchial secretions in the subsegmental and segmental left more than right lower lobe pulmonary bronchi may relate to a component of infection, inflammation or sequela from chronic congestion.  EKG: Ventricular paced rhythm - biventricular pacemaker detected  Received in the ED:   Entresto x 1 (07 Dec 2023 16:07)       ROS:  Negative except for:    PAST MEDICAL & SURGICAL HISTORY:  Benign Hypertension      CAD (Coronary Artery Disease)      Diabetes Mellitus, Type 2      CVA (Cerebral Vascular Accident)  > 10 years ago      Hyperthyroidism      Former smoker      Obesity      MI (myocardial infarction)  2007      HLD (hyperlipidemia)      CHF (congestive heart failure)  denies any recent exacerbations or intubation hx      Unsteady gait  uses walker PRN      Atrial fibrillation  on Coumadin      Mild pulmonary hypertension  on echo 5/2020, moderate on cardio ( Dr. Curtis's note)      CABG (Coronary Artery Bypass Graft)  2007      S/P Craniotomy  > 10 years ago with bleed evaccuation      S/P Ventriculoperitoneal Shunt  probably removed per pt      S/P Hysterectomy      AICD (automatic cardioverter/defibrillator) present  [email or web address], N161 [employer]ant/[id number], last interrogation 11/25/2020      S/P coronary artery stent placement  > 2 years ago, 2 stents          SOCIAL HISTORY:    FAMILY HISTORY:  No pertinent family history in first degree relatives        MEDICATIONS  (STANDING):  aspirin enteric coated 81 milliGRAM(s) Oral daily  benzonatate 100 milliGRAM(s) Oral every 8 hours  carvedilol 6.25 milliGRAM(s) Oral every 12 hours  cholecalciferol 2000 Unit(s) Oral daily  dextrose 5%. 1000 milliLiter(s) (100 mL/Hr) IV Continuous <Continuous>  dextrose 5%. 1000 milliLiter(s) (50 mL/Hr) IV Continuous <Continuous>  dextrose 50% Injectable 25 Gram(s) IV Push once  dextrose 50% Injectable 25 Gram(s) IV Push once  dextrose 50% Injectable 12.5 Gram(s) IV Push once  digoxin     Tablet 125 MICROGram(s) Oral every other day  glucagon  Injectable 1 milliGRAM(s) IntraMuscular once  guaiFENesin Oral Liquid (Sugar-Free) 200 milliGRAM(s) Oral every 6 hours  insulin glargine Injectable (LANTUS) 22 Unit(s) SubCutaneous at bedtime  insulin lispro (ADMELOG) corrective regimen sliding scale   SubCutaneous three times a day before meals  insulin lispro (ADMELOG) corrective regimen sliding scale   SubCutaneous at bedtime  methimazole 2.5 milliGRAM(s) Oral <User Schedule>  sacubitril 24 mG/valsartan 26 mG 1 Tablet(s) Oral two times a day  simvastatin 40 milliGRAM(s) Oral at bedtime  torsemide 60 milliGRAM(s) Oral daily    MEDICATIONS  (PRN):  acetaminophen     Tablet .. 650 milliGRAM(s) Oral every 6 hours PRN Temp greater or equal to 38C (100.4F), Mild Pain (1 - 3)  dextrose Oral Gel 15 Gram(s) Oral once PRN Blood Glucose LESS THAN 70 milliGRAM(s)/deciliter  melatonin 3 milliGRAM(s) Oral at bedtime PRN Insomnia      Allergies    No Known Allergies    Intolerances        Vital Signs Last 24 Hrs  T(C): 36.2 (07 Dec 2023 16:10), Max: 37 (07 Dec 2023 07:24)  T(F): 97.2 (07 Dec 2023 16:10), Max: 98.6 (07 Dec 2023 07:24)  HR: 74 (07 Dec 2023 16:10) (74 - 87)  BP: 130/60 (07 Dec 2023 16:10) (104/66 - 130/60)  BP(mean): --  RR: 16 (07 Dec 2023 16:10) (16 - 18)  SpO2: 95% (07 Dec 2023 16:10) (95% - 97%)    Parameters below as of 07 Dec 2023 16:10  Patient On (Oxygen Delivery Method): room air        PHYSICAL EXAM  exam as per hospitalist      LABS:    CBC Full  -  ( 07 Dec 2023 08:05 )  WBC Count : 4.73 K/uL  RBC Count : 3.90 M/uL  Hemoglobin : 11.2 g/dL  Hematocrit : 34.8 %  Platelet Count - Automated : 65 K/uL  Mean Cell Volume : 89.2 fl  Mean Cell Hemoglobin : 28.7 pg  Mean Cell Hemoglobin Concentration : 32.2 gm/dL  Auto Neutrophil # : 3.64 K/uL  Auto Lymphocyte # : 0.42 K/uL  Auto Monocyte # : 0.64 K/uL  Auto Eosinophil # : 0.01 K/uL  Auto Basophil # : 0.01 K/uL  Auto Neutrophil % : 77.0 %  Auto Lymphocyte % : 8.9 %  Auto Monocyte % : 13.5 %  Auto Eosinophil % : 0.2 %  Auto Basophil % : 0.2 %    12-07    137  |  97  |  73<H>  ----------------------------<  191<H>  3.9   |  31  |  1.70<H>    Ca    7.9<L>      07 Dec 2023 08:05  Mg     2.6     12-07      PT/INR - ( 07 Dec 2023 08:05 )   PT: 24.5 sec;   INR: 2.14 ratio         PTT - ( 07 Dec 2023 08:05 )  PTT:43.0 sec          BLOOD SMEAR INTERPRETATION:    RADIOLOGY & ADDITIONAL STUDIES:       Patient is a 73y old  Female who presents with a chief complaint of low platelets/covid + (07 Dec 2023 16:07)      HPI:  73 yF with PMHx of CAD status post CABG (2007), V. tach status post pacemaker/defibrillator(Guidant), cardiac cath 2018 with PCI, diabetes, hyperlipidemia, A-fib on warfarin, CVA with hemorrhage (2010), cardiomems (unable to do readings as pt unable to lay flat), is presenting for complaints of fatigue, chest tightness, and cough.  Recently prescribed metolazone when he saw her cardiologist last week (Dr. Reardon). Patient states ever since starting the metolazone she has felt weak. Patient also states she has a similar cough every year at the beginning of winter. Patient denies other symptoms of Covid. Patient has bilateral leg swelling with the left leg always worse. Patient states the chest tightness has resolved since arriving to hospital and denies current chest pain, and shortness of breath. Patient denies recent bleeding or symptoms of GI bleed.     Denies fever, chills, cough, abdominal pain, nausea, vomiting, diarrhea, constipation, urinary frequency, urgency, or dysuria, headaches.  Denies recent travel, recent antibiotic use, or sick contacts.    ED Course:   Vitals: BP: 105/66, HR: 87, Temp:97.5 , RR: 17, SpO2: 97% on RA   Labs:  WBC: 4.73 HGB: 11.2 Platelet: 65 INR: 2.14 Trop 53.9 -> 58.6 COVID +  CT Chest:   Small pericardial effusion. Cardiomegaly. Mild peribronchial thickening and bronchial secretions in the subsegmental and segmental left more than right lower lobe pulmonary bronchi may relate to a component of infection, inflammation or sequela from chronic congestion.  EKG: Ventricular paced rhythm - biventricular pacemaker detected  Received in the ED:   Entresto x 1 (07 Dec 2023 16:07)       ROS:  Negative except for:    PAST MEDICAL & SURGICAL HISTORY:  Benign Hypertension      CAD (Coronary Artery Disease)      Diabetes Mellitus, Type 2      CVA (Cerebral Vascular Accident)  > 10 years ago      Hyperthyroidism      Former smoker      Obesity      MI (myocardial infarction)  2007      HLD (hyperlipidemia)      CHF (congestive heart failure)  denies any recent exacerbations or intubation hx      Unsteady gait  uses walker PRN      Atrial fibrillation  on Coumadin      Mild pulmonary hypertension  on echo 5/2020, moderate on cardio ( Dr. Curtis's note)      CABG (Coronary Artery Bypass Graft)  2007      S/P Craniotomy  > 10 years ago with bleed evaccuation      S/P Ventriculoperitoneal Shunt  probably removed per pt      S/P Hysterectomy      AICD (automatic cardioverter/defibrillator) present  [employer], N161 Guidant/[id number], last interrogation 11/25/2020      S/P coronary artery stent placement  > 2 years ago, 2 stents          SOCIAL HISTORY:    FAMILY HISTORY:  No pertinent family history in first degree relatives        MEDICATIONS  (STANDING):  aspirin enteric coated 81 milliGRAM(s) Oral daily  benzonatate 100 milliGRAM(s) Oral every 8 hours  carvedilol 6.25 milliGRAM(s) Oral every 12 hours  cholecalciferol 2000 Unit(s) Oral daily  dextrose 5%. 1000 milliLiter(s) (100 mL/Hr) IV Continuous <Continuous>  dextrose 5%. 1000 milliLiter(s) (50 mL/Hr) IV Continuous <Continuous>  dextrose 50% Injectable 25 Gram(s) IV Push once  dextrose 50% Injectable 25 Gram(s) IV Push once  dextrose 50% Injectable 12.5 Gram(s) IV Push once  digoxin     Tablet 125 MICROGram(s) Oral every other day  glucagon  Injectable 1 milliGRAM(s) IntraMuscular once  guaiFENesin Oral Liquid (Sugar-Free) 200 milliGRAM(s) Oral every 6 hours  insulin glargine Injectable (LANTUS) 22 Unit(s) SubCutaneous at bedtime  insulin lispro (ADMELOG) corrective regimen sliding scale   SubCutaneous three times a day before meals  insulin lispro (ADMELOG) corrective regimen sliding scale   SubCutaneous at bedtime  methimazole 2.5 milliGRAM(s) Oral <User Schedule>  sacubitril 24 mG/valsartan 26 mG 1 Tablet(s) Oral two times a day  simvastatin 40 milliGRAM(s) Oral at bedtime  torsemide 60 milliGRAM(s) Oral daily    MEDICATIONS  (PRN):  acetaminophen     Tablet .. 650 milliGRAM(s) Oral every 6 hours PRN Temp greater or equal to 38C (100.4F), Mild Pain (1 - 3)  dextrose Oral Gel 15 Gram(s) Oral once PRN Blood Glucose LESS THAN 70 milliGRAM(s)/deciliter  melatonin 3 milliGRAM(s) Oral at bedtime PRN Insomnia      Allergies    No Known Allergies    Intolerances        Vital Signs Last 24 Hrs  T(C): 36.2 (07 Dec 2023 16:10), Max: 37 (07 Dec 2023 07:24)  T(F): 97.2 (07 Dec 2023 16:10), Max: 98.6 (07 Dec 2023 07:24)  HR: 74 (07 Dec 2023 16:10) (74 - 87)  BP: 130/60 (07 Dec 2023 16:10) (104/66 - 130/60)  BP(mean): --  RR: 16 (07 Dec 2023 16:10) (16 - 18)  SpO2: 95% (07 Dec 2023 16:10) (95% - 97%)    Parameters below as of 07 Dec 2023 16:10  Patient On (Oxygen Delivery Method): room air        PHYSICAL EXAM  exam as per hospitalist      LABS:    CBC Full  -  ( 07 Dec 2023 08:05 )  WBC Count : 4.73 K/uL  RBC Count : 3.90 M/uL  Hemoglobin : 11.2 g/dL  Hematocrit : 34.8 %  Platelet Count - Automated : 65 K/uL  Mean Cell Volume : 89.2 fl  Mean Cell Hemoglobin : 28.7 pg  Mean Cell Hemoglobin Concentration : 32.2 gm/dL  Auto Neutrophil # : 3.64 K/uL  Auto Lymphocyte # : 0.42 K/uL  Auto Monocyte # : 0.64 K/uL  Auto Eosinophil # : 0.01 K/uL  Auto Basophil # : 0.01 K/uL  Auto Neutrophil % : 77.0 %  Auto Lymphocyte % : 8.9 %  Auto Monocyte % : 13.5 %  Auto Eosinophil % : 0.2 %  Auto Basophil % : 0.2 %    12-07    137  |  97  |  73<H>  ----------------------------<  191<H>  3.9   |  31  |  1.70<H>    Ca    7.9<L>      07 Dec 2023 08:05  Mg     2.6     12-07      PT/INR - ( 07 Dec 2023 08:05 )   PT: 24.5 sec;   INR: 2.14 ratio         PTT - ( 07 Dec 2023 08:05 )  PTT:43.0 sec          BLOOD SMEAR INTERPRETATION:    RADIOLOGY & ADDITIONAL STUDIES:

## 2023-12-07 NOTE — H&P ADULT - NSHPPHYSICALEXAM_GEN_ALL_CORE
CONSTITUTIONAL: Uncomfortable  EYES: No conjunctival or scleral injection, non-icteric  ENMT: Erythematous mucosa with moist membranes.   NECK: Supple, symmetric and without tracheal deviation   RESP: No respiratory distress, no use of accessory muscles; CTA b/l, no WRR  CV: RRR, +S1S2, b/l peripheral edema  GI: Soft, NT, ND  LYMPH: No cervical LAD or tenderness  MSK: Normal ROM without pain, no joint pain   SKIN: b/l dry flaky skin ankles  NEURO: Sensation intact in upper and lower extremities b/l to light touch   PSYCH: Appropriate insight/judgment; A+O x 3, mood and affect appropriate

## 2023-12-07 NOTE — ED PROVIDER NOTE - PROGRESS NOTE DETAILS
Patient with thrombocytopenia, COVID.  She is not hypoxic.  Seen by cardiology, will plan on admission to hospitalist service.  Spoke with Dr. Stephenson for admission.  Shamir Contreras MD.

## 2023-12-07 NOTE — CONSULT NOTE ADULT - ATTENDING COMMENTS
74 yo female with past medical history of coronary artery disease status post CABG (2007), V. tach status post pacemaker/defibrillator(Guidant), cardiac cath 2018 with PCI left circumflex and 1st obtuse, ischemic cardiomyopathy, diabetes, hyperlipidemia, A-fib on warfarin, CVA with hemorrhage (2010), cardiomems (unable to do readings as pt unable to lay flat), is presenting for complaints of fatigue and chest tightness. + COVID  Cardiology consulted for chest tightness  Outpatient Cardiologist: Dr. Reardon     - cp was nonanginal and resolved.   - trops neg  - EKG Ventricular-paced rhythm Biventricular pacemaker detected. HR 78  - continue Aspirin 81 MG and Simvastatin 40 MG Oral Tablet    - difficult to assess pts vol status.   - Previous TTE 10/26/23: LVEF 25-30%, global LV hypokinesis. moderate (grade 2) left ventricular diastolic dysfunction.  LA severely dilated. MIld to moderate TR. Mild pulmonary HTN.  - continue home Entresto 24-26 mg BID, Torsemide 60 mg, pt unsure if she was on spironolactone outpatient.   - check ct chest     - Hx AFib  - continue Warfarin for AC  - continue Carvedilol 6.25 MG BID,  Digoxin 125 MCG    addendum:  now with COVID which explains many sx. cont home diuretics without metolazone.   ct chest ?pna. fu official read. Further cardiac workup will depend on clinical course. 72 yo female with past medical history of coronary artery disease status post CABG (2007), V. tach status post pacemaker/defibrillator(Guidant), cardiac cath 2018 with PCI left circumflex and 1st obtuse, ischemic cardiomyopathy, diabetes, hyperlipidemia, A-fib on warfarin, CVA with hemorrhage (2010), cardiomems (unable to do readings as pt unable to lay flat), is presenting for complaints of fatigue and chest tightness. + COVID  Cardiology consulted for chest tightness  Outpatient Cardiologist: Dr. Reardon     - cp was nonanginal and resolved.   - trops neg  - EKG Ventricular-paced rhythm Biventricular pacemaker detected. HR 78  - continue Aspirin 81 MG and Simvastatin 40 MG Oral Tablet    - difficult to assess pts vol status.   - Previous TTE 10/26/23: LVEF 25-30%, global LV hypokinesis. moderate (grade 2) left ventricular diastolic dysfunction.  LA severely dilated. MIld to moderate TR. Mild pulmonary HTN.  - continue home Entresto 24-26 mg BID, Torsemide 60 mg, pt unsure if she was on spironolactone outpatient.   - check ct chest     - Hx AFib  - continue Warfarin for AC  - continue Carvedilol 6.25 MG BID,  Digoxin 125 MCG    addendum:  now with COVID which explains many sx. cont home diuretics without metolazone.   ct chest ?pna. fu official read. Further cardiac workup will depend on clinical course.

## 2023-12-07 NOTE — H&P ADULT - PROBLEM SELECTOR PLAN 3
Spoke with Dr Scherer, is requesting to ask mom if she is still seeing Dr Gurrola? Karan is prone to more respiratory illnesses due to RAD/Asthma.  If mom is in agreement, she would first recommend having Karan do lab work to check his immunology levels such as IgG, IgA.  If the results are low, then she definitely would recommend seeing an allergist/immunologist.     Patient w/ history CHF  - Previous TTE 10/26/23: LVEF 25-30%, global LV hypokinesis. moderate (grade 2) left ventricular diastolic dysfunction.  LA severely dilated. MIld to moderate TR. Mild pulmonary HTN.  CT Chest:   Small pericardial effusion. Cardiomegaly. Mild peribronchial thickening and bronchial secretions in the subsegmental and segmental left more than right lower lobe pulmonary bronchi may relate to a component of infection, inflammation or sequela from chronic congestion.  - continue home Entresto 24-26 mg BID, Torsemide 60 mg, pt unsure if she was on spironolactone outpatient. Patient w/ history CHF  - Previous TTE 10/26/23: LVEF 25-30%, global LV hypokinesis. moderate (grade 2) left ventricular diastolic dysfunction.  LA severely dilated. MIld to moderate TR. Mild pulmonary HTN.  CT Chest:   Small pericardial effusion. Cardiomegaly. Mild peribronchial thickening and bronchial secretions in the subsegmental and segmental left more than right lower lobe pulmonary bronchi may relate to a component of infection, inflammation or sequela from chronic congestion.  - continue home Entresto 24-26 mg BID, Torsemide 60 mg, pt unsure if she was on spironolactone outpatient.  - Cardiology Valdez following Patient w/ history CHF  - Previous TTE 10/26/23: LVEF 25-30%, global LV hypokinesis. moderate (grade 2) left ventricular diastolic dysfunction.  LA severely dilated. MIld to moderate TR. Mild pulmonary HTN.  CT Chest:   Small pericardial effusion. Cardiomegaly. Mild peribronchial thickening and bronchial secretions in the subsegmental and segmental left more than right lower lobe pulmonary bronchi may relate to a component of infection, inflammation or sequela from chronic congestion.  - continue home Entresto 24-26 mg BID, Torsemide 60 mg, pt unsure if she was on spironolactone outpatient.  - Cardiology Vladez following

## 2023-12-07 NOTE — H&P ADULT - PROBLEM SELECTOR PLAN 4
- Patient with thrombocytopenia of 65.   - Patient denies recent bleeding.  - Hematology following (Avelino) - Patient with thrombocytopenia of 65.   - care d/w heme-onc hold Warfarin tonight in setting of thrombocytopenia, may consider restarting in am based on coags and cbc  - Patient denies recent bleeding.  - Hematology following (Avelino)

## 2023-12-07 NOTE — H&P ADULT - ASSESSMENT
73 yF with PMHx of CAD status post CABG (2007), V. tach status post pacemaker/defibrillator(Guidant), cardiac cath 2018 with PCI, diabetes, hyperlipidemia, A-fib on warfarin, CVA with hemorrhage (2010), cardiomems (unable to do readings as pt unable to lay flat), admitted for COVID19 and thrombocytopenia.

## 2023-12-07 NOTE — H&P ADULT - PROBLEM SELECTOR PLAN 6
Chronic  On tele monitoring  - continue Warfarin for AC  - f/u PT, PTT, INR  - continue Carvedilol 6.25 MG BID,  Digoxin 125 MCG   Cardio following Dr. Valdez Chronic  On tele monitoring  - care d/w heme-onc hold Warfarin tonight in setting of thrombocytopenia, may consider restarting in am based on coags and cbc  - f/u PT, PTT, INR  - continue Carvedilol 6.25 MG BID,  Digoxin 125 MCG   Cardio following Dr. Valdez

## 2023-12-07 NOTE — H&P ADULT - PROBLEM SELECTOR PLAN 7
- BP well controlled, monitor routine hemodynamics.  - Continue Carvedilol 6.25 MG BID, continue home Entresto 24-26 mg BID, Torsemide 60 mg

## 2023-12-07 NOTE — H&P ADULT - PROBLEM/PLAN-9
DISPLAY PLAN FREE TEXT Abdomen soft/No evidence of prior surgery/No distension/No tenderness/No masses or organomegaly/Bowel sounds present and normal/No hernia(s)

## 2023-12-07 NOTE — ED ADULT NURSE NOTE - NSFALLHARMRISKINTERV_ED_ALL_ED
Assistance OOB with selected safe patient handling equipment if applicable/Assistance with ambulation/Communicate risk of Fall with Harm to all staff, patient, and family/Provide visual cue: red socks, yellow wristband, yellow gown, etc/Reinforce activity limits and safety measures with patient and family/Bed in lowest position, wheels locked, appropriate side rails in place/Call bell, personal items and telephone in reach/Instruct patient to call for assistance before getting out of bed/chair/stretcher/Non-slip footwear applied when patient is off stretcher/[place] to call system/Physically safe environment - no spills, clutter or unnecessary equipment/Purposeful Proactive Rounding/Room/bathroom lighting operational, light cord in reach Assistance OOB with selected safe patient handling equipment if applicable/Assistance with ambulation/Communicate risk of Fall with Harm to all staff, patient, and family/Provide visual cue: red socks, yellow wristband, yellow gown, etc/Reinforce activity limits and safety measures with patient and family/Bed in lowest position, wheels locked, appropriate side rails in place/Call bell, personal items and telephone in reach/Instruct patient to call for assistance before getting out of bed/chair/stretcher/Non-slip footwear applied when patient is off stretcher/[place] to call system/Physically safe environment - no spills, clutter or unnecessary equipment/Purposeful Proactive Rounding/Room/bathroom lighting operational, light cord in reach

## 2023-12-07 NOTE — H&P ADULT - PROBLEM SELECTOR PLAN 1
Patient presents with cough, SOB w/ exertion likely 2/2suspected/confirmed COVID-19 infection  - Admit to GMF  - Supplemental O2 prn maintain O2 sats >88%. Prone PRN  - Remdesivir 5 day course per protocol  - Will hold off on/start remdesivir, GFR >30 and ALT not > than 390, symp <14 days  - Monitor volume status closely, avoid aggressive hydration  - Tylenol prn myalgias, fever  - Isolation precautions per protocol  - ID (Dr. Miramontes) consulted, f/u recs Patient presents with cough, SOB w/ exertion likely 2/2suspected/confirmed COVID-19 infection  - Admit to GMF  - Will hold off on remdesivir, GFR >30 and ALT not > than 390, symp <14 days  - Monitor volume status closely, avoid aggressive hydration  - robitussin 200 q6,  standing tessalon perles q8 for cough  - Tylenol prn myalgias, fever  - Isolation precautions per protocol  - ID (Dr. Miramontes) consulted, f/u recs

## 2023-12-07 NOTE — ED PROVIDER NOTE - CLINICAL SUMMARY MEDICAL DECISION MAKING FREE TEXT BOX
Despite patient states that she feels dry, she does have faint bibasilar crackles and visible shortness of breath, concern for volume overload state.  No wheezing to suggest obstructive lung pathology.  With chest tightness, will check troponin to rule out ACS.  Will also evaluate EKG.  Plan to consult cardiology.  Will evaluate for [place] as well.  Plan on lab work, imaging, cardiology consult.    pcp sahil spann Despite patient states that she feels dry, she does have faint bibasilar crackles and visible shortness of breath, concern for volume overload state.  No wheezing to suggest obstructive lung pathology.  With chest tightness, will check troponin to rule out ACS.  Will also evaluate EKG.  Plan to consult cardiology.  Will evaluate for [place] as well.  Plan on lab work, imaging, cardiology consult.

## 2023-12-07 NOTE — H&P ADULT - PROBLEM SELECTOR PLAN 5
Patient on home insulin.   -f/u AM A1C  Recent basal insulin dose of 60 units AM, 65 units PM - as per outpatient cardiology note from last week  - will start on 20 units AM, 22 units PM, increase as needed  -Regular finger sticks  -Consistent carb diet  -Hypoglycemic protocol Patient on home insulin.   -f/u AM A1C  Recent basal insulin dose of 60 units AM, 65 units PM - as per outpatient cardiology note from last week  - will start on 20 units AM, 22 units PM, increase as needed  - moderate sliding scale  -Regular finger sticks  -Consistent carb diet  -Hypoglycemic protocol

## 2023-12-07 NOTE — H&P ADULT - NSICDXPASTSURGICALHX_GEN_ALL_CORE_FT
PAST SURGICAL HISTORY:  AICD (automatic cardioverter/defibrillator) present [employer], N161 [employer]/[id number], last interrogation 11/25/2020    CABG (Coronary Artery Bypass Graft) 2007    S/P coronary artery stent placement > 2 years ago, 2 stents    S/P Craniotomy > 10 years ago with bleed evaccuation    S/P Hysterectomy     S/P Ventriculoperitoneal Shunt probably removed per pt     PAST SURGICAL HISTORY:  AICD (automatic cardioverter/defibrillator) present [employer], N161 [employer]/[id number], last interrogation 11/25/2020    CABG (Coronary Artery Bypass Graft) 2007    S/P coronary artery stent placement > 2 years ago, 2 stents    S/P Craniotomy > 10 years ago with bleed evaccuation    S/P Hysterectomy     S/P Ventriculoperitoneal Shunt probably removed per pt

## 2023-12-07 NOTE — CONSULT NOTE ADULT - ASSESSMENT
Covid with exacerbation of mild thrombocytopenia  baseline approx 100K  now 65K    Recommendations:  1.  follow CBC  2.  hold coumadin and repeat INR in am  3.  cardiology evaluation  4.  further heme recommendations pending  discussed with Dr. Stephenson

## 2023-12-07 NOTE — ED PROVIDER NOTE - PHYSICAL EXAMINATION
Constitutional: Awake, Alert, non-toxic. No acute distress.  HEAD: Normocephalic, atraumatic.   EYES: PERRL, EOM intact, conjunctiva and sclera are clear bilaterally.  ENT: External ears normal. No rhinorrhea, no tracheal deviation   NECK: Supple, non-tender  CARDIOVASCULAR: irregular rate and rhythm.  RESPIRATORY: intermittent pursed lip breathing; breath sounds with faint bibasilar crackles. Speaking in full sentences. No accessory muscle use.   ABDOMEN: Soft; non-tender, non-distended. No rebound or guarding.   MSK:  1+ lower extremity edema, no deformities  SKIN: Warm, dry  NEURO: A&O x3. Sensory and motor functions are grossly intact. Speech is normal. No facial droop.  PSYCH: Appearance and judgement seem appropriate for gender and age.

## 2023-12-07 NOTE — ED PROVIDER NOTE - WR INTERPRETATION 1
My independent evaluation shows cardiomegaly, no significant change pleural effusions or pulmonary vascular congestion compared to previous chest x-ray.  ICD in place.

## 2023-12-07 NOTE — CONSULT NOTE ADULT - SUBJECTIVE AND OBJECTIVE BOX
Patient is a 73y old  Female who presents with a chief complaint of chest tightness.    HPI:  74 yo female with past medical history of coronary artery disease status post CABG (2007), V. tach status post pacemaker/defibrillator(Guidant), cardiac cath 2018 with PCI left circumflex and 1st obtuse, ischemic cardiomyopathy, diabetes, hyperlipidemia, A-fib on warfarin, CVA with hemorrhage (2010), cardiomems (unable to do readings as pt unable to lay flat), is presenting for complaints of fatigue and chest tightness.  She saw Dr. Reardon last week and was started on metolazone for 2 days.  She is currently on torsemide daily.  She is also on digoxin.  States she has been feeling fatigued and tired since starting the metolazone a few days ago.  Also notes intermittent chest tightness.  Denies any new shortness of breath and states she has chronic orthopnea.  Leg swelling has been baseline per patient.  Chest tightness not worsened with exertion.    Interval Hx: She states that her chest tightness has resolved. She denies SOB. She states that her leg swelling has improved since starting metolazone.  Outpatient Cardiologist: Dr Reardon    PAST MEDICAL & SURGICAL HISTORY:  Benign Hypertension      CAD (Coronary Artery Disease)      Diabetes Mellitus, Type 2      CVA (Cerebral Vascular Accident)  > 10 years ago      Hyperthyroidism      Former smoker      Obesity      MI (myocardial infarction)  2007      HLD (hyperlipidemia)      CHF (congestive heart failure)  denies any recent exacerbations or intubation hx      Unsteady gait  uses walker PRN      Atrial fibrillation  on Coumadin      Mild pulmonary hypertension  on echo 5/2020, moderate on cardio ( Dr. Curtis's note)      CABG (Coronary Artery Bypass Graft)  2007      S/P Craniotomy  > 10 years ago with bleed evaccuation      S/P Ventriculoperitoneal Shunt  probably removed per pt      S/P Hysterectomy      AICD (automatic cardioverter/defibrillator) present  [employer], N161 Guidant/[id number], last interrogation 11/25/2020      S/P coronary artery stent placement  > 2 years ago, 2 stents                ECHO  FINDINGS:  TTE 10/26/23:  2. Left ventricular systolic function is severely decreased with an ejection fraction visually estimated at 25 to 30 %. Global left ventricular hypokinesis.   3. Segmental wall motion abnormalities cannot be excluded due to poor endocardial border definition   (patient refused Definity enhancement).   4. There is moderate (grade 2) left ventricular diastolic dysfunction.   5. The right ventricle is not well visualized. mildly reduced systolic function.   6. Device lead is visualized in the right heart.   7. Right atrium was not well visualized.   8. The left atrium is severely dilated.   9. Mitral valve leaflets are diffusely calcified.   10. There is mild posterior calcification and moderate calcification of the mitral valve annulus.   11. Mild mitral regurgitation.   12. Trileaflet aortic valve with normal systolic excursion.   13. Mild to moderate tricuspid regurgitation.   14. Estimated pulmonary artery systolic pressure is 40 mmHg, consistent with mild pulmonary hypertension.   15. Compared to the transthoracic echocardiogram performed on 4/8/2022 there have probably been no significant interval changes LVEF appears lower but suboptimal endocardial definition makes reliable comparison difficult. Consider repeat study with the use of echocontrast.      MEDICATIONS  (STANDING):    MEDICATIONS  (PRN):      FAMILY HISTORY:    Denies Family history of CAD or early MI    ROS:  Constitutional: denies fever, chills  Respiratory: + cough. denies SOB, RACHEL  Cardiovascular: denies CP, palpitations. + orthopnea, + LE edema  Gastrointestinal: denies nausea, vomiting, abdominal pain  Genitourinary: denies urinary changes  Skin: Denies rashes, itching  Neurologic: denies headache, weakness, dizziness  ROS negative except as noted above      SOCIAL HISTORY:    No tobacco, Alcohol or Drug use    Vital Signs Last 24 Hrs  T(C): 36.4 (07 Dec 2023 12:01), Max: 37 (07 Dec 2023 07:24)  T(F): 97.5 (07 Dec 2023 12:01), Max: 98.6 (07 Dec 2023 07:24)  HR: 87 (07 Dec 2023 12:01) (74 - 87)  BP: 105/66 (07 Dec 2023 12:01) (104/66 - 116/64)  BP(mean): --  RR: 17 (07 Dec 2023 12:01) (17 - 18)  SpO2: 97% (07 Dec 2023 12:01) (95% - 97%)    Parameters below as of 07 Dec 2023 12:01  Patient On (Oxygen Delivery Method): room air        Physical Exam:  General: awake, alert, NAD  HEENT: NCAT, moist mucous membranes   Neurology: A&Ox3, nonfocal, sensation intact   Respiratory: + bibasilar crackles  CV: RRR, +S1/S2, no murmurs, rubs or gallops  Abdominal: Soft, NT, ND  Extremities: R LE with 2 + pitting edema, L LE with 2+ nonpitting edema.  MSK: Normal ROM, no joint erythema or warmth, no joint swelling   Heme: No obvious ecchymosis or petechiae   Skin: warm, dry, normal color      ECG: Ventricular-paced rhythm Biventricular pacemaker detected. HR 78    I&O's Detail      LABS:                        11.2   4.73  )-----------( 65       ( 07 Dec 2023 08:05 )             34.8     12-07    137  |  97  |  73<H>  ----------------------------<  191<H>  3.9   |  31  |  1.70<H>    Ca    7.9<L>      07 Dec 2023 08:05  Mg     2.6     12-07          PT/INR - ( 07 Dec 2023 08:05 )   PT: 24.5 sec;   INR: 2.14 ratio         PTT - ( 07 Dec 2023 08:05 )  PTT:43.0 sec  Urinalysis Basic - ( 07 Dec 2023 08:05 )    Color: x / Appearance: x / SG: x / pH: x  Gluc: 191 mg/dL / Ketone: x  / Bili: x / Urobili: x   Blood: x / Protein: x / Nitrite: x   Leuk Esterase: x / RBC: x / WBC x   Sq Epi: x / Non Sq Epi: x / Bacteria: x      I&O's Summary    BNP  RADIOLOGY & ADDITIONAL STUDIES:  < from: Xray Chest 1 View- PORTABLE-Urgent (07.12.22 @ 05:56) >    ACC: [id number] EXAM:  XR CHEST PORTABLE URGENT 1V                          PROCEDURE DATE:  07/12/2022          INTERPRETATION:  AP chest on July 12, 2022 at 5:50 AM. Patient has chest   pain.    Heart magnified by technique. Sternotomy and left-sided defibrillator   again noted.    Lungs remain clear.    Patient had endotracheal tube on December 22, 2020 no longer evident.    IMPRESSION: No acute finding.    --- End of Report ---            LARISSA KINSEY MD; Attending Radiologist  This documenthas been electronically signed. Jul 12 2022  1:05PM    < end of copied text >   Patient is a 73y old  Female who presents with a chief complaint of chest tightness.    HPI:  72 yo female with past medical history of coronary artery disease status post CABG (2007), V. tach status post pacemaker/defibrillator(Guidant), cardiac cath 2018 with PCI left circumflex and 1st obtuse, ischemic cardiomyopathy, diabetes, hyperlipidemia, A-fib on warfarin, CVA with hemorrhage (2010), cardiomems (unable to do readings as pt unable to lay flat), is presenting for complaints of fatigue and chest tightness.  She saw Dr. Reardon last week and was started on metolazone for 2 days.  She is currently on torsemide daily.  She is also on digoxin.  States she has been feeling fatigued and tired since starting the metolazone a few days ago.  Also notes intermittent chest tightness.  Denies any new shortness of breath and states she has chronic orthopnea.  Leg swelling has been baseline per patient.  Chest tightness not worsened with exertion.    Interval Hx: She states that her chest tightness has resolved. She denies SOB. She states that her leg swelling has improved since starting metolazone.  Outpatient Cardiologist: Dr Reardon    PAST MEDICAL & SURGICAL HISTORY:  Benign Hypertension      CAD (Coronary Artery Disease)      Diabetes Mellitus, Type 2      CVA (Cerebral Vascular Accident)  > 10 years ago      Hyperthyroidism      Former smoker      Obesity      MI (myocardial infarction)  2007      HLD (hyperlipidemia)      CHF (congestive heart failure)  denies any recent exacerbations or intubation hx      Unsteady gait  uses walker PRN      Atrial fibrillation  on Coumadin      Mild pulmonary hypertension  on echo 5/2020, moderate on cardio ( Dr. Curtis's note)      CABG (Coronary Artery Bypass Graft)  2007      S/P Craniotomy  > 10 years ago with bleed evaccuation      S/P Ventriculoperitoneal Shunt  probably removed per pt      S/P Hysterectomy      AICD (automatic cardioverter/defibrillator) present  [employer], N161 Guidant/[id number], last interrogation 11/25/2020      S/P coronary artery stent placement  > 2 years ago, 2 stents                ECHO  FINDINGS:  TTE 10/26/23:  2. Left ventricular systolic function is severely decreased with an ejection fraction visually estimated at 25 to 30 %. Global left ventricular hypokinesis.   3. Segmental wall motion abnormalities cannot be excluded due to poor endocardial border definition   (patient refused Definity enhancement).   4. There is moderate (grade 2) left ventricular diastolic dysfunction.   5. The right ventricle is not well visualized. mildly reduced systolic function.   6. Device lead is visualized in the right heart.   7. Right atrium was not well visualized.   8. The left atrium is severely dilated.   9. Mitral valve leaflets are diffusely calcified.   10. There is mild posterior calcification and moderate calcification of the mitral valve annulus.   11. Mild mitral regurgitation.   12. Trileaflet aortic valve with normal systolic excursion.   13. Mild to moderate tricuspid regurgitation.   14. Estimated pulmonary artery systolic pressure is 40 mmHg, consistent with mild pulmonary hypertension.   15. Compared to the transthoracic echocardiogram performed on 4/8/2022 there have probably been no significant interval changes LVEF appears lower but suboptimal endocardial definition makes reliable comparison difficult. Consider repeat study with the use of echocontrast.      MEDICATIONS  (STANDING):    MEDICATIONS  (PRN):      FAMILY HISTORY:    Denies Family history of CAD or early MI    ROS:  Constitutional: denies fever, chills  Respiratory: + cough. denies SOB, RACHEL  Cardiovascular: denies CP, palpitations. + orthopnea, + LE edema  Gastrointestinal: denies nausea, vomiting, abdominal pain  Genitourinary: denies urinary changes  Skin: Denies rashes, itching  Neurologic: denies headache, weakness, dizziness  ROS negative except as noted above      SOCIAL HISTORY:    No tobacco, Alcohol or Drug use    Vital Signs Last 24 Hrs  T(C): 36.4 (07 Dec 2023 12:01), Max: 37 (07 Dec 2023 07:24)  T(F): 97.5 (07 Dec 2023 12:01), Max: 98.6 (07 Dec 2023 07:24)  HR: 87 (07 Dec 2023 12:01) (74 - 87)  BP: 105/66 (07 Dec 2023 12:01) (104/66 - 116/64)  BP(mean): --  RR: 17 (07 Dec 2023 12:01) (17 - 18)  SpO2: 97% (07 Dec 2023 12:01) (95% - 97%)    Parameters below as of 07 Dec 2023 12:01  Patient On (Oxygen Delivery Method): room air        Physical Exam:  General: awake, alert, NAD  HEENT: NCAT, moist mucous membranes   Neurology: A&Ox3, nonfocal, sensation intact   Respiratory: + bibasilar crackles  CV: RRR, +S1/S2, no murmurs, rubs or gallops  Abdominal: Soft, NT, ND  Extremities: R LE with 2 + pitting edema, L LE with 2+ nonpitting edema.  MSK: Normal ROM, no joint erythema or warmth, no joint swelling   Heme: No obvious ecchymosis or petechiae   Skin: warm, dry, normal color      ECG: Ventricular-paced rhythm Biventricular pacemaker detected. HR 78    I&O's Detail      LABS:                        11.2   4.73  )-----------( 65       ( 07 Dec 2023 08:05 )             34.8     12-07    137  |  97  |  73<H>  ----------------------------<  191<H>  3.9   |  31  |  1.70<H>    Ca    7.9<L>      07 Dec 2023 08:05  Mg     2.6     12-07          PT/INR - ( 07 Dec 2023 08:05 )   PT: 24.5 sec;   INR: 2.14 ratio         PTT - ( 07 Dec 2023 08:05 )  PTT:43.0 sec  Urinalysis Basic - ( 07 Dec 2023 08:05 )    Color: x / Appearance: x / SG: x / pH: x  Gluc: 191 mg/dL / Ketone: x  / Bili: x / Urobili: x   Blood: x / Protein: x / Nitrite: x   Leuk Esterase: x / RBC: x / WBC x   Sq Epi: x / Non Sq Epi: x / Bacteria: x      I&O's Summary    BNP  RADIOLOGY & ADDITIONAL STUDIES:  < from: Xray Chest 1 View- PORTABLE-Urgent (07.12.22 @ 05:56) >    ACC: [id number] EXAM:  XR CHEST PORTABLE URGENT 1V                          PROCEDURE DATE:  07/12/2022          INTERPRETATION:  AP chest on July 12, 2022 at 5:50 AM. Patient has chest   pain.    Heart magnified by technique. Sternotomy and left-sided defibrillator   again noted.    Lungs remain clear.    Patient had endotracheal tube on December 22, 2020 no longer evident.    IMPRESSION: No acute finding.    --- End of Report ---            LARISSA KINSEY MD; Attending Radiologist  This documenthas been electronically signed. Jul 12 2022  1:05PM    < end of copied text >   Patient is a 73y old  Female who presents with a chief complaint of chest tightness.    HPI:  72 yo female with past medical history of coronary artery disease status post CABG (2007), V. tach status post pacemaker/defibrillator(Guidant), cardiac cath 2018 with PCI left circumflex and 1st obtuse, ischemic cardiomyopathy, diabetes, hyperlipidemia, A-fib on warfarin, CVA with hemorrhage (2010), cardiomems (unable to do readings as pt unable to lay flat), is presenting for complaints of fatigue and chest tightness.  She saw Dr. Reardon last week and was started on metolazone for 2 days.  She is currently on torsemide daily.  She is also on digoxin.  States she has been feeling fatigued and tired since starting the metolazone a few days ago.  Also notes intermittent chest tightness.  Denies any new shortness of breath and states she has chronic orthopnea.  Leg swelling has been baseline per patient.  Chest tightness not worsened with exertion.    Interval Hx: She states that her chest tightness has resolved. She denies SOB. She states that her leg swelling has improved since starting metolazone.  Outpatient Cardiologist: Dr Reardon    PAST MEDICAL & SURGICAL HISTORY:  Benign Hypertension      CAD (Coronary Artery Disease)      Diabetes Mellitus, Type 2      CVA (Cerebral Vascular Accident)  > 10 years ago      Hyperthyroidism      Former smoker      Obesity      MI (myocardial infarction)  2007      HLD (hyperlipidemia)      CHF (congestive heart failure)  denies any recent exacerbations or intubation hx      Unsteady gait  uses walker PRN      Atrial fibrillation  on Coumadin      Mild pulmonary hypertension  on echo 5/2020, moderate on cardio ( Dr. Curtis's note)      CABG (Coronary Artery Bypass Graft)  2007      S/P Craniotomy  > 10 years ago with bleed evaccuation      S/P Ventriculoperitoneal Shunt  probably removed per pt      S/P Hysterectomy      AICD (automatic cardioverter/defibrillator) present  [employer], N161 Guidant/[id number], last interrogation 11/25/2020      S/P coronary artery stent placement  > 2 years ago, 2 stents                ECHO  FINDINGS:  TTE 10/26/23:  2. Left ventricular systolic function is severely decreased with an ejection fraction visually estimated at 25 to 30 %. Global left ventricular hypokinesis.   3. Segmental wall motion abnormalities cannot be excluded due to poor endocardial border definition   (patient refused Definity enhancement).   4. There is moderate (grade 2) left ventricular diastolic dysfunction.   5. The right ventricle is not well visualized. mildly reduced systolic function.   6. Device lead is visualized in the right heart.   7. Right atrium was not well visualized.   8. The left atrium is severely dilated.   9. Mitral valve leaflets are diffusely calcified.   10. There is mild posterior calcification and moderate calcification of the mitral valve annulus.   11. Mild mitral regurgitation.   12. Trileaflet aortic valve with normal systolic excursion.   13. Mild to moderate tricuspid regurgitation.   14. Estimated pulmonary artery systolic pressure is 40 mmHg, consistent with mild pulmonary hypertension.   15. Compared to the transthoracic echocardiogram performed on 4/8/2022 there have probably been no significant interval changes LVEF appears lower but suboptimal endocardial definition makes reliable comparison difficult. Consider repeat study with the use of echocontrast.      MEDICATIONS  (STANDING):    MEDICATIONS  (PRN):      FAMILY HISTORY:    Denies Family history of CAD or early MI    ROS:     ROS negative except as noted above      SOCIAL HISTORY:    No tobacco, Alcohol or Drug use    Vital Signs Last 24 Hrs  T(C): 36.4 (07 Dec 2023 12:01), Max: 37 (07 Dec 2023 07:24)  T(F): 97.5 (07 Dec 2023 12:01), Max: 98.6 (07 Dec 2023 07:24)  HR: 87 (07 Dec 2023 12:01) (74 - 87)  BP: 105/66 (07 Dec 2023 12:01) (104/66 - 116/64)  BP(mean): --  RR: 17 (07 Dec 2023 12:01) (17 - 18)  SpO2: 97% (07 Dec 2023 12:01) (95% - 97%)    Parameters below as of 07 Dec 2023 12:01  Patient On (Oxygen Delivery Method): room air        Physical Exam:  General: awake, alert, NAD  HEENT: NCAT, moist mucous membranes   Neurology: A&Ox3, nonfocal, sensation intact   Respiratory: + bibasilar crackles  CV: RRR, +S1/S2, no murmurs, rubs or gallops  Abdominal: Soft, NT, ND  Extremities: R LE with 2 + pitting edema, L LE with 2+ nonpitting edema.  MSK: Normal ROM, no joint erythema or warmth, no joint swelling   Heme: No obvious ecchymosis or petechiae   Skin: warm, dry, normal color      ECG: Ventricular-paced rhythm Biventricular pacemaker detected. HR 78    I&O's Detail      LABS:                        11.2   4.73  )-----------( 65       ( 07 Dec 2023 08:05 )             34.8     12-07    137  |  97  |  73<H>  ----------------------------<  191<H>  3.9   |  31  |  1.70<H>    Ca    7.9<L>      07 Dec 2023 08:05  Mg     2.6     12-07          PT/INR - ( 07 Dec 2023 08:05 )   PT: 24.5 sec;   INR: 2.14 ratio         PTT - ( 07 Dec 2023 08:05 )  PTT:43.0 sec  Urinalysis Basic - ( 07 Dec 2023 08:05 )    Color: x / Appearance: x / SG: x / pH: x  Gluc: 191 mg/dL / Ketone: x  / Bili: x / Urobili: x   Blood: x / Protein: x / Nitrite: x   Leuk Esterase: x / RBC: x / WBC x   Sq Epi: x / Non Sq Epi: x / Bacteria: x      I&O's Summary    BNP  RADIOLOGY & ADDITIONAL STUDIES:  < from: Xray Chest 1 View- PORTABLE-Urgent (07.12.22 @ 05:56) >    ACC: [id number] EXAM:  XR CHEST PORTABLE URGENT 1V                          PROCEDURE DATE:  07/12/2022          INTERPRETATION:  AP chest on July 12, 2022 at 5:50 AM. Patient has chest   pain.    Heart magnified by technique. Sternotomy and left-sided defibrillator   again noted.    Lungs remain clear.    Patient had endotracheal tube on December 22, 2020 no longer evident.    IMPRESSION: No acute finding.    --- End of Report ---            LARISSA KINSEY MD; Attending Radiologist  This documenthas been electronically signed. Jul 12 2022  1:05PM    < end of copied text >   Patient is a 73y old  Female who presents with a chief complaint of chest tightness.    HPI:  72 yo female with past medical history of coronary artery disease status post CABG (2007), V. tach status post pacemaker/defibrillator(Guidant), cardiac cath 2018 with PCI left circumflex and 1st obtuse, ischemic cardiomyopathy, diabetes, hyperlipidemia, A-fib on warfarin, CVA with hemorrhage (2010), cardiomems (unable to do readings as pt unable to lay flat), is presenting for complaints of fatigue and chest tightness.  She saw Dr. Reardon last week and was started on metolazone for 2 days.  She is currently on torsemide daily.  She is also on digoxin.  States she has been feeling fatigued and tired since starting the metolazone a few days ago.  Also notes intermittent chest tightness.  Denies any new shortness of breath and states she has chronic orthopnea.  Leg swelling has been baseline per patient.  Chest tightness not worsened with exertion.    Interval Hx: She states that her chest tightness has resolved. She denies SOB. She states that her leg swelling has improved since starting metolazone.  Outpatient Cardiologist: Dr Reardon    PAST MEDICAL & SURGICAL HISTORY:  Benign Hypertension      CAD (Coronary Artery Disease)      Diabetes Mellitus, Type 2      CVA (Cerebral Vascular Accident)  > 10 years ago      Hyperthyroidism      Former smoker      Obesity      MI (myocardial infarction)  2007      HLD (hyperlipidemia)      CHF (congestive heart failure)  denies any recent exacerbations or intubation hx      Unsteady gait  uses walker PRN      Atrial fibrillation  on Coumadin      Mild pulmonary hypertension  on echo 5/2020, moderate on cardio ( Dr. Curtis's note)      CABG (Coronary Artery Bypass Graft)  2007      S/P Craniotomy  > 10 years ago with bleed evaccuation      S/P Ventriculoperitoneal Shunt  probably removed per pt      S/P Hysterectomy      AICD (automatic cardioverter/defibrillator) present  [employer], N161 [employer]ant/[id number], last interrogation 11/25/2020      S/P coronary artery stent placement  > 2 years ago, 2 stents                ECHO  FINDINGS:  TTE 10/26/23:  2. Left ventricular systolic function is severely decreased with an ejection fraction visually estimated at 25 to 30 %. Global left ventricular hypokinesis.   3. Segmental wall motion abnormalities cannot be excluded due to poor endocardial border definition   (patient refused Definity enhancement).   4. There is moderate (grade 2) left ventricular diastolic dysfunction.   5. The right ventricle is not well visualized. mildly reduced systolic function.   6. Device lead is visualized in the right heart.   7. Right atrium was not well visualized.   8. The left atrium is severely dilated.   9. Mitral valve leaflets are diffusely calcified.   10. There is mild posterior calcification and moderate calcification of the mitral valve annulus.   11. Mild mitral regurgitation.   12. Trileaflet aortic valve with normal systolic excursion.   13. Mild to moderate tricuspid regurgitation.   14. Estimated pulmonary artery systolic pressure is 40 mmHg, consistent with mild pulmonary hypertension.   15. Compared to the transthoracic echocardiogram performed on 4/8/2022 there have probably been no significant interval changes LVEF appears lower but suboptimal endocardial definition makes reliable comparison difficult. Consider repeat study with the use of echocontrast.      MEDICATIONS  (STANDING):    MEDICATIONS  (PRN):      FAMILY HISTORY:    Denies Family history of CAD or early MI    ROS:     ROS negative except as noted above      SOCIAL HISTORY:    No tobacco, Alcohol or Drug use    Vital Signs Last 24 Hrs  T(C): 36.4 (07 Dec 2023 12:01), Max: 37 (07 Dec 2023 07:24)  T(F): 97.5 (07 Dec 2023 12:01), Max: 98.6 (07 Dec 2023 07:24)  HR: 87 (07 Dec 2023 12:01) (74 - 87)  BP: 105/66 (07 Dec 2023 12:01) (104/66 - 116/64)  BP(mean): --  RR: 17 (07 Dec 2023 12:01) (17 - 18)  SpO2: 97% (07 Dec 2023 12:01) (95% - 97%)    Parameters below as of 07 Dec 2023 12:01  Patient On (Oxygen Delivery Method): room air        Physical Exam:  General: awake, alert, NAD  HEENT: NCAT, moist mucous membranes   Neurology: A&Ox3, nonfocal, sensation intact   Respiratory: + bibasilar crackles  CV: RRR, +S1/S2, no murmurs, rubs or gallops  Abdominal: Soft, NT, ND  Extremities: R LE with 2 + pitting edema, L LE with 2+ nonpitting edema.  MSK: Normal ROM, no joint erythema or warmth, no joint swelling   Heme: No obvious ecchymosis or petechiae   Skin: warm, dry, normal color      ECG: Ventricular-paced rhythm Biventricular pacemaker detected. HR 78    I&O's Detail      LABS:                        11.2   4.73  )-----------( 65       ( 07 Dec 2023 08:05 )             34.8     12-07    137  |  97  |  73<H>  ----------------------------<  191<H>  3.9   |  31  |  1.70<H>    Ca    7.9<L>      07 Dec 2023 08:05  Mg     2.6     12-07          PT/INR - ( 07 Dec 2023 08:05 )   PT: 24.5 sec;   INR: 2.14 ratio         PTT - ( 07 Dec 2023 08:05 )  PTT:43.0 sec  Urinalysis Basic - ( 07 Dec 2023 08:05 )    Color: x / Appearance: x / SG: x / pH: x  Gluc: 191 mg/dL / Ketone: x  / Bili: x / Urobili: x   Blood: x / Protein: x / Nitrite: x   Leuk Esterase: x / RBC: x / WBC x   Sq Epi: x / Non Sq Epi: x / Bacteria: x      I&O's Summary    BNP  RADIOLOGY & ADDITIONAL STUDIES:  < from: Xray Chest 1 View- PORTABLE-Urgent (07.12.22 @ 05:56) >    ACC: [id number] EXAM:  XR CHEST PORTABLE URGENT 1V                          PROCEDURE DATE:  07/12/2022          INTERPRETATION:  AP chest on July 12, 2022 at 5:50 AM. Patient has chest   pain.    Heart magnified by technique. Sternotomy and left-sided defibrillator   again noted.    Lungs remain clear.    Patient had endotracheal tube on December 22, 2020 no longer evident.    IMPRESSION: No acute finding.    --- End of Report ---            LARISSA KINSEY MD; Attending Radiologist  This documenthas been electronically signed. Jul 12 2022  1:05PM    < end of copied text >

## 2023-12-07 NOTE — H&P ADULT - HISTORY OF PRESENT ILLNESS
73 yF with PMHx of CAD status post CABG (2007), V. tach status post pacemaker/defibrillator(Guidant), cardiac cath 2018 with PCI, diabetes, hyperlipidemia, A-fib on warfarin, CVA with hemorrhage (2010), cardiomems (unable to do readings as pt unable to lay flat), is presenting for complaints of fatigue and chest tightness.  Recently prescribed metolazone when he saw her cardiologist last week (Dr. Reardon).    IN THE ED  Vitals: BP | HR  | RR  | Temp | SpO2   S/p:*any thing they got in ED*  EKG:  Labs  Imaging   73 yF with PMHx of CAD status post CABG (2007), V. tach status post pacemaker/defibrillator(Guidant), cardiac cath 2018 with PCI, diabetes, hyperlipidemia, A-fib on warfarin, CVA with hemorrhage (2010), cardiomems (unable to do readings as pt unable to lay flat), is presenting for complaints of fatigue, chest tightness, and cough.  Recently prescribed metolazone when he saw her cardiologist last week (Dr. Reardon). Patient states ever since starting the metolazone she has felt weak. Patient also states she has a similar cough every year at the beginning of winter. Patient denies other symptoms of Covid. Patient has bilateral leg swelling with the left leg always worse. Patient states the chest tightness has resolved since arriving to hospital and denies current chest pain, and shortness of breath. Patient denies recent bleeding or symptoms of GI bleed.     Denies fever, chills, cough, abdominal pain, nausea, vomiting, diarrhea, constipation, urinary frequency, urgency, or dysuria, headaches.  Denies recent travel, recent antibiotic use, or sick contacts.    ED Course:   Vitals: BP: 105/66, HR: 87, Temp:97.5 , RR: 17, SpO2: 97% on RA   Labs:  WBC: 4.73 HGB: 11.2 Platelet: 65 INR: 2.14 Trop 53.9 -> 58.6 COVID +  CT Chest:   Small pericardial effusion. Cardiomegaly. Mild peribronchial thickening and bronchial secretions in the subsegmental and segmental left more than right lower lobe pulmonary bronchi may relate to a component of infection, inflammation or sequela from chronic congestion.  EKG: Ventricular paced rhythm - biventricular pacemaker detected  Received in the ED:   Entresto x 1

## 2023-12-07 NOTE — ED ADULT NURSE NOTE - CHIEF COMPLAINT QUOTE
per ems from [facility] with general malaise, feeling unwell, feels dry. pt's doctor increased water pill last week. per ems from [facility] with general malaise, feeling unwell, feels dry. pt's doctor increased water pill last week.

## 2023-12-07 NOTE — H&P ADULT - PROBLEM SELECTOR PLAN 8
History of CAD, cardiac cath 2018 with PCI  - continue Aspirin 81 MG and Simvastatin 40 MG Oral Tablet

## 2023-12-07 NOTE — H&P ADULT - PROBLEM SELECTOR PLAN 2
Recent history of chest tightness (now resolved) and elevated troponin on admission: Trop 53.9 -> 58.6  - trend to peak  - f/u troponin 12/7 PM

## 2023-12-07 NOTE — CONSULT NOTE ADULT - ASSESSMENT
72 yo female with past medical history of coronary artery disease status post CABG (2007), V. tach status post pacemaker/defibrillator(Guidant), cardiac cath 2018 with PCI left circumflex and 1st obtuse, ischemic cardiomyopathy, diabetes, hyperlipidemia, A-fib on warfarin, CVA with hemorrhage (2010), cardiomems (unable to do readings as pt unable to lay flat), is presenting for complaints of fatigue and chest tightness.   Cardiology consulted for chest tightness  Outpatient Cardiologist: Dr. Reardon     - Patient is   - No clear evidence of acute ischemia, trops negative x 2. Will follow up third set.  - His CKs are flat, suggesting against acute atherosclerotic plaque rupture.  - Biomarker trend is not consistent with plaque rupture but rather demand ischemia. Monitor closely for the development of anginal symptoms or clinical signs of ischemia.   - No acute changes on EKG compared to previous.    - No meaningful evidence of volume overload.  - Previous TTE shows ___.    - BP well controlled, monitor routine hemodynamics.  - Continue ___.    - Monitor and replete lytes, keep K>4, Mg>2.  - Strict I/Os, daily weights.  - Pt has no active ischemia, decompensated heart failure, unstable arrythmia, or severe stenotic valvular disease, and has __ cardiac risk factors. In the setting of low risk ___, he/she is optimized from cardiovascular standpoint to proceed with planned procedure with routine hemodynamic monitoring.   - Pt has no modifiable active cardiac risk factors and in the setting of low risk _____, patient is optimized as best as possible from cardiovascular standpoint to proceed with planned procedure with routine hemodynamic monitoring.  - Other cardiovascular workup will depend on clinical course.  - All other workup per primary team.  - Will continue to follow.             74 yo female with past medical history of coronary artery disease status post CABG (2007), V. tach status post pacemaker/defibrillator(Guidant), cardiac cath 2018 with PCI left circumflex and 1st obtuse, ischemic cardiomyopathy, diabetes, hyperlipidemia, A-fib on warfarin, CVA with hemorrhage (2010), cardiomems (unable to do readings as pt unable to lay flat), is presenting for complaints of fatigue and chest tightness.   Cardiology consulted for chest tightness  Outpatient Cardiologist: Dr. Reardon     - Patient is   - No clear evidence of acute ischemia, trops negative x 2. Will follow up third set.  - His CKs are flat, suggesting against acute atherosclerotic plaque rupture.  - Biomarker trend is not consistent with plaque rupture but rather demand ischemia. Monitor closely for the development of anginal symptoms or clinical signs of ischemia.   - No acute changes on EKG compared to previous.    - No meaningful evidence of volume overload.  - Previous TTE shows ___.    - BP well controlled, monitor routine hemodynamics.  - Continue ___.    - Monitor and replete lytes, keep K>4, Mg>2.  - Strict I/Os, daily weights.  - Pt has no active ischemia, decompensated heart failure, unstable arrythmia, or severe stenotic valvular disease, and has __ cardiac risk factors. In the setting of low risk ___, he/she is optimized from cardiovascular standpoint to proceed with planned procedure with routine hemodynamic monitoring.   - Pt has no modifiable active cardiac risk factors and in the setting of low risk _____, patient is optimized as best as possible from cardiovascular standpoint to proceed with planned procedure with routine hemodynamic monitoring.  - Other cardiovascular workup will depend on clinical course.  - All other workup per primary team.  - Will continue to follow.             74 yo female with past medical history of coronary artery disease status post CABG (2007), V. tach status post pacemaker/defibrillator(Guidant), cardiac cath 2018 with PCI left circumflex and 1st obtuse, ischemic cardiomyopathy, diabetes, hyperlipidemia, A-fib on warfarin, CVA with hemorrhage (2010), cardiomems (unable to do readings as pt unable to lay flat), is presenting for complaints of fatigue and chest tightness.   Cardiology consulted for chest tightness  Outpatient Cardiologist: Dr. Reardon     - chest pain has resolved  - Troponin 53.9  - EKG Ventricular-paced rhythm Biventricular pacemaker detected. HR 78  - No acute changes on EKG compared to previous.  - continue Aspirin 81 MG and Simvastatin 40 MG Oral Tablet    - Pt appears to have volume overload on exam  - Previous TTE 10/26/23: LVEF 25-30%, global LV hypokinesis. moderate (grade 2) left ventricular diastolic dysfunction.  LA severely dilated. MIld to moderate TR. Mild pulmonary HTN.  - continue home Entresto 24-26 mg BID, Torsemide 60 mg, pt unsure if she was on spironolactone outpatient.   - hold home metolazone    - Hx AFib  - continue Warfarin for AC  - continue Carvedilol 6.25 MG BID,  Digoxin 125 MCG     - BP well controlled, monitor routine hemodynamics.  - Continue Carvedilol 6.25 MG BID,    - Monitor and replete lytes, keep K>4, Mg>2.  - Strict I/Os, daily weights.  - Other cardiovascular workup will depend on clinical course.  - All other workup per primary team.  - Will continue to follow.       74 yo female with past medical history of coronary artery disease status post CABG (2007), V. tach status post pacemaker/defibrillator(Guidant), cardiac cath 2018 with PCI left circumflex and 1st obtuse, ischemic cardiomyopathy, diabetes, hyperlipidemia, A-fib on warfarin, CVA with hemorrhage (2010), cardiomems (unable to do readings as pt unable to lay flat), is presenting for complaints of fatigue and chest tightness. + COVID  Cardiology consulted for chest tightness  Outpatient Cardiologist: Dr. Reardon     - chest pain has resolved  - Troponin 53.9  - EKG Ventricular-paced rhythm Biventricular pacemaker detected. HR 78  - No acute changes on EKG compared to previous.  - continue Aspirin 81 MG and Simvastatin 40 MG Oral Tablet    - Pt appears to have volume overload on exam  - Previous TTE 10/26/23: LVEF 25-30%, global LV hypokinesis. moderate (grade 2) left ventricular diastolic dysfunction.  LA severely dilated. MIld to moderate TR. Mild pulmonary HTN.  - continue home Entresto 24-26 mg BID, Torsemide 60 mg, pt unsure if she was on spironolactone outpatient.   - hold home metolazone    - Hx AFib  - continue Warfarin for AC  - continue Carvedilol 6.25 MG BID,  Digoxin 125 MCG     - BP well controlled, monitor routine hemodynamics.  - Continue Carvedilol 6.25 MG BID,    - Monitor and replete lytes, keep K>4, Mg>2.  - Strict I/Os, daily weights.  - Other cardiovascular workup will depend on clinical course.  - All other workup per primary team.  - Will continue to follow.       74 yo female with past medical history of coronary artery disease status post CABG (2007), V. tach status post pacemaker/defibrillator(Guidant), cardiac cath 2018 with PCI left circumflex and 1st obtuse, ischemic cardiomyopathy, diabetes, hyperlipidemia, A-fib on warfarin, CVA with hemorrhage (2010), cardiomems (unable to do readings as pt unable to lay flat), is presenting for complaints of fatigue and chest tightness. + COVID  Cardiology consulted for chest tightness  Outpatient Cardiologist: Dr. Reardon     - chest pain has resolved  - Troponin 53.9  - EKG Ventricular-paced rhythm Biventricular pacemaker detected. HR 78  - No acute changes on EKG compared to previous.  - continue Aspirin 81 MG and Simvastatin 40 MG Oral Tablet    - difficult to assess vol status. cardiomems nonfunctioning.   - Previous TTE 10/26/23: LVEF 25-30%, global LV hypokinesis. moderate (grade 2) left ventricular diastolic dysfunction.  LA severely dilated. MIld to moderate TR. Mild pulmonary HTN.  - continue home Entresto 24-26 mg BID, Torsemide 60 mg, pt unsure if she was on spironolactone outpatient.   - hold home metolazone    - Hx AFib  - continue Warfarin for AC  - continue Carvedilol 6.25 MG BID,  Digoxin 125 MCG     - BP well controlled, monitor routine hemodynamics.  - Continue Carvedilol 6.25 MG BID,    - Monitor and replete lytes, keep K>4, Mg>2.  - Strict I/Os, daily weights.  - Other cardiovascular workup will depend on clinical course.  - All other workup per primary team.  - Will continue to follow.

## 2023-12-08 LAB
A1C WITH ESTIMATED AVERAGE GLUCOSE RESULT: 7.3 % — HIGH (ref 4–5.6)
A1C WITH ESTIMATED AVERAGE GLUCOSE RESULT: 7.3 % — HIGH (ref 4–5.6)
ALBUMIN SERPL ELPH-MCNC: 3.1 G/DL — LOW (ref 3.3–5)
ALP SERPL-CCNC: 56 U/L — SIGNIFICANT CHANGE UP (ref 40–120)
ALP SERPL-CCNC: 61 U/L — SIGNIFICANT CHANGE UP (ref 40–120)
ALP SERPL-CCNC: 61 U/L — SIGNIFICANT CHANGE UP (ref 40–120)
ALT FLD-CCNC: 17 U/L — SIGNIFICANT CHANGE UP (ref 12–78)
ALT FLD-CCNC: 19 U/L — SIGNIFICANT CHANGE UP (ref 12–78)
ALT FLD-CCNC: 19 U/L — SIGNIFICANT CHANGE UP (ref 12–78)
ANION GAP SERPL CALC-SCNC: 5 MMOL/L — SIGNIFICANT CHANGE UP (ref 5–17)
ANION GAP SERPL CALC-SCNC: 5 MMOL/L — SIGNIFICANT CHANGE UP (ref 5–17)
APTT BLD: 40.3 SEC — HIGH (ref 24.5–35.6)
APTT BLD: 40.3 SEC — HIGH (ref 24.5–35.6)
AST SERPL-CCNC: 28 U/L — SIGNIFICANT CHANGE UP (ref 15–37)
AST SERPL-CCNC: 31 U/L — SIGNIFICANT CHANGE UP (ref 15–37)
AST SERPL-CCNC: 31 U/L — SIGNIFICANT CHANGE UP (ref 15–37)
BASOPHILS # BLD AUTO: 0.01 K/UL — SIGNIFICANT CHANGE UP (ref 0–0.2)
BASOPHILS # BLD AUTO: 0.01 K/UL — SIGNIFICANT CHANGE UP (ref 0–0.2)
BASOPHILS NFR BLD AUTO: 0.3 % — SIGNIFICANT CHANGE UP (ref 0–2)
BASOPHILS NFR BLD AUTO: 0.3 % — SIGNIFICANT CHANGE UP (ref 0–2)
BILIRUB DIRECT SERPL-MCNC: 0.3 MG/DL — SIGNIFICANT CHANGE UP (ref 0–0.3)
BILIRUB DIRECT SERPL-MCNC: 0.3 MG/DL — SIGNIFICANT CHANGE UP (ref 0–0.3)
BILIRUB DIRECT SERPL-MCNC: 0.4 MG/DL — HIGH (ref 0–0.3)
BILIRUB DIRECT SERPL-MCNC: 0.4 MG/DL — HIGH (ref 0–0.3)
BILIRUB INDIRECT FLD-MCNC: 0.6 MG/DL — SIGNIFICANT CHANGE UP (ref 0.2–1)
BILIRUB SERPL-MCNC: 0.9 MG/DL — SIGNIFICANT CHANGE UP (ref 0.2–1.2)
BILIRUB SERPL-MCNC: 0.9 MG/DL — SIGNIFICANT CHANGE UP (ref 0.2–1.2)
BILIRUB SERPL-MCNC: 1 MG/DL — SIGNIFICANT CHANGE UP (ref 0.2–1.2)
BUN SERPL-MCNC: 67 MG/DL — HIGH (ref 7–23)
BUN SERPL-MCNC: 67 MG/DL — HIGH (ref 7–23)
CALCIUM SERPL-MCNC: 8 MG/DL — LOW (ref 8.5–10.1)
CALCIUM SERPL-MCNC: 8 MG/DL — LOW (ref 8.5–10.1)
CHLORIDE SERPL-SCNC: 98 MMOL/L — SIGNIFICANT CHANGE UP (ref 96–108)
CHLORIDE SERPL-SCNC: 98 MMOL/L — SIGNIFICANT CHANGE UP (ref 96–108)
CHOLEST SERPL-MCNC: 91 MG/DL — SIGNIFICANT CHANGE UP
CHOLEST SERPL-MCNC: 91 MG/DL — SIGNIFICANT CHANGE UP
CO2 SERPL-SCNC: 34 MMOL/L — HIGH (ref 22–31)
CO2 SERPL-SCNC: 34 MMOL/L — HIGH (ref 22–31)
CREAT SERPL-MCNC: 1.5 MG/DL — HIGH (ref 0.5–1.3)
CREAT SERPL-MCNC: 1.5 MG/DL — HIGH (ref 0.5–1.3)
CREAT SERPL-MCNC: 1.6 MG/DL — HIGH (ref 0.5–1.3)
EGFR: 34 ML/MIN/1.73M2 — LOW
EGFR: 37 ML/MIN/1.73M2 — LOW
EGFR: 37 ML/MIN/1.73M2 — LOW
EOSINOPHIL # BLD AUTO: 0 K/UL — SIGNIFICANT CHANGE UP (ref 0–0.5)
EOSINOPHIL # BLD AUTO: 0 K/UL — SIGNIFICANT CHANGE UP (ref 0–0.5)
EOSINOPHIL NFR BLD AUTO: 0 % — SIGNIFICANT CHANGE UP (ref 0–6)
EOSINOPHIL NFR BLD AUTO: 0 % — SIGNIFICANT CHANGE UP (ref 0–6)
ESTIMATED AVERAGE GLUCOSE: 163 MG/DL — HIGH (ref 68–114)
ESTIMATED AVERAGE GLUCOSE: 163 MG/DL — HIGH (ref 68–114)
GLUCOSE SERPL-MCNC: 161 MG/DL — HIGH (ref 70–99)
GLUCOSE SERPL-MCNC: 161 MG/DL — HIGH (ref 70–99)
HCT VFR BLD CALC: 35.5 % — SIGNIFICANT CHANGE UP (ref 34.5–45)
HCT VFR BLD CALC: 35.5 % — SIGNIFICANT CHANGE UP (ref 34.5–45)
HDLC SERPL-MCNC: 33 MG/DL — LOW
HDLC SERPL-MCNC: 33 MG/DL — LOW
HGB BLD-MCNC: 11.2 G/DL — LOW (ref 11.5–15.5)
HGB BLD-MCNC: 11.2 G/DL — LOW (ref 11.5–15.5)
IMM GRANULOCYTES NFR BLD AUTO: 0.3 % — SIGNIFICANT CHANGE UP (ref 0–0.9)
IMM GRANULOCYTES NFR BLD AUTO: 0.3 % — SIGNIFICANT CHANGE UP (ref 0–0.9)
INR BLD: 2.28 RATIO — HIGH (ref 0.85–1.18)
INR BLD: 2.28 RATIO — HIGH (ref 0.85–1.18)
INR BLD: 2.29 RATIO — HIGH (ref 0.85–1.18)
INR BLD: 2.29 RATIO — HIGH (ref 0.85–1.18)
LIPID PNL WITH DIRECT LDL SERPL: 37 MG/DL — SIGNIFICANT CHANGE UP
LIPID PNL WITH DIRECT LDL SERPL: 37 MG/DL — SIGNIFICANT CHANGE UP
LYMPHOCYTES # BLD AUTO: 0.58 K/UL — LOW (ref 1–3.3)
LYMPHOCYTES # BLD AUTO: 0.58 K/UL — LOW (ref 1–3.3)
LYMPHOCYTES # BLD AUTO: 18.5 % — SIGNIFICANT CHANGE UP (ref 13–44)
LYMPHOCYTES # BLD AUTO: 18.5 % — SIGNIFICANT CHANGE UP (ref 13–44)
MCHC RBC-ENTMCNC: 28.6 PG — SIGNIFICANT CHANGE UP (ref 27–34)
MCHC RBC-ENTMCNC: 28.6 PG — SIGNIFICANT CHANGE UP (ref 27–34)
MCHC RBC-ENTMCNC: 31.5 GM/DL — LOW (ref 32–36)
MCHC RBC-ENTMCNC: 31.5 GM/DL — LOW (ref 32–36)
MCV RBC AUTO: 90.8 FL — SIGNIFICANT CHANGE UP (ref 80–100)
MCV RBC AUTO: 90.8 FL — SIGNIFICANT CHANGE UP (ref 80–100)
MONOCYTES # BLD AUTO: 0.34 K/UL — SIGNIFICANT CHANGE UP (ref 0–0.9)
MONOCYTES # BLD AUTO: 0.34 K/UL — SIGNIFICANT CHANGE UP (ref 0–0.9)
MONOCYTES NFR BLD AUTO: 10.8 % — SIGNIFICANT CHANGE UP (ref 2–14)
MONOCYTES NFR BLD AUTO: 10.8 % — SIGNIFICANT CHANGE UP (ref 2–14)
NEUTROPHILS # BLD AUTO: 2.2 K/UL — SIGNIFICANT CHANGE UP (ref 1.8–7.4)
NEUTROPHILS # BLD AUTO: 2.2 K/UL — SIGNIFICANT CHANGE UP (ref 1.8–7.4)
NEUTROPHILS NFR BLD AUTO: 70.1 % — SIGNIFICANT CHANGE UP (ref 43–77)
NEUTROPHILS NFR BLD AUTO: 70.1 % — SIGNIFICANT CHANGE UP (ref 43–77)
NON HDL CHOLESTEROL: 59 MG/DL — SIGNIFICANT CHANGE UP
NON HDL CHOLESTEROL: 59 MG/DL — SIGNIFICANT CHANGE UP
NRBC # BLD: 0 /100 WBCS — SIGNIFICANT CHANGE UP (ref 0–0)
NRBC # BLD: 0 /100 WBCS — SIGNIFICANT CHANGE UP (ref 0–0)
PLATELET # BLD AUTO: 75 K/UL — LOW (ref 150–400)
PLATELET # BLD AUTO: 75 K/UL — LOW (ref 150–400)
POTASSIUM SERPL-MCNC: 3.4 MMOL/L — LOW (ref 3.5–5.3)
POTASSIUM SERPL-MCNC: 3.4 MMOL/L — LOW (ref 3.5–5.3)
POTASSIUM SERPL-SCNC: 3.4 MMOL/L — LOW (ref 3.5–5.3)
POTASSIUM SERPL-SCNC: 3.4 MMOL/L — LOW (ref 3.5–5.3)
PROT SERPL-MCNC: 6.9 G/DL — SIGNIFICANT CHANGE UP (ref 6–8.3)
PROT SERPL-MCNC: 7.3 G/DL — SIGNIFICANT CHANGE UP (ref 6–8.3)
PROT SERPL-MCNC: 7.3 G/DL — SIGNIFICANT CHANGE UP (ref 6–8.3)
PROTHROM AB SERPL-ACNC: 26 SEC — HIGH (ref 9.5–13)
PROTHROM AB SERPL-ACNC: 26 SEC — HIGH (ref 9.5–13)
PROTHROM AB SERPL-ACNC: 26.2 SEC — HIGH (ref 9.5–13)
PROTHROM AB SERPL-ACNC: 26.2 SEC — HIGH (ref 9.5–13)
RBC # BLD: 3.91 M/UL — SIGNIFICANT CHANGE UP (ref 3.8–5.2)
RBC # BLD: 3.91 M/UL — SIGNIFICANT CHANGE UP (ref 3.8–5.2)
RBC # FLD: 14.9 % — HIGH (ref 10.3–14.5)
RBC # FLD: 14.9 % — HIGH (ref 10.3–14.5)
SODIUM SERPL-SCNC: 137 MMOL/L — SIGNIFICANT CHANGE UP (ref 135–145)
SODIUM SERPL-SCNC: 137 MMOL/L — SIGNIFICANT CHANGE UP (ref 135–145)
TRIGL SERPL-MCNC: 122 MG/DL — SIGNIFICANT CHANGE UP
TRIGL SERPL-MCNC: 122 MG/DL — SIGNIFICANT CHANGE UP
WBC # BLD: 3.14 K/UL — LOW (ref 3.8–10.5)
WBC # BLD: 3.14 K/UL — LOW (ref 3.8–10.5)
WBC # FLD AUTO: 3.14 K/UL — LOW (ref 3.8–10.5)
WBC # FLD AUTO: 3.14 K/UL — LOW (ref 3.8–10.5)

## 2023-12-08 PROCEDURE — 99222 1ST HOSP IP/OBS MODERATE 55: CPT

## 2023-12-08 PROCEDURE — 99232 SBSQ HOSP IP/OBS MODERATE 35: CPT

## 2023-12-08 PROCEDURE — 99233 SBSQ HOSP IP/OBS HIGH 50: CPT | Mod: GC

## 2023-12-08 RX ORDER — REMDESIVIR 5 MG/ML
200 INJECTION INTRAVENOUS EVERY 24 HOURS
Refills: 0 | Status: COMPLETED | OUTPATIENT
Start: 2023-12-08 | End: 2023-12-08

## 2023-12-08 RX ORDER — WARFARIN SODIUM 2.5 MG/1
4 TABLET ORAL DAILY
Refills: 0 | Status: DISCONTINUED | OUTPATIENT
Start: 2023-12-08 | End: 2023-12-09

## 2023-12-08 RX ORDER — REMDESIVIR 5 MG/ML
INJECTION INTRAVENOUS
Refills: 0 | Status: COMPLETED | OUTPATIENT
Start: 2023-12-08 | End: 2023-12-10

## 2023-12-08 RX ORDER — REMDESIVIR 5 MG/ML
100 INJECTION INTRAVENOUS EVERY 24 HOURS
Refills: 0 | Status: COMPLETED | OUTPATIENT
Start: 2023-12-09 | End: 2023-12-10

## 2023-12-08 RX ADMIN — Medication 200 MILLIGRAM(S): at 18:15

## 2023-12-08 RX ADMIN — REMDESIVIR 200 MILLIGRAM(S): 5 INJECTION INTRAVENOUS at 12:37

## 2023-12-08 RX ADMIN — CARVEDILOL PHOSPHATE 6.25 MILLIGRAM(S): 80 CAPSULE, EXTENDED RELEASE ORAL at 18:14

## 2023-12-08 RX ADMIN — Medication 60 MILLIGRAM(S): at 06:14

## 2023-12-08 RX ADMIN — INSULIN GLARGINE 20 UNIT(S): 100 INJECTION, SOLUTION SUBCUTANEOUS at 08:19

## 2023-12-08 RX ADMIN — Medication 200 MILLIGRAM(S): at 06:13

## 2023-12-08 RX ADMIN — SACUBITRIL AND VALSARTAN 1 TABLET(S): 24; 26 TABLET, FILM COATED ORAL at 18:14

## 2023-12-08 RX ADMIN — INSULIN GLARGINE 22 UNIT(S): 100 INJECTION, SOLUTION SUBCUTANEOUS at 22:49

## 2023-12-08 RX ADMIN — SIMVASTATIN 40 MILLIGRAM(S): 20 TABLET, FILM COATED ORAL at 22:36

## 2023-12-08 RX ADMIN — Medication 100 MILLIGRAM(S): at 16:41

## 2023-12-08 RX ADMIN — CARVEDILOL PHOSPHATE 6.25 MILLIGRAM(S): 80 CAPSULE, EXTENDED RELEASE ORAL at 06:14

## 2023-12-08 RX ADMIN — Medication 100 MILLIGRAM(S): at 06:14

## 2023-12-08 RX ADMIN — Medication 2: at 12:23

## 2023-12-08 RX ADMIN — Medication 2000 UNIT(S): at 12:20

## 2023-12-08 RX ADMIN — WARFARIN SODIUM 4 MILLIGRAM(S): 2.5 TABLET ORAL at 22:50

## 2023-12-08 RX ADMIN — SACUBITRIL AND VALSARTAN 1 TABLET(S): 24; 26 TABLET, FILM COATED ORAL at 07:04

## 2023-12-08 RX ADMIN — Medication 200 MILLIGRAM(S): at 12:20

## 2023-12-08 RX ADMIN — Medication 2: at 08:18

## 2023-12-08 RX ADMIN — Medication 4: at 18:15

## 2023-12-08 RX ADMIN — Medication 100 MILLIGRAM(S): at 22:35

## 2023-12-08 RX ADMIN — Medication 125 MICROGRAM(S): at 12:20

## 2023-12-08 RX ADMIN — Medication 81 MILLIGRAM(S): at 12:20

## 2023-12-08 NOTE — PROGRESS NOTE ADULT - ASSESSMENT
73F CAD, ICM, s/p CABG (2007), stent placement (2018) VT s/p PPM/defibrillator(Guidant), diabetes, hyperlipidemia, Afib on warfarin, CVA with hemorrhage (2010), cardiomems (unable to do readings as pt unable to lay flat), is presenting for complaints of fatigue and chest tightness. + COVID     CAD, Afib, chest pain   - chest pain non anginal and has resolved  - Troponin 53.9    - EKG:  78  - No acute changes on EKG compared to previous.  - continue ASA, statin     - difficult to assess vol status, s/p cardiomems nonfunctioning.   - TTE (10/26/23) LVEF 25-30%, global LV hypokinesis, mod LVDD,  LA severely dilated. MIld to mod TR. Mild pulmonary HTN.  - continue home Entresto 24-26 mg BID, Torsemide 60 mg, pt unsure if she was on spironolactone outpatient.   - hold home metolazone    - known Hx of  AFib on coumadin   - continue daily dose coumadin for goal INR 2-3  - BP, HR stable and controlled  - continue Carvedilol 6.25 MG BID  - continue Digoxin 125 MCG   - Monitor and replete lytes, keep K>4, Mg>2.    - + COVID, iso and management per primary   - Will continue to follow.    Daina Garcia [facility]  Nurse Practitioner - Cardiology   call TEAMS   73F CAD, ICM, s/p CABG (2007), stent placement (2018) VT s/p PPM/defibrillator(Guidant), diabetes, hyperlipidemia, Afib on warfarin, CVA with hemorrhage (2010), cardiomems (unable to do readings as pt unable to lay flat), is presenting for complaints of fatigue and chest tightness. + COVID     CAD, Afib, chest pain   - chest pain non anginal and has resolved  - Troponin 53.9    - EKG:  78  - No acute changes on EKG compared to previous.  - continue ASA, statin     - difficult to assess vol status, s/p cardiomems nonfunctioning.   - TTE (10/26/23) LVEF 25-30%, global LV hypokinesis, mod LVDD,  LA severely dilated. MIld to mod TR. Mild pulmonary HTN.  - continue home Entresto 24-26 mg BID, Torsemide 60 mg, pt unsure if she was on spironolactone outpatient.   - hold home metolazone    - known Hx of  AFib on coumadin   - continue daily dose coumadin for goal INR 2-3  - BP, HR stable and controlled  - continue Carvedilol 6.25 MG BID  - continue Digoxin 125 MCG   - Monitor and replete lytes, keep K>4, Mg>2.    - + COVID, iso and management per primary   - Will continue to follow.    Daina Garcia [facility]  Nurse Practitioner - Cardiology   call TEAMS

## 2023-12-08 NOTE — ED ADULT NURSE REASSESSMENT NOTE - NS ED NURSE REASSESS COMMENT FT1
pt remains in er is alert oriented admitted to hospital iv patent resp even unlabored pain free awaiting bed assignment

## 2023-12-08 NOTE — CARE COORDINATION ASSESSMENT. - NSCAREPROVIDERS_GEN_ALL_CORE_FT
CARE PROVIDERS:  Accepting Physician: Real Stephenson  Administration: Jayme Holder  Administration: Mau Ellis  Administration: Amber Collins  Admitting: Real Stephenson  Attending: Real Stephenson  Consultant: Estuardo Valdez  Consultant: Haider Starkey  Consultant: Daina Garcia  Consultant: Jimmy Rivera  Consultant: Hon Avelino  Consultant: Brenda Miramontes  Consultant: Christa Ocampo  Covering Team: Cami Bruner  ED Attending: Shamir Contreras ED Nurse: Suki Perez  Infection Control: Bhavana Trejo  Ordered: Doctor, Unknown  Ordered: ADM, User  Ordered: ServiceAccount, SCMMLM  Outpatient Provider: Rosendo Jenkins  Outpatient Provider: Catrachito Rojas  Outpatient Provider: Pito Curtis  Outpatient Provider: Jason Reardon  Primary Team: Gilbert Hendrix  Primary Team: Pranav Winter  Primary Team: Galilea Gallagher  Primary Team: Mery Veliz  Primary Team: Bradford Yo  Primary Team: Thai Thomas  : Chrissy Vale  : Radha Cavazos  Student: Marilyn Roque  Team: [facility], Team  UR// Supp. Assoc.: Christine Curtis

## 2023-12-08 NOTE — PROGRESS NOTE ADULT - PROBLEM SELECTOR PLAN 9
- patient on Coumadin, being held tonight, but may consider restarting in am upon coordination with heme-onc and coags, cbc results  - SCD - patient on Coumadin, now resumed

## 2023-12-08 NOTE — PROGRESS NOTE ADULT - PROBLEM SELECTOR PLAN 1
Patient presents with cough, SOB w/ exertion likely 2/2suspected/confirmed COVID-19 infection  - Will hold off on remdesivir, GFR >30 and ALT not > than 390, symp <14 days  - Monitor volume status closely, avoid aggressive hydration  - robitussin 200 q6,  standing tessalon perles q8 for cough  - Tylenol prn myalgias, fever  - Isolation precautions per protocol  - ID (Dr. Miramontes) consulted, f/u recs Patient presents with cough, SOB w/ exertion likely 2/2suspected/confirmed COVID-19 infection  -Will start remdesevir  - Monitor volume status closely, avoid aggressive hydration  - robitussin 200 q6,  standing tessalon perles q8 for cough  - Tylenol prn myalgias, fever  - Isolation precautions per protocol  - ID (Dr. Miramontes) consulted, f/u recs

## 2023-12-08 NOTE — PROGRESS NOTE ADULT - SUBJECTIVE AND OBJECTIVE BOX
[facility] -- Julia Mccann Pannella, Patel, Savella, Goodger, Cohen  Office # [phone]    Follow Up:  chest tightness, SOB     Subjective/Observations: seen and examined, awake, alert, resting comfortably in bed, denies chest pain, dyspnea, palpitations or dizziness, orthopnea and PND. Tolerating room air    REVIEW OF SYSTEMS: All other review of systems is negative unless indicated above  PAST MEDICAL & SURGICAL HISTORY:  Benign Hypertension      CAD (Coronary Artery Disease)      Diabetes Mellitus, Type 2      CVA (Cerebral Vascular Accident)  > 10 years ago      Hyperthyroidism      Former smoker      Obesity      MI (myocardial infarction)  2007      HLD (hyperlipidemia)      CHF (congestive heart failure)  denies any recent exacerbations or intubation hx      Unsteady gait  uses walker PRN      Atrial fibrillation  on Coumadin      Mild pulmonary hypertension  on echo 5/2020, moderate on cardio ( Dr. Curtis's note)      CABG (Coronary Artery Bypass Graft)  2007      S/P Craniotomy  > 10 years ago with bleed evaccuation      S/P Ventriculoperitoneal Shunt  probably removed per pt      S/P Hysterectomy      AICD (automatic cardioverter/defibrillator) present  ASSET4, N161 [employer]/[id number], last interrogation 11/25/2020      S/P coronary artery stent placement  > 2 years ago, 2 stents        MEDICATIONS  (STANDING):  aspirin enteric coated 81 milliGRAM(s) Oral daily  benzonatate 100 milliGRAM(s) Oral every 8 hours  carvedilol 6.25 milliGRAM(s) Oral every 12 hours  cholecalciferol 2000 Unit(s) Oral daily  dextrose 5%. 1000 milliLiter(s) (50 mL/Hr) IV Continuous <Continuous>  dextrose 5%. 1000 milliLiter(s) (100 mL/Hr) IV Continuous <Continuous>  dextrose 50% Injectable 12.5 Gram(s) IV Push once  dextrose 50% Injectable 25 Gram(s) IV Push once  dextrose 50% Injectable 25 Gram(s) IV Push once  digoxin     Tablet 125 MICROGram(s) Oral every other day  glucagon  Injectable 1 milliGRAM(s) IntraMuscular once  guaiFENesin Oral Liquid (Sugar-Free) 200 milliGRAM(s) Oral every 6 hours  insulin glargine Injectable (LANTUS) 22 Unit(s) SubCutaneous at bedtime  insulin glargine Injectable (LANTUS) 20 Unit(s) SubCutaneous every morning  insulin lispro (ADMELOG) corrective regimen sliding scale   SubCutaneous at bedtime  insulin lispro (ADMELOG) corrective regimen sliding scale   SubCutaneous three times a day before meals  methimazole 2.5 milliGRAM(s) Oral <User Schedule>  remdesivir  IVPB   IV Intermittent   remdesivir  IVPB 200 milliGRAM(s) IV Intermittent every 24 hours  sacubitril 24 mG/valsartan 26 mG 1 Tablet(s) Oral two times a day  simvastatin 40 milliGRAM(s) Oral at bedtime  torsemide 60 milliGRAM(s) Oral daily  warfarin 4 milliGRAM(s) Oral daily    MEDICATIONS  (PRN):  acetaminophen     Tablet .. 650 milliGRAM(s) Oral every 6 hours PRN Temp greater or equal to 38C (100.4F), Mild Pain (1 - 3)  dextrose Oral Gel 15 Gram(s) Oral once PRN Blood Glucose LESS THAN 70 milliGRAM(s)/deciliter  melatonin 3 milliGRAM(s) Oral at bedtime PRN Insomnia    Allergies    No Known Allergies    Intolerances      Vital Signs Last 24 Hrs  T(C): 36.9 (08 Dec 2023 04:20), Max: 36.9 (08 Dec 2023 04:20)  T(F): 98.4 (08 Dec 2023 04:20), Max: 98.4 (08 Dec 2023 04:20)  HR: 80 (08 Dec 2023 04:20) (74 - 87)  BP: 104/70 (08 Dec 2023 04:20) (104/70 - 130/60)  BP(mean): --  RR: 18 (08 Dec 2023 04:20) (16 - 18)  SpO2: 98% (08 Dec 2023 04:20) (95% - 98%)    Parameters below as of 08 Dec 2023 04:20  Patient On (Oxygen Delivery Method): room air      I&O's Summary        TELE: SR 60's   PHYSICAL EXAM:  Constitutional: NAD, awake and alert  HEENT: Moist Mucous Membranes, Anicteric  Pulmonary: Non-labored, breath sounds are clear bilaterally, No wheezing, rales or rhonchi  Cardiovascular: Regular, S1 and S2, No murmurs, rubs, gallops or clicks  Gastrointestinal: Bowel Sounds present, soft, nontender.   Lymph: 2+ RLE pitting edema, LLE + edema non pitting (chronic)  No lymphadenopathy.  Skin: No visible rashes or ulcers.  Psych:  Mood & affect appropriate  LABS: All Labs Reviewed:                        11.2   3.14  )-----------( 75       ( 08 Dec 2023 08:05 )             35.5                         11.2   4.73  )-----------( 65       ( 07 Dec 2023 08:05 )             34.8     08 Dec 2023 08:05    137    |  98     |  67     ----------------------------<  161    3.4     |  34     |  1.60   07 Dec 2023 17:52    x      |  x      |  x      ----------------------------<  x      x       |  x      |  1.50   07 Dec 2023 08:05    137    |  97     |  73     ----------------------------<  191    3.9     |  31     |  1.70     Ca    8.0        08 Dec 2023 08:05  Ca    7.9        07 Dec 2023 08:05  Mg     2.6       07 Dec 2023 08:05    TPro  6.9    /  Alb  3.1    /  TBili  1.0    /  DBili  0.3    /  AST  28     /  ALT  17     /  AlkPhos  56     08 Dec 2023 08:05  TPro  6.9    /  Alb  3.1    /  TBili  0.8    /  DBili  0.3    /  AST  27     /  ALT  17     /  AlkPhos  59     07 Dec 2023 17:52    PT/INR - ( 08 Dec 2023 08:05 )   PT: 26.0 sec;   INR: 2.28 ratio         PTT - ( 08 Dec 2023 08:05 )  PTT:40.3 sec  CARDIAC MARKERS ( 07 Dec 2023 17:52 )  x     / x     / 129 U/L / x     / 1.2 ng/mL      12 Lead ECG:   Ventricular Rate 78 BPM    Atrial Rate 357 BPM    QRS Duration 168 ms    Q-T Interval 454 ms    QTC Calculation(Bazett) 517 ms    R Axis 119 degrees    T Axis -59 degrees    Diagnosis Line Ventricular-paced rhythm  Biventricular pacemaker detected  Abnormal ECG  When compared with ECG of 12-JUL-2022 05:14,  Vent. rate has decreased BY   6 BPM  Confirmed by patrick Reardon ([id number]) on 12/7/2023 6:53:28 PM (12-07-23 @ 08:20)        [facility] -- Julia Mccann Pannella, Patel, Savella, Goodger, Cohen  Office # [phone]    Follow Up:  chest tightness, SOB     Subjective/Observations: seen and examined, awake, alert, resting comfortably in bed, denies chest pain, dyspnea, palpitations or dizziness, orthopnea and PND. Tolerating room air    REVIEW OF SYSTEMS: All other review of systems is negative unless indicated above  PAST MEDICAL & SURGICAL HISTORY:  Benign Hypertension      CAD (Coronary Artery Disease)      Diabetes Mellitus, Type 2      CVA (Cerebral Vascular Accident)  > 10 years ago      Hyperthyroidism      Former smoker      Obesity      MI (myocardial infarction)  2007      HLD (hyperlipidemia)      CHF (congestive heart failure)  denies any recent exacerbations or intubation hx      Unsteady gait  uses walker PRN      Atrial fibrillation  on Coumadin      Mild pulmonary hypertension  on echo 5/2020, moderate on cardio ( Dr. Curtis's note)      CABG (Coronary Artery Bypass Graft)  2007      S/P Craniotomy  > 10 years ago with bleed evaccuation      S/P Ventriculoperitoneal Shunt  probably removed per pt      S/P Hysterectomy      AICD (automatic cardioverter/defibrillator) present  [employer], N161 [employer]/[id number], last interrogation 11/25/2020      S/P coronary artery stent placement  > 2 years ago, 2 stents        MEDICATIONS  (STANDING):  aspirin enteric coated 81 milliGRAM(s) Oral daily  benzonatate 100 milliGRAM(s) Oral every 8 hours  carvedilol 6.25 milliGRAM(s) Oral every 12 hours  cholecalciferol 2000 Unit(s) Oral daily  dextrose 5%. 1000 milliLiter(s) (50 mL/Hr) IV Continuous <Continuous>  dextrose 5%. 1000 milliLiter(s) (100 mL/Hr) IV Continuous <Continuous>  dextrose 50% Injectable 12.5 Gram(s) IV Push once  dextrose 50% Injectable 25 Gram(s) IV Push once  dextrose 50% Injectable 25 Gram(s) IV Push once  digoxin     Tablet 125 MICROGram(s) Oral every other day  glucagon  Injectable 1 milliGRAM(s) IntraMuscular once  guaiFENesin Oral Liquid (Sugar-Free) 200 milliGRAM(s) Oral every 6 hours  insulin glargine Injectable (LANTUS) 22 Unit(s) SubCutaneous at bedtime  insulin glargine Injectable (LANTUS) 20 Unit(s) SubCutaneous every morning  insulin lispro (ADMELOG) corrective regimen sliding scale   SubCutaneous at bedtime  insulin lispro (ADMELOG) corrective regimen sliding scale   SubCutaneous three times a day before meals  methimazole 2.5 milliGRAM(s) Oral <User Schedule>  remdesivir  IVPB   IV Intermittent   remdesivir  IVPB 200 milliGRAM(s) IV Intermittent every 24 hours  sacubitril 24 mG/valsartan 26 mG 1 Tablet(s) Oral two times a day  simvastatin 40 milliGRAM(s) Oral at bedtime  torsemide 60 milliGRAM(s) Oral daily  warfarin 4 milliGRAM(s) Oral daily    MEDICATIONS  (PRN):  acetaminophen     Tablet .. 650 milliGRAM(s) Oral every 6 hours PRN Temp greater or equal to 38C (100.4F), Mild Pain (1 - 3)  dextrose Oral Gel 15 Gram(s) Oral once PRN Blood Glucose LESS THAN 70 milliGRAM(s)/deciliter  melatonin 3 milliGRAM(s) Oral at bedtime PRN Insomnia    Allergies    No Known Allergies    Intolerances      Vital Signs Last 24 Hrs  T(C): 36.9 (08 Dec 2023 04:20), Max: 36.9 (08 Dec 2023 04:20)  T(F): 98.4 (08 Dec 2023 04:20), Max: 98.4 (08 Dec 2023 04:20)  HR: 80 (08 Dec 2023 04:20) (74 - 87)  BP: 104/70 (08 Dec 2023 04:20) (104/70 - 130/60)  BP(mean): --  RR: 18 (08 Dec 2023 04:20) (16 - 18)  SpO2: 98% (08 Dec 2023 04:20) (95% - 98%)    Parameters below as of 08 Dec 2023 04:20  Patient On (Oxygen Delivery Method): room air      I&O's Summary        TELE: SR 60's   PHYSICAL EXAM:  Constitutional: NAD, awake and alert  HEENT: Moist Mucous Membranes, Anicteric  Pulmonary: Non-labored, breath sounds are clear bilaterally, No wheezing, rales or rhonchi  Cardiovascular: Regular, S1 and S2, No murmurs, rubs, gallops or clicks  Gastrointestinal: Bowel Sounds present, soft, nontender.   Lymph: 2+ RLE pitting edema, LLE + edema non pitting (chronic)  No lymphadenopathy.  Skin: No visible rashes or ulcers.  Psych:  Mood & affect appropriate  LABS: All Labs Reviewed:                        11.2   3.14  )-----------( 75       ( 08 Dec 2023 08:05 )             35.5                         11.2   4.73  )-----------( 65       ( 07 Dec 2023 08:05 )             34.8     08 Dec 2023 08:05    137    |  98     |  67     ----------------------------<  161    3.4     |  34     |  1.60   07 Dec 2023 17:52    x      |  x      |  x      ----------------------------<  x      x       |  x      |  1.50   07 Dec 2023 08:05    137    |  97     |  73     ----------------------------<  191    3.9     |  31     |  1.70     Ca    8.0        08 Dec 2023 08:05  Ca    7.9        07 Dec 2023 08:05  Mg     2.6       07 Dec 2023 08:05    TPro  6.9    /  Alb  3.1    /  TBili  1.0    /  DBili  0.3    /  AST  28     /  ALT  17     /  AlkPhos  56     08 Dec 2023 08:05  TPro  6.9    /  Alb  3.1    /  TBili  0.8    /  DBili  0.3    /  AST  27     /  ALT  17     /  AlkPhos  59     07 Dec 2023 17:52    PT/INR - ( 08 Dec 2023 08:05 )   PT: 26.0 sec;   INR: 2.28 ratio         PTT - ( 08 Dec 2023 08:05 )  PTT:40.3 sec  CARDIAC MARKERS ( 07 Dec 2023 17:52 )  x     / x     / 129 U/L / x     / 1.2 ng/mL      12 Lead ECG:   Ventricular Rate 78 BPM    Atrial Rate 357 BPM    QRS Duration 168 ms    Q-T Interval 454 ms    QTC Calculation(Bazett) 517 ms    R Axis 119 degrees    T Axis -59 degrees    Diagnosis Line Ventricular-paced rhythm  Biventricular pacemaker detected  Abnormal ECG  When compared with ECG of 12-JUL-2022 05:14,  Vent. rate has decreased BY   6 BPM  Confirmed by patrick eRardon ([id number]) on 12/7/2023 6:53:28 PM (12-07-23 @ 08:20)

## 2023-12-08 NOTE — CARE COORDINATION ASSESSMENT. - NSPASTMEDSURGHISTORY_GEN_ALL_CORE_FT
PAST MEDICAL & SURGICAL HISTORY:  Diabetes Mellitus, Type 2      CAD (Coronary Artery Disease)      Benign Hypertension      CABG (Coronary Artery Bypass Graft)  2007      Hyperthyroidism      CVA (Cerebral Vascular Accident)  > 10 years ago      S/P Hysterectomy      S/P Ventriculoperitoneal Shunt  probably removed per pt      S/P Craniotomy  > 10 years ago with bleed evaccuation      Unsteady gait  uses walker PRN      CHF (congestive heart failure)  denies any recent exacerbations or intubation hx      HLD (hyperlipidemia)      MI (myocardial infarction)  2007      Obesity      Former smoker      Mild pulmonary hypertension  on echo 5/2020, moderate on cardio ( Dr. Curtis's note)      Atrial fibrillation  on Coumadin      S/P coronary artery stent placement  > 2 years ago, 2 stents      AICD (automatic cardioverter/defibrillator) present  [employer], N161 [employer]/[id number], last interrogation 11/25/2020       PAST MEDICAL & SURGICAL HISTORY:  Diabetes Mellitus, Type 2      CAD (Coronary Artery Disease)      Benign Hypertension      CABG (Coronary Artery Bypass Graft)  2007      Hyperthyroidism      CVA (Cerebral Vascular Accident)  > 10 years ago      S/P Hysterectomy      S/P Ventriculoperitoneal Shunt  probably removed per pt      S/P Craniotomy  > 10 years ago with bleed evaccuation      Unsteady gait  uses walker PRN      CHF (congestive heart failure)  denies any recent exacerbations or intubation hx      HLD (hyperlipidemia)      MI (myocardial infarction)  2007      Obesity      Former smoker      Mild pulmonary hypertension  on echo 5/2020, moderate on cardio ( Dr. Curtis's note)      Atrial fibrillation  on Coumadin      S/P coronary artery stent placement  > 2 years ago, 2 stents      AICD (automatic cardioverter/defibrillator) present  [employer], N161 [employer]/[id number], last interrogation 11/25/2020

## 2023-12-08 NOTE — PATIENT PROFILE ADULT - FUNCTIONAL ASSESSMENT - BASIC MOBILITY 2.
Problem: Patient Care Overview  Goal: Plan of Care Review  Outcome: Ongoing (interventions implemented as appropriate)  Pt is AAOx4. VSS. No falls/injury as pt calls for assistance when needed. Olivo boots remain on and pt on special air mattress. Pain being monitored and controlled with PRN meds. No s/s of infection noted- incision clean and dry with staples intact. Bed in lowest position. Call light within reach. Will continue to monitor.       2 = A lot of assistance

## 2023-12-08 NOTE — PROGRESS NOTE ADULT - SUBJECTIVE AND OBJECTIVE BOX
Interval History:  no new complaints  Chart reviewed and events noted;   Overnight events:    MEDICATIONS  (STANDING):  aspirin enteric coated 81 milliGRAM(s) Oral daily  benzonatate 100 milliGRAM(s) Oral every 8 hours  carvedilol 6.25 milliGRAM(s) Oral every 12 hours  cholecalciferol 2000 Unit(s) Oral daily  dextrose 5%. 1000 milliLiter(s) (100 mL/Hr) IV Continuous <Continuous>  dextrose 5%. 1000 milliLiter(s) (50 mL/Hr) IV Continuous <Continuous>  dextrose 50% Injectable 12.5 Gram(s) IV Push once  dextrose 50% Injectable 25 Gram(s) IV Push once  dextrose 50% Injectable 25 Gram(s) IV Push once  digoxin     Tablet 125 MICROGram(s) Oral every other day  glucagon  Injectable 1 milliGRAM(s) IntraMuscular once  guaiFENesin Oral Liquid (Sugar-Free) 200 milliGRAM(s) Oral every 6 hours  insulin glargine Injectable (LANTUS) 22 Unit(s) SubCutaneous at bedtime  insulin glargine Injectable (LANTUS) 20 Unit(s) SubCutaneous every morning  insulin lispro (ADMELOG) corrective regimen sliding scale   SubCutaneous at bedtime  insulin lispro (ADMELOG) corrective regimen sliding scale   SubCutaneous three times a day before meals  methimazole 2.5 milliGRAM(s) Oral <User Schedule>  remdesivir  IVPB   IV Intermittent   remdesivir  IVPB 200 milliGRAM(s) IV Intermittent every 24 hours  sacubitril 24 mG/valsartan 26 mG 1 Tablet(s) Oral two times a day  simvastatin 40 milliGRAM(s) Oral at bedtime  torsemide 60 milliGRAM(s) Oral daily  warfarin 4 milliGRAM(s) Oral daily    MEDICATIONS  (PRN):  acetaminophen     Tablet .. 650 milliGRAM(s) Oral every 6 hours PRN Temp greater or equal to 38C (100.4F), Mild Pain (1 - 3)  dextrose Oral Gel 15 Gram(s) Oral once PRN Blood Glucose LESS THAN 70 milliGRAM(s)/deciliter  melatonin 3 milliGRAM(s) Oral at bedtime PRN Insomnia      Vital Signs Last 24 Hrs  T(C): 36.9 (08 Dec 2023 04:20), Max: 36.9 (08 Dec 2023 04:20)  T(F): 98.4 (08 Dec 2023 04:20), Max: 98.4 (08 Dec 2023 04:20)  HR: 80 (08 Dec 2023 04:20) (74 - 80)  BP: 104/70 (08 Dec 2023 04:20) (104/70 - 130/60)  BP(mean): --  RR: 18 (08 Dec 2023 04:20) (16 - 18)  SpO2: 98% (08 Dec 2023 04:20) (95% - 98%)    Parameters below as of 08 Dec 2023 04:20  Patient On (Oxygen Delivery Method): room air        PHYSICAL EXAM  exam as per hospitalist      LABS:  CBC Full  -  ( 08 Dec 2023 08:05 )  WBC Count : 3.14 K/uL  RBC Count : 3.91 M/uL  Hemoglobin : 11.2 g/dL  Hematocrit : 35.5 %  Platelet Count - Automated : 75 K/uL  Mean Cell Volume : 90.8 fl  Mean Cell Hemoglobin : 28.6 pg  Mean Cell Hemoglobin Concentration : 31.5 gm/dL  Auto Neutrophil # : 2.20 K/uL  Auto Lymphocyte # : 0.58 K/uL  Auto Monocyte # : 0.34 K/uL  Auto Eosinophil # : 0.00 K/uL  Auto Basophil # : 0.01 K/uL  Auto Neutrophil % : 70.1 %  Auto Lymphocyte % : 18.5 %  Auto Monocyte % : 10.8 %  Auto Eosinophil % : 0.0 %  Auto Basophil % : 0.3 %    12-08    137  |  98  |  67<H>  ----------------------------<  161<H>  3.4<L>   |  34<H>  |  1.60<H>    Ca    8.0<L>      08 Dec 2023 08:05  Mg     2.6     12-07    TPro  6.9  /  Alb  3.1<L>  /  TBili  1.0  /  DBili  0.3  /  AST  28  /  ALT  17  /  AlkPhos  56  12-08    PT/INR - ( 08 Dec 2023 08:05 )   PT: 26.0 sec;   INR: 2.28 ratio         PTT - ( 08 Dec 2023 08:05 )  PTT:40.3 sec    fe studies      WBC trend  3.14 K/uL (12-08-23 @ 08:05)  4.73 K/uL (12-07-23 @ 08:05)      Hgb trend  11.2 g/dL (12-08-23 @ 08:05)  11.2 g/dL (12-07-23 @ 08:05)      plt trend  75 K/uL (12-08-23 @ 08:05)  65 K/uL (12-07-23 @ 08:05)        RADIOLOGY & ADDITIONAL STUDIES:

## 2023-12-08 NOTE — PROGRESS NOTE ADULT - PROBLEM SELECTOR PLAN 3
Patient w/ history CHF  - Previous TTE 10/26/23: LVEF 25-30%, global LV hypokinesis. moderate (grade 2) left ventricular diastolic dysfunction.  LA severely dilated. MIld to moderate TR. Mild pulmonary HTN.  CT Chest: Small pericardial effusion. Cardiomegaly. Mild peribronchial thickening and bronchial secretions in the subsegmental and segmental left more than right lower lobe pulmonary bronchi may relate to a component of infection, inflammation or sequela from chronic congestion.  - continue home Entresto 24-26 mg BID, Torsemide 60 mg, pt unsure if she was on spironolactone outpatient.  - [facility] following Patient w/ history CHF  - Previous TTE 10/26/23: LVEF 25-30%, global LV hypokinesis. moderate (grade 2) left ventricular diastolic dysfunction.  LA severely dilated. MIld to moderate TR. Mild pulmonary HTN.  CT Chest: Small pericardial effusion. Cardiomegaly. Mild peribronchial thickening and bronchial secretions in the subsegmental and segmental left more than right lower lobe pulmonary bronchi may relate to a component of infection, inflammation or sequela from chronic congestion.  - continue home Entresto 24-26 mg BID, Torsemide 60 mg, pt unsure if she was on spironolactone outpatient.  - Daily weights   - [facility] following

## 2023-12-08 NOTE — CARE COORDINATION ASSESSMENT. - CURRENT MENTAL STATUS/COGNITIVE FUNCTIONING
alert/oriented to person/oriented to place/oriented to time/oriented to situation/recall memory is intact/recent memory is intact

## 2023-12-08 NOTE — CONSULT NOTE ADULT - ASSESSMENT
73 yF with PMHx of CAD status post CABG (2007), V. tach status post pacemaker/defibrillator(Guidant), cardiac cath 2018 with PCI, diabetes, hyperlipidemia, A-fib on warfarin, CVA with hemorrhage (2010), cardiomems (unable to do readings as pt unable to lay flat), who presented because she felt like there was "fluid in her lung". Found to be COVID positive. Otherwise on room air. No fevers or leukocytosis. Thrombocytopenia possibly related to COVID, but appears to have had mild thrombocytopenia on prior labs. CT chest reviewed.    #COVID    -suggest remdesivir x 3 doses  -AC per primary team  -monitor creatinine, LFT's    Thank you for courtesy of this consult. Will follow PRN, please call ID if any questions or acute issues arise. Thank you.    Discussed with Dr. Simeon Miramontes MD  Division of Infectious Diseases   Cell [phone] between 8am and 6pm   After 6pm and weekends please call ID service at [phone].     55 minutes spent on total encounter assessing patient, examination, chart review, counseling and coordinating care by the attending physician/nurse/care manager.    73 yF with PMHx of CAD status post CABG (2007), V. tach status post pacemaker/defibrillator(Guidant), cardiac cath 2018 with PCI, diabetes, hyperlipidemia, A-fib on warfarin, CVA with hemorrhage (2010), cardiomems (unable to do readings as pt unable to lay flat), who presented because she felt like there was "fluid in her lung". Found to be COVID positive. Otherwise on room air. No fevers or leukocytosis. Thrombocytopenia possibly related to COVID, but appears to have had mild thrombocytopenia on prior labs. CT chest reviewed.    #COVID    -suggest remdesivir x 3 doses  -AC per primary team  -monitor creatinine, LFT's    Thank you for courtesy of this consult. Will follow PRN, please call ID if any questions or acute issues arise. Thank you.    Discussed with Dr. Simeon Miramontes MD  Division of Infectious Diseases   Cell [phone] between 8am and 6pm   After 6pm and weekends please call ID service at [phone].     55 minutes spent on total encounter assessing patient, examination, chart review, counseling and coordinating care by the attending physician/nurse/care manager.

## 2023-12-08 NOTE — PROGRESS NOTE ADULT - SUBJECTIVE AND OBJECTIVE BOX
Patient is a 73y old  Female who presents with a chief complaint of low platelets/covid + (07 Dec 2023 18:51)      INTERVAL HPI/OVERNIGHT EVENTS: Pt was seen and examined at bedside. No acute overnight events. Pt states that she is feeling better than when she came in, when asked why she said she does not know that she is normally builds up fluid and it feels like this but that she is frustrated by these continued experiences.  Pt denies headache, dizziness, lightheadedness, fever, chills, body aches, CP, SOB, palpitations, abdominal pain, n/v, numbness/tingling.  No other complaints at this time.     MEDICATIONS  (STANDING):  aspirin enteric coated 81 milliGRAM(s) Oral daily  benzonatate 100 milliGRAM(s) Oral every 8 hours  carvedilol 6.25 milliGRAM(s) Oral every 12 hours  cholecalciferol 2000 Unit(s) Oral daily  dextrose 5%. 1000 milliLiter(s) (100 mL/Hr) IV Continuous <Continuous>  dextrose 5%. 1000 milliLiter(s) (50 mL/Hr) IV Continuous <Continuous>  dextrose 50% Injectable 25 Gram(s) IV Push once  dextrose 50% Injectable 25 Gram(s) IV Push once  dextrose 50% Injectable 12.5 Gram(s) IV Push once  digoxin     Tablet 125 MICROGram(s) Oral every other day  glucagon  Injectable 1 milliGRAM(s) IntraMuscular once  guaiFENesin Oral Liquid (Sugar-Free) 200 milliGRAM(s) Oral every 6 hours  insulin glargine Injectable (LANTUS) 20 Unit(s) SubCutaneous every morning  insulin glargine Injectable (LANTUS) 22 Unit(s) SubCutaneous at bedtime  insulin lispro (ADMELOG) corrective regimen sliding scale   SubCutaneous three times a day before meals  insulin lispro (ADMELOG) corrective regimen sliding scale   SubCutaneous at bedtime  methimazole 2.5 milliGRAM(s) Oral <User Schedule>  sacubitril 24 mG/valsartan 26 mG 1 Tablet(s) Oral two times a day  simvastatin 40 milliGRAM(s) Oral at bedtime  torsemide 60 milliGRAM(s) Oral daily    MEDICATIONS  (PRN):  acetaminophen     Tablet .. 650 milliGRAM(s) Oral every 6 hours PRN Temp greater or equal to 38C (100.4F), Mild Pain (1 - 3)  dextrose Oral Gel 15 Gram(s) Oral once PRN Blood Glucose LESS THAN 70 milliGRAM(s)/deciliter  melatonin 3 milliGRAM(s) Oral at bedtime PRN Insomnia      Allergies    No Known Allergies    Intolerances        REVIEW OF SYSTEMS:  CONSTITUTIONAL: No fever or chills  HEENT:  No headache, no sore throat  RESPIRATORY: No cough, wheezing, or shortness of breath  CARDIOVASCULAR: No chest pain, palpitations  GASTROINTESTINAL: No abd pain, nausea, vomiting, or diarrhea  GENITOURINARY: No dysuria, frequency, or hematuria  NEUROLOGICAL: no focal weakness or dizziness  MUSCULOSKELETAL: no myalgias     Vital Signs Last 24 Hrs  T(C): 36.9 (08 Dec 2023 04:20), Max: 36.9 (08 Dec 2023 04:20)  T(F): 98.4 (08 Dec 2023 04:20), Max: 98.4 (08 Dec 2023 04:20)  HR: 80 (08 Dec 2023 04:20) (74 - 87)  BP: 104/70 (08 Dec 2023 04:20) (104/70 - 130/60)  BP(mean): --  RR: 18 (08 Dec 2023 04:20) (16 - 18)  SpO2: 98% (08 Dec 2023 04:20) (95% - 98%)    Parameters below as of 08 Dec 2023 04:20  Patient On (Oxygen Delivery Method): room air        PHYSICAL EXAM:  GENERAL: NAD  HEENT:  anicteric, moist mucous membranes  CHEST/LUNG:  CTA b/l, no rales, wheezes, or rhonchi  HEART:  RRR, S1, S2  ABDOMEN:  BS+, soft, nontender, nondistended  EXTREMITIES: + 1 pitting edema B/L, no cyanosis, or calf tenderness  NERVOUS SYSTEM: answers questions and follows commands appropriately    LABS:                        11.2   3.14  )-----------( x        ( 08 Dec 2023 08:05 )             35.5     CBC Full  -  ( 08 Dec 2023 08:05 )  WBC Count : 3.14 K/uL  Hemoglobin : 11.2 g/dL  Hematocrit : 35.5 %  Platelet Count - Automated : x  Mean Cell Volume : 90.8 fl  Mean Cell Hemoglobin : 28.6 pg  Mean Cell Hemoglobin Concentration : 31.5 gm/dL  Auto Neutrophil # : x  Auto Lymphocyte # : x  Auto Monocyte # : x  Auto Eosinophil # : x  Auto Basophil # : x  Auto Neutrophil % : x  Auto Lymphocyte % : x  Auto Monocyte % : x  Auto Eosinophil % : x  Auto Basophil % : x    08 Dec 2023 08:05    137    |  98     |  67     ----------------------------<  161    3.4     |  34     |  1.60     Ca    8.0        08 Dec 2023 08:05    TPro  6.9    /  Alb  3.1    /  TBili  1.0    /  DBili  0.3    /  AST  28     /  ALT  17     /  AlkPhos  56     08 Dec 2023 08:05    PT/INR - ( 08 Dec 2023 08:05 )   PT: 26.0 sec;   INR: 2.28 ratio         PTT - ( 08 Dec 2023 08:05 )  PTT:40.3 sec  Urinalysis Basic - ( 08 Dec 2023 08:05 )    Color: x / Appearance: x / SG: x / pH: x  Gluc: 161 mg/dL / Ketone: x  / Bili: x / Urobili: x   Blood: x / Protein: x / Nitrite: x   Leuk Esterase: x / RBC: x / WBC x   Sq Epi: x / Non Sq Epi: x / Bacteria: x      CAPILLARY BLOOD GLUCOSE      POCT Blood Glucose.: 160 mg/dL (08 Dec 2023 07:50)  POCT Blood Glucose.: 173 mg/dL (07 Dec 2023 21:24)  POCT Blood Glucose.: 251 mg/dL (07 Dec 2023 17:29)          RADIOLOGY & ADDITIONAL TESTS: ____    Personally reviewed.     Consultant(s) Notes Reviewed:  [x] YES  [ ] NO

## 2023-12-08 NOTE — CONSULT NOTE ADULT - CONSULT REASON
Pt discharged to home with parent(s) after discussing with parents care at home, when to follow up with PCP or other health care provider as directed on discharge instructions.  Discussed with parent(s) when to bring pt back to the Emergency Room if necessary.  Discussed how to best control pain at home with prescribed or recommended medications or other non medicine interventions.  Parent(s) verbalize understanding discharge instructions and deny having any further questions upon discharge to home. Copy of discharge given to parent(s) to have at home.   
COVID
chest tightness, CHF
thrombocytopenia D69.59

## 2023-12-08 NOTE — PROGRESS NOTE ADULT - PROBLEM SELECTOR PLAN 2
Recent history of chest tightness (now resolved) and elevated troponin on admission: Trop 53.9 -> 58.6 > 55 Recent history of chest tightness (now resolved) and elevated troponin on admission: Trop 53.9 -> 58.6 > 55  Cardiology consulted, appreciate recommendations Recent history of chest tightness (now resolved) and elevated troponin on admission: Trop 53.9 -> 58.6 > 55  Cardiology consulted, appreciated recommendations

## 2023-12-08 NOTE — PROGRESS NOTE ADULT - ASSESSMENT
Covid with exacerbation of mild thrombocytopenia  baseline approx 100K  now 65K -75K  WBC 3.14 decreased secondary to covid    Recommendations:  1.  follow CBC  2.  coumadin as per cardiology still therapeutic  3.  cardiology evaluation  4.  further heme recommendations pending

## 2023-12-08 NOTE — PROGRESS NOTE ADULT - PROBLEM SELECTOR PLAN 5
Patient on home insulin.   -A1c pending   Recent basal insulin dose of 60 units AM, 65 units PM - as per outpatient cardiology note from last week  - will start on 20 units AM, 22 units PM, increase as needed  - moderate sliding scale  -Regular finger sticks  -Consistent carb diet  -Hypoglycemic protocol Patient on home insulin.   -A1c pending   -Patient states she takes Humalog between 30-60u TID with basal levamir 60-70u at night  - will continue 20 units AM, 22 units PM, increase as needed  - moderate sliding scale  -Regular finger sticks  -Consistent carb diet  -Hypoglycemic protocol Patient on home insulin.   -A1c 7.3%  -Patient states she takes Humalog between 30-60u TID with basal levamir 60-70u at night  - will change the 20 units AM, 22 units PM to 40u lantus at night  - moderate sliding scale  -Regular finger sticks  -Consistent carb diet  -Hypoglycemic protocol

## 2023-12-08 NOTE — PATIENT PROFILE ADULT - FALL HARM RISK - HARM RISK INTERVENTIONS
Assistance with ambulation/Assistance OOB with selected safe patient handling equipment/Communicate Risk of Fall with Harm to all staff/Discuss with provider need for PT consult/Monitor gait and stability/Provide patient with walking aids - walker, cane, crutches/Reinforce activity limits and safety measures with patient and family/Tailored Fall Risk Interventions/Toileting schedule using arm’s reach rule for commode and bathroom/Visual Cue: Yellow wristband and red socks/Bed in lowest position, wheels locked, appropriate side rails in place/Call bell, personal items and telephone in reach/Instruct patient to call for assistance before getting out of bed or chair/Non-slip footwear when patient is out of bed/[place] to call system/Physically safe environment - no spills, clutter or unnecessary equipment/Purposeful Proactive Rounding/Room/bathroom lighting operational, light cord in reach Assistance with ambulation/Assistance OOB with selected safe patient handling equipment/Communicate Risk of Fall with Harm to all staff/Discuss with provider need for PT consult/Monitor gait and stability/Provide patient with walking aids - walker, cane, crutches/Reinforce activity limits and safety measures with patient and family/Tailored Fall Risk Interventions/Toileting schedule using arm’s reach rule for commode and bathroom/Visual Cue: Yellow wristband and red socks/Bed in lowest position, wheels locked, appropriate side rails in place/Call bell, personal items and telephone in reach/Instruct patient to call for assistance before getting out of bed or chair/Non-slip footwear when patient is out of bed/[place] to call system/Physically safe environment - no spills, clutter or unnecessary equipment/Purposeful Proactive Rounding/Room/bathroom lighting operational, light cord in reach

## 2023-12-08 NOTE — CONSULT NOTE ADULT - SUBJECTIVE AND OBJECTIVE BOX
[facility]  INFECTIOUS DISEASES - Carlo Beck, [address], [place] [address]  Tel: [phone]     Fax: [phone]  =======================================================    [id number]  JULY LEONARDCHERYGONZALO     CC: Patient is a 73y old  Female who presents with a chief complaint of low platelets/covid + (08 Dec 2023 12:30)    HPI:  73 yF with PMHx of CAD status post CABG (2007), V. tach status post pacemaker/defibrillator(Guidant), cardiac cath 2018 with PCI, diabetes, hyperlipidemia, A-fib on warfarin, CVA with hemorrhage (2010), cardiomems (unable to do readings as pt unable to lay flat), who presented because she felt like there was "fluid in her lung". She reports cough but also says this happens every winter. Denies any fevers or chills. Denies any pain or SOB. Has bilateral LE edema but says this is chronic.        PAST MEDICAL & SURGICAL HISTORY:  Benign Hypertension      CAD (Coronary Artery Disease)      Diabetes Mellitus, Type 2      CVA (Cerebral Vascular Accident)  > 10 years ago      Hyperthyroidism      Former smoker      Obesity      MI (myocardial infarction)  2007      HLD (hyperlipidemia)      CHF (congestive heart failure)  denies any recent exacerbations or intubation hx      Unsteady gait  uses walker PRN      Atrial fibrillation  on Coumadin      Mild pulmonary hypertension  on echo 5/2020, moderate on cardio ( Dr. Curtis's note)      CABG (Coronary Artery Bypass Graft)  2007      S/P Craniotomy  > 10 years ago with bleed evaccuation      S/P Ventriculoperitoneal Shunt  probably removed per pt      S/P Hysterectomy      AICD (automatic cardioverter/defibrillator) present  [employer], N161 Guidant/[id number], last interrogation 11/25/2020      S/P coronary artery stent placement  > 2 years ago, 2 stents          Social Hx:     FAMILY HISTORY:  No pertinent family history in first degree relatives        Allergies    No Known Allergies    Intolerances        Antibiotics:  MEDICATIONS  (STANDING):  aspirin enteric coated 81 milliGRAM(s) Oral daily  benzonatate 100 milliGRAM(s) Oral every 8 hours  carvedilol 6.25 milliGRAM(s) Oral every 12 hours  cholecalciferol 2000 Unit(s) Oral daily  dextrose 5%. 1000 milliLiter(s) (50 mL/Hr) IV Continuous <Continuous>  dextrose 5%. 1000 milliLiter(s) (100 mL/Hr) IV Continuous <Continuous>  dextrose 50% Injectable 25 Gram(s) IV Push once  dextrose 50% Injectable 25 Gram(s) IV Push once  dextrose 50% Injectable 12.5 Gram(s) IV Push once  digoxin     Tablet 125 MICROGram(s) Oral every other day  glucagon  Injectable 1 milliGRAM(s) IntraMuscular once  guaiFENesin Oral Liquid (Sugar-Free) 200 milliGRAM(s) Oral every 6 hours  insulin glargine Injectable (LANTUS) 20 Unit(s) SubCutaneous every morning  insulin glargine Injectable (LANTUS) 22 Unit(s) SubCutaneous at bedtime  insulin lispro (ADMELOG) corrective regimen sliding scale   SubCutaneous three times a day before meals  insulin lispro (ADMELOG) corrective regimen sliding scale   SubCutaneous at bedtime  methimazole 2.5 milliGRAM(s) Oral <User Schedule>  remdesivir  IVPB   IV Intermittent   sacubitril 24 mG/valsartan 26 mG 1 Tablet(s) Oral two times a day  simvastatin 40 milliGRAM(s) Oral at bedtime  torsemide 60 milliGRAM(s) Oral daily  warfarin 4 milliGRAM(s) Oral daily    MEDICATIONS  (PRN):  acetaminophen     Tablet .. 650 milliGRAM(s) Oral every 6 hours PRN Temp greater or equal to 38C (100.4F), Mild Pain (1 - 3)  dextrose Oral Gel 15 Gram(s) Oral once PRN Blood Glucose LESS THAN 70 milliGRAM(s)/deciliter  melatonin 3 milliGRAM(s) Oral at bedtime PRN Insomnia       REVIEW OF SYSTEMS:  CONSTITUTIONAL:  No Fever or chills  HEENT:  No sore throat or runny nose.  CARDIOVASCULAR:  No chest pain   RESPIRATORY:  see history  GASTROINTESTINAL:  No nausea, vomiting or diarrhea. no abdominal pain  GENITOURINARY:  No dysuria  MUSCULOSKELETAL:  no joint aches, no muscle pain  NEUROLOGIC:  No headache or dizziness  PSYCHIATRIC:  No disorder of thought or mood.    Physical Exam:  Vital Signs Last 24 Hrs  T(C): 36.9 (08 Dec 2023 04:20), Max: 36.9 (08 Dec 2023 04:20)  T(F): 98.4 (08 Dec 2023 04:20), Max: 98.4 (08 Dec 2023 04:20)  HR: 80 (08 Dec 2023 04:20) (74 - 80)  BP: 104/70 (08 Dec 2023 04:20) (104/70 - 130/60)  BP(mean): --  RR: 18 (08 Dec 2023 04:20) (16 - 18)  SpO2: 98% (08 Dec 2023 04:20) (95% - 98%)    Parameters below as of 08 Dec 2023 04:20  Patient On (Oxygen Delivery Method): room air        GEN: NAD  HEENT: normocephalic and atraumatic.   NECK: Supple.   LUNGS: Normal respiratory effort  HEART: Regular rate and rhythm   ABDOMEN: Soft, nontender, and nondistended.    EXTREMITIES: bipedal edema--chronic per patient  NEUROLOGIC: grossly intact.  PSYCHIATRIC: Appropriate affect .      Labs:  12-08    137  |  98  |  67<H>  ----------------------------<  161<H>  3.4<L>   |  34<H>  |  1.60<H>    Ca    8.0<L>      08 Dec 2023 08:05  Mg     2.6     12-07    TPro  6.9  /  Alb  3.1<L>  /  TBili  1.0  /  DBili  0.3  /  AST  28  /  ALT  17  /  AlkPhos  56  12-08                          11.2   3.14  )-----------( 75       ( 08 Dec 2023 08:05 )             35.5     PT/INR - ( 08 Dec 2023 08:05 )   PT: 26.0 sec;   INR: 2.28 ratio         PTT - ( 08 Dec 2023 08:05 )  PTT:40.3 sec  Urinalysis Basic - ( 08 Dec 2023 08:05 )    Color: x / Appearance: x / SG: x / pH: x  Gluc: 161 mg/dL / Ketone: x  / Bili: x / Urobili: x   Blood: x / Protein: x / Nitrite: x   Leuk Esterase: x / RBC: x / WBC x   Sq Epi: x / Non Sq Epi: x / Bacteria: x      LIVER FUNCTIONS - ( 08 Dec 2023 08:05 )  Alb: 3.1 g/dL / Pro: 6.9 g/dL / ALK PHOS: 56 U/L / ALT: 17 U/L / AST: 28 U/L / GGT: x           CARDIAC MARKERS ( 07 Dec 2023 17:52 )  x     / x     / 129 U/L / x     / 1.2 ng/mL              SARS-CoV-2 Result: Detected (12-07-23 @ 08:50)      RECENT CULTURES:        All imaging and other data have been reviewed.  < from: CT Chest No Cont (12.07.23 @ 13:57) >  FINDINGS:    LUNGS AND AIRWAYS: Patent central airways.  Minimal and subtle   groundglass opacity in the left upper lobe. There is peribronchial   thickening and mucous secretions involving the left more than right   subsegmental and segmental pulmonary bronchi. There are no airspace   opacities or pulmonary consolidation. No interstitial interlobular septal   thickening.  PLEURA: No pleural effusion.  MEDIASTINUM AND RE: No lymphadenopathy.  VESSELS: The mid ascending thoracic aorta measures3.4 cm, the pulmonary   trunk is dilated to 3.6 cm. Postsurgical changes suggesting CABG surgery.  HEART: Heart size is enlarged. Small volume pericardial effusion is   noted. Right ventricular pacer lead is noted from the prepectoral device.   Additional epicardial pacer lead.  CHEST WALL AND LOWER NECK: Left prepectoral pacer device.  VISUALIZED UPPER ABDOMEN: Partially visualized liver, spleen, adrenal   glands appear within normal limits. Cholelithiasis. Pancreatic atrophy.   Right renal cyst.  BONES: Levoscoliosis. Sternotomy. Syndesmophytosis in the thoracic spine,   hyperkyphosis. No lytic or blastic osteodestructive bony lesions.    IMPRESSION:  Small pericardial effusion. Cardiomegaly.    Mild peribronchial thickening and bronchial secretions in the   subsegmental and segmental left more than right lower lobe pulmonary   bronchi may relate to a component of infection, inflammation or sequela   from chronic congestion.    No evidence of acute pulmonary edema.    Nonspecific very subtle groundglass opacity in the left upper lobe.    < end of copied text >   [facility]  INFECTIOUS DISEASES - Carlo Beck, [address], [place] [address]  Tel: [phone]     Fax: [phone]  =======================================================    [id number]  JULY LEONARDCHERYGONZALO     CC: Patient is a 73y old  Female who presents with a chief complaint of low platelets/covid + (08 Dec 2023 12:30)    HPI:  73 yF with PMHx of CAD status post CABG (2007), V. tach status post pacemaker/defibrillator([employer]), cardiac cath 2018 with PCI, diabetes, hyperlipidemia, A-fib on warfarin, CVA with hemorrhage (2010), cardiomems (unable to do readings as pt unable to lay flat), who presented because she felt like there was "fluid in her lung". She reports cough but also says this happens every winter. Denies any fevers or chills. Denies any pain or SOB. Has bilateral LE edema but says this is chronic.        PAST MEDICAL & SURGICAL HISTORY:  Benign Hypertension      CAD (Coronary Artery Disease)      Diabetes Mellitus, Type 2      CVA (Cerebral Vascular Accident)  > 10 years ago      Hyperthyroidism      Former smoker      Obesity      MI (myocardial infarction)  2007      HLD (hyperlipidemia)      CHF (congestive heart failure)  denies any recent exacerbations or intubation hx      Unsteady gait  uses walker PRN      Atrial fibrillation  on Coumadin      Mild pulmonary hypertension  on echo 5/2020, moderate on cardio ( Dr. Curtis's note)      CABG (Coronary Artery Bypass Graft)  2007      S/P Craniotomy  > 10 years ago with bleed evaccuation      S/P Ventriculoperitoneal Shunt  probably removed per pt      S/P Hysterectomy      AICD (automatic cardioverter/defibrillator) present  [employer], N161 Guidant/[id number], last interrogation 11/25/2020      S/P coronary artery stent placement  > 2 years ago, 2 stents          Social Hx:     FAMILY HISTORY:  No pertinent family history in first degree relatives        Allergies    No Known Allergies    Intolerances        Antibiotics:  MEDICATIONS  (STANDING):  aspirin enteric coated 81 milliGRAM(s) Oral daily  benzonatate 100 milliGRAM(s) Oral every 8 hours  carvedilol 6.25 milliGRAM(s) Oral every 12 hours  cholecalciferol 2000 Unit(s) Oral daily  dextrose 5%. 1000 milliLiter(s) (50 mL/Hr) IV Continuous <Continuous>  dextrose 5%. 1000 milliLiter(s) (100 mL/Hr) IV Continuous <Continuous>  dextrose 50% Injectable 25 Gram(s) IV Push once  dextrose 50% Injectable 25 Gram(s) IV Push once  dextrose 50% Injectable 12.5 Gram(s) IV Push once  digoxin     Tablet 125 MICROGram(s) Oral every other day  glucagon  Injectable 1 milliGRAM(s) IntraMuscular once  guaiFENesin Oral Liquid (Sugar-Free) 200 milliGRAM(s) Oral every 6 hours  insulin glargine Injectable (LANTUS) 20 Unit(s) SubCutaneous every morning  insulin glargine Injectable (LANTUS) 22 Unit(s) SubCutaneous at bedtime  insulin lispro (ADMELOG) corrective regimen sliding scale   SubCutaneous three times a day before meals  insulin lispro (ADMELOG) corrective regimen sliding scale   SubCutaneous at bedtime  methimazole 2.5 milliGRAM(s) Oral <User Schedule>  remdesivir  IVPB   IV Intermittent   sacubitril 24 mG/valsartan 26 mG 1 Tablet(s) Oral two times a day  simvastatin 40 milliGRAM(s) Oral at bedtime  torsemide 60 milliGRAM(s) Oral daily  warfarin 4 milliGRAM(s) Oral daily    MEDICATIONS  (PRN):  acetaminophen     Tablet .. 650 milliGRAM(s) Oral every 6 hours PRN Temp greater or equal to 38C (100.4F), Mild Pain (1 - 3)  dextrose Oral Gel 15 Gram(s) Oral once PRN Blood Glucose LESS THAN 70 milliGRAM(s)/deciliter  melatonin 3 milliGRAM(s) Oral at bedtime PRN Insomnia       REVIEW OF SYSTEMS:  CONSTITUTIONAL:  No Fever or chills  HEENT:  No sore throat or runny nose.  CARDIOVASCULAR:  No chest pain   RESPIRATORY:  see history  GASTROINTESTINAL:  No nausea, vomiting or diarrhea. no abdominal pain  GENITOURINARY:  No dysuria  MUSCULOSKELETAL:  no joint aches, no muscle pain  NEUROLOGIC:  No headache or dizziness  PSYCHIATRIC:  No disorder of thought or mood.    Physical Exam:  Vital Signs Last 24 Hrs  T(C): 36.9 (08 Dec 2023 04:20), Max: 36.9 (08 Dec 2023 04:20)  T(F): 98.4 (08 Dec 2023 04:20), Max: 98.4 (08 Dec 2023 04:20)  HR: 80 (08 Dec 2023 04:20) (74 - 80)  BP: 104/70 (08 Dec 2023 04:20) (104/70 - 130/60)  BP(mean): --  RR: 18 (08 Dec 2023 04:20) (16 - 18)  SpO2: 98% (08 Dec 2023 04:20) (95% - 98%)    Parameters below as of 08 Dec 2023 04:20  Patient On (Oxygen Delivery Method): room air        GEN: NAD  HEENT: normocephalic and atraumatic.   NECK: Supple.   LUNGS: Normal respiratory effort  HEART: Regular rate and rhythm   ABDOMEN: Soft, nontender, and nondistended.    EXTREMITIES: bipedal edema--chronic per patient  NEUROLOGIC: grossly intact.  PSYCHIATRIC: Appropriate affect .      Labs:  12-08    137  |  98  |  67<H>  ----------------------------<  161<H>  3.4<L>   |  34<H>  |  1.60<H>    Ca    8.0<L>      08 Dec 2023 08:05  Mg     2.6     12-07    TPro  6.9  /  Alb  3.1<L>  /  TBili  1.0  /  DBili  0.3  /  AST  28  /  ALT  17  /  AlkPhos  56  12-08                          11.2   3.14  )-----------( 75       ( 08 Dec 2023 08:05 )             35.5     PT/INR - ( 08 Dec 2023 08:05 )   PT: 26.0 sec;   INR: 2.28 ratio         PTT - ( 08 Dec 2023 08:05 )  PTT:40.3 sec  Urinalysis Basic - ( 08 Dec 2023 08:05 )    Color: x / Appearance: x / SG: x / pH: x  Gluc: 161 mg/dL / Ketone: x  / Bili: x / Urobili: x   Blood: x / Protein: x / Nitrite: x   Leuk Esterase: x / RBC: x / WBC x   Sq Epi: x / Non Sq Epi: x / Bacteria: x      LIVER FUNCTIONS - ( 08 Dec 2023 08:05 )  Alb: 3.1 g/dL / Pro: 6.9 g/dL / ALK PHOS: 56 U/L / ALT: 17 U/L / AST: 28 U/L / GGT: x           CARDIAC MARKERS ( 07 Dec 2023 17:52 )  x     / x     / 129 U/L / x     / 1.2 ng/mL              SARS-CoV-2 Result: Detected (12-07-23 @ 08:50)      RECENT CULTURES:        All imaging and other data have been reviewed.  < from: CT Chest No Cont (12.07.23 @ 13:57) >  FINDINGS:    LUNGS AND AIRWAYS: Patent central airways.  Minimal and subtle   groundglass opacity in the left upper lobe. There is peribronchial   thickening and mucous secretions involving the left more than right   subsegmental and segmental pulmonary bronchi. There are no airspace   opacities or pulmonary consolidation. No interstitial interlobular septal   thickening.  PLEURA: No pleural effusion.  MEDIASTINUM AND RE: No lymphadenopathy.  VESSELS: The mid ascending thoracic aorta measures3.4 cm, the pulmonary   trunk is dilated to 3.6 cm. Postsurgical changes suggesting CABG surgery.  HEART: Heart size is enlarged. Small volume pericardial effusion is   noted. Right ventricular pacer lead is noted from the prepectoral device.   Additional epicardial pacer lead.  CHEST WALL AND LOWER NECK: Left prepectoral pacer device.  VISUALIZED UPPER ABDOMEN: Partially visualized liver, spleen, adrenal   glands appear within normal limits. Cholelithiasis. Pancreatic atrophy.   Right renal cyst.  BONES: Levoscoliosis. Sternotomy. Syndesmophytosis in the thoracic spine,   hyperkyphosis. No lytic or blastic osteodestructive bony lesions.    IMPRESSION:  Small pericardial effusion. Cardiomegaly.    Mild peribronchial thickening and bronchial secretions in the   subsegmental and segmental left more than right lower lobe pulmonary   bronchi may relate to a component of infection, inflammation or sequela   from chronic congestion.    No evidence of acute pulmonary edema.    Nonspecific very subtle groundglass opacity in the left upper lobe.    < end of copied text >

## 2023-12-08 NOTE — PROGRESS NOTE ADULT - PROBLEM SELECTOR PLAN 6
Chronic  On tele monitoring  - care d/w heme-onc hold Warfarin tonight in setting of thrombocytopenia, may consider restarting in am based on coags and cbc  - f/u PT, PTT, INR  - continue Carvedilol 6.25 MG BID,  Digoxin 125 MCG   Cardio following Dr. Valdez Chronic  On tele monitoring  Resume home warfarin   - f/u PT, PTT, INR  - continue Carvedilol 6.25 MG BID,  Digoxin 125 MCG   Cardio following Dr. Valdez

## 2023-12-08 NOTE — PROGRESS NOTE ADULT - PROBLEM SELECTOR PLAN 4
- Patient with thrombocytopenia of 65.   - care d/w heme-onc hold Warfarin tonight in setting of thrombocytopenia, may consider restarting in am based on coags and cbc  - Patient denies recent bleeding.  - Hematology following (Avelino) - Patient with thrombocytopenia of 65  - resume home warfarin due to dx of a-fib   - Patient denies recent bleeding.  - Hematology following (Avelino), appreciate recommendations - Patient with thrombocytopenia of 65  - resume home warfarin due to dx of a-fib   - Patient denies recent bleeding.  - Hematology following (Avelino), appreciate recommendations: continue anticoagulation per cardiology - Patient with thrombocytopenia of 65  - per heme, resume home warfarin due to dx of a-fib   - Patient denies recent bleeding.  - Hematology following (Avelino), appreciate recommendations: continue anticoagulation per cardiology

## 2023-12-08 NOTE — PATIENT PROFILE ADULT - INTERNATIONAL TRAVEL
Patient is scheduled for 12/26/19  Patient will call the office with his new insurance information  He thinks it's a [employer] insurance, not sure if we participate in that particular plan    Please check insurance No

## 2023-12-08 NOTE — CARE COORDINATION ASSESSMENT. - OTHER PERTINENT LIVING ARRANGEMENT/HOME SAFETY INFORMATION
Pt lives in senior housing apartment and has support from her brother who lives in close proximity. Pt was independent with ADLS prior to admission to hospital.

## 2023-12-08 NOTE — PATIENT PROFILE ADULT - MST SCORE
Contacted pt, discussed taking all BP medications as scheduled in the AM before MOHS. Also request that pt bring in additional losartan day of procedure. Pt states understanding and has no questions regarding any other medications.   0

## 2023-12-08 NOTE — PATIENT PROFILE ADULT - NSPROHMDIABETHBA1C_GEN_A_NUR
unknown Retention Suture Text: Retention sutures were placed to support the closure and prevent dehiscence.

## 2023-12-09 LAB
ALBUMIN SERPL ELPH-MCNC: 3 G/DL — LOW (ref 3.3–5)
ALP SERPL-CCNC: 56 U/L — SIGNIFICANT CHANGE UP (ref 40–120)
ALP SERPL-CCNC: 56 U/L — SIGNIFICANT CHANGE UP (ref 40–120)
ALP SERPL-CCNC: 58 U/L — SIGNIFICANT CHANGE UP (ref 40–120)
ALP SERPL-CCNC: 58 U/L — SIGNIFICANT CHANGE UP (ref 40–120)
ALT FLD-CCNC: 17 U/L — SIGNIFICANT CHANGE UP (ref 12–78)
ALT FLD-CCNC: 17 U/L — SIGNIFICANT CHANGE UP (ref 12–78)
ALT FLD-CCNC: 18 U/L — SIGNIFICANT CHANGE UP (ref 12–78)
ALT FLD-CCNC: 18 U/L — SIGNIFICANT CHANGE UP (ref 12–78)
ANION GAP SERPL CALC-SCNC: 8 MMOL/L — SIGNIFICANT CHANGE UP (ref 5–17)
ANION GAP SERPL CALC-SCNC: 8 MMOL/L — SIGNIFICANT CHANGE UP (ref 5–17)
AST SERPL-CCNC: 34 U/L — SIGNIFICANT CHANGE UP (ref 15–37)
BASOPHILS # BLD AUTO: 0.01 K/UL — SIGNIFICANT CHANGE UP (ref 0–0.2)
BASOPHILS # BLD AUTO: 0.01 K/UL — SIGNIFICANT CHANGE UP (ref 0–0.2)
BASOPHILS NFR BLD AUTO: 0.3 % — SIGNIFICANT CHANGE UP (ref 0–2)
BASOPHILS NFR BLD AUTO: 0.3 % — SIGNIFICANT CHANGE UP (ref 0–2)
BILIRUB DIRECT SERPL-MCNC: 0.5 MG/DL — HIGH (ref 0–0.3)
BILIRUB DIRECT SERPL-MCNC: 0.5 MG/DL — HIGH (ref 0–0.3)
BILIRUB INDIRECT FLD-MCNC: 0.5 MG/DL — SIGNIFICANT CHANGE UP (ref 0.2–1)
BILIRUB INDIRECT FLD-MCNC: 0.5 MG/DL — SIGNIFICANT CHANGE UP (ref 0.2–1)
BILIRUB SERPL-MCNC: 1 MG/DL — SIGNIFICANT CHANGE UP (ref 0.2–1.2)
BUN SERPL-MCNC: 65 MG/DL — HIGH (ref 7–23)
BUN SERPL-MCNC: 65 MG/DL — HIGH (ref 7–23)
CALCIUM SERPL-MCNC: 8.2 MG/DL — LOW (ref 8.5–10.1)
CALCIUM SERPL-MCNC: 8.2 MG/DL — LOW (ref 8.5–10.1)
CHLORIDE SERPL-SCNC: 96 MMOL/L — SIGNIFICANT CHANGE UP (ref 96–108)
CHLORIDE SERPL-SCNC: 96 MMOL/L — SIGNIFICANT CHANGE UP (ref 96–108)
CO2 SERPL-SCNC: 35 MMOL/L — HIGH (ref 22–31)
CO2 SERPL-SCNC: 35 MMOL/L — HIGH (ref 22–31)
CREAT SERPL-MCNC: 1.4 MG/DL — HIGH (ref 0.5–1.3)
CREAT SERPL-MCNC: 1.4 MG/DL — HIGH (ref 0.5–1.3)
EGFR: 40 ML/MIN/1.73M2 — LOW
EGFR: 40 ML/MIN/1.73M2 — LOW
EOSINOPHIL # BLD AUTO: 0 K/UL — SIGNIFICANT CHANGE UP (ref 0–0.5)
EOSINOPHIL # BLD AUTO: 0 K/UL — SIGNIFICANT CHANGE UP (ref 0–0.5)
EOSINOPHIL NFR BLD AUTO: 0 % — SIGNIFICANT CHANGE UP (ref 0–6)
EOSINOPHIL NFR BLD AUTO: 0 % — SIGNIFICANT CHANGE UP (ref 0–6)
GLUCOSE SERPL-MCNC: 183 MG/DL — HIGH (ref 70–99)
GLUCOSE SERPL-MCNC: 183 MG/DL — HIGH (ref 70–99)
HCT VFR BLD CALC: 37.3 % — SIGNIFICANT CHANGE UP (ref 34.5–45)
HCT VFR BLD CALC: 37.3 % — SIGNIFICANT CHANGE UP (ref 34.5–45)
HGB BLD-MCNC: 11.8 G/DL — SIGNIFICANT CHANGE UP (ref 11.5–15.5)
HGB BLD-MCNC: 11.8 G/DL — SIGNIFICANT CHANGE UP (ref 11.5–15.5)
IMM GRANULOCYTES NFR BLD AUTO: 0.3 % — SIGNIFICANT CHANGE UP (ref 0–0.9)
IMM GRANULOCYTES NFR BLD AUTO: 0.3 % — SIGNIFICANT CHANGE UP (ref 0–0.9)
INR BLD: 2.33 RATIO — HIGH (ref 0.85–1.18)
INR BLD: 2.33 RATIO — HIGH (ref 0.85–1.18)
LYMPHOCYTES # BLD AUTO: 0.51 K/UL — LOW (ref 1–3.3)
LYMPHOCYTES # BLD AUTO: 0.51 K/UL — LOW (ref 1–3.3)
LYMPHOCYTES # BLD AUTO: 14.6 % — SIGNIFICANT CHANGE UP (ref 13–44)
LYMPHOCYTES # BLD AUTO: 14.6 % — SIGNIFICANT CHANGE UP (ref 13–44)
MCHC RBC-ENTMCNC: 28.4 PG — SIGNIFICANT CHANGE UP (ref 27–34)
MCHC RBC-ENTMCNC: 28.4 PG — SIGNIFICANT CHANGE UP (ref 27–34)
MCHC RBC-ENTMCNC: 31.6 GM/DL — LOW (ref 32–36)
MCHC RBC-ENTMCNC: 31.6 GM/DL — LOW (ref 32–36)
MCV RBC AUTO: 89.9 FL — SIGNIFICANT CHANGE UP (ref 80–100)
MCV RBC AUTO: 89.9 FL — SIGNIFICANT CHANGE UP (ref 80–100)
MONOCYTES # BLD AUTO: 0.46 K/UL — SIGNIFICANT CHANGE UP (ref 0–0.9)
MONOCYTES # BLD AUTO: 0.46 K/UL — SIGNIFICANT CHANGE UP (ref 0–0.9)
MONOCYTES NFR BLD AUTO: 13.2 % — SIGNIFICANT CHANGE UP (ref 2–14)
MONOCYTES NFR BLD AUTO: 13.2 % — SIGNIFICANT CHANGE UP (ref 2–14)
NEUTROPHILS # BLD AUTO: 2.5 K/UL — SIGNIFICANT CHANGE UP (ref 1.8–7.4)
NEUTROPHILS # BLD AUTO: 2.5 K/UL — SIGNIFICANT CHANGE UP (ref 1.8–7.4)
NEUTROPHILS NFR BLD AUTO: 71.6 % — SIGNIFICANT CHANGE UP (ref 43–77)
NEUTROPHILS NFR BLD AUTO: 71.6 % — SIGNIFICANT CHANGE UP (ref 43–77)
NRBC # BLD: 0 /100 WBCS — SIGNIFICANT CHANGE UP (ref 0–0)
NRBC # BLD: 0 /100 WBCS — SIGNIFICANT CHANGE UP (ref 0–0)
PLATELET # BLD AUTO: 79 K/UL — LOW (ref 150–400)
PLATELET # BLD AUTO: 79 K/UL — LOW (ref 150–400)
POTASSIUM SERPL-MCNC: 3.7 MMOL/L — SIGNIFICANT CHANGE UP (ref 3.5–5.3)
POTASSIUM SERPL-MCNC: 3.7 MMOL/L — SIGNIFICANT CHANGE UP (ref 3.5–5.3)
POTASSIUM SERPL-SCNC: 3.7 MMOL/L — SIGNIFICANT CHANGE UP (ref 3.5–5.3)
POTASSIUM SERPL-SCNC: 3.7 MMOL/L — SIGNIFICANT CHANGE UP (ref 3.5–5.3)
PROT SERPL-MCNC: 6.8 G/DL — SIGNIFICANT CHANGE UP (ref 6–8.3)
PROT SERPL-MCNC: 6.8 G/DL — SIGNIFICANT CHANGE UP (ref 6–8.3)
PROT SERPL-MCNC: 7 G/DL — SIGNIFICANT CHANGE UP (ref 6–8.3)
PROT SERPL-MCNC: 7 G/DL — SIGNIFICANT CHANGE UP (ref 6–8.3)
PROTHROM AB SERPL-ACNC: 26.6 SEC — HIGH (ref 9.5–13)
PROTHROM AB SERPL-ACNC: 26.6 SEC — HIGH (ref 9.5–13)
RBC # BLD: 4.15 M/UL — SIGNIFICANT CHANGE UP (ref 3.8–5.2)
RBC # BLD: 4.15 M/UL — SIGNIFICANT CHANGE UP (ref 3.8–5.2)
RBC # FLD: 14.8 % — HIGH (ref 10.3–14.5)
RBC # FLD: 14.8 % — HIGH (ref 10.3–14.5)
SODIUM SERPL-SCNC: 139 MMOL/L — SIGNIFICANT CHANGE UP (ref 135–145)
SODIUM SERPL-SCNC: 139 MMOL/L — SIGNIFICANT CHANGE UP (ref 135–145)
T3 SERPL-MCNC: 68 NG/DL — LOW (ref 80–200)
T3 SERPL-MCNC: 68 NG/DL — LOW (ref 80–200)
T4 AB SER-ACNC: 7 UG/DL — SIGNIFICANT CHANGE UP (ref 4.6–12)
T4 AB SER-ACNC: 7 UG/DL — SIGNIFICANT CHANGE UP (ref 4.6–12)
TSH SERPL-MCNC: 1.01 UIU/ML — SIGNIFICANT CHANGE UP (ref 0.36–3.74)
TSH SERPL-MCNC: 1.01 UIU/ML — SIGNIFICANT CHANGE UP (ref 0.36–3.74)
WBC # BLD: 3.49 K/UL — LOW (ref 3.8–10.5)
WBC # BLD: 3.49 K/UL — LOW (ref 3.8–10.5)
WBC # FLD AUTO: 3.49 K/UL — LOW (ref 3.8–10.5)
WBC # FLD AUTO: 3.49 K/UL — LOW (ref 3.8–10.5)

## 2023-12-09 PROCEDURE — 99232 SBSQ HOSP IP/OBS MODERATE 35: CPT

## 2023-12-09 PROCEDURE — 99233 SBSQ HOSP IP/OBS HIGH 50: CPT | Mod: GC

## 2023-12-09 RX ORDER — NYSTATIN CREAM 100000 [USP'U]/G
1 CREAM TOPICAL
Refills: 0 | Status: DISCONTINUED | OUTPATIENT
Start: 2023-12-09 | End: 2023-12-10

## 2023-12-09 RX ORDER — WARFARIN SODIUM 2.5 MG/1
4 TABLET ORAL ONCE
Refills: 0 | Status: COMPLETED | OUTPATIENT
Start: 2023-12-09 | End: 2023-12-09

## 2023-12-09 RX ADMIN — Medication 100 MILLIGRAM(S): at 21:24

## 2023-12-09 RX ADMIN — Medication 100 MILLIGRAM(S): at 13:35

## 2023-12-09 RX ADMIN — Medication 81 MILLIGRAM(S): at 13:35

## 2023-12-09 RX ADMIN — REMDESIVIR 200 MILLIGRAM(S): 5 INJECTION INTRAVENOUS at 13:35

## 2023-12-09 RX ADMIN — CARVEDILOL PHOSPHATE 6.25 MILLIGRAM(S): 80 CAPSULE, EXTENDED RELEASE ORAL at 18:29

## 2023-12-09 RX ADMIN — INSULIN GLARGINE 20 UNIT(S): 100 INJECTION, SOLUTION SUBCUTANEOUS at 08:39

## 2023-12-09 RX ADMIN — Medication 100 MILLIGRAM(S): at 05:25

## 2023-12-09 RX ADMIN — Medication 2: at 12:50

## 2023-12-09 RX ADMIN — Medication 200 MILLIGRAM(S): at 13:34

## 2023-12-09 RX ADMIN — NYSTATIN CREAM 1 APPLICATION(S): 100000 CREAM TOPICAL at 18:28

## 2023-12-09 RX ADMIN — Medication 2000 UNIT(S): at 13:34

## 2023-12-09 RX ADMIN — WARFARIN SODIUM 4 MILLIGRAM(S): 2.5 TABLET ORAL at 21:23

## 2023-12-09 RX ADMIN — SIMVASTATIN 40 MILLIGRAM(S): 20 TABLET, FILM COATED ORAL at 21:24

## 2023-12-09 RX ADMIN — SACUBITRIL AND VALSARTAN 1 TABLET(S): 24; 26 TABLET, FILM COATED ORAL at 18:29

## 2023-12-09 RX ADMIN — INSULIN GLARGINE 22 UNIT(S): 100 INJECTION, SOLUTION SUBCUTANEOUS at 23:24

## 2023-12-09 NOTE — PROGRESS NOTE ADULT - PROBLEM SELECTOR PLAN 1
Patient presents with cough, SOB w/ exertion likely 2/2suspected/confirmed COVID-19 infection  - Continue Remdesivir  - Robitussin 200 q6,  standing tessalon perles q8 for cough  - Tylenol prn myalgias, fever  - Isolation precautions per protocol  - ID (Dr. Miramontes) consulted, f/u recs

## 2023-12-09 NOTE — PROGRESS NOTE ADULT - ATTENDING COMMENTS
72 yo female with past medical history of coronary artery disease status post CABG (2007), V. tach status post pacemaker/defibrillator(Guidant), cardiac cath 2018 with PCI left circumflex and 1st obtuse, ischemic cardiomyopathy, diabetes, hyperlipidemia, A-fib on warfarin, CVA with hemorrhage (2010), cardiomems (unable to do readings as pt unable to lay flat), is presenting for complaints of fatigue and chest tightness. + COVID      - cp was nonanginal and resolved.   - trops neg  - EKG Ventricular-paced rhythm Biventricular pacemaker detected. HR 78  - continue Aspirin 81 MG and Simvastatin 40 MG Oral Tablet    - difficult to assess pts vol status.   - Previous TTE 10/26/23: LVEF 25-30%, global LV hypokinesis. moderate (grade 2) left ventricular diastolic dysfunction.  LA severely dilated. MIld to moderate TR. Mild pulmonary HTN.  - continue home Entresto 24-26 mg BID, Torsemide 60 mg, pt unsure if she was on spironolactone outpatient.       - Hx AFib  - continue Warfarin for AC  - continue Carvedilol 6.25 MG BID,  Digoxin 125 MCG
Patient was seen and examined by myself. Case was discussed with house staff in details. I have reviewed and agree with the plan as outlined above with edits where appropriate.    HPI:  73 yF with PMHx of CAD status post CABG (2007), V. tach status post pacemaker/defibrillator(Guidant), cardiac cath 2018 with PCI, diabetes, hyperlipidemia, A-fib on warfarin, CVA with hemorrhage (2010), cardiomems (unable to do readings as pt unable to lay flat), is presenting for complaints of fatigue, chest tightness, and cough.  Recently prescribed metolazone when he saw her cardiologist last week (Dr. Reardon). Patient states ever since starting the metolazone she has felt weak. Patient also states she has a similar cough every year at the beginning of winter. Patient denies other symptoms of Covid. Patient has bilateral leg swelling with the left leg always worse. Patient states the chest tightness has resolved since arriving to hospital and denies current chest pain, and shortness of breath. Patient denies recent bleeding or symptoms of GI bleed.     Denies fever, chills, cough, abdominal pain, nausea, vomiting, diarrhea, constipation, urinary frequency, urgency, or dysuria, headaches.  Denies recent travel, recent antibiotic use, or sick contacts.    ED Course:   Vitals: BP: 105/66, HR: 87, Temp:97.5 , RR: 17, SpO2: 97% on RA   Labs:  WBC: 4.73 HGB: 11.2 Platelet: 65 INR: 2.14 Trop 53.9 -> 58.6 COVID +  CT Chest:   Small pericardial effusion. Cardiomegaly. Mild peribronchial thickening and bronchial secretions in the subsegmental and segmental left more than right lower lobe pulmonary bronchi may relate to a component of infection, inflammation or sequela from chronic congestion.  EKG: Ventricular paced rhythm - biventricular pacemaker detected  Received in the ED:   Entresto x 1 (07 Dec 2023 16:07)      ROS: as in the HPI; all other ROS negative    SH and family history as above    Vital Signs Last 24 Hrs  T(C): 36.4 (08 Dec 2023 14:29), Max: 36.9 (08 Dec 2023 04:20)  T(F): 97.6 (08 Dec 2023 14:29), Max: 98.4 (08 Dec 2023 04:20)  HR: 69 (08 Dec 2023 14:29) (69 - 80)  BP: 95/61 (08 Dec 2023 14:29) (86/56 - 130/60)  BP(mean): --  RR: 18 (08 Dec 2023 14:29) (16 - 18)  SpO2: 96% (08 Dec 2023 14:29) (95% - 98%)    Parameters below as of 08 Dec 2023 14:29  Patient On (Oxygen Delivery Method): room air        GEN: NAD  HEENT- normocephalic; mouth moist  CVS- S1S2+  LUNGS-  no wheezing, ronchi/rales R lung   ABD: Soft , nontender, nondistended, Bowel sounds are present  EXTREMITY: no calf tenderness, no cyanosis, 1+ pitting edema  NEURO: AAOx3; non focal neurologic exam; grossly non focal neuro exam  PSYCH: normal affect and behavior  BACK: no swelling or mass;   VASCULAR: distal peripheral pulses present  SKIN: warm and dry.       Labs and imaging reviewed      Patient presenting with Patient is a 73y old  Female who presents with a chief complaint of low platelets/covid + (08 Dec 2023 13:28)   admitted for   1. COVID infection: patient is coughing in the room but she states she has chronic cough, not feeling well and feeling of "fluid in the chest"  in the last week. CT: Nonspecific very subtle groundglass opacity in the left upper lobe. discussed with ID, will start Remdesivir. patient not candidate for paxlovid due to drug to drug interaction.   2. thrombocytopenia: hemo eval noted no sign of bleeding.  will monitor   3. A fib: continue digoxin/, coreg and coumadin as per cardio.   4. CHFr EF: on entresto, tosemidestatin , asa, a fib control.   5. DM: FS appears controlled on current dose.   discharge plan for tomorrow after remdesivir         Plan of care discussed with patient ;  all questions and concerns were addressed.
Patient was seen and examined by myself. Case was discussed with house staff in details. I have reviewed and agree with the plan as outlined above with edits where appropriate.    HPI:  73 yF with PMHx of CAD status post CABG (2007), V. tach status post pacemaker/defibrillator(Guidant), cardiac cath 2018 with PCI, diabetes, hyperlipidemia, A-fib on warfarin, CVA with hemorrhage (2010), cardiomems (unable to do readings as pt unable to lay flat), is presenting for complaints of fatigue, chest tightness, and cough.  Recently prescribed metolazone when he saw her cardiologist last week (Dr. Reardon). Patient states ever since starting the metolazone she has felt weak. Patient also states she has a similar cough every year at the beginning of winter. Patient denies other symptoms of Covid. Patient has bilateral leg swelling with the left leg always worse. Patient states the chest tightness has resolved since arriving to hospital and denies current chest pain, and shortness of breath. Patient denies recent bleeding or symptoms of GI bleed.     Denies fever, chills, cough, abdominal pain, nausea, vomiting, diarrhea, constipation, urinary frequency, urgency, or dysuria, headaches.  Denies recent travel, recent antibiotic use, or sick contacts.    ED Course:   Vitals: BP: 105/66, HR: 87, Temp:97.5 , RR: 17, SpO2: 97% on RA   Labs:  WBC: 4.73 HGB: 11.2 Platelet: 65 INR: 2.14 Trop 53.9 -> 58.6 COVID +  CT Chest:   Small pericardial effusion. Cardiomegaly. Mild peribronchial thickening and bronchial secretions in the subsegmental and segmental left more than right lower lobe pulmonary bronchi may relate to a component of infection, inflammation or sequela from chronic congestion.  EKG: Ventricular paced rhythm - biventricular pacemaker detected  Received in the ED:   Entresto x 1 (07 Dec 2023 16:07)      ROS: as in the HPI; all other ROS negative    SH and family history as above    Vital Signs Last 24 Hrs  T(C): 36.4 (08 Dec 2023 14:29), Max: 36.9 (08 Dec 2023 04:20)  T(F): 97.6 (08 Dec 2023 14:29), Max: 98.4 (08 Dec 2023 04:20)  HR: 69 (08 Dec 2023 14:29) (69 - 80)  BP: 95/61 (08 Dec 2023 14:29) (86/56 - 130/60)  BP(mean): --  RR: 18 (08 Dec 2023 14:29) (16 - 18)  SpO2: 96% (08 Dec 2023 14:29) (95% - 98%)    Parameters below as of 08 Dec 2023 14:29  Patient On (Oxygen Delivery Method): room air        GEN: NAD  HEENT- normocephalic; mouth moist  CVS- S1S2+  LUNGS-  no wheezing, ronchi/rales R lung   ABD: Soft , nontender, nondistended, Bowel sounds are present  EXTREMITY: no calf tenderness, no cyanosis, 1+ pitting edema  NEURO: AAOx3; non focal neurologic exam; grossly non focal neuro exam  PSYCH: normal affect and behavior  BACK: no swelling or mass;   VASCULAR: distal peripheral pulses present  SKIN: warm and dry.       Labs and imaging reviewed      Patient presenting with Patient is a 73y old  Female who presents with a chief complaint of low platelets/covid + (08 Dec 2023 13:28)   admitted for   1. COVID infection: patient is coughing in the room but she states she has chronic cough, not feeling well and feeling of "fluid in the chest"  in the last week. CT: Nonspecific very subtle groundglass opacity in the left upper lobe. discussed with ID, will start Remdesivir. patient not candidate for paxlovid due to drug to drug interaction.   2. thrombocytopenia: hemo eval noted no sign of bleeding.  will monitor   3. A fib: continue digoxin/, coreg and coumadin as per cardio.   4. CHFr EF: on entresto, tosemidestatin , asa, a fib control.   5. DM:  need to verify Home dose, FS appears controlled on current dose.         Plan of care discussed with patient ;  all questions and concerns were addressed.

## 2023-12-09 NOTE — PROGRESS NOTE ADULT - TIME BILLING
direct patient care including but not limited to reviewing chart, medications ,laboratory data, imaging reports, discussion of plan of care with consultants on the case, coordination of care with multidisciplinary team involved in the case and discussion of plan with patient.  Patient  agreeable to plan of care and verbalized understanding the anticipated hospital course and treatment plan.
direct patient care including but not limited to reviewing chart, medications ,laboratory data, imaging reports, discussion of plan of care with consultants on the case, coordination of care with multidisciplinary team involved in the case and discussion of plan with patient.  Patient  agreeable to plan of care and verbalized understanding the anticipated hospital course and treatment plan.

## 2023-12-09 NOTE — PROGRESS NOTE ADULT - PROBLEM SELECTOR PLAN 6
- On tele monitoring  - Resume home warfarin   - Monitor daily PT, PTT, INR  - Continue Carvedilol 6.25 MG BID, Digoxin 125 MCG   - Cardio following Dr. Valdez

## 2023-12-09 NOTE — PROGRESS NOTE ADULT - SUBJECTIVE AND OBJECTIVE BOX
[facility] - Julia Mccann, Nicole, José Miguel, Caron, Oscar Conner  Office Number:  [phone]    Patient resting comfortably in bed in NAD.  Laying flat with no respiratory distress.  No complaints of chest pain, increased dyspnea, palpitations, PND, or orthopnea.  She is out of bed and walking to the bathroom.     F/U for:  CHF    MEDICATIONS  (STANDING):  aspirin enteric coated 81 milliGRAM(s) Oral daily  benzonatate 100 milliGRAM(s) Oral every 8 hours  carvedilol 6.25 milliGRAM(s) Oral every 12 hours  cholecalciferol 2000 Unit(s) Oral daily  dextrose 5%. 1000 milliLiter(s) (100 mL/Hr) IV Continuous <Continuous>  dextrose 5%. 1000 milliLiter(s) (50 mL/Hr) IV Continuous <Continuous>  dextrose 50% Injectable 25 Gram(s) IV Push once  dextrose 50% Injectable 12.5 Gram(s) IV Push once  dextrose 50% Injectable 25 Gram(s) IV Push once  digoxin     Tablet 125 MICROGram(s) Oral every other day  glucagon  Injectable 1 milliGRAM(s) IntraMuscular once  guaiFENesin Oral Liquid (Sugar-Free) 200 milliGRAM(s) Oral every 6 hours  insulin glargine Injectable (LANTUS) 22 Unit(s) SubCutaneous at bedtime  insulin glargine Injectable (LANTUS) 20 Unit(s) SubCutaneous every morning  insulin lispro (ADMELOG) corrective regimen sliding scale   SubCutaneous at bedtime  insulin lispro (ADMELOG) corrective regimen sliding scale   SubCutaneous three times a day before meals  methimazole 2.5 milliGRAM(s) Oral <User Schedule>  remdesivir  IVPB 100 milliGRAM(s) IV Intermittent every 24 hours  remdesivir  IVPB   IV Intermittent   sacubitril 24 mG/valsartan 26 mG 1 Tablet(s) Oral two times a day  simvastatin 40 milliGRAM(s) Oral at bedtime  torsemide 60 milliGRAM(s) Oral daily  warfarin 4 milliGRAM(s) Oral daily    MEDICATIONS  (PRN):  acetaminophen     Tablet .. 650 milliGRAM(s) Oral every 6 hours PRN Temp greater or equal to 38C (100.4F), Mild Pain (1 - 3)  dextrose Oral Gel 15 Gram(s) Oral once PRN Blood Glucose LESS THAN 70 milliGRAM(s)/deciliter  melatonin 3 milliGRAM(s) Oral at bedtime PRN Insomnia      Allergies    No Known Allergies             Vital Signs Last 24 Hrs  T(C): 37.3 (09 Dec 2023 05:14), Max: 37.7 (08 Dec 2023 21:36)  T(F): 99.2 (09 Dec 2023 05:14), Max: 99.9 (08 Dec 2023 21:36)  HR: 70 (09 Dec 2023 05:14) (69 - 74)  BP: 98/59 (09 Dec 2023 06:50) (95/61 - 111/62)  BP(mean): --  RR: 18 (09 Dec 2023 05:14) (17 - 18)  SpO2: 92% (09 Dec 2023 05:14) (92% - 97%)    Parameters below as of 09 Dec 2023 05:14  Patient On (Oxygen Delivery Method): room air        I&O's Summary      ON EXAM:    Constitutional: NAD, awake and alert  HEENT: Moist Mucous Membranes, Anicteric  Pulmonary: Non-labored, breath sounds are clear bilaterally, No wheezing, rales or rhonchi  Cardiovascular: Regular, S1 and S2, No murmurs, rubs, gallops or clicks  Gastrointestinal: Bowel Sounds present, soft, nontender.   Lymph: 2+ RLE pitting edema, LLE + edema non pitting (chronic)  No lymphadenopathy.  Skin: No visible rashes or ulcers.  Psych:  Mood & affect appropriate    LABS: All Labs Reviewed:                        11.8   3.49  )-----------( 79       ( 09 Dec 2023 10:10 )             37.3                         11.2   3.14  )-----------( 75       ( 08 Dec 2023 08:05 )             35.5                         11.2   4.73  )-----------( 65       ( 07 Dec 2023 08:05 )             34.8     09 Dec 2023 10:10    139    |  96     |  65     ----------------------------<  183    3.7     |  35     |  1.40   08 Dec 2023 13:10    x      |  x      |  x      ----------------------------<  x      x       |  x      |  1.60   08 Dec 2023 08:05    137    |  98     |  67     ----------------------------<  161    3.4     |  34     |  1.60     Ca    8.2        09 Dec 2023 10:10  Ca    8.0        08 Dec 2023 08:05  Ca    7.9        07 Dec 2023 08:05  Mg     2.6       07 Dec 2023 08:05    TPro  6.8    /  Alb  3.0    /  TBili  1.0    /  DBili  x      /  AST  34     /  ALT  18     /  AlkPhos  56     09 Dec 2023 10:10  TPro  7.3    /  Alb  3.1    /  TBili  1.0    /  DBili  0.4    /  AST  31     /  ALT  19     /  AlkPhos  61     08 Dec 2023 13:10  TPro  6.9    /  Alb  3.1    /  TBili  1.0    /  DBili  0.3    /  AST  28     /  ALT  17     /  AlkPhos  56     08 Dec 2023 08:05    PT/INR - ( 09 Dec 2023 10:10 )   PT: 26.6 sec;   INR: 2.33 ratio         PTT - ( 08 Dec 2023 08:05 )  PTT:40.3 sec  CARDIAC MARKERS ( 07 Dec 2023 17:52 )  x     / x     / 129 U/L / x     / 1.2 ng/mL           12-09 @ 10:10  TSH: 1.01     [facility] - Julia Mccann, Nicole, José Miguel, Caron, Oscar Conner  Office Number:  [phone]    Patient resting comfortably in bed in NAD.  Laying flat with no respiratory distress.  No complaints of chest pain, increased dyspnea, palpitations, PND, or orthopnea.  She is out of bed and walking to the bathroom.     F/U for:  CHF    MEDICATIONS  (STANDING):  aspirin enteric coated 81 milliGRAM(s) Oral daily  benzonatate 100 milliGRAM(s) Oral every 8 hours  carvedilol 6.25 milliGRAM(s) Oral every 12 hours  cholecalciferol 2000 Unit(s) Oral daily  dextrose 5%. 1000 milliLiter(s) (100 mL/Hr) IV Continuous <Continuous>  dextrose 5%. 1000 milliLiter(s) (50 mL/Hr) IV Continuous <Continuous>  dextrose 50% Injectable 25 Gram(s) IV Push once  dextrose 50% Injectable 12.5 Gram(s) IV Push once  dextrose 50% Injectable 25 Gram(s) IV Push once  digoxin     Tablet 125 MICROGram(s) Oral every other day  glucagon  Injectable 1 milliGRAM(s) IntraMuscular once  guaiFENesin Oral Liquid (Sugar-Free) 200 milliGRAM(s) Oral every 6 hours  insulin glargine Injectable (LANTUS) 22 Unit(s) SubCutaneous at bedtime  insulin glargine Injectable (LANTUS) 20 Unit(s) SubCutaneous every morning  insulin lispro (ADMELOG) corrective regimen sliding scale   SubCutaneous at bedtime  insulin lispro (ADMELOG) corrective regimen sliding scale   SubCutaneous three times a day before meals  methimazole 2.5 milliGRAM(s) Oral <User Schedule>  remdesivir  IVPB 100 milliGRAM(s) IV Intermittent every 24 hours  remdesivir  IVPB   IV Intermittent   sacubitril 24 mG/valsartan 26 mG 1 Tablet(s) Oral two times a day  simvastatin 40 milliGRAM(s) Oral at bedtime  torsemide 60 milliGRAM(s) Oral daily  warfarin 4 milliGRAM(s) Oral daily    MEDICATIONS  (PRN):  acetaminophen     Tablet .. 650 milliGRAM(s) Oral every 6 hours PRN Temp greater or equal to 38C (100.4F), Mild Pain (1 - 3)  dextrose Oral Gel 15 Gram(s) Oral once PRN Blood Glucose LESS THAN 70 milliGRAM(s)/deciliter  melatonin 3 milliGRAM(s) Oral at bedtime PRN Insomnia      Allergies    No Known Allergies             Vital Signs Last 24 Hrs  T(C): 37.3 (09 Dec 2023 05:14), Max: 37.7 (08 Dec 2023 21:36)  T(F): 99.2 (09 Dec 2023 05:14), Max: 99.9 (08 Dec 2023 21:36)  HR: 70 (09 Dec 2023 05:14) (69 - 74)  BP: 98/59 (09 Dec 2023 06:50) (95/61 - 111/62)  BP(mean): --  RR: 18 (09 Dec 2023 05:14) (17 - 18)  SpO2: 92% (09 Dec 2023 05:14) (92% - 97%)    Parameters below as of 09 Dec 2023 05:14  Patient On (Oxygen Delivery Method): room air        I&O's Summary      ON EXAM:    Constitutional: NAD, awake and alert  HEENT: Moist Mucous Membranes, Anicteric  Pulmonary: Non-labored, breath sounds are clear bilaterally, No wheezing, rales or rhonchi  Cardiovascular: Regular, S1 and S2, No murmurs, rubs, gallops or clicks  Gastrointestinal: Bowel Sounds present, soft, nontender.   Lymph: 2+ RLE pitting edema, LLE + edema non pitting (chronic)  No lymphadenopathy.  Skin: No visible rashes or ulcers.  Psych:  Mood & affect appropriate    LABS: All Labs Reviewed:                        11.8   3.49  )-----------( 79       ( 09 Dec 2023 10:10 )             37.3                         11.2   3.14  )-----------( 75       ( 08 Dec 2023 08:05 )             35.5                         11.2   4.73  )-----------( 65       ( 07 Dec 2023 08:05 )             34.8     09 Dec 2023 10:10    139    |  96     |  65     ----------------------------<  183    3.7     |  35     |  1.40   08 Dec 2023 13:10    x      |  x      |  x      ----------------------------<  x      x       |  x      |  1.60   08 Dec 2023 08:05    137    |  98     |  67     ----------------------------<  161    3.4     |  34     |  1.60     Ca    8.2        09 Dec 2023 10:10  Ca    8.0        08 Dec 2023 08:05  Ca    7.9        07 Dec 2023 08:05  Mg     2.6       07 Dec 2023 08:05    TPro  6.8    /  Alb  3.0    /  TBili  1.0    /  DBili  x      /  AST  34     /  ALT  18     /  AlkPhos  56     09 Dec 2023 10:10  TPro  7.3    /  Alb  3.1    /  TBili  1.0    /  DBili  0.4    /  AST  31     /  ALT  19     /  AlkPhos  61     08 Dec 2023 13:10  TPro  6.9    /  Alb  3.1    /  TBili  1.0    /  DBili  0.3    /  AST  28     /  ALT  17     /  AlkPhos  56     08 Dec 2023 08:05    PT/INR - ( 09 Dec 2023 10:10 )   PT: 26.6 sec;   INR: 2.33 ratio         PTT - ( 08 Dec 2023 08:05 )  PTT:40.3 sec  CARDIAC MARKERS ( 07 Dec 2023 17:52 )  x     / x     / 129 U/L / x     / 1.2 ng/mL           12-09 @ 10:10  TSH: 1.01

## 2023-12-09 NOTE — PROGRESS NOTE ADULT - PROBLEM SELECTOR PLAN 3
Patient w/ history CHF  - Previous TTE 10/26/23: LVEF 25-30%, global LV hypokinesis. moderate (grade 2) left ventricular diastolic dysfunction.  LA severely dilated. MIld to moderate TR. Mild pulmonary HTN.   - CT Chest: Small pericardial effusion. Cardiomegaly. Mild peribronchial thickening and bronchial secretions in the subsegmental and segmental left more than right lower lobe pulmonary bronchi   - continue home Entresto 24-26 mg BID with hold parameters, Torsemide 60 mg  - Daily weights   - Cardiology Valdez following

## 2023-12-09 NOTE — PROGRESS NOTE ADULT - PROBLEM SELECTOR PLAN 4
- Patient with thrombocytopenia   - per heme, resume home warfarin due to dx of a-fib   - No signs of active bleeding at this time   - Hematology following (Avelino), appreciate recommendations: continue anticoagulation per cardiology

## 2023-12-09 NOTE — PROGRESS NOTE ADULT - PROBLEM SELECTOR PLAN 2
- Recent history of chest tightness (now resolved) and elevated troponin on admission: Trop 53.9 -> 58.6 > 55  - Likely 2/2 demand   - Cardiology consulted, appreciated recommendations

## 2023-12-09 NOTE — PROGRESS NOTE ADULT - SUBJECTIVE AND OBJECTIVE BOX
Patient is a 73y old  Female who presents with a chief complaint of low platelets/covid + (09 Dec 2023 12:46)      INTERVAL HPI/OVERNIGHT EVENTS: Pt was seen and examined at bedside. No acute overnight events. Pt states that she feels well and would like to go home. Pt proceeds to have productive coughing episode. Pt informed that she is to complete a 3 day course of Remdesivir given her symptomatic COVID. Pt denies headache, dizziness, lightheadedness, fever, chills, body aches, CP, SOB, palpitations, abdominal pain, n/v, numbness/tingling. No other complaints at this time.     MEDICATIONS  (STANDING):  aspirin enteric coated 81 milliGRAM(s) Oral daily  benzonatate 100 milliGRAM(s) Oral every 8 hours  carvedilol 6.25 milliGRAM(s) Oral every 12 hours  cholecalciferol 2000 Unit(s) Oral daily  dextrose 5%. 1000 milliLiter(s) (100 mL/Hr) IV Continuous <Continuous>  dextrose 5%. 1000 milliLiter(s) (50 mL/Hr) IV Continuous <Continuous>  dextrose 50% Injectable 25 Gram(s) IV Push once  dextrose 50% Injectable 25 Gram(s) IV Push once  dextrose 50% Injectable 12.5 Gram(s) IV Push once  digoxin     Tablet 125 MICROGram(s) Oral every other day  glucagon  Injectable 1 milliGRAM(s) IntraMuscular once  guaiFENesin Oral Liquid (Sugar-Free) 200 milliGRAM(s) Oral every 6 hours  insulin glargine Injectable (LANTUS) 20 Unit(s) SubCutaneous every morning  insulin glargine Injectable (LANTUS) 22 Unit(s) SubCutaneous at bedtime  insulin lispro (ADMELOG) corrective regimen sliding scale   SubCutaneous three times a day before meals  insulin lispro (ADMELOG) corrective regimen sliding scale   SubCutaneous at bedtime  methimazole 2.5 milliGRAM(s) Oral <User Schedule>  remdesivir  IVPB   IV Intermittent   remdesivir  IVPB 100 milliGRAM(s) IV Intermittent every 24 hours  sacubitril 24 mG/valsartan 26 mG 1 Tablet(s) Oral two times a day  simvastatin 40 milliGRAM(s) Oral at bedtime  torsemide 60 milliGRAM(s) Oral daily  warfarin 4 milliGRAM(s) Oral daily    MEDICATIONS  (PRN):  acetaminophen     Tablet .. 650 milliGRAM(s) Oral every 6 hours PRN Temp greater or equal to 38C (100.4F), Mild Pain (1 - 3)  dextrose Oral Gel 15 Gram(s) Oral once PRN Blood Glucose LESS THAN 70 milliGRAM(s)/deciliter  melatonin 3 milliGRAM(s) Oral at bedtime PRN Insomnia      Allergies    No Known Allergies    Intolerances        REVIEW OF SYSTEMS: As per above     Vital Signs Last 24 Hrs  T(C): 36.4 (09 Dec 2023 11:51), Max: 37.7 (08 Dec 2023 21:36)  T(F): 97.5 (09 Dec 2023 11:51), Max: 99.9 (08 Dec 2023 21:36)  HR: 72 (09 Dec 2023 11:51) (69 - 74)  BP: 103/62 (09 Dec 2023 11:51) (95/61 - 111/62)  BP(mean): --  RR: 19 (09 Dec 2023 11:51) (17 - 19)  SpO2: 97% (09 Dec 2023 11:51) (92% - 97%)    Parameters below as of 09 Dec 2023 11:51  Patient On (Oxygen Delivery Method): room air        PHYSICAL EXAM:  GENERAL: Obese woman resting in bed, coughing, appears comfortable   CHEST/LUNG:  Coarse breath sounds in bilateral lower lung fields, productive cough, decreased respiratory effort  HEART:  RRR, S1, S2, no murmur appreciated, minimal lower extremity edema bilaterally   ABDOMEN:  BS+, soft, nontender, nondistended  EXTREMITIES: no edema, cyanosis, or calf tenderness  NERVOUS SYSTEM: answers questions and follows commands appropriately    LABS:                        11.8   3.49  )-----------( 79       ( 09 Dec 2023 10:10 )             37.3     CBC Full  -  ( 09 Dec 2023 10:10 )  WBC Count : 3.49 K/uL  Hemoglobin : 11.8 g/dL  Hematocrit : 37.3 %  Platelet Count - Automated : 79 K/uL  Mean Cell Volume : 89.9 fl  Mean Cell Hemoglobin : 28.4 pg  Mean Cell Hemoglobin Concentration : 31.6 gm/dL  Auto Neutrophil # : 2.50 K/uL  Auto Lymphocyte # : 0.51 K/uL  Auto Monocyte # : 0.46 K/uL  Auto Eosinophil # : 0.00 K/uL  Auto Basophil # : 0.01 K/uL  Auto Neutrophil % : 71.6 %  Auto Lymphocyte % : 14.6 %  Auto Monocyte % : 13.2 %  Auto Eosinophil % : 0.0 %  Auto Basophil % : 0.3 %    09 Dec 2023 10:10    139    |  96     |  65     ----------------------------<  183    3.7     |  35     |  1.40     Ca    8.2        09 Dec 2023 10:10    TPro  6.8    /  Alb  3.0    /  TBili  1.0    /  DBili  0.5    /  AST  34     /  ALT  18     /  AlkPhos  56     09 Dec 2023 10:10    PT/INR - ( 09 Dec 2023 10:10 )   PT: 26.6 sec;   INR: 2.33 ratio         PTT - ( 08 Dec 2023 08:05 )  PTT:40.3 sec  Urinalysis Basic - ( 09 Dec 2023 10:10 )    Color: x / Appearance: x / SG: x / pH: x  Gluc: 183 mg/dL / Ketone: x  / Bili: x / Urobili: x   Blood: x / Protein: x / Nitrite: x   Leuk Esterase: x / RBC: x / WBC x   Sq Epi: x / Non Sq Epi: x / Bacteria: x      CAPILLARY BLOOD GLUCOSE      POCT Blood Glucose.: 171 mg/dL (09 Dec 2023 12:13)  POCT Blood Glucose.: 107 mg/dL (09 Dec 2023 08:22)  POCT Blood Glucose.: 149 mg/dL (08 Dec 2023 22:31)  POCT Blood Glucose.: 214 mg/dL (08 Dec 2023 18:12)          RADIOLOGY & ADDITIONAL TESTS: ____    Personally reviewed.     Consultant(s) Notes Reviewed:  [x] YES  [ ] NO

## 2023-12-09 NOTE — PROGRESS NOTE ADULT - PROBLEM SELECTOR PLAN 8
History of CAD, cardiac cath 2018 with PCI  - continue Aspirin 81 MG and Simvastatin 40 MG Oral Tablet
History of CAD, cardiac cath 2018 with PCI  - continue Aspirin 81 MG and Simvastatin 40 MG Oral Tablet

## 2023-12-09 NOTE — PROGRESS NOTE ADULT - ASSESSMENT
74 yo female with past medical history of coronary artery disease status post CABG (2007), V. tach status post pacemaker/defibrillator([employer]), cardiac cath 2018 with PCI left circumflex and 1st obtuse, ischemic cardiomyopathy, diabetes, hyperlipidemia, A-fib on warfarin, CVA with hemorrhage (2010), cardiomems (unable to do readings as pt unable to lay flat), is presenting for complaints of fatigue and chest tightness. + COVID     - chest pain has resolved  - EKG Ventricular-paced rhythm Biventricular pacemaker detected. HR 78  - No acute changes on EKG compared to previous.  - continue Aspirin 81 MG and Simvastatin 40 MG Oral Tablet    - difficult to assess vol status. cardiomems nonfunctioning.   - Previous TTE 10/26/23: LVEF 25-30%, global LV hypokinesis. moderate (grade 2) left ventricular diastolic dysfunction.  LA severely dilated. MIld to moderate TR. Mild pulmonary HTN.  - continue home Entresto 24-26 mg BID, Torsemide 60 mg, pt unsure if she was on spironolactone outpatient.   - hold home metolazone    - Hx AFib  - continue Warfarin for AC  - continue Carvedilol 6.25 MG BID,  Digoxin 125 MCG     - BP well controlled, monitor routine hemodynamics.  - Continue Carvedilol 6.25 MG BID,    - Monitor and replete lytes, keep K>4, Mg>2.  - Strict I/Os, daily weights.  - Other cardiovascular workup will depend on clinical course.  - All other workup per primary team.  - Will continue to follow.       72 yo female with past medical history of coronary artery disease status post CABG (2007), V. tach status post pacemaker/defibrillator([employer]), cardiac cath 2018 with PCI left circumflex and 1st obtuse, ischemic cardiomyopathy, diabetes, hyperlipidemia, A-fib on warfarin, CVA with hemorrhage (2010), cardiomems (unable to do readings as pt unable to lay flat), is presenting for complaints of fatigue and chest tightness. + COVID     - chest pain has resolved  - EKG Ventricular-paced rhythm Biventricular pacemaker detected. HR 78  - No acute changes on EKG compared to previous.  - continue Aspirin 81 MG and Simvastatin 40 MG Oral Tablet    - difficult to assess vol status. cardiomems nonfunctioning.   - Previous TTE 10/26/23: LVEF 25-30%, global LV hypokinesis. moderate (grade 2) left ventricular diastolic dysfunction.  LA severely dilated. MIld to moderate TR. Mild pulmonary HTN.  - continue home Entresto 24-26 mg BID, Torsemide 60 mg, pt unsure if she was on spironolactone outpatient.   - hold home metolazone    - Hx AFib  - continue Warfarin for AC  - continue Carvedilol 6.25 MG BID,  Digoxin 125 MCG     - BP well controlled, monitor routine hemodynamics.  - Continue Carvedilol 6.25 MG BID,    - Monitor and replete lytes, keep K>4, Mg>2.  - Strict I/Os, daily weights.  - Other cardiovascular workup will depend on clinical course.  - All other workup per primary team.  - Will continue to follow.

## 2023-12-09 NOTE — PROGRESS NOTE ADULT - PROBLEM SELECTOR PLAN 5
Chronic, History of DM2, on home insulin   - A1c 7.3%  - Patient states she takes Humalog between 30-60u TID with basal levamir 60-70u at night  - Continue 20 units AM, 22 units PM to 40u lantus at night  - Moderate dose insulin corrective scale  - Hypoglycemia protocol, Accuchecks AC&HS  - Diet regular food with DASH/TLC

## 2023-12-09 NOTE — PROGRESS NOTE ADULT - ASSESSMENT
Covid with exacerbation of mild thrombocytopenia  baseline approx 100K  now 65K -75K  WBC 3.14 decreased secondary to covid  WBC and platelet count relatively stable    Recommendations:  1.  follow CBC  2.  coumadin as per cardiology still therapeutic  3.  cardiology evaluation  4.  further heme recommendations pending

## 2023-12-09 NOTE — PROGRESS NOTE ADULT - SUBJECTIVE AND OBJECTIVE BOX
Interval History:  no new complaints  Chart reviewed and events noted;   Overnight events:    MEDICATIONS  (STANDING):  aspirin enteric coated 81 milliGRAM(s) Oral daily  benzonatate 100 milliGRAM(s) Oral every 8 hours  carvedilol 6.25 milliGRAM(s) Oral every 12 hours  cholecalciferol 2000 Unit(s) Oral daily  dextrose 5%. 1000 milliLiter(s) (50 mL/Hr) IV Continuous <Continuous>  dextrose 5%. 1000 milliLiter(s) (100 mL/Hr) IV Continuous <Continuous>  dextrose 50% Injectable 25 Gram(s) IV Push once  dextrose 50% Injectable 25 Gram(s) IV Push once  dextrose 50% Injectable 12.5 Gram(s) IV Push once  digoxin     Tablet 125 MICROGram(s) Oral every other day  glucagon  Injectable 1 milliGRAM(s) IntraMuscular once  guaiFENesin Oral Liquid (Sugar-Free) 200 milliGRAM(s) Oral every 6 hours  insulin glargine Injectable (LANTUS) 20 Unit(s) SubCutaneous every morning  insulin glargine Injectable (LANTUS) 22 Unit(s) SubCutaneous at bedtime  insulin lispro (ADMELOG) corrective regimen sliding scale   SubCutaneous three times a day before meals  insulin lispro (ADMELOG) corrective regimen sliding scale   SubCutaneous at bedtime  methimazole 2.5 milliGRAM(s) Oral <User Schedule>  remdesivir  IVPB   IV Intermittent   remdesivir  IVPB 100 milliGRAM(s) IV Intermittent every 24 hours  sacubitril 24 mG/valsartan 26 mG 1 Tablet(s) Oral two times a day  simvastatin 40 milliGRAM(s) Oral at bedtime  torsemide 60 milliGRAM(s) Oral daily  warfarin 4 milliGRAM(s) Oral daily    MEDICATIONS  (PRN):  acetaminophen     Tablet .. 650 milliGRAM(s) Oral every 6 hours PRN Temp greater or equal to 38C (100.4F), Mild Pain (1 - 3)  dextrose Oral Gel 15 Gram(s) Oral once PRN Blood Glucose LESS THAN 70 milliGRAM(s)/deciliter  melatonin 3 milliGRAM(s) Oral at bedtime PRN Insomnia      Vital Signs Last 24 Hrs  T(C): 36.4 (09 Dec 2023 11:51), Max: 37.7 (08 Dec 2023 21:36)  T(F): 97.5 (09 Dec 2023 11:51), Max: 99.9 (08 Dec 2023 21:36)  HR: 72 (09 Dec 2023 11:51) (69 - 74)  BP: 103/62 (09 Dec 2023 11:51) (95/61 - 111/62)  BP(mean): --  RR: 19 (09 Dec 2023 11:51) (17 - 19)  SpO2: 97% (09 Dec 2023 11:51) (92% - 97%)    Parameters below as of 09 Dec 2023 11:51  Patient On (Oxygen Delivery Method): room air        PHYSICAL EXAM  exam by hospitalist    LABS:  CBC Full  -  ( 09 Dec 2023 10:10 )  WBC Count : 3.49 K/uL  RBC Count : 4.15 M/uL  Hemoglobin : 11.8 g/dL  Hematocrit : 37.3 %  Platelet Count - Automated : 79 K/uL  Mean Cell Volume : 89.9 fl  Mean Cell Hemoglobin : 28.4 pg  Mean Cell Hemoglobin Concentration : 31.6 gm/dL  Auto Neutrophil # : 2.50 K/uL  Auto Lymphocyte # : 0.51 K/uL  Auto Monocyte # : 0.46 K/uL  Auto Eosinophil # : 0.00 K/uL  Auto Basophil # : 0.01 K/uL  Auto Neutrophil % : 71.6 %  Auto Lymphocyte % : 14.6 %  Auto Monocyte % : 13.2 %  Auto Eosinophil % : 0.0 %  Auto Basophil % : 0.3 %    12-09    139  |  96  |  65<H>  ----------------------------<  183<H>  3.7   |  35<H>  |  1.40<H>    Ca    8.2<L>      09 Dec 2023 10:10    TPro  6.8  /  Alb  3.0<L>  /  TBili  1.0  /  DBili  0.5<H>  /  AST  34  /  ALT  18  /  AlkPhos  56  12-09    PT/INR - ( 09 Dec 2023 10:10 )   PT: 26.6 sec;   INR: 2.33 ratio         PTT - ( 08 Dec 2023 08:05 )  PTT:40.3 sec    fe studies      WBC trend  3.49 K/uL (12-09-23 @ 10:10)  3.14 K/uL (12-08-23 @ 08:05)  4.73 K/uL (12-07-23 @ 08:05)      Hgb trend  11.8 g/dL (12-09-23 @ 10:10)  11.2 g/dL (12-08-23 @ 08:05)  11.2 g/dL (12-07-23 @ 08:05)      plt trend  79 K/uL (12-09-23 @ 10:10)  75 K/uL (12-08-23 @ 08:05)  65 K/uL (12-07-23 @ 08:05)        RADIOLOGY & ADDITIONAL STUDIES:

## 2023-12-10 ENCOUNTER — TRANSCRIPTION ENCOUNTER (OUTPATIENT)
Age: 73
End: 2023-12-10

## 2023-12-10 VITALS
RESPIRATION RATE: 18 BRPM | TEMPERATURE: 98 F | OXYGEN SATURATION: 95 % | HEART RATE: 70 BPM | DIASTOLIC BLOOD PRESSURE: 73 MMHG | SYSTOLIC BLOOD PRESSURE: 113 MMHG

## 2023-12-10 LAB
ALBUMIN SERPL ELPH-MCNC: 2.7 G/DL — LOW (ref 3.3–5)
ALP SERPL-CCNC: 50 U/L — SIGNIFICANT CHANGE UP (ref 40–120)
ALT FLD-CCNC: 16 U/L — SIGNIFICANT CHANGE UP (ref 12–78)
ANION GAP SERPL CALC-SCNC: 4 MMOL/L — LOW (ref 5–17)
ANION GAP SERPL CALC-SCNC: 4 MMOL/L — LOW (ref 5–17)
AST SERPL-CCNC: 34 U/L — SIGNIFICANT CHANGE UP (ref 15–37)
BASOPHILS # BLD AUTO: 0.01 K/UL — SIGNIFICANT CHANGE UP (ref 0–0.2)
BASOPHILS # BLD AUTO: 0.01 K/UL — SIGNIFICANT CHANGE UP (ref 0–0.2)
BASOPHILS NFR BLD AUTO: 0.3 % — SIGNIFICANT CHANGE UP (ref 0–2)
BASOPHILS NFR BLD AUTO: 0.3 % — SIGNIFICANT CHANGE UP (ref 0–2)
BILIRUB DIRECT SERPL-MCNC: 0.4 MG/DL — HIGH (ref 0–0.3)
BILIRUB DIRECT SERPL-MCNC: 0.4 MG/DL — HIGH (ref 0–0.3)
BILIRUB INDIRECT FLD-MCNC: 0.3 MG/DL — SIGNIFICANT CHANGE UP (ref 0.2–1)
BILIRUB INDIRECT FLD-MCNC: 0.3 MG/DL — SIGNIFICANT CHANGE UP (ref 0.2–1)
BILIRUB SERPL-MCNC: 0.7 MG/DL — SIGNIFICANT CHANGE UP (ref 0.2–1.2)
BUN SERPL-MCNC: 62 MG/DL — HIGH (ref 7–23)
BUN SERPL-MCNC: 62 MG/DL — HIGH (ref 7–23)
CALCIUM SERPL-MCNC: 8.4 MG/DL — LOW (ref 8.5–10.1)
CALCIUM SERPL-MCNC: 8.4 MG/DL — LOW (ref 8.5–10.1)
CHLORIDE SERPL-SCNC: 102 MMOL/L — SIGNIFICANT CHANGE UP (ref 96–108)
CHLORIDE SERPL-SCNC: 102 MMOL/L — SIGNIFICANT CHANGE UP (ref 96–108)
CO2 SERPL-SCNC: 33 MMOL/L — HIGH (ref 22–31)
CO2 SERPL-SCNC: 33 MMOL/L — HIGH (ref 22–31)
CREAT SERPL-MCNC: 1.2 MG/DL — SIGNIFICANT CHANGE UP (ref 0.5–1.3)
CREAT SERPL-MCNC: 1.2 MG/DL — SIGNIFICANT CHANGE UP (ref 0.5–1.3)
EGFR: 48 ML/MIN/1.73M2 — LOW
EGFR: 48 ML/MIN/1.73M2 — LOW
EOSINOPHIL # BLD AUTO: 0.04 K/UL — SIGNIFICANT CHANGE UP (ref 0–0.5)
EOSINOPHIL # BLD AUTO: 0.04 K/UL — SIGNIFICANT CHANGE UP (ref 0–0.5)
EOSINOPHIL NFR BLD AUTO: 1.3 % — SIGNIFICANT CHANGE UP (ref 0–6)
EOSINOPHIL NFR BLD AUTO: 1.3 % — SIGNIFICANT CHANGE UP (ref 0–6)
GLUCOSE SERPL-MCNC: 73 MG/DL — SIGNIFICANT CHANGE UP (ref 70–99)
GLUCOSE SERPL-MCNC: 73 MG/DL — SIGNIFICANT CHANGE UP (ref 70–99)
HCT VFR BLD CALC: 36.1 % — SIGNIFICANT CHANGE UP (ref 34.5–45)
HCT VFR BLD CALC: 36.1 % — SIGNIFICANT CHANGE UP (ref 34.5–45)
HGB BLD-MCNC: 11.4 G/DL — LOW (ref 11.5–15.5)
HGB BLD-MCNC: 11.4 G/DL — LOW (ref 11.5–15.5)
IMM GRANULOCYTES NFR BLD AUTO: 0.3 % — SIGNIFICANT CHANGE UP (ref 0–0.9)
IMM GRANULOCYTES NFR BLD AUTO: 0.3 % — SIGNIFICANT CHANGE UP (ref 0–0.9)
INR BLD: 2.1 RATIO — HIGH (ref 0.85–1.18)
INR BLD: 2.1 RATIO — HIGH (ref 0.85–1.18)
LYMPHOCYTES # BLD AUTO: 0.59 K/UL — LOW (ref 1–3.3)
LYMPHOCYTES # BLD AUTO: 0.59 K/UL — LOW (ref 1–3.3)
LYMPHOCYTES # BLD AUTO: 19 % — SIGNIFICANT CHANGE UP (ref 13–44)
LYMPHOCYTES # BLD AUTO: 19 % — SIGNIFICANT CHANGE UP (ref 13–44)
MCHC RBC-ENTMCNC: 28.2 PG — SIGNIFICANT CHANGE UP (ref 27–34)
MCHC RBC-ENTMCNC: 28.2 PG — SIGNIFICANT CHANGE UP (ref 27–34)
MCHC RBC-ENTMCNC: 31.6 GM/DL — LOW (ref 32–36)
MCHC RBC-ENTMCNC: 31.6 GM/DL — LOW (ref 32–36)
MCV RBC AUTO: 89.4 FL — SIGNIFICANT CHANGE UP (ref 80–100)
MCV RBC AUTO: 89.4 FL — SIGNIFICANT CHANGE UP (ref 80–100)
MONOCYTES # BLD AUTO: 0.38 K/UL — SIGNIFICANT CHANGE UP (ref 0–0.9)
MONOCYTES # BLD AUTO: 0.38 K/UL — SIGNIFICANT CHANGE UP (ref 0–0.9)
MONOCYTES NFR BLD AUTO: 12.3 % — SIGNIFICANT CHANGE UP (ref 2–14)
MONOCYTES NFR BLD AUTO: 12.3 % — SIGNIFICANT CHANGE UP (ref 2–14)
NEUTROPHILS # BLD AUTO: 2.07 K/UL — SIGNIFICANT CHANGE UP (ref 1.8–7.4)
NEUTROPHILS # BLD AUTO: 2.07 K/UL — SIGNIFICANT CHANGE UP (ref 1.8–7.4)
NEUTROPHILS NFR BLD AUTO: 66.8 % — SIGNIFICANT CHANGE UP (ref 43–77)
NEUTROPHILS NFR BLD AUTO: 66.8 % — SIGNIFICANT CHANGE UP (ref 43–77)
NRBC # BLD: 0 /100 WBCS — SIGNIFICANT CHANGE UP (ref 0–0)
NRBC # BLD: 0 /100 WBCS — SIGNIFICANT CHANGE UP (ref 0–0)
PLATELET # BLD AUTO: 86 K/UL — LOW (ref 150–400)
PLATELET # BLD AUTO: 86 K/UL — LOW (ref 150–400)
POTASSIUM SERPL-MCNC: 3.6 MMOL/L — SIGNIFICANT CHANGE UP (ref 3.5–5.3)
POTASSIUM SERPL-MCNC: 3.6 MMOL/L — SIGNIFICANT CHANGE UP (ref 3.5–5.3)
POTASSIUM SERPL-SCNC: 3.6 MMOL/L — SIGNIFICANT CHANGE UP (ref 3.5–5.3)
POTASSIUM SERPL-SCNC: 3.6 MMOL/L — SIGNIFICANT CHANGE UP (ref 3.5–5.3)
PROT SERPL-MCNC: 6.3 G/DL — SIGNIFICANT CHANGE UP (ref 6–8.3)
PROT SERPL-MCNC: 6.3 G/DL — SIGNIFICANT CHANGE UP (ref 6–8.3)
PROT SERPL-MCNC: 6.4 G/DL — SIGNIFICANT CHANGE UP (ref 6–8.3)
PROT SERPL-MCNC: 6.4 G/DL — SIGNIFICANT CHANGE UP (ref 6–8.3)
PROTHROM AB SERPL-ACNC: 24 SEC — HIGH (ref 9.5–13)
PROTHROM AB SERPL-ACNC: 24 SEC — HIGH (ref 9.5–13)
RBC # BLD: 4.04 M/UL — SIGNIFICANT CHANGE UP (ref 3.8–5.2)
RBC # BLD: 4.04 M/UL — SIGNIFICANT CHANGE UP (ref 3.8–5.2)
RBC # FLD: 14.6 % — HIGH (ref 10.3–14.5)
RBC # FLD: 14.6 % — HIGH (ref 10.3–14.5)
SODIUM SERPL-SCNC: 139 MMOL/L — SIGNIFICANT CHANGE UP (ref 135–145)
SODIUM SERPL-SCNC: 139 MMOL/L — SIGNIFICANT CHANGE UP (ref 135–145)
WBC # BLD: 3.1 K/UL — LOW (ref 3.8–10.5)
WBC # BLD: 3.1 K/UL — LOW (ref 3.8–10.5)
WBC # FLD AUTO: 3.1 K/UL — LOW (ref 3.8–10.5)
WBC # FLD AUTO: 3.1 K/UL — LOW (ref 3.8–10.5)

## 2023-12-10 PROCEDURE — 80053 COMPREHEN METABOLIC PANEL: CPT

## 2023-12-10 PROCEDURE — 85025 COMPLETE CBC W/AUTO DIFF WBC: CPT

## 2023-12-10 PROCEDURE — 80076 HEPATIC FUNCTION PANEL: CPT

## 2023-12-10 PROCEDURE — 84443 ASSAY THYROID STIM HORMONE: CPT

## 2023-12-10 PROCEDURE — 82550 ASSAY OF CK (CPK): CPT

## 2023-12-10 PROCEDURE — 84436 ASSAY OF TOTAL THYROXINE: CPT

## 2023-12-10 PROCEDURE — 71045 X-RAY EXAM CHEST 1 VIEW: CPT

## 2023-12-10 PROCEDURE — 99239 HOSP IP/OBS DSCHRG MGMT >30: CPT

## 2023-12-10 PROCEDURE — 82553 CREATINE MB FRACTION: CPT

## 2023-12-10 PROCEDURE — 84480 ASSAY TRIIODOTHYRONINE (T3): CPT

## 2023-12-10 PROCEDURE — 85610 PROTHROMBIN TIME: CPT

## 2023-12-10 PROCEDURE — 71250 CT THORAX DX C-: CPT | Mod: MA

## 2023-12-10 PROCEDURE — 80061 LIPID PANEL: CPT

## 2023-12-10 PROCEDURE — 84484 ASSAY OF TROPONIN QUANT: CPT

## 2023-12-10 PROCEDURE — 85730 THROMBOPLASTIN TIME PARTIAL: CPT

## 2023-12-10 PROCEDURE — 99232 SBSQ HOSP IP/OBS MODERATE 35: CPT

## 2023-12-10 PROCEDURE — 82962 GLUCOSE BLOOD TEST: CPT

## 2023-12-10 PROCEDURE — 82565 ASSAY OF CREATININE: CPT

## 2023-12-10 PROCEDURE — 83880 ASSAY OF NATRIURETIC PEPTIDE: CPT

## 2023-12-10 PROCEDURE — 83036 HEMOGLOBIN GLYCOSYLATED A1C: CPT

## 2023-12-10 PROCEDURE — 36415 COLL VENOUS BLD VENIPUNCTURE: CPT

## 2023-12-10 PROCEDURE — 87637 SARSCOV2&INF A&B&RSV AMP PRB: CPT

## 2023-12-10 PROCEDURE — 83735 ASSAY OF MAGNESIUM: CPT

## 2023-12-10 PROCEDURE — 99285 EMERGENCY DEPT VISIT HI MDM: CPT

## 2023-12-10 PROCEDURE — 93005 ELECTROCARDIOGRAM TRACING: CPT

## 2023-12-10 PROCEDURE — 80048 BASIC METABOLIC PNL TOTAL CA: CPT

## 2023-12-10 RX ORDER — INSULIN DETEMIR 100/ML (3)
60 INSULIN PEN (ML) SUBCUTANEOUS
Refills: 0 | DISCHARGE

## 2023-12-10 RX ORDER — INSULIN DETEMIR 100/ML (3)
65 INSULIN PEN (ML) SUBCUTANEOUS
Refills: 0 | DISCHARGE

## 2023-12-10 RX ORDER — POTASSIUM CHLORIDE 20 MEQ
1 PACKET (EA) ORAL
Qty: 30 | Refills: 0
Start: 2023-12-10 | End: 2024-01-08

## 2023-12-10 RX ORDER — INSULIN LISPRO 100/ML
0 VIAL (ML) SUBCUTANEOUS
Qty: 0 | Refills: 0 | DISCHARGE

## 2023-12-10 RX ORDER — POLYETHYLENE GLYCOL 3350 17 G/17G
17 POWDER, FOR SOLUTION ORAL
Refills: 0 | Status: DISCONTINUED | OUTPATIENT
Start: 2023-12-10 | End: 2023-12-10

## 2023-12-10 RX ORDER — POTASSIUM CHLORIDE 20 MEQ
40 PACKET (EA) ORAL EVERY 4 HOURS
Refills: 0 | Status: DISCONTINUED | OUTPATIENT
Start: 2023-12-10 | End: 2023-12-10

## 2023-12-10 RX ORDER — INSULIN DETEMIR 100/ML (3)
30 INSULIN PEN (ML) SUBCUTANEOUS
Qty: 1 | Refills: 0
Start: 2023-12-10 | End: 2024-01-08

## 2023-12-10 RX ORDER — NYSTATIN CREAM 100000 [USP'U]/G
1 CREAM TOPICAL
Qty: 1 | Refills: 0
Start: 2023-12-10 | End: 2023-12-23

## 2023-12-10 RX ADMIN — Medication 2000 UNIT(S): at 11:43

## 2023-12-10 RX ADMIN — Medication 100 MILLIGRAM(S): at 13:47

## 2023-12-10 RX ADMIN — Medication 125 MICROGRAM(S): at 11:45

## 2023-12-10 RX ADMIN — Medication 100 MILLIGRAM(S): at 06:30

## 2023-12-10 RX ADMIN — REMDESIVIR 200 MILLIGRAM(S): 5 INJECTION INTRAVENOUS at 11:47

## 2023-12-10 RX ADMIN — NYSTATIN CREAM 1 APPLICATION(S): 100000 CREAM TOPICAL at 11:45

## 2023-12-10 RX ADMIN — Medication 81 MILLIGRAM(S): at 11:43

## 2023-12-10 NOTE — PROGRESS NOTE ADULT - SUBJECTIVE AND OBJECTIVE BOX
[facility] - Julia Mccann, Nicole, José Miguel, Caron, Oscar Conner  Office Number:  [phone]    Patient resting comfortably in bed in NAD.  Laying flat with no respiratory distress.  No complaints of chest pain, increased dyspnea, palpitations, PND, or orthopnea.    F/U for:    CHF       MEDICATIONS  (STANDING):  aspirin enteric coated 81 milliGRAM(s) Oral daily  benzonatate 100 milliGRAM(s) Oral every 8 hours  carvedilol 6.25 milliGRAM(s) Oral every 12 hours  cholecalciferol 2000 Unit(s) Oral daily  dextrose 5%. 1000 milliLiter(s) (100 mL/Hr) IV Continuous <Continuous>  dextrose 5%. 1000 milliLiter(s) (50 mL/Hr) IV Continuous <Continuous>  dextrose 50% Injectable 25 Gram(s) IV Push once  dextrose 50% Injectable 25 Gram(s) IV Push once  dextrose 50% Injectable 12.5 Gram(s) IV Push once  digoxin     Tablet 125 MICROGram(s) Oral every other day  glucagon  Injectable 1 milliGRAM(s) IntraMuscular once  guaiFENesin Oral Liquid (Sugar-Free) 200 milliGRAM(s) Oral every 6 hours  insulin glargine Injectable (LANTUS) 22 Unit(s) SubCutaneous at bedtime  insulin glargine Injectable (LANTUS) 20 Unit(s) SubCutaneous every morning  insulin lispro (ADMELOG) corrective regimen sliding scale   SubCutaneous three times a day before meals  insulin lispro (ADMELOG) corrective regimen sliding scale   SubCutaneous at bedtime  methimazole 2.5 milliGRAM(s) Oral <User Schedule>  nystatin Powder 1 Application(s) Topical two times a day  polyethylene glycol 3350 17 Gram(s) Oral two times a day  potassium chloride    Tablet ER 40 milliEquivalent(s) Oral every 4 hours  remdesivir  IVPB   IV Intermittent   remdesivir  IVPB 100 milliGRAM(s) IV Intermittent every 24 hours  sacubitril 24 mG/valsartan 26 mG 1 Tablet(s) Oral two times a day  simvastatin 40 milliGRAM(s) Oral at bedtime  torsemide 60 milliGRAM(s) Oral daily    MEDICATIONS  (PRN):  acetaminophen     Tablet .. 650 milliGRAM(s) Oral every 6 hours PRN Temp greater or equal to 38C (100.4F), Mild Pain (1 - 3)  dextrose Oral Gel 15 Gram(s) Oral once PRN Blood Glucose LESS THAN 70 milliGRAM(s)/deciliter  melatonin 3 milliGRAM(s) Oral at bedtime PRN Insomnia      Allergies    No Known Allergies             Vital Signs Last 24 Hrs  T(C): 36.4 (10 Dec 2023 04:44), Max: 36.9 (09 Dec 2023 20:35)  T(F): 97.6 (10 Dec 2023 04:44), Max: 98.4 (09 Dec 2023 20:35)  HR: 70 (10 Dec 2023 04:44) (69 - 72)  BP: 98/66 (10 Dec 2023 04:44) (98/66 - 116/68)  BP(mean): --  RR: 18 (10 Dec 2023 04:44) (18 - 19)  SpO2: 92% (10 Dec 2023 04:44) (92% - 97%)    Parameters below as of 10 Dec 2023 04:44  Patient On (Oxygen Delivery Method): room air        I&O's Summary      ON EXAM:    Constitutional: NAD, awake and alert  HEENT: Moist Mucous Membranes, Anicteric  Pulmonary: Non-labored, breath sounds are clear bilaterally, No wheezing, rales or rhonchi  Cardiovascular: Regular, S1 and S2, No murmurs, rubs, gallops or clicks  Gastrointestinal: Bowel Sounds present, soft, nontender.   Lymph: 2+ RLE pitting edema, LLE + edema non pitting (chronic)  No lymphadenopathy.  Skin: No visible rashes or ulcers.  Psych:  Mood & affect appropriate      LABS: All Labs Reviewed:                        11.4   3.10  )-----------( 86       ( 10 Dec 2023 06:30 )             36.1                         11.8   3.49  )-----------( 79       ( 09 Dec 2023 10:10 )             37.3                         11.2   3.14  )-----------( 75       ( 08 Dec 2023 08:05 )             35.5     10 Dec 2023 06:30    139    |  102    |  62     ----------------------------<  73     3.6     |  33     |  1.20   09 Dec 2023 10:10    139    |  96     |  65     ----------------------------<  183    3.7     |  35     |  1.40   08 Dec 2023 13:10    x      |  x      |  x      ----------------------------<  x      x       |  x      |  1.60     Ca    8.4        10 Dec 2023 06:30  Ca    8.2        09 Dec 2023 10:10  Ca    8.0        08 Dec 2023 08:05    TPro  6.3    /  Alb  2.7    /  TBili  0.7    /  DBili  0.4    /  AST  34     /  ALT  16     /  AlkPhos  50     10 Dec 2023 06:30  TPro  6.8    /  Alb  3.0    /  TBili  1.0    /  DBili  0.5    /  AST  34     /  ALT  18     /  AlkPhos  56     09 Dec 2023 10:10  TPro  7.3    /  Alb  3.1    /  TBili  1.0    /  DBili  0.4    /  AST  31     /  ALT  19     /  AlkPhos  61     08 Dec 2023 13:10    PT/INR - ( 10 Dec 2023 06:30 )   PT: 24.0 sec;   INR: 2.10 ratio               Blood Culture:     12-09 @ 10:10  TSH: 1.01     [facility] - Julia Mccann, Nicole, José Miguel, Caron, Oscar Conner  Office Number:  [phone]    Patient resting comfortably in bed in NAD.  Laying flat with no respiratory distress.  No complaints of chest pain, increased dyspnea, palpitations, PND, or orthopnea.    F/U for:    CHF       MEDICATIONS  (STANDING):  aspirin enteric coated 81 milliGRAM(s) Oral daily  benzonatate 100 milliGRAM(s) Oral every 8 hours  carvedilol 6.25 milliGRAM(s) Oral every 12 hours  cholecalciferol 2000 Unit(s) Oral daily  dextrose 5%. 1000 milliLiter(s) (100 mL/Hr) IV Continuous <Continuous>  dextrose 5%. 1000 milliLiter(s) (50 mL/Hr) IV Continuous <Continuous>  dextrose 50% Injectable 25 Gram(s) IV Push once  dextrose 50% Injectable 25 Gram(s) IV Push once  dextrose 50% Injectable 12.5 Gram(s) IV Push once  digoxin     Tablet 125 MICROGram(s) Oral every other day  glucagon  Injectable 1 milliGRAM(s) IntraMuscular once  guaiFENesin Oral Liquid (Sugar-Free) 200 milliGRAM(s) Oral every 6 hours  insulin glargine Injectable (LANTUS) 22 Unit(s) SubCutaneous at bedtime  insulin glargine Injectable (LANTUS) 20 Unit(s) SubCutaneous every morning  insulin lispro (ADMELOG) corrective regimen sliding scale   SubCutaneous three times a day before meals  insulin lispro (ADMELOG) corrective regimen sliding scale   SubCutaneous at bedtime  methimazole 2.5 milliGRAM(s) Oral <User Schedule>  nystatin Powder 1 Application(s) Topical two times a day  polyethylene glycol 3350 17 Gram(s) Oral two times a day  potassium chloride    Tablet ER 40 milliEquivalent(s) Oral every 4 hours  remdesivir  IVPB   IV Intermittent   remdesivir  IVPB 100 milliGRAM(s) IV Intermittent every 24 hours  sacubitril 24 mG/valsartan 26 mG 1 Tablet(s) Oral two times a day  simvastatin 40 milliGRAM(s) Oral at bedtime  torsemide 60 milliGRAM(s) Oral daily    MEDICATIONS  (PRN):  acetaminophen     Tablet .. 650 milliGRAM(s) Oral every 6 hours PRN Temp greater or equal to 38C (100.4F), Mild Pain (1 - 3)  dextrose Oral Gel 15 Gram(s) Oral once PRN Blood Glucose LESS THAN 70 milliGRAM(s)/deciliter  melatonin 3 milliGRAM(s) Oral at bedtime PRN Insomnia      Allergies    No Known Allergies             Vital Signs Last 24 Hrs  T(C): 36.4 (10 Dec 2023 04:44), Max: 36.9 (09 Dec 2023 20:35)  T(F): 97.6 (10 Dec 2023 04:44), Max: 98.4 (09 Dec 2023 20:35)  HR: 70 (10 Dec 2023 04:44) (69 - 72)  BP: 98/66 (10 Dec 2023 04:44) (98/66 - 116/68)  BP(mean): --  RR: 18 (10 Dec 2023 04:44) (18 - 19)  SpO2: 92% (10 Dec 2023 04:44) (92% - 97%)    Parameters below as of 10 Dec 2023 04:44  Patient On (Oxygen Delivery Method): room air        I&O's Summary      ON EXAM:    Constitutional: NAD, awake and alert  HEENT: Moist Mucous Membranes, Anicteric  Pulmonary: Non-labored, breath sounds are clear bilaterally, No wheezing, rales or rhonchi  Cardiovascular: Regular, S1 and S2, No murmurs, rubs, gallops or clicks  Gastrointestinal: Bowel Sounds present, soft, nontender.   Lymph: 2+ RLE pitting edema, LLE + edema non pitting (chronic)  No lymphadenopathy.  Skin: No visible rashes or ulcers.  Psych:  Mood & affect appropriate      LABS: All Labs Reviewed:                        11.4   3.10  )-----------( 86       ( 10 Dec 2023 06:30 )             36.1                         11.8   3.49  )-----------( 79       ( 09 Dec 2023 10:10 )             37.3                         11.2   3.14  )-----------( 75       ( 08 Dec 2023 08:05 )             35.5     10 Dec 2023 06:30    139    |  102    |  62     ----------------------------<  73     3.6     |  33     |  1.20   09 Dec 2023 10:10    139    |  96     |  65     ----------------------------<  183    3.7     |  35     |  1.40   08 Dec 2023 13:10    x      |  x      |  x      ----------------------------<  x      x       |  x      |  1.60     Ca    8.4        10 Dec 2023 06:30  Ca    8.2        09 Dec 2023 10:10  Ca    8.0        08 Dec 2023 08:05    TPro  6.3    /  Alb  2.7    /  TBili  0.7    /  DBili  0.4    /  AST  34     /  ALT  16     /  AlkPhos  50     10 Dec 2023 06:30  TPro  6.8    /  Alb  3.0    /  TBili  1.0    /  DBili  0.5    /  AST  34     /  ALT  18     /  AlkPhos  56     09 Dec 2023 10:10  TPro  7.3    /  Alb  3.1    /  TBili  1.0    /  DBili  0.4    /  AST  31     /  ALT  19     /  AlkPhos  61     08 Dec 2023 13:10    PT/INR - ( 10 Dec 2023 06:30 )   PT: 24.0 sec;   INR: 2.10 ratio               Blood Culture:     12-09 @ 10:10  TSH: 1.01

## 2023-12-10 NOTE — DISCHARGE NOTE PROVIDER - HOSPITAL COURSE
ADMISSION DATE:  12-07-23    ---  FROM ADMISSION H+P:   HPI:  73 yF with PMHx of CAD status post CABG (2007), V. tach status post pacemaker/defibrillator(Guidant), cardiac cath 2018 with PCI, diabetes, hyperlipidemia, A-fib on warfarin, CVA with hemorrhage (2010), cardiomems (unable to do readings as pt unable to lay flat), is presenting for complaints of fatigue, chest tightness, and cough.  Recently prescribed metolazone when he saw her cardiologist last week (Dr. Reardon). Patient states ever since starting the metolazone she has felt weak. Patient also states she has a similar cough every year at the beginning of winter. Patient denies other symptoms of Covid. Patient has bilateral leg swelling with the left leg always worse. Patient states the chest tightness has resolved since arriving to hospital and denies current chest pain, and shortness of breath. Patient denies recent bleeding or symptoms of GI bleed.     Denies fever, chills, cough, abdominal pain, nausea, vomiting, diarrhea, constipation, urinary frequency, urgency, or dysuria, headaches.  Denies recent travel, recent antibiotic use, or sick contacts.    ED Course:   Vitals: BP: 105/66, HR: 87, Temp:97.5 , RR: 17, SpO2: 97% on RA   Labs:  WBC: 4.73 HGB: 11.2 Platelet: 65 INR: 2.14 Trop 53.9 -> 58.6 COVID +  CT Chest:   Small pericardial effusion. Cardiomegaly. Mild peribronchial thickening and bronchial secretions in the subsegmental and segmental left more than right lower lobe pulmonary bronchi may relate to a component of infection, inflammation or sequela from chronic congestion.  EKG: Ventricular paced rhythm - biventricular pacemaker detected  Received in the ED:   Entresto x 1 (07 Dec 2023 16:07)      ---  HOSPITAL COURSE/PERTINENT LABS/PROCEDURES PERFORMED/PENDING TESTS:   Pt was admitted for   COVID infection: patient is coughing in the room but she states she has chronic cough, not feeling well and feeling of "fluid in the chest"  in the last week. CT: Nonspecific very subtle groundglass opacity in the left upper lobe. discussed with ID, will start Remdesivir for 3 days   2. thrombocytopenia: hemo eval noted no sign of bleeding. worsen with viral infection   3. A fib: continue digoxin/, coreg and coumadin as per cardio.   4. CHFr EF: on entresto, tosemide statin , asa, a fib control.   5. DM:  FS monitored, insulin regiment      Patient is stable for discharge as per primary medical team and consultants.    PT consulted, recommends discharge ______    Patient showed improvement throughout hospitalization. Patient was seen and examined on day of discharge. Patient was medically optimized for discharge with close outpatient follow up.    ---  PATIENT CONDITION:  - stable    --  VITALS:   T(C): 36.5 (12-10-23 @ 12:07), Max: 36.9 (12-09-23 @ 20:35)  HR: 70 (12-10-23 @ 12:07) (69 - 70)  BP: 113/73 (12-10-23 @ 12:07) (98/66 - 116/68)  RR: 18 (12-10-23 @ 12:07) (18 - 18)  SpO2: 95% (12-10-23 @ 12:07) (92% - 95%)    PHYSICAL EXAM ON DAY OF DISCHARGE:  GEN: NAD  HEENT- normocephalic; mouth moist  CVS- S1S2+  LUNGS-  no wheezing, ronchi/rales R lung   ABD: Soft , nontender, nondistended, Bowel sounds are present  EXTREMITY: no calf tenderness, no cyanosis, 1+ pitting edema  NEURO: AAOx3; non focal neurologic exam; grossly non focal neuro exam  PSYCH: normal affect and behavior  BACK: no swelling or mass;   VASCULAR: distal peripheral pulses present  SKIN: warm and dry. GEN: NAD      CONSULTANTS:   Cardiology  ID

## 2023-12-10 NOTE — DISCHARGE NOTE PROVIDER - NSDCMRMEDTOKEN_GEN_ALL_CORE_FT
aspirin 81 mg oral delayed release tablet: 1 tab(s) orally once a day (at bedtime)  benzonatate 100 mg oral capsule: 1 cap(s) orally every 8 hours as needed for  cough  carvedilol 6.25 mg oral tablet: 1 tab(s) orally every 12 hours  Demadex 20 mg oral tablet: 3 tab(s) orally once a day  digoxin 125 mcg (0.125 mg) oral tablet: 1 tab(s) orally every other day  Entresto 24 mg-26 mg oral tablet: 1 tab(s) orally 2 times a day  guaiFENesin 100 mg/5 mL oral liquid: 10 milliliter(s) orally every 6 hours as needed for  cough  nystatin 100,000 units/g topical powder: Apply topically to affected area 2 times a day 1 Apply topically to affected area 2 times a day  Potassium Chloride (Eqv-Klor-Con 10) 10 mEq oral tablet, extended release: 1 tab(s) orally once a day start from tomorrow 12/11  Tapazole 5 mg oral tablet: 0.5 tab(s) orally every other day  Vitamin D3 2000 intl units (50 mcg) oral tablet: orally once a day  warfarin 4 mg oral tablet: orally once a day (at bedtime)  Zocor 40 mg oral tablet: 1 tab(s) orally once a day (at bedtime)

## 2023-12-10 NOTE — DISCHARGE NOTE PROVIDER - PROVIDER TOKENS
PROVIDER:[TOKEN:[[id number]],FOLLOWUP:[1 week]] PROVIDER:[TOKEN:[[id number]],FOLLOWUP:[1 week]] FREE:[LAST:[PCP],PHONE:[(   )    -],FAX:[(   )    -],ADDRESS:[Please follow up with your PCP upon Discharge]] FREE:[LAST:[PCP],PHONE:[(   )    -],FAX:[(   )    -],ADDRESS:[Please follow up with your PCP upon Discharge],FOLLOWUP:[1-3 days]]

## 2023-12-10 NOTE — DISCHARGE NOTE NURSING/CASE MANAGEMENT/SOCIAL WORK - NSDCPEFALRISK_GEN_ALL_CORE
For information on Fall & Injury Prevention, visit: [email or web address] OR  [email or web address] OR  https://www.cdc.gov/steadi/patient.html For information on Fall & Injury Prevention, visit: [email or web address] OR  [email or web address] OR  https://www.cdc.gov/steadi/patient.html

## 2023-12-10 NOTE — DISCHARGE NOTE NURSING/CASE MANAGEMENT/SOCIAL WORK - PATIENT PORTAL LINK FT
You can access the FollowMyHealth Patient Portal offered by [facility] by registering at the following website: [email or web address]. By joining [employer]’s FollowMyHealth portal, you will also be able to view your health information using other applications (apps) compatible with our system. You can access the FollowMyHealth Patient Portal offered by [facility] by registering at the following website: [email or web address]. By joining [employer]’s FollowMyHealth portal, you will also be able to view your health information using other applications (apps) compatible with our system.

## 2023-12-10 NOTE — DISCHARGE NOTE PROVIDER - NSDCCPCAREPLAN_GEN_ALL_CORE_FT
PRINCIPAL DISCHARGE DIAGNOSIS  Diagnosis: 2019 novel coronavirus disease (COVID-19)  Assessment and Plan of Treatment: you received 3 days course Remdesivir, cough medication as needed. follow up with primary care in one week.      SECONDARY DISCHARGE DIAGNOSES  Diagnosis: Thrombocytopenia  Assessment and Plan of Treatment: worsen due to COVID infection, follow up primary care    Diagnosis: DM (diabetes mellitus)  Assessment and Plan of Treatment: suggested Levemir 30units at night daily, follow up with your endocrinologist in 1-2 weeks

## 2023-12-10 NOTE — PROGRESS NOTE ADULT - REASON FOR ADMISSION
low platelets/covid +

## 2023-12-10 NOTE — DISCHARGE NOTE PROVIDER - NSDCFUSCHEDAPPT_GEN_ALL_CORE_FT
[facility]  CARDIOLOGY [address] [place] [address]  Scheduled Appointment: 12/14/2023    Jason Reardon  [facility]  CARDIOLOGY [address] [place] [address]  Scheduled Appointment: 02/22/2024     [facility]  CARDIOLOGY [address] [place] [address]  Scheduled Appointment: 12/14/2023    Jason Reardon  [facility]  CARDIOLOGY [address] [place] [address]  Scheduled Appointment: 02/22/2024

## 2023-12-10 NOTE — PROGRESS NOTE ADULT - ASSESSMENT
72 yo female with past medical history of coronary artery disease status post CABG (2007), V. tach status post pacemaker/defibrillator(Guidant), cardiac cath 2018 with PCI left circumflex and 1st obtuse, ischemic cardiomyopathy, diabetes, hyperlipidemia, A-fib on warfarin, CVA with hemorrhage (2010), cardiomems (unable to do readings as pt unable to lay flat), is presenting for complaints of fatigue and chest tightness. + COVID     - chest pain has resolved  - EKG Ventricular-paced rhythm Biventricular pacemaker detected. HR 78  - No acute changes on EKG compared to previous.  - continue Aspirin 81 MG and Simvastatin 40 MG Oral Tablet    - difficult to assess vol status. cardiomems nonfunctioning.   - Previous TTE 10/26/23: LVEF 25-30%, global LV hypokinesis. moderate (grade 2) left ventricular diastolic dysfunction.  LA severely dilated. MIld to moderate TR. Mild pulmonary HTN.  - continue home Entresto 24-26 mg BID, Torsemide 60 mg, pt unsure if she was on spironolactone outpatient.   - hold home metolazone    - Hx AFib  - continue Warfarin for AC  - continue Carvedilol 6.25 MG BID,  Digoxin 125 MCG     - BP well controlled, monitor routine hemodynamics.  - Continue Carvedilol 6.25 MG BID,    - Monitor and replete lytes, keep K>4, Mg>2.  - Strict I/Os, daily weights.  - Other cardiovascular workup will depend on clinical course.  - All other workup per primary team.  - Will continue to follow.       74 yo female with past medical history of coronary artery disease status post CABG (2007), V. tach status post pacemaker/defibrillator(Guidant), cardiac cath 2018 with PCI left circumflex and 1st obtuse, ischemic cardiomyopathy, diabetes, hyperlipidemia, A-fib on warfarin, CVA with hemorrhage (2010), cardiomems (unable to do readings as pt unable to lay flat), is presenting for complaints of fatigue and chest tightness. + COVID     - chest pain has resolved  - EKG Ventricular-paced rhythm Biventricular pacemaker detected. HR 78  - No acute changes on EKG compared to previous.  - continue Aspirin 81 MG and Simvastatin 40 MG Oral Tablet    - difficult to assess vol status. cardiomems nonfunctioning.   - Previous TTE 10/26/23: LVEF 25-30%, global LV hypokinesis. moderate (grade 2) left ventricular diastolic dysfunction.  LA severely dilated. MIld to moderate TR. Mild pulmonary HTN.  - continue home Entresto 24-26 mg BID, Torsemide 60 mg, pt unsure if she was on spironolactone outpatient.   - hold home metolazone    - Hx AFib  - continue Warfarin for AC  - continue Carvedilol 6.25 MG BID,  Digoxin 125 MCG     - BP well controlled, monitor routine hemodynamics.  - Continue Carvedilol 6.25 MG BID,    - Monitor and replete lytes, keep K>4, Mg>2.  - Strict I/Os, daily weights.  - Other cardiovascular workup will depend on clinical course.  - All other workup per primary team.  - Will continue to follow.

## 2023-12-10 NOTE — DISCHARGE NOTE PROVIDER - CARE PROVIDER_API CALL
CHELSEA GUERRA  [address] [place] [address]  Phone: [phone]  Fax: ()-  Follow Up Time: 1 week   CHELSEA GUERRA  [address] [place] [address]  Phone: [phone]  Fax: ()-  Follow Up Time: 1 week   PCP,   Please follow up with your PCP upon Discharge  Phone: (   )    -  Fax: (   )    -  Follow Up Time:    PCP,   Please follow up with your PCP upon Discharge  Phone: (   )    -  Fax: (   )    -  Follow Up Time: 1-3 days

## 2023-12-14 ENCOUNTER — APPOINTMENT (OUTPATIENT)
Dept: CARDIOLOGY | Facility: CLINIC | Age: 73
End: 2023-12-14

## 2023-12-19 ENCOUNTER — APPOINTMENT (OUTPATIENT)
Dept: CARDIOLOGY | Facility: CLINIC | Age: 73
End: 2023-12-19
Payer: MEDICARE

## 2023-12-19 VITALS — RESPIRATION RATE: 17 BRPM | SYSTOLIC BLOOD PRESSURE: 108 MMHG | DIASTOLIC BLOOD PRESSURE: 60 MMHG | HEIGHT: 64 IN

## 2023-12-19 LAB
INR PPP: 6.6 RATIO
QUALITY CONTROL: YES

## 2023-12-19 PROCEDURE — 85610 PROTHROMBIN TIME: CPT | Mod: QW

## 2023-12-19 PROCEDURE — 93793 ANTICOAG MGMT PT WARFARIN: CPT

## 2023-12-22 ENCOUNTER — APPOINTMENT (OUTPATIENT)
Dept: CARDIOLOGY | Facility: CLINIC | Age: 73
End: 2023-12-22
Payer: MEDICARE

## 2023-12-22 LAB
INR PPP: 2.8 RATIO
QUALITY CONTROL: YES

## 2023-12-22 PROCEDURE — 93793 ANTICOAG MGMT PT WARFARIN: CPT

## 2023-12-22 PROCEDURE — 85610 PROTHROMBIN TIME: CPT | Mod: QW

## 2023-12-25 ENCOUNTER — NON-APPOINTMENT (OUTPATIENT)
Age: 73
End: 2023-12-25

## 2023-12-28 ENCOUNTER — APPOINTMENT (OUTPATIENT)
Dept: CARDIOLOGY | Facility: CLINIC | Age: 73
End: 2023-12-28
Payer: MEDICARE

## 2023-12-28 LAB
INR PPP: 3.9 RATIO
QUALITY CONTROL: YES

## 2023-12-28 PROCEDURE — 85610 PROTHROMBIN TIME: CPT | Mod: QW

## 2023-12-28 PROCEDURE — 93793 ANTICOAG MGMT PT WARFARIN: CPT

## 2024-01-02 NOTE — ED ADULT TRIAGE NOTE - HEIGHT IN CM
Immunizations are up to date. Encourage low-cholesterol, low-fat, calorie restricted diet. Recommend low-impact cardiovascular exercise (e.g. walking, biking, treadmill, elliptical, swimming) for 20-30 minutes at least three times a week. Encourage compliance with medications as prescribed and regular follow-up. Check IFOBT. Followup colonoscopy per GI. Further evaluation, treatment, and follow-up per orders, current med list, and patient instructions.     165.1

## 2024-01-04 ENCOUNTER — APPOINTMENT (OUTPATIENT)
Dept: CARDIOLOGY | Facility: CLINIC | Age: 74
End: 2024-01-04
Payer: MEDICARE

## 2024-01-04 LAB
INR PPP: 3 RATIO
QUALITY CONTROL: YES

## 2024-01-04 PROCEDURE — 93793 ANTICOAG MGMT PT WARFARIN: CPT

## 2024-01-04 PROCEDURE — 85610 PROTHROMBIN TIME: CPT | Mod: QW

## 2024-01-11 ENCOUNTER — APPOINTMENT (OUTPATIENT)
Dept: CARDIOLOGY | Facility: CLINIC | Age: 74
End: 2024-01-11
Payer: MEDICARE

## 2024-01-11 VITALS — SYSTOLIC BLOOD PRESSURE: 118 MMHG | DIASTOLIC BLOOD PRESSURE: 54 MMHG

## 2024-01-11 LAB
INR PPP: 2.7 RATIO
QUALITY CONTROL: YES

## 2024-01-11 PROCEDURE — 85610 PROTHROMBIN TIME: CPT | Mod: QW

## 2024-01-11 PROCEDURE — 93793 ANTICOAG MGMT PT WARFARIN: CPT

## 2024-01-18 RX ORDER — METOLAZONE 2.5 MG/1
2.5 TABLET ORAL DAILY
Qty: 90 | Refills: 1 | Status: ACTIVE | COMMUNITY
Start: 2019-07-01

## 2024-01-23 ENCOUNTER — APPOINTMENT (OUTPATIENT)
Dept: CARDIOLOGY | Facility: CLINIC | Age: 74
End: 2024-01-23
Payer: MEDICARE

## 2024-01-23 VITALS — SYSTOLIC BLOOD PRESSURE: 100 MMHG | DIASTOLIC BLOOD PRESSURE: 60 MMHG

## 2024-01-23 LAB
INR PPP: 2.6 RATIO
QUALITY CONTROL: YES

## 2024-01-23 PROCEDURE — 93793 ANTICOAG MGMT PT WARFARIN: CPT

## 2024-01-23 PROCEDURE — 85610 PROTHROMBIN TIME: CPT | Mod: QW

## 2024-02-06 ENCOUNTER — APPOINTMENT (OUTPATIENT)
Dept: CARDIOLOGY | Facility: CLINIC | Age: 74
End: 2024-02-06
Payer: MEDICARE

## 2024-02-06 PROCEDURE — 93793 ANTICOAG MGMT PT WARFARIN: CPT

## 2024-02-06 PROCEDURE — 85610 PROTHROMBIN TIME: CPT | Mod: QW

## 2024-02-07 LAB
INR PPP: 2.4 RATIO
QUALITY CONTROL: YES

## 2024-02-13 ENCOUNTER — RX RENEWAL (OUTPATIENT)
Age: 74
End: 2024-02-13

## 2024-02-22 ENCOUNTER — NON-APPOINTMENT (OUTPATIENT)
Age: 74
End: 2024-02-22

## 2024-02-22 ENCOUNTER — APPOINTMENT (OUTPATIENT)
Dept: CARDIOLOGY | Facility: CLINIC | Age: 74
End: 2024-02-22
Payer: MEDICARE

## 2024-02-22 VITALS — DIASTOLIC BLOOD PRESSURE: 68 MMHG | SYSTOLIC BLOOD PRESSURE: 110 MMHG | HEART RATE: 83 BPM | OXYGEN SATURATION: 98 %

## 2024-02-22 DIAGNOSIS — R94.31 ABNORMAL ELECTROCARDIOGRAM [ECG] [EKG]: ICD-10-CM

## 2024-02-22 DIAGNOSIS — I25.10 ATHEROSCLEROTIC HEART DISEASE OF NATIVE CORONARY ARTERY W/OUT ANGINA PECTORIS: ICD-10-CM

## 2024-02-22 LAB
INR PPP: 2.6 RATIO
QUALITY CONTROL: YES

## 2024-02-22 PROCEDURE — 99214 OFFICE O/P EST MOD 30 MIN: CPT

## 2024-02-22 PROCEDURE — 85610 PROTHROMBIN TIME: CPT | Mod: QW

## 2024-02-22 PROCEDURE — 93000 ELECTROCARDIOGRAM COMPLETE: CPT

## 2024-02-22 RX ORDER — DAPAGLIFLOZIN 5 MG/1
5 TABLET, FILM COATED ORAL
Qty: 90 | Refills: 3 | Status: ACTIVE | COMMUNITY
Start: 2024-02-22 | End: 1900-01-01

## 2024-03-07 ENCOUNTER — APPOINTMENT (OUTPATIENT)
Dept: CARDIOLOGY | Facility: CLINIC | Age: 74
End: 2024-03-07
Payer: MEDICARE

## 2024-03-07 LAB
INR PPP: 2.2 RATIO
QUALITY CONTROL: YES

## 2024-03-07 PROCEDURE — 93793 ANTICOAG MGMT PT WARFARIN: CPT

## 2024-03-07 PROCEDURE — 85610 PROTHROMBIN TIME: CPT | Mod: QW

## 2024-03-19 ENCOUNTER — APPOINTMENT (OUTPATIENT)
Dept: CARDIOLOGY | Facility: CLINIC | Age: 74
End: 2024-03-19
Payer: MEDICARE

## 2024-03-19 ENCOUNTER — NON-APPOINTMENT (OUTPATIENT)
Age: 74
End: 2024-03-19

## 2024-03-19 VITALS
SYSTOLIC BLOOD PRESSURE: 106 MMHG | DIASTOLIC BLOOD PRESSURE: 62 MMHG | HEIGHT: 64 IN | OXYGEN SATURATION: 98 % | RESPIRATION RATE: 16 BRPM

## 2024-03-19 LAB
INR PPP: 3.1 RATIO
QUALITY CONTROL: YES

## 2024-03-19 PROCEDURE — 93793 ANTICOAG MGMT PT WARFARIN: CPT

## 2024-03-19 PROCEDURE — 85610 PROTHROMBIN TIME: CPT | Mod: QW

## 2024-03-20 ENCOUNTER — APPOINTMENT (OUTPATIENT)
Dept: CARDIOLOGY | Facility: CLINIC | Age: 74
End: 2024-03-20
Payer: MEDICARE

## 2024-03-20 PROCEDURE — 93296 REM INTERROG EVL PM/IDS: CPT

## 2024-03-20 PROCEDURE — 93295 DEV INTERROG REMOTE 1/2/MLT: CPT

## 2024-04-11 ENCOUNTER — APPOINTMENT (OUTPATIENT)
Dept: CARDIOLOGY | Facility: CLINIC | Age: 74
End: 2024-04-11
Payer: MEDICARE

## 2024-04-11 VITALS — SYSTOLIC BLOOD PRESSURE: 110 MMHG | DIASTOLIC BLOOD PRESSURE: 62 MMHG

## 2024-04-11 LAB
INR PPP: 2.7 RATIO
QUALITY CONTROL: YES

## 2024-04-11 PROCEDURE — 85610 PROTHROMBIN TIME: CPT | Mod: QW

## 2024-04-11 PROCEDURE — 93793 ANTICOAG MGMT PT WARFARIN: CPT

## 2024-05-03 ENCOUNTER — APPOINTMENT (OUTPATIENT)
Dept: CARDIOLOGY | Facility: CLINIC | Age: 74
End: 2024-05-03
Payer: MEDICARE

## 2024-05-03 LAB
INR PPP: 4.5 RATIO
QUALITY CONTROL: YES

## 2024-05-03 PROCEDURE — 85610 PROTHROMBIN TIME: CPT | Mod: QW

## 2024-05-03 PROCEDURE — 93793 ANTICOAG MGMT PT WARFARIN: CPT

## 2024-05-10 ENCOUNTER — APPOINTMENT (OUTPATIENT)
Dept: CARDIOLOGY | Facility: CLINIC | Age: 74
End: 2024-05-10
Payer: MEDICARE

## 2024-05-10 LAB
INR PPP: 3.1 RATIO
QUALITY CONTROL: YES

## 2024-05-10 PROCEDURE — 93793 ANTICOAG MGMT PT WARFARIN: CPT

## 2024-05-10 PROCEDURE — 85610 PROTHROMBIN TIME: CPT | Mod: QW

## 2024-05-17 ENCOUNTER — APPOINTMENT (OUTPATIENT)
Dept: CARDIOLOGY | Facility: CLINIC | Age: 74
End: 2024-05-17
Payer: MEDICARE

## 2024-05-17 VITALS
HEIGHT: 64 IN | DIASTOLIC BLOOD PRESSURE: 58 MMHG | BODY MASS INDEX: 35 KG/M2 | SYSTOLIC BLOOD PRESSURE: 90 MMHG | WEIGHT: 205 LBS

## 2024-05-17 LAB
INR PPP: 2.5 RATIO
QUALITY CONTROL: YES

## 2024-05-17 PROCEDURE — 85610 PROTHROMBIN TIME: CPT | Mod: QW

## 2024-05-17 PROCEDURE — 93793 ANTICOAG MGMT PT WARFARIN: CPT

## 2024-05-22 ENCOUNTER — APPOINTMENT (OUTPATIENT)
Dept: CARDIOLOGY | Facility: CLINIC | Age: 74
End: 2024-05-22
Payer: MEDICARE

## 2024-05-22 ENCOUNTER — NON-APPOINTMENT (OUTPATIENT)
Age: 74
End: 2024-05-22

## 2024-05-22 VITALS
OXYGEN SATURATION: 95 % | HEART RATE: 74 BPM | SYSTOLIC BLOOD PRESSURE: 99 MMHG | BODY MASS INDEX: 33.46 KG/M2 | DIASTOLIC BLOOD PRESSURE: 55 MMHG | WEIGHT: 196 LBS | HEIGHT: 64 IN

## 2024-05-22 DIAGNOSIS — I50.20 UNSPECIFIED SYSTOLIC (CONGESTIVE) HEART FAILURE: ICD-10-CM

## 2024-05-22 DIAGNOSIS — R06.00 DYSPNEA, UNSPECIFIED: ICD-10-CM

## 2024-05-22 LAB
INR PPP: 2.5 RATIO
QUALITY CONTROL: YES

## 2024-05-22 PROCEDURE — 99214 OFFICE O/P EST MOD 30 MIN: CPT

## 2024-05-22 PROCEDURE — 93000 ELECTROCARDIOGRAM COMPLETE: CPT

## 2024-05-22 PROCEDURE — 85610 PROTHROMBIN TIME: CPT | Mod: QW

## 2024-05-22 NOTE — HISTORY OF PRESENT ILLNESS
[FreeTextEntry1] : Ms. Barrios returns for evaluation of her cardiomyopathy and coronary artery disease.   Past medical history is remarkable for myocardial infarction, coronary artery disease status post coronary artery bypass surgery in 2007, history of inducible sustained ventricular tachycardia leading to placement of a biventricular/ICD (Guidant), history of ischemic cardiomyopathy, history of diabetes, history of hyperlipidemia,history of hyperthyroidism, history of proliferative diabetic retinopathy, and history of stroke with hemorrhage in 2010 at which time she had a long complicated course requiring craniotomy for intracranial decompression and removal of thrombus. In August of 2010, she had several ICD discharges felt to be due to rapid atrial fibrillation.  She has had several episodes of atypical chest pain and underwent cardiac catheterization 2018 leading to percutaneous intervention of the left circumflex artery and the first obtuse marginal artery with drug eluting stents.  A year later, she presented with decompensated heart failure has had adjustment of her medication. She had a Cardiomems placed but has not been able to get ongoing readings given her inability to lie flat from her prior stroke and brain surgery. She is now s/p extraction of her desmond lead and ICD generator replacement in 2020.  In 7/12/22, she was seen in the emergency department last week for mild decompensation of heart failure.  Her diuretic has been increased to daily dosing.  Her BNP at this time was 3274  I last saw her in 2/24. She has been doing well lately.  She is doing PT and feeling better.  She continues to report rare fatigue, and occasional dyspnea, cough, and lower extremity swelling. Her edema is better. She is now taking torsemide 60 mg po daily. She has taken around 1-2 metolazones since last visit.  Her weight today is 196 lbs.  Echo performed in this office was in 4/2022, and demonstrated mild to moderate MR, a severely dilated left atrium, eccentric LVH, moderate to severe global left ventricular systolic dysfunction, and borderline pulmonary pressures. Findings were similar on a most recent study in 2023.

## 2024-05-22 NOTE — DISCUSSION/SUMMARY
[___ Month(s)] : in [unfilled] month(s) [FreeTextEntry1] : From a volume standpoint, Lisa is doing fairly well. She reports intermittent worsening LE edema, that improves with metolazone. She will keep an eye on her weights and will take metolazone 2.5 mg only as needed. Her weight is 196 lbs.  She will remain on her current dose of torsemide, along with Entresto, Coreg and spironolactone. She is ventricular paced, with underlying AF. She will remain on Digoxin. Her kidney function is normal. She is hesitant to try Faxiga given her frequent urination and pricing.  She will remain on her current medication regimen at this time.  She will remain on Coumadin for anticoagulation. She is following with the heart failure team in our office as well.  She is willing to continue physical therapy.   If no issues, I will see her again in 3 months. [EKG obtained to assist in diagnosis and management of assessed problem(s)] : EKG obtained to assist in diagnosis and management of assessed problem(s)

## 2024-05-22 NOTE — PHYSICAL EXAM

## 2024-06-07 ENCOUNTER — APPOINTMENT (OUTPATIENT)
Dept: CARDIOLOGY | Facility: CLINIC | Age: 74
End: 2024-06-07
Payer: MEDICARE

## 2024-06-07 VITALS — RESPIRATION RATE: 16 BRPM | BODY MASS INDEX: 33.46 KG/M2 | HEIGHT: 64 IN | WEIGHT: 196 LBS

## 2024-06-07 PROCEDURE — 85610 PROTHROMBIN TIME: CPT | Mod: QW

## 2024-06-07 PROCEDURE — 93793 ANTICOAG MGMT PT WARFARIN: CPT

## 2024-06-18 ENCOUNTER — NON-APPOINTMENT (OUTPATIENT)
Age: 74
End: 2024-06-18

## 2024-06-18 ENCOUNTER — APPOINTMENT (OUTPATIENT)
Dept: CARDIOLOGY | Facility: CLINIC | Age: 74
End: 2024-06-18
Payer: MEDICARE

## 2024-06-18 LAB
INR PPP: 2.3 RATIO
QUALITY CONTROL: YES

## 2024-06-18 PROCEDURE — 93793 ANTICOAG MGMT PT WARFARIN: CPT

## 2024-06-18 PROCEDURE — 85610 PROTHROMBIN TIME: CPT | Mod: QW

## 2024-06-19 ENCOUNTER — APPOINTMENT (OUTPATIENT)
Dept: CARDIOLOGY | Facility: CLINIC | Age: 74
End: 2024-06-19
Payer: MEDICARE

## 2024-06-19 PROCEDURE — 93294 REM INTERROG EVL PM/LDLS PM: CPT

## 2024-06-19 PROCEDURE — 93296 REM INTERROG EVL PM/IDS: CPT

## 2024-07-05 ENCOUNTER — APPOINTMENT (OUTPATIENT)
Dept: CARDIOLOGY | Facility: CLINIC | Age: 74
End: 2024-07-05
Payer: MEDICARE

## 2024-07-05 LAB
INR PPP: 2.7 RATIO
QUALITY CONTROL: YES

## 2024-07-05 PROCEDURE — 85610 PROTHROMBIN TIME: CPT | Mod: QW

## 2024-07-05 PROCEDURE — 93793 ANTICOAG MGMT PT WARFARIN: CPT

## 2024-07-16 ENCOUNTER — APPOINTMENT (OUTPATIENT)
Dept: CARDIOLOGY | Facility: CLINIC | Age: 74
End: 2024-07-16

## 2024-07-18 ENCOUNTER — APPOINTMENT (OUTPATIENT)
Dept: CARDIOLOGY | Facility: CLINIC | Age: 74
End: 2024-07-18
Payer: MEDICARE

## 2024-07-18 VITALS — SYSTOLIC BLOOD PRESSURE: 108 MMHG | DIASTOLIC BLOOD PRESSURE: 58 MMHG | HEART RATE: 76 BPM

## 2024-07-18 LAB
INR PPP: 2.6 RATIO
QUALITY CONTROL: YES

## 2024-07-18 PROCEDURE — 93793 ANTICOAG MGMT PT WARFARIN: CPT

## 2024-07-18 PROCEDURE — 85610 PROTHROMBIN TIME: CPT | Mod: QW

## 2024-08-08 ENCOUNTER — APPOINTMENT (OUTPATIENT)
Dept: CARDIOLOGY | Facility: CLINIC | Age: 74
End: 2024-08-08

## 2024-08-08 PROCEDURE — 85610 PROTHROMBIN TIME: CPT | Mod: QW

## 2024-08-08 PROCEDURE — 93793 ANTICOAG MGMT PT WARFARIN: CPT

## 2024-08-16 ENCOUNTER — RX RENEWAL (OUTPATIENT)
Age: 74
End: 2024-08-16

## 2024-08-20 ENCOUNTER — APPOINTMENT (OUTPATIENT)
Dept: CARDIOLOGY | Facility: CLINIC | Age: 74
End: 2024-08-20
Payer: MEDICARE

## 2024-08-20 VITALS — SYSTOLIC BLOOD PRESSURE: 110 MMHG | DIASTOLIC BLOOD PRESSURE: 60 MMHG

## 2024-08-20 LAB
INR PPP: 2.7 RATIO
QUALITY CONTROL: YES

## 2024-08-20 PROCEDURE — 85610 PROTHROMBIN TIME: CPT | Mod: QW

## 2024-08-20 PROCEDURE — 93793 ANTICOAG MGMT PT WARFARIN: CPT

## 2024-09-05 ENCOUNTER — APPOINTMENT (OUTPATIENT)
Dept: CARDIOLOGY | Facility: CLINIC | Age: 74
End: 2024-09-05
Payer: MEDICARE

## 2024-09-05 LAB
INR PPP: 2.6 RATIO
QUALITY CONTROL: YES

## 2024-09-05 PROCEDURE — 85610 PROTHROMBIN TIME: CPT | Mod: QW

## 2024-09-05 PROCEDURE — 93793 ANTICOAG MGMT PT WARFARIN: CPT

## 2024-09-17 ENCOUNTER — NON-APPOINTMENT (OUTPATIENT)
Age: 74
End: 2024-09-17

## 2024-09-18 ENCOUNTER — APPOINTMENT (OUTPATIENT)
Dept: CARDIOLOGY | Facility: CLINIC | Age: 74
End: 2024-09-18
Payer: MEDICARE

## 2024-09-18 PROCEDURE — 93294 REM INTERROG EVL PM/LDLS PM: CPT

## 2024-09-18 PROCEDURE — 93296 REM INTERROG EVL PM/IDS: CPT

## 2024-09-20 ENCOUNTER — NON-APPOINTMENT (OUTPATIENT)
Age: 74
End: 2024-09-20

## 2024-09-20 ENCOUNTER — APPOINTMENT (OUTPATIENT)
Dept: CARDIOLOGY | Facility: CLINIC | Age: 74
End: 2024-09-20

## 2024-09-20 VITALS
DIASTOLIC BLOOD PRESSURE: 63 MMHG | HEIGHT: 64 IN | WEIGHT: 210 LBS | BODY MASS INDEX: 35.85 KG/M2 | HEART RATE: 74 BPM | OXYGEN SATURATION: 95 % | SYSTOLIC BLOOD PRESSURE: 99 MMHG

## 2024-09-20 DIAGNOSIS — R94.31 ABNORMAL ELECTROCARDIOGRAM [ECG] [EKG]: ICD-10-CM

## 2024-09-20 DIAGNOSIS — I25.10 ATHEROSCLEROTIC HEART DISEASE OF NATIVE CORONARY ARTERY W/OUT ANGINA PECTORIS: ICD-10-CM

## 2024-09-20 DIAGNOSIS — I25.5 ISCHEMIC CARDIOMYOPATHY: ICD-10-CM

## 2024-09-20 LAB
INR PPP: 2.6 RATIO
QUALITY CONTROL: YES

## 2024-09-20 PROCEDURE — 99214 OFFICE O/P EST MOD 30 MIN: CPT

## 2024-09-20 PROCEDURE — 93000 ELECTROCARDIOGRAM COMPLETE: CPT

## 2024-09-20 PROCEDURE — 85610 PROTHROMBIN TIME: CPT | Mod: QW

## 2024-09-20 NOTE — HISTORY OF PRESENT ILLNESS
[FreeTextEntry1] : Ms. Barrios returns for evaluation of her cardiomyopathy and coronary artery disease.   Past medical history is remarkable for myocardial infarction, coronary artery disease status post coronary artery bypass surgery in 2007, history of inducible sustained ventricular tachycardia leading to placement of a biventricular/ICD (Guidant), history of ischemic cardiomyopathy, history of diabetes, history of hyperlipidemia,history of hyperthyroidism, history of proliferative diabetic retinopathy, and history of stroke with hemorrhage in 2010 at which time she had a long complicated course requiring craniotomy for intracranial decompression and removal of thrombus. In August of 2010, she had several ICD discharges felt to be due to rapid atrial fibrillation.  She has had several episodes of atypical chest pain and underwent cardiac catheterization 2018 leading to percutaneous intervention of the left circumflex artery and the first obtuse marginal artery with drug eluting stents.  A year later, she presented with decompensated heart failure has had adjustment of her medication. She had a Cardiomems placed but has not been able to get ongoing readings given her inability to lie flat from her prior stroke and brain surgery. She is now s/p extraction of her desmond lead and ICD generator replacement in 2020.  In 7/12/22, she was seen in the emergency department last week for mild decompensation of heart failure.  Her diuretic has been increased to daily dosing.  Her BNP at this time was 3274  I last saw her in 5/24. She has been doing well lately.  She is doing PT and feeling better.  She continues to report rare fatigue, and occasional dyspnea, cough, and lower extremity swelling. Her edema remains. She is now taking torsemide 60 mg po daily. She has not taken any metolazone. Her weight today is 210 lbs? up from 196 lbs. She reports a weight of 205 at home.   Echo performed in this office was in 4/2022, and demonstrated mild to moderate MR, a severely dilated left atrium, eccentric LVH, moderate to severe global left ventricular systolic dysfunction, and borderline pulmonary pressures. Findings were similar on a most recent study in 2023.

## 2024-09-20 NOTE — PHYSICAL EXAM

## 2024-09-20 NOTE — DISCUSSION/SUMMARY
[___ Month(s)] : in [unfilled] month(s) [FreeTextEntry1] : From a volume standpoint, Lisa is doing ok, though appears more volume overloaded today (and has gained weight).  She reports intermittent worsening LE edema, and I want her to take metolazone 2.5 for the next 2 days prior to her dose of torsemide. Her "dry" weight has generally been 196 lbs.  She will remain on her current dose of Entresto, Coreg and spironolactone. She is ventricular paced, with underlying AF. She will remain on Digoxin. Her kidney function is normal. She is hesitant to try Faxiga given her frequent urination and pricing.  She will remain on her current medication regimen at this time.  She will remain on Coumadin for anticoagulation. She is following with the heart failure team as well.  She is willing to continue physical therapy.  If no issues, I will see her again in 3 months. [EKG obtained to assist in diagnosis and management of assessed problem(s)] : EKG obtained to assist in diagnosis and management of assessed problem(s)

## 2024-09-20 NOTE — REVIEW OF SYSTEMS
[Feeling Fatigued] : feeling fatigued [Negative] : Heme/Lymph [Lower Ext Edema] : lower extremity edema

## 2024-10-01 ENCOUNTER — APPOINTMENT (OUTPATIENT)
Dept: CARDIOLOGY | Facility: CLINIC | Age: 74
End: 2024-10-01
Payer: MEDICARE

## 2024-10-01 VITALS — DIASTOLIC BLOOD PRESSURE: 58 MMHG | SYSTOLIC BLOOD PRESSURE: 108 MMHG

## 2024-10-01 LAB
INR PPP: 3 RATIO
QUALITY CONTROL: YES

## 2024-10-01 PROCEDURE — 93793 ANTICOAG MGMT PT WARFARIN: CPT

## 2024-10-01 PROCEDURE — 85610 PROTHROMBIN TIME: CPT | Mod: QW

## 2024-10-22 ENCOUNTER — APPOINTMENT (OUTPATIENT)
Dept: CARDIOLOGY | Facility: CLINIC | Age: 74
End: 2024-10-22
Payer: MEDICARE

## 2024-10-22 VITALS — DIASTOLIC BLOOD PRESSURE: 58 MMHG | SYSTOLIC BLOOD PRESSURE: 102 MMHG

## 2024-10-22 LAB
INR PPP: 3.9 RATIO
QUALITY CONTROL: YES

## 2024-10-22 PROCEDURE — 93793 ANTICOAG MGMT PT WARFARIN: CPT

## 2024-10-22 PROCEDURE — 85610 PROTHROMBIN TIME: CPT | Mod: QW

## 2024-11-06 ENCOUNTER — APPOINTMENT (OUTPATIENT)
Dept: CARDIOLOGY | Facility: CLINIC | Age: 74
End: 2024-11-06

## 2024-11-12 ENCOUNTER — APPOINTMENT (OUTPATIENT)
Dept: CARDIOLOGY | Facility: CLINIC | Age: 74
End: 2024-11-12
Payer: MEDICARE

## 2024-11-12 VITALS — HEIGHT: 64 IN | RESPIRATION RATE: 16 BRPM | WEIGHT: 210 LBS | BODY MASS INDEX: 35.85 KG/M2

## 2024-11-12 LAB
INR PPP: 3 RATIO
QUALITY CONTROL: YES

## 2024-11-12 PROCEDURE — 85610 PROTHROMBIN TIME: CPT | Mod: QW

## 2024-11-12 PROCEDURE — 93793 ANTICOAG MGMT PT WARFARIN: CPT

## 2024-12-03 ENCOUNTER — APPOINTMENT (OUTPATIENT)
Dept: CARDIOLOGY | Facility: CLINIC | Age: 74
End: 2024-12-03
Payer: MEDICARE

## 2024-12-03 PROCEDURE — 85610 PROTHROMBIN TIME: CPT | Mod: QW

## 2024-12-03 PROCEDURE — 93793 ANTICOAG MGMT PT WARFARIN: CPT

## 2024-12-04 LAB
INR PPP: 3.7 RATIO
QUALITY CONTROL: YES

## 2024-12-13 ENCOUNTER — NON-APPOINTMENT (OUTPATIENT)
Age: 74
End: 2024-12-13

## 2024-12-13 ENCOUNTER — APPOINTMENT (OUTPATIENT)
Dept: CARDIOLOGY | Facility: CLINIC | Age: 74
End: 2024-12-13
Payer: MEDICARE

## 2024-12-13 VITALS
DIASTOLIC BLOOD PRESSURE: 51 MMHG | HEART RATE: 70 BPM | HEIGHT: 64 IN | BODY MASS INDEX: 35.17 KG/M2 | WEIGHT: 206 LBS | SYSTOLIC BLOOD PRESSURE: 96 MMHG | OXYGEN SATURATION: 98 %

## 2024-12-13 VITALS — SYSTOLIC BLOOD PRESSURE: 100 MMHG | DIASTOLIC BLOOD PRESSURE: 58 MMHG

## 2024-12-13 DIAGNOSIS — R06.00 DYSPNEA, UNSPECIFIED: ICD-10-CM

## 2024-12-13 LAB
INR PPP: 2.9 RATIO
QUALITY CONTROL: YES

## 2024-12-13 PROCEDURE — 99214 OFFICE O/P EST MOD 30 MIN: CPT

## 2024-12-13 PROCEDURE — 93000 ELECTROCARDIOGRAM COMPLETE: CPT

## 2024-12-13 PROCEDURE — 85610 PROTHROMBIN TIME: CPT | Mod: QW

## 2024-12-18 ENCOUNTER — APPOINTMENT (OUTPATIENT)
Dept: CARDIOLOGY | Facility: CLINIC | Age: 74
End: 2024-12-18

## 2024-12-18 PROCEDURE — 93295 DEV INTERROG REMOTE 1/2/MLT: CPT

## 2024-12-18 PROCEDURE — 93296 REM INTERROG EVL PM/IDS: CPT

## 2024-12-26 ENCOUNTER — LABORATORY RESULT (OUTPATIENT)
Age: 74
End: 2024-12-26

## 2024-12-27 ENCOUNTER — LABORATORY RESULT (OUTPATIENT)
Age: 74
End: 2024-12-27

## 2024-12-30 ENCOUNTER — LABORATORY RESULT (OUTPATIENT)
Age: 74
End: 2024-12-30

## 2024-12-30 ENCOUNTER — RX RENEWAL (OUTPATIENT)
Age: 74
End: 2024-12-30

## 2024-12-31 ENCOUNTER — LABORATORY RESULT (OUTPATIENT)
Age: 74
End: 2024-12-31

## 2025-01-02 ENCOUNTER — LABORATORY RESULT (OUTPATIENT)
Age: 75
End: 2025-01-02

## 2025-01-03 ENCOUNTER — NON-APPOINTMENT (OUTPATIENT)
Age: 75
End: 2025-01-03

## 2025-01-20 ENCOUNTER — LABORATORY RESULT (OUTPATIENT)
Age: 75
End: 2025-01-20

## 2025-02-18 ENCOUNTER — LABORATORY RESULT (OUTPATIENT)
Age: 75
End: 2025-02-18

## 2025-02-18 ENCOUNTER — NON-APPOINTMENT (OUTPATIENT)
Age: 75
End: 2025-02-18

## 2025-02-19 ENCOUNTER — LABORATORY RESULT (OUTPATIENT)
Age: 75
End: 2025-02-19

## 2025-02-20 ENCOUNTER — LABORATORY RESULT (OUTPATIENT)
Age: 75
End: 2025-02-20

## 2025-03-04 ENCOUNTER — LABORATORY RESULT (OUTPATIENT)
Age: 75
End: 2025-03-04

## 2025-03-05 ENCOUNTER — LABORATORY RESULT (OUTPATIENT)
Age: 75
End: 2025-03-05

## 2025-03-06 ENCOUNTER — LABORATORY RESULT (OUTPATIENT)
Age: 75
End: 2025-03-06

## 2025-03-07 ENCOUNTER — LABORATORY RESULT (OUTPATIENT)
Age: 75
End: 2025-03-07

## 2025-03-10 ENCOUNTER — NON-APPOINTMENT (OUTPATIENT)
Age: 75
End: 2025-03-10

## 2025-03-14 NOTE — ED ADULT NURSE NOTE - BREATHING INTERVENTIONS
Detail Level: Detailed
Quality 226: Preventive Care And Screening: Tobacco Use: Screening And Cessation Intervention: Patient screened for tobacco use and is an ex/non-smoker
2ln/c/Oxygen

## 2025-03-17 ENCOUNTER — LABORATORY RESULT (OUTPATIENT)
Age: 75
End: 2025-03-17

## 2025-03-17 ENCOUNTER — NON-APPOINTMENT (OUTPATIENT)
Age: 75
End: 2025-03-17

## 2025-03-19 ENCOUNTER — APPOINTMENT (OUTPATIENT)
Dept: CARDIOLOGY | Facility: CLINIC | Age: 75
End: 2025-03-19

## 2025-03-19 ENCOUNTER — NON-APPOINTMENT (OUTPATIENT)
Age: 75
End: 2025-03-19

## 2025-03-19 PROCEDURE — 93296 REM INTERROG EVL PM/IDS: CPT

## 2025-03-19 PROCEDURE — 93295 DEV INTERROG REMOTE 1/2/MLT: CPT

## 2025-03-24 ENCOUNTER — NON-APPOINTMENT (OUTPATIENT)
Age: 75
End: 2025-03-24

## 2025-03-24 ENCOUNTER — LABORATORY RESULT (OUTPATIENT)
Age: 75
End: 2025-03-24

## 2025-04-01 ENCOUNTER — NON-APPOINTMENT (OUTPATIENT)
Age: 75
End: 2025-04-01

## 2025-04-01 ENCOUNTER — LABORATORY RESULT (OUTPATIENT)
Age: 75
End: 2025-04-01

## 2025-04-02 ENCOUNTER — NON-APPOINTMENT (OUTPATIENT)
Age: 75
End: 2025-04-02

## 2025-04-15 ENCOUNTER — LABORATORY RESULT (OUTPATIENT)
Age: 75
End: 2025-04-15

## 2025-04-16 ENCOUNTER — LABORATORY RESULT (OUTPATIENT)
Age: 75
End: 2025-04-16

## 2025-04-16 ENCOUNTER — NON-APPOINTMENT (OUTPATIENT)
Age: 75
End: 2025-04-16

## 2025-04-21 ENCOUNTER — NON-APPOINTMENT (OUTPATIENT)
Age: 75
End: 2025-04-21

## 2025-04-21 ENCOUNTER — APPOINTMENT (OUTPATIENT)
Dept: CARDIOLOGY | Facility: CLINIC | Age: 75
End: 2025-04-21
Payer: MEDICARE

## 2025-04-21 VITALS — OXYGEN SATURATION: 95 % | HEIGHT: 64 IN | HEART RATE: 77 BPM

## 2025-04-21 VITALS — DIASTOLIC BLOOD PRESSURE: 65 MMHG | SYSTOLIC BLOOD PRESSURE: 105 MMHG

## 2025-04-21 DIAGNOSIS — I50.20 UNSPECIFIED SYSTOLIC (CONGESTIVE) HEART FAILURE: ICD-10-CM

## 2025-04-21 DIAGNOSIS — R06.00 DYSPNEA, UNSPECIFIED: ICD-10-CM

## 2025-04-21 PROCEDURE — 99214 OFFICE O/P EST MOD 30 MIN: CPT

## 2025-04-21 PROCEDURE — 93000 ELECTROCARDIOGRAM COMPLETE: CPT

## 2025-04-24 ENCOUNTER — NON-APPOINTMENT (OUTPATIENT)
Age: 75
End: 2025-04-24

## 2025-04-24 ENCOUNTER — LABORATORY RESULT (OUTPATIENT)
Age: 75
End: 2025-04-24

## 2025-05-09 LAB — INR PPP: 2.63

## 2025-05-22 ENCOUNTER — LABORATORY RESULT (OUTPATIENT)
Age: 75
End: 2025-05-22

## 2025-05-23 ENCOUNTER — LABORATORY RESULT (OUTPATIENT)
Age: 75
End: 2025-05-23

## 2025-05-23 ENCOUNTER — NON-APPOINTMENT (OUTPATIENT)
Age: 75
End: 2025-05-23

## 2025-05-27 ENCOUNTER — APPOINTMENT (OUTPATIENT)
Dept: CARDIOLOGY | Facility: CLINIC | Age: 75
End: 2025-05-27
Payer: MEDICARE

## 2025-05-27 PROCEDURE — 93306 TTE W/DOPPLER COMPLETE: CPT

## 2025-06-02 ENCOUNTER — LABORATORY RESULT (OUTPATIENT)
Age: 75
End: 2025-06-02

## 2025-06-03 ENCOUNTER — LABORATORY RESULT (OUTPATIENT)
Age: 75
End: 2025-06-03

## 2025-06-06 ENCOUNTER — NON-APPOINTMENT (OUTPATIENT)
Age: 75
End: 2025-06-06

## 2025-06-06 ENCOUNTER — LABORATORY RESULT (OUTPATIENT)
Age: 75
End: 2025-06-06

## 2025-06-18 ENCOUNTER — NON-APPOINTMENT (OUTPATIENT)
Age: 75
End: 2025-06-18

## 2025-06-18 ENCOUNTER — APPOINTMENT (OUTPATIENT)
Dept: CARDIOLOGY | Facility: CLINIC | Age: 75
End: 2025-06-18

## 2025-06-18 PROCEDURE — 93295 DEV INTERROG REMOTE 1/2/MLT: CPT

## 2025-06-18 PROCEDURE — 93296 REM INTERROG EVL PM/IDS: CPT

## 2025-06-23 NOTE — ED PROVIDER NOTE - PEDAL EDEMA LATERALITY
PROVIDER:[TOKEN:[[id number]],SCHEDULEDAPPT:[07/02/2025],SCHEDULEDAPPTTIME:[09:15 AM],ESTABLISHEDPATIENT:[T]]
BILATERAL

## 2025-06-24 ENCOUNTER — LABORATORY RESULT (OUTPATIENT)
Age: 75
End: 2025-06-24

## 2025-06-24 ENCOUNTER — NON-APPOINTMENT (OUTPATIENT)
Age: 75
End: 2025-06-24

## 2025-07-08 ENCOUNTER — NON-APPOINTMENT (OUTPATIENT)
Age: 75
End: 2025-07-08

## 2025-07-08 ENCOUNTER — LABORATORY RESULT (OUTPATIENT)
Age: 75
End: 2025-07-08

## 2025-07-22 ENCOUNTER — LABORATORY RESULT (OUTPATIENT)
Age: 75
End: 2025-07-22

## 2025-07-22 ENCOUNTER — NON-APPOINTMENT (OUTPATIENT)
Age: 75
End: 2025-07-22

## 2025-08-06 ENCOUNTER — LABORATORY RESULT (OUTPATIENT)
Age: 75
End: 2025-08-06

## 2025-08-06 ENCOUNTER — NON-APPOINTMENT (OUTPATIENT)
Age: 75
End: 2025-08-06

## 2025-08-20 ENCOUNTER — LABORATORY RESULT (OUTPATIENT)
Age: 75
End: 2025-08-20

## 2025-08-21 ENCOUNTER — LABORATORY RESULT (OUTPATIENT)
Age: 75
End: 2025-08-21

## 2025-08-21 ENCOUNTER — NON-APPOINTMENT (OUTPATIENT)
Age: 75
End: 2025-08-21

## 2025-08-28 ENCOUNTER — APPOINTMENT (OUTPATIENT)
Dept: CARDIOLOGY | Facility: CLINIC | Age: 75
End: 2025-08-28

## 2025-08-28 ENCOUNTER — APPOINTMENT (OUTPATIENT)
Dept: CARDIOLOGY | Facility: CLINIC | Age: 75
End: 2025-08-28
Payer: MEDICARE

## 2025-08-28 VITALS
SYSTOLIC BLOOD PRESSURE: 124 MMHG | OXYGEN SATURATION: 94 % | HEIGHT: 64 IN | DIASTOLIC BLOOD PRESSURE: 66 MMHG | HEART RATE: 71 BPM

## 2025-08-28 VITALS — DIASTOLIC BLOOD PRESSURE: 65 MMHG | SYSTOLIC BLOOD PRESSURE: 99 MMHG

## 2025-08-28 DIAGNOSIS — R06.00 DYSPNEA, UNSPECIFIED: ICD-10-CM

## 2025-08-28 DIAGNOSIS — I50.20 UNSPECIFIED SYSTOLIC (CONGESTIVE) HEART FAILURE: ICD-10-CM

## 2025-08-28 LAB
INR PPP: 2.9 RATIO
QUALITY CONTROL: YES

## 2025-08-28 PROCEDURE — 99214 OFFICE O/P EST MOD 30 MIN: CPT

## 2025-08-28 PROCEDURE — 93000 ELECTROCARDIOGRAM COMPLETE: CPT

## 2025-09-09 ENCOUNTER — NON-APPOINTMENT (OUTPATIENT)
Age: 75
End: 2025-09-09

## 2025-09-09 ENCOUNTER — LABORATORY RESULT (OUTPATIENT)
Age: 75
End: 2025-09-09

## 2025-09-17 ENCOUNTER — NON-APPOINTMENT (OUTPATIENT)
Age: 75
End: 2025-09-17

## 2025-09-17 ENCOUNTER — APPOINTMENT (OUTPATIENT)
Dept: CARDIOLOGY | Facility: CLINIC | Age: 75
End: 2025-09-17
Payer: MEDICARE

## 2025-09-17 PROCEDURE — 93296 REM INTERROG EVL PM/IDS: CPT

## 2025-09-17 PROCEDURE — 93295 DEV INTERROG REMOTE 1/2/MLT: CPT
